# Patient Record
Sex: MALE | Race: WHITE | NOT HISPANIC OR LATINO | Employment: FULL TIME | URBAN - METROPOLITAN AREA
[De-identification: names, ages, dates, MRNs, and addresses within clinical notes are randomized per-mention and may not be internally consistent; named-entity substitution may affect disease eponyms.]

---

## 2021-10-14 ENCOUNTER — OFFICE VISIT (OUTPATIENT)
Dept: FAMILY MEDICINE | Facility: CLINIC | Age: 46
End: 2021-10-14
Payer: COMMERCIAL

## 2021-10-14 VITALS
DIASTOLIC BLOOD PRESSURE: 84 MMHG | RESPIRATION RATE: 20 BRPM | HEART RATE: 73 BPM | BODY MASS INDEX: 31.21 KG/M2 | OXYGEN SATURATION: 97 % | SYSTOLIC BLOOD PRESSURE: 136 MMHG | HEIGHT: 70 IN | WEIGHT: 218 LBS | TEMPERATURE: 98.6 F

## 2021-10-14 DIAGNOSIS — R31.9 HEMATURIA, UNSPECIFIED TYPE: Primary | ICD-10-CM

## 2021-10-14 DIAGNOSIS — R11.0 NAUSEA: ICD-10-CM

## 2021-10-14 DIAGNOSIS — R10.9 RIGHT FLANK PAIN: ICD-10-CM

## 2021-10-14 LAB
ALBUMIN UR-MCNC: ABNORMAL MG/DL
APPEARANCE UR: CLEAR
BACTERIA #/AREA URNS HPF: ABNORMAL /HPF
BILIRUB UR QL STRIP: NEGATIVE
COLOR UR AUTO: YELLOW
GLUCOSE UR STRIP-MCNC: NEGATIVE MG/DL
HGB UR QL STRIP: ABNORMAL
KETONES UR STRIP-MCNC: NEGATIVE MG/DL
LEUKOCYTE ESTERASE UR QL STRIP: NEGATIVE
NITRATE UR QL: NEGATIVE
PH UR STRIP: 6.5 [PH] (ref 5–7)
RBC #/AREA URNS AUTO: ABNORMAL /HPF
SP GR UR STRIP: 1.02 (ref 1–1.03)
UROBILINOGEN UR STRIP-ACNC: 0.2 E.U./DL
WBC #/AREA URNS AUTO: ABNORMAL /HPF

## 2021-10-14 PROCEDURE — 99204 OFFICE O/P NEW MOD 45 MIN: CPT | Performed by: PHYSICIAN ASSISTANT

## 2021-10-14 PROCEDURE — 81001 URINALYSIS AUTO W/SCOPE: CPT | Performed by: PHYSICIAN ASSISTANT

## 2021-10-14 RX ORDER — ONDANSETRON 4 MG/1
4 TABLET, ORALLY DISINTEGRATING ORAL EVERY 8 HOURS PRN
Qty: 5 TABLET | Refills: 0 | Status: SHIPPED | OUTPATIENT
Start: 2021-10-14 | End: 2021-11-10

## 2021-10-14 RX ORDER — TAMSULOSIN HYDROCHLORIDE 0.4 MG/1
0.4 CAPSULE ORAL DAILY
Qty: 7 CAPSULE | Refills: 0 | Status: SHIPPED | OUTPATIENT
Start: 2021-10-14 | End: 2021-11-10

## 2021-10-14 ASSESSMENT — ENCOUNTER SYMPTOMS
FEVER: 0
HEMATURIA: 1
LIGHT-HEADEDNESS: 0
DIARRHEA: 0
VOMITING: 0
NAUSEA: 1
NERVOUS/ANXIOUS: 0
FREQUENCY: 0
CONSTIPATION: 0
DIFFICULTY URINATING: 0
SHORTNESS OF BREATH: 0
ABDOMINAL PAIN: 1
DIZZINESS: 0
DYSURIA: 0
FLANK PAIN: 1

## 2021-10-14 ASSESSMENT — MIFFLIN-ST. JEOR: SCORE: 1875.09

## 2021-10-14 ASSESSMENT — PAIN SCALES - GENERAL: PAINLEVEL: SEVERE PAIN (7)

## 2021-10-14 NOTE — PATIENT INSTRUCTIONS
Your symptoms are concerning for a kidney stone.  Please see the handout at the end of the packet regarding kidney stones.    -We are completing a urine study and blood work today and will send results to [employer] or will call you if this is not set up.  -For further evaluation of the cause of your symptoms, I have placed an order for a CT scan.  Please call [phone] to schedule this.  I would like you to complete this before the end of the week if possible.  -To help any stones pass, I have prescribed Flomax.  -For nausea, I have prescribed Zofran.  -For pain management, I would like you to take ibuprofen as recommended on the bottle.    -Please schedule a follow-up with one of our primary care providers within the next month for a yearly physical.    -If you develop any severe symptoms such as fevers, vomiting, severe pain, or any other severe symptoms, please go to the emergency department.    Reach out with questions or concerns.    Take Care,   Fern Hussein PA-C        Patient Education     Blood in Urine (Hematuria)   Blood in your urine is called hematuria. Most of the time, the cause is not serious. But you should never ignore blood in the urine. Your healthcare provider can evaluate you to find the cause of the bleeding and treat it, if needed.   Types of hematuria    Gross hematuria. This means that the blood can be seen by the naked eye. The urine may look pinkish, brownish, or bright red.    Microscopic hematuria. This means that the urine is clear, but blood cells can be seen when urine is looked at under a microscope or tested in a lab.  Both types of hematuria can have the same causes. Neither one is more serious than the other. With either type, you may not have any other symptoms at all. Or you may have other symptoms such as:     Pain, pressure, or burning when you urinate    Belly pain    Back pain  No matter how much blood is found, the cause of the bleeding needs to be identified.   What  causes hematuria?  Causes of hematuria vary. They include things such as:     Injury    Strenuous exercise    Infection of the bladder, kidney, or prostate    Menstruation  Other reasons people may have blood in their urine are more serious. They include:       Blood-clotting disorders    Bladder or kidney cancer    Sickle cell disease    Inflammation of the kidney, urethra, bladder, or prostate  Many treatments are available for blood in the urine, depending on the cause.   Diagnosing hematuria  Your healthcare provider will first confirm that blood is in your urine. He or she will also take your health history and give you a physical exam. Then other tests are done to find exactly where the blood is coming from and why. Your provider will decide which tests will best figure out the cause of your hematuria. These are some common tests that may be done:     Lab tests (may include urinalysis, a urine culture, a urine cytology, and blood tests)    Cystoscopy    CT or CT urography    MRI or MR urography    Ultrasound of the kidney    Kidney biopsy  [employer] last reviewed this educational content on 6/1/2019 2000-2021 [employer]. All rights reserved. This information is not intended as a substitute for professional medical care. Always follow your healthcare professional's instructions.           Patient Education     Understanding Kidney Stones  Your kidneys are bean-shaped organs. They help filter extra salts, waste, and water from your body. You need to drink enough water every day to help flush the extra salts into your urine. Aim for 6 to 8 8-ounce cups every day.   What are kidney stones?  Kidney stones are made up of chemical crystals that separate out from urine. These crystals clump together to make stones. They may stay in the kidney or move into the urinary tract.    Why kidney stones form  Kidneys form stones for many reasons. If you don t drink enough water, for instance, you won t have  enough urine to dilute chemicals. Then the chemicals may form crystals, which can develop into stones. Here are some reasons why kidney stones form:     Fluid loss (dehydration). This can concentrate urine, causing stones to form.    Certain foods. Some foods contain large amounts of the chemicals that sometimes crystallize into stones. Eating foods that contain a lot of meat or salt can lead to more kidney stones.    Kidney infections. These infections foster stones by slowing urine flow or changing the acid balance of your urine.    Family history. If family members have had kidney stones, you re more likely to have them, too.    A lack of certain substances in your urine. Some substances can help protect you from forming stones. If you don t have enough of these in your urine, stone formation can increase.  Where stones form  Stones begin in the cup-shaped part of the kidney (calyx). Some stay in the calyx and grow. Others move into the kidney, pelvis, or into the ureter. There they can lodge, block the flow of urine, and cause pain.     Symptoms  Many stones cause sudden and severe pain and bloody urine. Others cause nausea or frequent, burning urination. Symptoms often depend on your stone s size and location. Fever may indicate a serious infection. Call your healthcare provider right away if you develop a fever.   [employer] last reviewed this educational content on 2/1/2020 2000-2021 [employer]. All rights reserved. This information is not intended as a substitute for professional medical care. Always follow your healthcare professional's instructions.

## 2021-10-14 NOTE — PROGRESS NOTES
Assessment & Plan     Hematuria, unspecified type  Right flank pain  Nausea  Patient is a 46-year-old male who presents to clinic due to 1 day of change in urine color that developed into hematuria as well as right flank pain starting 2 days ago.  Pain is now at right groin and rated 5/10.  Vital signs normal.  Physical exam significant for RLQ tenderness to palpation without rebound or guarding.  No CVA tenderness. No history of kidney stones.     Differential diagnosis includes kidney stone, mass, acute intrarenal abnormalities.  Symptoms are most consistent with kidney stone.  Patient unable to provide urine sample initially.  Will complete urinalysis as well as basic labs at end of visit.  Patient prescribed Flomax to help stone pass.  Recommended ibuprofen for pain management.  Discussed the importance of hydration.  Zofran prescribed for nausea.  Patient to follow-up with CT within the next 1-2 days for further evaluation.  Discussed importance of emergent follow-up for any new or worsening symptoms.  Patient agreeable.    - UA with Microscopic reflex to Culture - Clinic Collect  - tamsulosin (FLOMAX) 0.4 MG capsule; Take 1 capsule (0.4 mg) by mouth daily  - CT Abdomen Pelvis w/o Contrast; Future  - CBC with platelets; Future  - Basic metabolic panel  (Ca, Cl, CO2, Creat, Gluc, K, Na, BUN); Future  - Urine Microscopic  - ondansetron (ZOFRAN-ODT) 4 MG ODT tab; Take 1 tablet (4 mg) by mouth every 8 hours as needed for nausea    See Patient Instructions    Return in about 1 week (around 10/21/2021), or if symptoms worsen or fail to improve.    [facility] SAMUEL Jay is a 46 year old who presents for the following health issues     HPI     Genitourinary - Male  Onset/Duration: 1 day. Yesterday at work patient noticed a change in urine color which developed into hematuria today. Patient had right upper flank pain a few days ago. Pain is now at RLQ/groin rated  "5/10.  No history of kidney stones.  Description:   Dysuria (painful urination): no}  Hematuria (blood in urine): YES  Frequency: no  Waking at night to urinate: no  Hesitancy (delay in urine): no  Retention (unable to empty): no  Decrease in urinary flow: no  Incontinence: no  Progression of Symptoms:  worsening  Accompanying Signs & Symptoms:  Fever: no  Back/Flank pain: YES- right low back pain that radiates into the groin causing a pulling feeling  Urethral discharge: no  Testicle lumps/masses/pain: no  Nausea and/or vomiting: no  Abdominal pain: no  History:   History of frequent UTI s: no  History of kidney stones: no  History of hernias: no  Personal or Family history of Prostate problems: no  Sexually active: no  Precipitating or alleviating factors: None  Therapies tried and outcome: none        Review of Systems   Constitutional: Negative for fever.   Respiratory: Negative for shortness of breath.    Cardiovascular: Negative for chest pain.   Gastrointestinal: Positive for abdominal pain (RLQ) and nausea (mild). Negative for constipation, diarrhea and vomiting.   Genitourinary: Positive for flank pain (resolved ) and hematuria. Negative for difficulty urinating, dysuria, frequency and urgency.   Skin: Negative for rash.   Neurological: Negative for dizziness and light-headedness.   Psychiatric/Behavioral: The patient is not nervous/anxious.             Objective    /84   Pulse 73   Temp 98.6  F (37  C) (Tympanic)   Resp 20   Ht 1.778 m (5' 10\")   Wt 98.9 kg (218 lb)   SpO2 97%   BMI 31.28 kg/m    Body mass index is 31.28 kg/m .  Physical Exam  Vitals and nursing note reviewed.   Constitutional:       General: He is not in acute distress.     Appearance: Normal appearance.   HENT:      Head: Normocephalic and atraumatic.   Eyes:      Extraocular Movements: Extraocular movements intact.      Pupils: Pupils are equal, round, and reactive to light.   Cardiovascular:      Rate and Rhythm: Normal " rate and regular rhythm.      Heart sounds: Normal heart sounds.   Pulmonary:      Effort: Pulmonary effort is normal.      Breath sounds: Normal breath sounds.   Abdominal:      General: Bowel sounds are normal.      Palpations: Abdomen is soft.      Tenderness: There is abdominal tenderness (RLQ). There is no right CVA tenderness, left CVA tenderness, guarding or rebound.   Musculoskeletal:         General: Normal range of motion.      Cervical back: Normal range of motion.   Skin:     General: Skin is warm and dry.   Neurological:      General: No focal deficit present.      Mental Status: He is alert.   Psychiatric:         Mood and Affect: Mood normal.         Behavior: Behavior normal.

## 2021-10-15 ENCOUNTER — ANCILLARY PROCEDURE (OUTPATIENT)
Dept: CT IMAGING | Facility: CLINIC | Age: 46
End: 2021-10-15
Attending: PHYSICIAN ASSISTANT
Payer: COMMERCIAL

## 2021-10-15 ENCOUNTER — TELEPHONE (OUTPATIENT)
Dept: FAMILY MEDICINE | Facility: CLINIC | Age: 46
End: 2021-10-15

## 2021-10-15 DIAGNOSIS — N28.89 LEFT KIDNEY MASS: Primary | ICD-10-CM

## 2021-10-15 DIAGNOSIS — R31.9 HEMATURIA, UNSPECIFIED TYPE: ICD-10-CM

## 2021-10-15 DIAGNOSIS — R10.9 RIGHT FLANK PAIN: ICD-10-CM

## 2021-10-15 LAB — RADIOLOGIST FLAGS: NORMAL

## 2021-10-15 PROCEDURE — 74176 CT ABD & PELVIS W/O CONTRAST: CPT | Mod: TC | Performed by: RADIOLOGY

## 2021-10-15 NOTE — TELEPHONE ENCOUNTER
I left 2 messages for patient to call back to clinic regarding lab work. First message left yesterday after appointment and second left today. I forgot to bring patient to lab for blood draw after clinic visit 10/14. I left clin ic # to call back to set this up.  Yasmin Singh CMA

## 2021-10-18 ENCOUNTER — PRE VISIT (OUTPATIENT)
Dept: UROLOGY | Facility: CLINIC | Age: 46
End: 2021-10-18

## 2021-10-18 DIAGNOSIS — R31.0 GROSS HEMATURIA: Primary | ICD-10-CM

## 2021-10-19 ENCOUNTER — TELEPHONE (OUTPATIENT)
Dept: UROLOGY | Facility: CLINIC | Age: 46
End: 2021-10-19

## 2021-10-19 NOTE — TELEPHONE ENCOUNTER
----- Message from Lacey Cortes sent at 10/18/2021  7:17 PM CDT -----  Regarding: FW: Cystoscopy and also imaging prior to visit  Hello,  Could you schedule this pt for a CT urogram before his visit with Dr. Bledsoe on 10/20/2021.    Thank you  ----- Message -----  From: Vicki Bledsoe MD  Sent: 10/18/2021   6:24 PM CDT  To: Lacey Cortes  Subject: Cystoscopy and also imaging prior to visit       Donnie Rahman,     Please be ready to scope him. Also it'd be great if he can get a CT urogram prior to his visit with me.     Thanks

## 2021-10-19 NOTE — TELEPHONE ENCOUNTER
Reason for visit: cystoscopy     Relevant information: kidney mass, hematuria    Problem list There is no problem list on file for this patient.      Records/imaging/labs/orders: labs and imaging available. Ct urogram ordered.    Pt called: n/a    At Rooming: collect urine with Cx bladder kit. have a extra luer-agustina syringe available to collect urine during cysto. No urojet, give cipro.

## 2021-10-20 ENCOUNTER — OFFICE VISIT (OUTPATIENT)
Dept: UROLOGY | Facility: CLINIC | Age: 46
End: 2021-10-20
Attending: PHYSICIAN ASSISTANT
Payer: COMMERCIAL

## 2021-10-20 ENCOUNTER — LAB (OUTPATIENT)
Dept: LAB | Facility: CLINIC | Age: 46
End: 2021-10-20
Payer: COMMERCIAL

## 2021-10-20 VITALS
WEIGHT: 218 LBS | BODY MASS INDEX: 31.21 KG/M2 | DIASTOLIC BLOOD PRESSURE: 95 MMHG | HEIGHT: 70 IN | SYSTOLIC BLOOD PRESSURE: 150 MMHG | HEART RATE: 74 BPM

## 2021-10-20 DIAGNOSIS — R10.9 RIGHT FLANK PAIN: ICD-10-CM

## 2021-10-20 DIAGNOSIS — R31.9 HEMATURIA, UNSPECIFIED TYPE: ICD-10-CM

## 2021-10-20 DIAGNOSIS — R31.0 GROSS HEMATURIA: Primary | ICD-10-CM

## 2021-10-20 DIAGNOSIS — N28.89 LEFT KIDNEY MASS: ICD-10-CM

## 2021-10-20 LAB
ANION GAP SERPL CALCULATED.3IONS-SCNC: 3 MMOL/L (ref 3–14)
BUN SERPL-MCNC: 12 MG/DL (ref 7–30)
CALCIUM SERPL-MCNC: 8.9 MG/DL (ref 8.5–10.1)
CHLORIDE BLD-SCNC: 111 MMOL/L (ref 94–109)
CO2 SERPL-SCNC: 29 MMOL/L (ref 20–32)
CREAT SERPL-MCNC: 0.96 MG/DL (ref 0.66–1.25)
ERYTHROCYTE [DISTWIDTH] IN BLOOD BY AUTOMATED COUNT: 12.5 % (ref 10–15)
GFR SERPL CREATININE-BSD FRML MDRD: >90 ML/MIN/1.73M2
GLUCOSE BLD-MCNC: 129 MG/DL (ref 70–99)
HCT VFR BLD AUTO: 42 % (ref 40–53)
HGB BLD-MCNC: 14.4 G/DL (ref 13.3–17.7)
MCH RBC QN AUTO: 29.3 PG (ref 26.5–33)
MCHC RBC AUTO-ENTMCNC: 34.3 G/DL (ref 31.5–36.5)
MCV RBC AUTO: 86 FL (ref 78–100)
PLATELET # BLD AUTO: 229 10E3/UL (ref 150–450)
POTASSIUM BLD-SCNC: 4.2 MMOL/L (ref 3.4–5.3)
RBC # BLD AUTO: 4.91 10E6/UL (ref 4.4–5.9)
SODIUM SERPL-SCNC: 143 MMOL/L (ref 133–144)
WBC # BLD AUTO: 8 10E3/UL (ref 4–11)

## 2021-10-20 PROCEDURE — 80048 BASIC METABOLIC PNL TOTAL CA: CPT

## 2021-10-20 PROCEDURE — 88112 CYTOPATH CELL ENHANCE TECH: CPT | Mod: 26 | Performed by: PATHOLOGY

## 2021-10-20 PROCEDURE — 52000 CYSTOURETHROSCOPY: CPT | Performed by: UROLOGY

## 2021-10-20 PROCEDURE — 99204 OFFICE O/P NEW MOD 45 MIN: CPT | Mod: 25 | Performed by: UROLOGY

## 2021-10-20 PROCEDURE — 36415 COLL VENOUS BLD VENIPUNCTURE: CPT

## 2021-10-20 PROCEDURE — 85027 COMPLETE CBC AUTOMATED: CPT

## 2021-10-20 PROCEDURE — 88112 CYTOPATH CELL ENHANCE TECH: CPT | Mod: TC | Performed by: UROLOGY

## 2021-10-20 RX ORDER — CIPROFLOXACIN 500 MG/1
500 TABLET, FILM COATED ORAL ONCE
Status: DISCONTINUED | OUTPATIENT
Start: 2021-10-20 | End: 2021-11-16

## 2021-10-20 ASSESSMENT — PAIN SCALES - GENERAL: PAINLEVEL: NO PAIN (0)

## 2021-10-20 ASSESSMENT — MIFFLIN-ST. JEOR: SCORE: 1875.09

## 2021-10-20 NOTE — PROGRESS NOTES
Urology clinic   Renal mass           Chief Complaint:   Hematuria and renal mass            Consult or Referral:     Pierre Avila is a 46 year old male seen in consultation from Dr. Hussein.         History of Present Illness:    Pierre Avila is a very pleasant 46 year old male who presents with a history of gross  hematuria. He has not had similar symptoms in the past.There are not associated urinary symptoms.  he denies any flank or abdominal pain.      CT abdomen pelvis non con was obtained which showed a large left upper pole renal mass. He is then referred here for further evaluation.     Concerning typical risk factors for urinary tract malignancies, the patient does not have a family history of  malignancies.    he has not received pelvic radiation, exposure to known carcinogenic agents such as benzenes, aromatic amines, alkylating agents or chronic indwelling foreign bodies in the urinary tract in the past.  Furthermore he does not have a history of recurrent urinary tract infections in the past.    The patient is not currently smoking cigarettes.  he is a former smoker.Finally, he does not have a history of analgesic abuse.           Past Medical History:   None          Past Surgical History:   None          Medications     Current Outpatient Medications   Medication     NO ACTIVE MEDICATIONS     ondansetron (ZOFRAN-ODT) 4 MG ODT tab     tamsulosin (FLOMAX) 0.4 MG capsule     Current Facility-Administered Medications   Medication     ciprofloxacin (CIPRO) tablet 500 mg            Family History:     Family History   Problem Relation Age of Onset     Asthma Father      C.A.D. No family hx of      Diabetes No family hx of      Hypertension No family hx of      Cerebrovascular Disease No family hx of      Cancer Father         melanoma     Cancer Sister         melanoma            Social History:     Social History     Socioeconomic History     Marital status: Single     Spouse name: Not on file  "    Number of children: Not on file     Years of education: Not on file     Highest education level: Not on file   Occupational History     Not on file   Tobacco Use     Smoking status: Former Smoker     Smokeless tobacco: Former User   Substance and Sexual Activity     Alcohol use: Not on file     Drug use: Not on file     Sexual activity: Not on file   Other Topics Concern     Parent/sibling w/ CABG, MI or angioplasty before 65F 55M? No   Social History Narrative     Not on file     Social Determinants of Health     Financial Resource Strain:      Difficulty of Paying Living Expenses:    Food Insecurity:      Worried About Running Out of Food in the Last Year:      Ran Out of Food in the Last Year:    Transportation Needs:      Lack of Transportation (Medical):      Lack of Transportation (Non-Medical):    Physical Activity:      Days of Exercise per Week:      Minutes of Exercise per Session:    Stress:      Feeling of Stress :    Social Connections:      Frequency of Communication with Friends and Family:      Frequency of Social Gatherings with Friends and Family:      Attends Denominational Services:      Active Member of Clubs or Organizations:      Attends Club or Organization Meetings:      Marital Status:    Intimate Partner Violence:      Fear of Current or Ex-Partner:      Emotionally Abused:      Physically Abused:      Sexually Abused:             Allergies:   Seasonal allergies         Review of Systems:  From intake questionnaire     Negative 14 system review except as noted on HPI, nurse's note.         Physical Exam:     Patient is a 46 year old  male    Vitals: Blood pressure (!) 150/95, pulse 74, height 1.778 m (5' 10\"), weight 98.9 kg (218 lb). Body mass index is 31.28 kg/m .  General Appearance Adult: Alert, no acute distress, oriented  HENT: throat/mouth:normal, good dentition  Lungs: no respiratory distress, or pursed lip breathing  Heart: No obvious jugular venous distension present  Abdomen: " soft, nontender, no organomegaly or masses, scars noted  Lymphatics: No cervical or supraclavicular adenopathy  Musculoskeltal: extremities normal, no peripheral edema  Skin: no visible suspicious lesions or rashes  Neuro: Alert, oriented, speech and mentation normal  Psych: affect and mood normal  Gait: Normal  : circumcised        Labs and Pathology:    I reviewed all applicable laboratory and pathology data and went over findings with patient  Significant for     Lab Results   Component Value Date    WBC 8.0 10/20/2021     Lab Results   Component Value Date    RBC 4.91 10/20/2021     Lab Results   Component Value Date    HGB 14.4 10/20/2021     Lab Results   Component Value Date    HCT 42.0 10/20/2021     No components found for: MCT  Lab Results   Component Value Date    MCV 86 10/20/2021     Lab Results   Component Value Date    MCH 29.3 10/20/2021     Lab Results   Component Value Date    MCHC 34.3 10/20/2021     Lab Results   Component Value Date    RDW 12.5 10/20/2021     Lab Results   Component Value Date     10/20/2021       Last Comprehensive Metabolic Panel:  Sodium   Date Value Ref Range Status   10/20/2021 143 133 - 144 mmol/L Final     Potassium   Date Value Ref Range Status   10/20/2021 4.2 3.4 - 5.3 mmol/L Final     Chloride   Date Value Ref Range Status   10/20/2021 111 (H) 94 - 109 mmol/L Final     Carbon Dioxide (CO2)   Date Value Ref Range Status   10/20/2021 29 20 - 32 mmol/L Final     Anion Gap   Date Value Ref Range Status   10/20/2021 3 3 - 14 mmol/L Final     Glucose   Date Value Ref Range Status   10/20/2021 129 (H) 70 - 99 mg/dL Final     Urea Nitrogen   Date Value Ref Range Status   10/20/2021 12 7 - 30 mg/dL Final     Creatinine   Date Value Ref Range Status   10/20/2021 0.96 0.66 - 1.25 mg/dL Final     GFR Estimate   Date Value Ref Range Status   10/20/2021 >90 >60 mL/min/1.73m2 Final     Comment:     As of July 11, 2021, eGFR is calculated by the CKD-EPI creatinine equation,  without race adjustment. eGFR can be influenced by muscle mass, exercise, and diet. The reported eGFR is an estimation only and is only applicable if the renal function is stable.     Calcium   Date Value Ref Range Status   10/20/2021 8.9 8.5 - 10.1 mg/dL Final       INR/Prothrombin Time    Creatinine   Date Value Ref Range Status   10/20/2021 0.96 0.66 - 1.25 mg/dL Final        No results found for: PSA        Imaging:    I reviewed all applicable imaging and went over findings with patient.  Significant for large left upper pole mass no clear retroperitoneal lymphadenopathy limited interpretation due to lack of contrast     CT Abdomen Pelvis w/o Contrast    Result Date: 10/15/2021  CT ABDOMEN AND PELVIS WITHOUT CONTRAST   10/15/2021 10:56 AM HISTORY: Hematuria, unknown cause. Suspected kidney stone on right. Hematuria, unspecified type. Right flank pain. TECHNIQUE: Noncontrast CT abdomen and pelvis was performed. Radiation dose for this scan was reduced using automated exposure control, adjustment of the mA and/or kV according to patient size, or iterative reconstruction technique. COMPARISON: None available FINDINGS: Lower chest: Lung bases are clear. Right kidney: No radiodense kidney/ureteral stone or hydronephrosis. Left kidney: No radiodense kidney/ureteral stones or hydronephrosis. There is an approximately 4.5 cm ill-defined masslike area in the upper pole of the left kidney (series 3 image 78 and series 4 image 60), indeterminate could represent hemorrhagic/proteinaceous cyst versus renal neoplasm. Urinary bladder: Distended and unremarkable. Remainder of the abdomen and pelvis: Limited evaluation of the abdominal organs due to lack of IV contrast. Hepatobiliary: Diffuse hypoattenuation of the liver, likely due to underlying hepatic cirrhosis. Otherwise, the unenhanced liver and gallbladder are grossly unremarkable. Pancreas: The unenhanced pancreas is grossly unremarkable. Spleen: No splenomegaly. Adrenal  glands: No adrenal nodules. Bowels: No abnormally dilated bowel loops. The appendix is visualized and appears normal. Peritoneum: No significant free fluid in the abdomen or pelvis. No free peritoneal or portal venous gas. 2.3 cm calcified focus along the posterior aspect of the urinary bladder, of indeterminate etiology. Pelvic organs: Unremarkable. Vascular: Scattered atherosclerotic vascular calcification of the abdominal aorta and iliac vessels. Lymph nodes: No significant abdominopelvic lymphadenopathy. Bones and soft tissue: No suspicious osseous lesion.      IMPRESSION: 1. No radiodense kidney/ureteral stones or hydronephrosis in either kidney. 2. There is approximately 4.5 cm ill-defined masslike area in the upper pole of the left kidney, indeterminate, could represent hemorrhagic/proteinaceous cyst versus renal neoplasm. Recommend further evaluation with CT or MRI renal mass protocol. 3. Significant hepatic steatosis. [Consider Follow Up: Possible left renal mass.] This report will be copied to the [facility] to ensure a provider acknowledges the finding. [facility] is available Monday through Friday 8am-3:30 pm.  BIANCA HACKETT MD   SYSTEM ID:  [id number]          Outside and Past Medical records    I spent 10 minutes reviewing outside and past medical records.    Cystoscopy procedure     After sterile preparation and draping of the patient,  a 16-Swedish flexible cystoscope was introduced via the urethra.  It was passed without difficulty into the bladder.  The urethra was open without evidence of stricture.  The ureteral orifices were orthotopic.  The bladder mucosa was evaluated using both antegrade and retroflex views and this showed no evidence of any lesions or trabeculation. There was no bloody efflux from left ureteral orifice. 60 cc of urine was then collected and sent off for cytology.  Scope was then removed and patient tolerated the procedure relatively well.     10 total  minutes was spent on cystoscopy procedure alone.                 Assessment and Plan:     Assessment     Left renal mass       I explained to him that lack of contrast significantly limits my ability to define any specific features of the mass including it's origin (from kidney parenchyma or from collecting system) so we need to obtain better imaging as the first step. This is most likely a primary renal cell cancer. Cystoscopy today showed no abnormalities. Hematuria could be due to presence of upper tract urothelial tumor or invasion of collecting system by renal cell carcinoma.       Plan  Obtain CT urogram and chest x ray   Will discuss results once available         45 total minutes spent on the date of the encounter including direct interaction with the patient, performing chart review, documentation and further activities as noted above, exclusive of the time spent on performing cystoscopy.         Vicki Bledsoe MD   Department of Urology   [facility]

## 2021-10-20 NOTE — LETTER
10/20/2021       RE: Pierre Avila  [address] [facility] [address]     Dear Colleague,    Thank you for referring your patient, Pierre Avila, to the [facility] UROLOGY [facility] [place] [facility]. Please see a copy of my visit note below.    Urology clinic   Renal mass           Chief Complaint:   Hematuria and renal mass            Consult or Referral:     Pierre Avila is a 46 year old male seen in consultation from Dr. Hussein.         History of Present Illness:    Pierre Avila is a very pleasant 46 year old male who presents with a history of gross  hematuria. He has not had similar symptoms in the past.There are not associated urinary symptoms.  he denies any flank or abdominal pain.      CT abdomen pelvis non con was obtained which showed a large left upper pole renal mass. He is then referred here for further evaluation.     Concerning typical risk factors for urinary tract malignancies, the patient does not have a family history of  malignancies.    he has not received pelvic radiation, exposure to known carcinogenic agents such as benzenes, aromatic amines, alkylating agents or chronic indwelling foreign bodies in the urinary tract in the past.  Furthermore he does not have a history of recurrent urinary tract infections in the past.    The patient is not currently smoking cigarettes.  he is a former smoker.Finally, he does not have a history of analgesic abuse.           Past Medical History:   None          Past Surgical History:   None          Medications     Current Outpatient Medications   Medication     NO ACTIVE MEDICATIONS     ondansetron (ZOFRAN-ODT) 4 MG ODT tab     tamsulosin (FLOMAX) 0.4 MG capsule     Current Facility-Administered Medications   Medication     ciprofloxacin (CIPRO) tablet 500 mg            Family History:     Family History   Problem Relation Age of Onset     Asthma Father      C.A.D. No family hx  of      Diabetes No family hx of      Hypertension No family hx of      Cerebrovascular Disease No family hx of      Cancer Father         melanoma     Cancer Sister         melanoma            Social History:     Social History     Socioeconomic History     Marital status: Single     Spouse name: Not on file     Number of children: Not on file     Years of education: Not on file     Highest education level: Not on file   Occupational History     Not on file   Tobacco Use     Smoking status: Former Smoker     Smokeless tobacco: Former User   Substance and Sexual Activity     Alcohol use: Not on file     Drug use: Not on file     Sexual activity: Not on file   Other Topics Concern     Parent/sibling w/ CABG, MI or angioplasty before 65F 55M? No   Social History Narrative     Not on file     Social Determinants of Health     Financial Resource Strain:      Difficulty of Paying Living Expenses:    Food Insecurity:      Worried About Running Out of Food in the Last Year:      Ran Out of Food in the Last Year:    Transportation Needs:      Lack of Transportation (Medical):      Lack of Transportation (Non-Medical):    Physical Activity:      Days of Exercise per Week:      Minutes of Exercise per Session:    Stress:      Feeling of Stress :    Social Connections:      Frequency of Communication with Friends and Family:      Frequency of Social Gatherings with Friends and Family:      Attends Jew Services:      Active Member of Clubs or Organizations:      Attends Club or Organization Meetings:      Marital Status:    Intimate Partner Violence:      Fear of Current or Ex-Partner:      Emotionally Abused:      Physically Abused:      Sexually Abused:             Allergies:   Seasonal allergies         Review of Systems:  From intake questionnaire     Negative 14 system review except as noted on HPI, nurse's note.         Physical Exam:     Patient is a 46 year old  male    Vitals: Blood pressure (!) 150/95, pulse 74,  "height 1.778 m (5' 10\"), weight 98.9 kg (218 lb). Body mass index is 31.28 kg/m .  General Appearance Adult: Alert, no acute distress, oriented  HENT: throat/mouth:normal, good dentition  Lungs: no respiratory distress, or pursed lip breathing  Heart: No obvious jugular venous distension present  Abdomen: soft, nontender, no organomegaly or masses, scars noted  Lymphatics: No cervical or supraclavicular adenopathy  Musculoskeltal: extremities normal, no peripheral edema  Skin: no visible suspicious lesions or rashes  Neuro: Alert, oriented, speech and mentation normal  Psych: affect and mood normal  Gait: Normal  : circumcised        Labs and Pathology:    I reviewed all applicable laboratory and pathology data and went over findings with patient  Significant for     Lab Results   Component Value Date    WBC 8.0 10/20/2021     Lab Results   Component Value Date    RBC 4.91 10/20/2021     Lab Results   Component Value Date    HGB 14.4 10/20/2021     Lab Results   Component Value Date    HCT 42.0 10/20/2021     No components found for: MCT  Lab Results   Component Value Date    MCV 86 10/20/2021     Lab Results   Component Value Date    MCH 29.3 10/20/2021     Lab Results   Component Value Date    MCHC 34.3 10/20/2021     Lab Results   Component Value Date    RDW 12.5 10/20/2021     Lab Results   Component Value Date     10/20/2021       Last Comprehensive Metabolic Panel:  Sodium   Date Value Ref Range Status   10/20/2021 143 133 - 144 mmol/L Final     Potassium   Date Value Ref Range Status   10/20/2021 4.2 3.4 - 5.3 mmol/L Final     Chloride   Date Value Ref Range Status   10/20/2021 111 (H) 94 - 109 mmol/L Final     Carbon Dioxide (CO2)   Date Value Ref Range Status   10/20/2021 29 20 - 32 mmol/L Final     Anion Gap   Date Value Ref Range Status   10/20/2021 3 3 - 14 mmol/L Final     Glucose   Date Value Ref Range Status   10/20/2021 129 (H) 70 - 99 mg/dL Final     Urea Nitrogen   Date Value Ref Range " Status   10/20/2021 12 7 - 30 mg/dL Final     Creatinine   Date Value Ref Range Status   10/20/2021 0.96 0.66 - 1.25 mg/dL Final     GFR Estimate   Date Value Ref Range Status   10/20/2021 >90 >60 mL/min/1.73m2 Final     Comment:     As of July 11, 2021, eGFR is calculated by the CKD-EPI creatinine equation, without race adjustment. eGFR can be influenced by muscle mass, exercise, and diet. The reported eGFR is an estimation only and is only applicable if the renal function is stable.     Calcium   Date Value Ref Range Status   10/20/2021 8.9 8.5 - 10.1 mg/dL Final       INR/Prothrombin Time    Creatinine   Date Value Ref Range Status   10/20/2021 0.96 0.66 - 1.25 mg/dL Final        No results found for: PSA        Imaging:    I reviewed all applicable imaging and went over findings with patient.  Significant for large left upper pole mass no clear retroperitoneal lymphadenopathy limited interpretation due to lack of contrast     CT Abdomen Pelvis w/o Contrast    Result Date: 10/15/2021  CT ABDOMEN AND PELVIS WITHOUT CONTRAST   10/15/2021 10:56 AM HISTORY: Hematuria, unknown cause. Suspected kidney stone on right. Hematuria, unspecified type. Right flank pain. TECHNIQUE: Noncontrast CT abdomen and pelvis was performed. Radiation dose for this scan was reduced using automated exposure control, adjustment of the mA and/or kV according to patient size, or iterative reconstruction technique. COMPARISON: None available FINDINGS: Lower chest: Lung bases are clear. Right kidney: No radiodense kidney/ureteral stone or hydronephrosis. Left kidney: No radiodense kidney/ureteral stones or hydronephrosis. There is an approximately 4.5 cm ill-defined masslike area in the upper pole of the left kidney (series 3 image 78 and series 4 image 60), indeterminate could represent hemorrhagic/proteinaceous cyst versus renal neoplasm. Urinary bladder: Distended and unremarkable. Remainder of the abdomen and pelvis: Limited evaluation of  the abdominal organs due to lack of IV contrast. Hepatobiliary: Diffuse hypoattenuation of the liver, likely due to underlying hepatic cirrhosis. Otherwise, the unenhanced liver and gallbladder are grossly unremarkable. Pancreas: The unenhanced pancreas is grossly unremarkable. Spleen: No splenomegaly. Adrenal glands: No adrenal nodules. Bowels: No abnormally dilated bowel loops. The appendix is visualized and appears normal. Peritoneum: No significant free fluid in the abdomen or pelvis. No free peritoneal or portal venous gas. 2.3 cm calcified focus along the posterior aspect of the urinary bladder, of indeterminate etiology. Pelvic organs: Unremarkable. Vascular: Scattered atherosclerotic vascular calcification of the abdominal aorta and iliac vessels. Lymph nodes: No significant abdominopelvic lymphadenopathy. Bones and soft tissue: No suspicious osseous lesion.      IMPRESSION: 1. No radiodense kidney/ureteral stones or hydronephrosis in either kidney. 2. There is approximately 4.5 cm ill-defined masslike area in the upper pole of the left kidney, indeterminate, could represent hemorrhagic/proteinaceous cyst versus renal neoplasm. Recommend further evaluation with CT or MRI renal mass protocol. 3. Significant hepatic steatosis. [Consider Follow Up: Possible left renal mass.] This report will be copied to the [facility] to ensure a provider acknowledges the finding. [facility] Center is available Monday through Friday 8am-3:30 pm.  BIANCA HACKETT MD   SYSTEM ID:  [id number]          Outside and Past Medical records    I spent 10 minutes reviewing outside and past medical records.    Cystoscopy procedure     After sterile preparation and draping of the patient,  a 16-Maltese flexible cystoscope was introduced via the urethra.  It was passed without difficulty into the bladder.  The urethra was open without evidence of stricture.  The ureteral orifices were orthotopic.  The bladder mucosa was evaluated  using both antegrade and retroflex views and this showed no evidence of any lesions or trabeculation. There was no bloody efflux from left ureteral orifice. 60 cc of urine was then collected and sent off for cytology.  Scope was then removed and patient tolerated the procedure relatively well.     10 total minutes was spent on cystoscopy procedure alone.                 Assessment and Plan:     Assessment     Left renal mass       I explained to him that lack of contrast significantly limits my ability to define any specific features of the mass including it's origin (from kidney parenchyma or from collecting system) so we need to obtain better imaging as the first step. This is most likely a primary renal cell cancer. Cystoscopy today showed no abnormalities. Hematuria could be due to presence of upper tract urothelial tumor or invasion of collecting system by renal cell carcinoma.       Plan  Obtain CT urogram and chest x ray   Will discuss results once available         45 total minutes spent on the date of the encounter including direct interaction with the patient, performing chart review, documentation and further activities as noted above, exclusive of the time spent on performing cystoscopy.         Vicki Bledsoe MD   Department of Urology   [facility]

## 2021-10-20 NOTE — NURSING NOTE
"Chief Complaint   Patient presents with    Cystoscopy     kidney mass, hematuria       Blood pressure (!) 150/95, pulse 74, height 1.778 m (5' 10\"), weight 98.9 kg (218 lb). Body mass index is 31.28 kg/m .    There is no problem list on file for this patient.      Allergies   Allergen Reactions    Seasonal Allergies        Current Outpatient Medications   Medication Sig Dispense Refill    NO ACTIVE MEDICATIONS       ondansetron (ZOFRAN-ODT) 4 MG ODT tab Take 1 tablet (4 mg) by mouth every 8 hours as needed for nausea (Patient not taking: Reported on 10/20/2021) 5 tablet 0    tamsulosin (FLOMAX) 0.4 MG capsule Take 1 capsule (0.4 mg) by mouth daily (Patient not taking: Reported on 10/20/2021) 7 capsule 0       Social History     Tobacco Use    Smoking status: Former Smoker    Smokeless tobacco: Former User   Substance Use Topics    Alcohol use: None    Drug use: None       Invasive Procedure Safety Checklist:    Procedure: Cystoscopy    Action: Complete sections and checkboxes as appropriate.    Pre-procedure:  1. Patient ID Verified with 2 identifiers (Jennifer and  or MRN) : YES    2. Procedure and site verified with patient/designee (when able) : YES    3. Accurate consent documentation in medical record : YES    4. H&P (or appropriate assessment) documented in medical record : N/A  H&P must be up to 30 days prior to procedure an updated within 24 hours of                 Procedure as applicable.     5. Relevant diagnostic and radiology test results appropriately labeled and displayed as applicable : YES    6. Blood products, implants, devices, and/or special equipment available for the procedure as applicable : YES    7. Procedure site(s) marked with provider initials [Exclusions: none] : NO    8. Marking not required. Reason : Yes  Procedure does not require site marking    Time Out:     Time-Out performed immediately prior to starting procedure, including verbal and active participation of all team members " addressing: YES    1. Correct patient identity.  2. Confirmed that the correct side and site are marked.  3. An accurate procedure to be done.  4. Agreement on the procedure to be done.  5. Correct patient position.  6. Relevant images and results are properly labeled and appropriately displayed.  7. The need to administer antibiotics or fluids for irrigation purposes during the procedure as applicable.  8. Safety precautions based on patient history or medication use.    During Procedure: Verification of correct person, site, and procedure occurs any time the responsibility for care of the patient is transferred to another member of the care team.  The following medication was given:     MEDICATION:  Lidocaine without epinephrine 2% jelly  ROUTE: urethral   SITE: urethral   DOSE: 10 mL  LOT #: [id number]  : [employer]  EXPIRATION DATE: 4-23  NDC#: [address]-4   Was there drug waste? No    Prior to med admin, verified patient identity using patient's name and date of birth.  Due to med administration, patient instructed to remain in clinic for 15 minutes  afterwards, and to report any adverse reaction to me immediately.    Drug Amount Wasted:  None.  Vial/Syringe: Syringe      The following medication was given:     MEDICATION:  Ciprofloxacin  ROUTE: PO  SITE: Medication was given orally   DOSE: 500mg  LOT #: [id number]  : [employer]  EXPIRATION DATE: 11/2022  NDC#: 4359-1774-02   Was there drug waste? No    Prior to med admin, verified patient identity using patient's name and date of birth.  Due to med administration, patient instructed to remain in clinic for 15 minutes  afterwards, and to report any adverse reaction to me immediately.    Drug Amount Wasted:  None.  Vial/Syringe: Syringe      Alpesh Acevedo  10/20/2021  12:55 PM

## 2021-10-21 ENCOUNTER — ANCILLARY PROCEDURE (OUTPATIENT)
Dept: CT IMAGING | Facility: CLINIC | Age: 46
End: 2021-10-21
Attending: UROLOGY
Payer: COMMERCIAL

## 2021-10-21 DIAGNOSIS — R31.0 GROSS HEMATURIA: ICD-10-CM

## 2021-10-21 LAB
PATH REPORT.COMMENTS IMP SPEC: NORMAL
PATH REPORT.FINAL DX SPEC: NORMAL
PATH REPORT.GROSS SPEC: NORMAL
PATH REPORT.MICROSCOPIC SPEC OTHER STN: NORMAL
PATH REPORT.RELEVANT HX SPEC: NORMAL

## 2021-10-21 PROCEDURE — 74178 CT ABD&PLV WO CNTR FLWD CNTR: CPT | Mod: TC | Performed by: RADIOLOGY

## 2021-10-21 RX ORDER — IOPAMIDOL 755 MG/ML
100 INJECTION, SOLUTION INTRAVASCULAR ONCE
Status: COMPLETED | OUTPATIENT
Start: 2021-10-21 | End: 2021-10-21

## 2021-10-21 RX ADMIN — IOPAMIDOL 100 ML: 755 INJECTION, SOLUTION INTRAVASCULAR at 09:21

## 2021-10-22 DIAGNOSIS — N28.89 RENAL MASS: Primary | ICD-10-CM

## 2021-10-22 DIAGNOSIS — N28.89 LEFT KIDNEY MASS: Primary | ICD-10-CM

## 2021-10-22 RX ORDER — CIPROFLOXACIN 2 MG/ML
400 INJECTION, SOLUTION INTRAVENOUS
Status: CANCELLED | OUTPATIENT
Start: 2021-10-22

## 2021-10-23 ENCOUNTER — HEALTH MAINTENANCE LETTER (OUTPATIENT)
Age: 46
End: 2021-10-23

## 2021-10-25 ENCOUNTER — TELEPHONE (OUTPATIENT)
Dept: UROLOGY | Facility: CLINIC | Age: 46
End: 2021-10-25

## 2021-10-25 NOTE — TELEPHONE ENCOUNTER
[facility] Call Center    Phone Message    May a detailed message be left on voicemail: yes     Reason for Call: Other: Pt calling to schedule outpt biopsy and would like to provide family history.  Please call Pt to discuss.      Action Taken: Message routed to:  [facility] Urology    Travel Screening: Not Applicable

## 2021-10-26 ENCOUNTER — NURSE TRIAGE (OUTPATIENT)
Dept: NURSING | Facility: CLINIC | Age: 46
End: 2021-10-26

## 2021-10-27 DIAGNOSIS — Z11.59 ENCOUNTER FOR SCREENING FOR OTHER VIRAL DISEASES: ICD-10-CM

## 2021-10-27 PROBLEM — N28.89 RENAL MASS: Status: ACTIVE | Noted: 2021-10-27

## 2021-10-28 ENCOUNTER — TELEPHONE (OUTPATIENT)
Dept: UROLOGY | Facility: CLINIC | Age: 46
End: 2021-10-28

## 2021-10-28 NOTE — CONFIDENTIAL NOTE
Patient is scheduled for surgery with Dr. Bledsoe    Spoke with: Pierre Pardo voice mail with: n/a    Date of Surgery: 11/12/21    Location: [facility] OR    H&P: Scheduled with [facility]    Pre-procedure COVID-19 Test: 11/8/21    Additional imaging/appointments: n/a    Surgery packet: to be mailed by 10/28/21     Additional comments: n/a

## 2021-11-03 ENCOUNTER — PATIENT OUTREACH (OUTPATIENT)
Dept: UROLOGY | Facility: CLINIC | Age: 46
End: 2021-11-03

## 2021-11-03 ENCOUNTER — MYC MEDICAL ADVICE (OUTPATIENT)
Dept: UROLOGY | Facility: CLINIC | Age: 46
End: 2021-11-03
Payer: COMMERCIAL

## 2021-11-04 ENCOUNTER — TELEPHONE (OUTPATIENT)
Dept: UROLOGY | Facility: CLINIC | Age: 46
End: 2021-11-04

## 2021-11-04 NOTE — CONFIDENTIAL NOTE
Called to reschedule surgery with Dr Bledsoe from 11/12/21 to 11/16/21 at [facility] OR due to Dr Bledsoe's schedule.  Patient is aware of the update.

## 2021-11-05 ENCOUNTER — TELEPHONE (OUTPATIENT)
Dept: UROLOGY | Facility: CLINIC | Age: 46
End: 2021-11-05

## 2021-11-05 NOTE — TELEPHONE ENCOUNTER
Spoke with pt and confirmed appointment time change for 12/01/2021.    Lacey Cortes MA  11/05/2021

## 2021-11-06 ENCOUNTER — MEDICAL CORRESPONDENCE (OUTPATIENT)
Dept: HEALTH INFORMATION MANAGEMENT | Facility: CLINIC | Age: 46
End: 2021-11-06

## 2021-11-08 DIAGNOSIS — Z11.59 ENCOUNTER FOR SCREENING FOR OTHER VIRAL DISEASES: ICD-10-CM

## 2021-11-10 ENCOUNTER — ANCILLARY PROCEDURE (OUTPATIENT)
Dept: GENERAL RADIOLOGY | Facility: CLINIC | Age: 46
End: 2021-11-10
Attending: UROLOGY
Payer: COMMERCIAL

## 2021-11-10 ENCOUNTER — OFFICE VISIT (OUTPATIENT)
Dept: FAMILY MEDICINE | Facility: CLINIC | Age: 46
End: 2021-11-10
Payer: COMMERCIAL

## 2021-11-10 VITALS
DIASTOLIC BLOOD PRESSURE: 80 MMHG | WEIGHT: 211 LBS | HEIGHT: 70 IN | HEART RATE: 73 BPM | BODY MASS INDEX: 30.21 KG/M2 | RESPIRATION RATE: 16 BRPM | SYSTOLIC BLOOD PRESSURE: 129 MMHG | OXYGEN SATURATION: 99 % | TEMPERATURE: 97.4 F

## 2021-11-10 DIAGNOSIS — N28.89 LEFT KIDNEY MASS: ICD-10-CM

## 2021-11-10 DIAGNOSIS — R31.9 HEMATURIA, UNSPECIFIED TYPE: ICD-10-CM

## 2021-11-10 DIAGNOSIS — Z01.818 PREOP GENERAL PHYSICAL EXAM: Primary | ICD-10-CM

## 2021-11-10 LAB
ALBUMIN UR-MCNC: NEGATIVE MG/DL
APPEARANCE UR: CLEAR
BILIRUB UR QL STRIP: NEGATIVE
COLOR UR AUTO: YELLOW
GLUCOSE UR STRIP-MCNC: NEGATIVE MG/DL
HGB UR QL STRIP: ABNORMAL
KETONES UR STRIP-MCNC: NEGATIVE MG/DL
LEUKOCYTE ESTERASE UR QL STRIP: NEGATIVE
NITRATE UR QL: NEGATIVE
PH UR STRIP: 6 [PH] (ref 5–7)
RBC #/AREA URNS AUTO: ABNORMAL /HPF
SP GR UR STRIP: <=1.005 (ref 1–1.03)
SQUAMOUS #/AREA URNS AUTO: ABNORMAL /LPF
UROBILINOGEN UR STRIP-ACNC: 0.2 E.U./DL
WBC #/AREA URNS AUTO: ABNORMAL /HPF

## 2021-11-10 PROCEDURE — 71046 X-RAY EXAM CHEST 2 VIEWS: CPT | Performed by: RADIOLOGY

## 2021-11-10 PROCEDURE — 81001 URINALYSIS AUTO W/SCOPE: CPT | Performed by: PHYSICIAN ASSISTANT

## 2021-11-10 PROCEDURE — 99214 OFFICE O/P EST MOD 30 MIN: CPT | Performed by: PHYSICIAN ASSISTANT

## 2021-11-10 ASSESSMENT — PAIN SCALES - GENERAL: PAINLEVEL: NO PAIN (0)

## 2021-11-10 ASSESSMENT — MIFFLIN-ST. JEOR: SCORE: 1843.34

## 2021-11-10 NOTE — PATIENT INSTRUCTIONS

## 2021-11-10 NOTE — PROGRESS NOTES
[facility]  [address] [facility] [address]  Phone: [phone]  Primary Provider: Ren Owen  Pre-op Performing Provider: YAMEL MELENDEZ      PREOPERATIVE EVALUATION:  Today's date: 11/10/2021    Pierre Avila is a 46 year old male who presents for a preoperative evaluation.    Surgical Information:  Surgery/Procedure: Cystouroscopy  Surgery Location: [place]   Surgeon: Ladi  Surgery Date: 11/16/21  Time of Surgery: 9:20am  Where patient plans to recover: At home with family  Fax number for surgical facility: Note does not need to be faxed, will be available electronically in Epic.    Type of Anesthesia Anticipated: to be determined    Assessment & Plan     The proposed surgical procedure is considered LOW risk.      ICD-10-CM    1. Preop general physical exam  Z01.818 UA Macro with Reflex to Micro and Culture - lab collect     UA Macro with Reflex to Micro and Culture - lab collect   2. Left kidney mass  N28.89    3. Hematuria, unspecified type  R31.9        Risks and Recommendations:  The patient has the following additional risks and recommendations for perioperative complications:   - No identified additional risk factors other than previously addressed    Medication Instructions:  Patient is on no chronic medications    RECOMMENDATION:  APPROVAL GIVEN to proceed with proposed procedure, without further diagnostic evaluation.        Subjective     HPI related to upcoming procedure: history of hematuria and found to have a renal and bladder mass.    Preop Questions 11/10/2021   1. Have you ever had a heart attack or stroke? No   2. Have you ever had surgery on your heart or blood vessels, such as a stent placement, a coronary artery bypass, or surgery on an artery in your head, neck, heart, or legs? No   3. Do you have chest pain with activity? No   4. Do you have a history of  heart failure? No   5. Do you currently have a cold, bronchitis or  symptoms of other infection? No   6. Do you have a cough, shortness of breath, or wheezing? No   7. Do you or anyone in your family have previous history of blood clots? No   8. Do you or does anyone in your family have a serious bleeding problem such as prolonged bleeding following surgeries or cuts? No   9. Have you ever had problems with anemia or been told to take iron pills? No   10. Have you had any abnormal blood loss such as black, tarry or bloody stools? No   11. Have you ever had a blood transfusion? No   12. Are you willing to have a blood transfusion if it is medically needed before, during, or after your surgery? NO -    13. Have you or any of your relatives ever had problems with anesthesia? No   14. Do you have sleep apnea, excessive snoring or daytime drowsiness? No   15. Do you have any artifical heart valves or other implanted medical devices like a pacemaker, defibrillator, or continuous glucose monitor? No   16. Do you have artificial joints? No   17. Are you allergic to latex? No     Status of Chronic Conditions:  See problem list for active medical problems.  Problems all longstanding and stable, except as noted/documented.  See ROS for pertinent symptoms related to these conditions.      Review of Systems  CONSTITUTIONAL: NEGATIVE for fever, chills, change in weight  INTEGUMENTARY/SKIN: NEGATIVE for worrisome rashes, moles or lesions  EYES: NEGATIVE for vision changes or irritation  ENT/MOUTH: NEGATIVE for ear, mouth and throat problems  RESP: NEGATIVE for significant cough or SOB  CV: NEGATIVE for chest pain, palpitations or peripheral edema  GI: NEGATIVE for nausea, abdominal pain, heartburn, or change in bowel habits  : NEGATIVE for frequency, dysuria, or hematuria  MUSCULOSKELETAL: NEGATIVE for significant arthralgias or myalgia  NEURO: NEGATIVE for weakness, dizziness or paresthesias  ENDOCRINE: NEGATIVE for temperature intolerance, skin/hair changes  HEME: NEGATIVE for bleeding  "problems  PSYCHIATRIC: NEGATIVE for changes in mood or affect    Patient Active Problem List    Diagnosis Date Noted     Renal mass 10/27/2021     Priority: Medium     Added automatically from request for surgery [id number]        History reviewed. No pertinent past medical history.  History reviewed. No pertinent surgical history.  No current outpatient medications on file.       Allergies   Allergen Reactions     Seasonal Allergies         Social History     Tobacco Use     Smoking status: Former Smoker     Smokeless tobacco: Former User   Substance Use Topics     Alcohol use: Not on file     Family History   Problem Relation Age of Onset     Asthma Father      Cancer Father         melanoma     Cancer Sister         melanoma     C.A.D. No family hx of      Diabetes No family hx of      Hypertension No family hx of      Cerebrovascular Disease No family hx of      History   Drug Use Unknown         Objective     /80   Pulse 73   Temp 97.4  F (36.3  C) (Tympanic)   Resp 16   Ht 1.778 m (5' 10\")   Wt 95.7 kg (211 lb)   SpO2 99%   BMI 30.28 kg/m      Physical Exam    GENERAL APPEARANCE: healthy, alert and no distress     EYES: EOMI,  PERRL     HENT: ear canals and TM's normal and nose and mouth without ulcers or lesions     NECK: no adenopathy, no asymmetry, masses, or scars and thyroid normal to palpation     RESP: lungs clear to auscultation - no rales, rhonchi or wheezes     CV: regular rates and rhythm, normal S1 S2, no S3 or S4 and no murmur, click or rub     ABDOMEN:  soft, nontender, no HSM or masses and bowel sounds normal     MS: extremities normal- no gross deformities noted, no evidence of inflammation in joints, FROM in all extremities.     SKIN: no suspicious lesions or rashes     NEURO: Normal strength and tone, sensory exam grossly normal, mentation intact and speech normal     PSYCH: mentation appears normal. and affect normal/bright     LYMPHATICS: No cervical adenopathy    Recent Labs "   Lab Test 10/20/21  0755 10/20/21  0754   HGB 14.4  --      --    NA  --  143   POTASSIUM  --  4.2   CR  --  0.96        Diagnostics:  Results for orders placed or performed in visit on 11/10/21   X-Ray Chest 2 Views     Status: None    Narrative    XR CHEST 2 VW 11/10/2021 9:00 AM    HISTORY: Left kidney mass    COMPARISON: CT 10/21/2021      Impression    IMPRESSION:Negative exam. No acute or suspicious chest findings. The  lungs are clear and there are no pleural effusions. Normal heart size.    MODESTA JOE MD         SYSTEM ID:  [id number]   Results for orders placed or performed in visit on 11/10/21   UA Macro with Reflex to Micro and Culture - lab collect     Status: Abnormal    Specimen: Urine, Clean Catch   Result Value Ref Range    Color Urine Yellow Colorless, Straw, Light Yellow, Yellow    Appearance Urine Clear Clear    Glucose Urine Negative Negative mg/dL    Bilirubin Urine Negative Negative    Ketones Urine Negative Negative mg/dL    Specific Gravity Urine <=1.005 1.003 - 1.035    Blood Urine Small (A) Negative    pH Urine 6.0 5.0 - 7.0    Protein Albumin Urine Negative Negative mg/dL    Urobilinogen Urine 0.2 0.2, 1.0 E.U./dL    Nitrite Urine Negative Negative    Leukocyte Esterase Urine Negative Negative   Urine Microscopic     Status: Abnormal   Result Value Ref Range    RBC Urine 0-2 0-2 /HPF /HPF    WBC Urine 0-5 0-5 /HPF /HPF    Squamous Epithelials Urine Few (A) None Seen /LPF    Narrative    Urine Culture not indicated       No labs were ordered during this visit.   No EKG required for low risk surgery (cataract, skin procedure, breast biopsy, etc).    Revised Cardiac Risk Index (RCRI):  The patient has the following serious cardiovascular risks for perioperative complications:   - No serious cardiac risks = 0 points     RCRI Interpretation: 0 points: Class I (very low risk - 0.4% complication rate)           Signed Electronically by: Ismael Castro PA-C  Copy of this  evaluation report is provided to requesting physician.  Discussed this patient with  Ismael Castro and agree with above assessment and plan. The patient is an acceptable candidate for the proposed anasthesia.  Saqib العلي MD

## 2021-11-10 NOTE — H&P (VIEW-ONLY)
[facility]  [address] [facility] [address]  Phone: [phone]  Primary Provider: Ren Owen  Pre-op Performing Provider: YAMEL MELENDEZ      PREOPERATIVE EVALUATION:  Today's date: 11/10/2021    Pierre Avila is a 46 year old male who presents for a preoperative evaluation.    Surgical Information:  Surgery/Procedure: Cystouroscopy  Surgery Location: [facility]   Surgeon: Ladi  Surgery Date: 11/16/21  Time of Surgery: 9:20am  Where patient plans to recover: At home with family  Fax number for surgical facility: Note does not need to be faxed, will be available electronically in Epic.    Type of Anesthesia Anticipated: to be determined    Assessment & Plan     The proposed surgical procedure is considered LOW risk.      ICD-10-CM    1. Preop general physical exam  Z01.818 UA Macro with Reflex to Micro and Culture - lab collect     UA Macro with Reflex to Micro and Culture - lab collect   2. Left kidney mass  N28.89    3. Hematuria, unspecified type  R31.9        Risks and Recommendations:  The patient has the following additional risks and recommendations for perioperative complications:   - No identified additional risk factors other than previously addressed    Medication Instructions:  Patient is on no chronic medications    RECOMMENDATION:  APPROVAL GIVEN to proceed with proposed procedure, without further diagnostic evaluation.        Subjective     HPI related to upcoming procedure: history of hematuria and found to have a renal and bladder mass.    Preop Questions 11/10/2021   1. Have you ever had a heart attack or stroke? No   2. Have you ever had surgery on your heart or blood vessels, such as a stent placement, a coronary artery bypass, or surgery on an artery in your head, neck, heart, or legs? No   3. Do you have chest pain with activity? No   4. Do you have a history of  heart failure? No   5. Do you currently have a cold, bronchitis or  symptoms of other infection? No   6. Do you have a cough, shortness of breath, or wheezing? No   7. Do you or anyone in your family have previous history of blood clots? No   8. Do you or does anyone in your family have a serious bleeding problem such as prolonged bleeding following surgeries or cuts? No   9. Have you ever had problems with anemia or been told to take iron pills? No   10. Have you had any abnormal blood loss such as black, tarry or bloody stools? No   11. Have you ever had a blood transfusion? No   12. Are you willing to have a blood transfusion if it is medically needed before, during, or after your surgery? NO -    13. Have you or any of your relatives ever had problems with anesthesia? No   14. Do you have sleep apnea, excessive snoring or daytime drowsiness? No   15. Do you have any artifical heart valves or other implanted medical devices like a pacemaker, defibrillator, or continuous glucose monitor? No   16. Do you have artificial joints? No   17. Are you allergic to latex? No     Status of Chronic Conditions:  See problem list for active medical problems.  Problems all longstanding and stable, except as noted/documented.  See ROS for pertinent symptoms related to these conditions.      Review of Systems  CONSTITUTIONAL: NEGATIVE for fever, chills, change in weight  INTEGUMENTARY/SKIN: NEGATIVE for worrisome rashes, moles or lesions  EYES: NEGATIVE for vision changes or irritation  ENT/MOUTH: NEGATIVE for ear, mouth and throat problems  RESP: NEGATIVE for significant cough or SOB  CV: NEGATIVE for chest pain, palpitations or peripheral edema  GI: NEGATIVE for nausea, abdominal pain, heartburn, or change in bowel habits  : NEGATIVE for frequency, dysuria, or hematuria  MUSCULOSKELETAL: NEGATIVE for significant arthralgias or myalgia  NEURO: NEGATIVE for weakness, dizziness or paresthesias  ENDOCRINE: NEGATIVE for temperature intolerance, skin/hair changes  HEME: NEGATIVE for bleeding  "problems  PSYCHIATRIC: NEGATIVE for changes in mood or affect    Patient Active Problem List    Diagnosis Date Noted     Renal mass 10/27/2021     Priority: Medium     Added automatically from request for surgery [id number]        History reviewed. No pertinent past medical history.  History reviewed. No pertinent surgical history.  No current outpatient medications on file.       Allergies   Allergen Reactions     Seasonal Allergies         Social History     Tobacco Use     Smoking status: Former Smoker     Smokeless tobacco: Former User   Substance Use Topics     Alcohol use: Not on file     Family History   Problem Relation Age of Onset     Asthma Father      Cancer Father         melanoma     Cancer Sister         melanoma     C.A.D. No family hx of      Diabetes No family hx of      Hypertension No family hx of      Cerebrovascular Disease No family hx of      History   Drug Use Unknown         Objective     /80   Pulse 73   Temp 97.4  F (36.3  C) (Tympanic)   Resp 16   Ht 1.778 m (5' 10\")   Wt 95.7 kg (211 lb)   SpO2 99%   BMI 30.28 kg/m      Physical Exam    GENERAL APPEARANCE: healthy, alert and no distress     EYES: EOMI,  PERRL     HENT: ear canals and TM's normal and nose and mouth without ulcers or lesions     NECK: no adenopathy, no asymmetry, masses, or scars and thyroid normal to palpation     RESP: lungs clear to auscultation - no rales, rhonchi or wheezes     CV: regular rates and rhythm, normal S1 S2, no S3 or S4 and no murmur, click or rub     ABDOMEN:  soft, nontender, no HSM or masses and bowel sounds normal     MS: extremities normal- no gross deformities noted, no evidence of inflammation in joints, FROM in all extremities.     SKIN: no suspicious lesions or rashes     NEURO: Normal strength and tone, sensory exam grossly normal, mentation intact and speech normal     PSYCH: mentation appears normal. and affect normal/bright     LYMPHATICS: No cervical adenopathy    Recent Labs "   Lab Test 10/20/21  0755 10/20/21  0754   HGB 14.4  --      --    NA  --  143   POTASSIUM  --  4.2   CR  --  0.96        Diagnostics:  Results for orders placed or performed in visit on 11/10/21   X-Ray Chest 2 Views     Status: None    Narrative    XR CHEST 2 VW 11/10/2021 9:00 AM    HISTORY: Left kidney mass    COMPARISON: CT 10/21/2021      Impression    IMPRESSION:Negative exam. No acute or suspicious chest findings. The  lungs are clear and there are no pleural effusions. Normal heart size.    MODESTA JOE MD         SYSTEM ID:  [id number]   Results for orders placed or performed in visit on 11/10/21   UA Macro with Reflex to Micro and Culture - lab collect     Status: Abnormal    Specimen: Urine, Clean Catch   Result Value Ref Range    Color Urine Yellow Colorless, Straw, Light Yellow, Yellow    Appearance Urine Clear Clear    Glucose Urine Negative Negative mg/dL    Bilirubin Urine Negative Negative    Ketones Urine Negative Negative mg/dL    Specific Gravity Urine <=1.005 1.003 - 1.035    Blood Urine Small (A) Negative    pH Urine 6.0 5.0 - 7.0    Protein Albumin Urine Negative Negative mg/dL    Urobilinogen Urine 0.2 0.2, 1.0 E.U./dL    Nitrite Urine Negative Negative    Leukocyte Esterase Urine Negative Negative   Urine Microscopic     Status: Abnormal   Result Value Ref Range    RBC Urine 0-2 0-2 /HPF /HPF    WBC Urine 0-5 0-5 /HPF /HPF    Squamous Epithelials Urine Few (A) None Seen /LPF    Narrative    Urine Culture not indicated       No labs were ordered during this visit.   No EKG required for low risk surgery (cataract, skin procedure, breast biopsy, etc).    Revised Cardiac Risk Index (RCRI):  The patient has the following serious cardiovascular risks for perioperative complications:   - No serious cardiac risks = 0 points     RCRI Interpretation: 0 points: Class I (very low risk - 0.4% complication rate)           Signed Electronically by: Ismael Castro PA-C  Copy of this  evaluation report is provided to requesting physician.  Discussed this patient with  Ismael Castro and agree with above assessment and plan. The patient is an acceptable candidate for the proposed anasthesia.  Saqib العلي MD

## 2021-11-12 ENCOUNTER — LAB (OUTPATIENT)
Dept: LAB | Facility: CLINIC | Age: 46
End: 2021-11-12
Payer: COMMERCIAL

## 2021-11-12 DIAGNOSIS — Z11.59 ENCOUNTER FOR SCREENING FOR OTHER VIRAL DISEASES: ICD-10-CM

## 2021-11-12 PROCEDURE — U0003 INFECTIOUS AGENT DETECTION BY NUCLEIC ACID (DNA OR RNA); SEVERE ACUTE RESPIRATORY SYNDROME CORONAVIRUS 2 (SARS-COV-2) (CORONAVIRUS DISEASE [COVID-19]), AMPLIFIED PROBE TECHNIQUE, MAKING USE OF HIGH THROUGHPUT TECHNOLOGIES AS DESCRIBED BY CMS-2020-01-R: HCPCS

## 2021-11-12 PROCEDURE — U0005 INFEC AGEN DETEC AMPLI PROBE: HCPCS

## 2021-11-14 LAB — SARS-COV-2 RNA RESP QL NAA+PROBE: NEGATIVE

## 2021-11-15 ENCOUNTER — ANESTHESIA EVENT (OUTPATIENT)
Dept: SURGERY | Facility: AMBULATORY SURGERY CENTER | Age: 46
End: 2021-11-15
Payer: COMMERCIAL

## 2021-11-15 NOTE — ANESTHESIA PREPROCEDURE EVALUATION
Anesthesia Pre-Procedure Evaluation    Patient: Pierre Avila   MRN: [id number] : 1975        Preoperative Diagnosis: Renal mass [N28.89]    Procedure : Procedure(s):  CYSTOURETEROSCOPY, WITH RETROGRADE PYELOGRAM Biopsy and STENT INSERTION RIGHT          No past medical history on file.   No past surgical history on file.   Allergies   Allergen Reactions     Seasonal Allergies       Social History     Tobacco Use     Smoking status: Former Smoker     Smokeless tobacco: Former User   Substance Use Topics     Alcohol use: Not on file      Wt Readings from Last 1 Encounters:   11/10/21 95.7 kg (211 lb)        Anesthesia Evaluation            ROS/MED HX  ENT/Pulmonary:       Neurologic:       Cardiovascular:       METS/Exercise Tolerance:     Hematologic:       Musculoskeletal:       GI/Hepatic:       Renal/Genitourinary:       Endo:     (+) Obesity,     Psychiatric/Substance Use:       Infectious Disease:       Malignancy:       Other:            Physical Exam    Airway        Mallampati: II       Respiratory Devices and Support         Dental           Cardiovascular          Rhythm and rate: regular     Pulmonary           breath sounds clear to auscultation           OUTSIDE LABS:  CBC:   Lab Results   Component Value Date    WBC 8.0 10/20/2021    HGB 14.4 10/20/2021    HCT 42.0 10/20/2021     10/20/2021     BMP:   Lab Results   Component Value Date     10/20/2021    POTASSIUM 4.2 10/20/2021    CHLORIDE 111 (H) 10/20/2021    CO2 29 10/20/2021    BUN 12 10/20/2021    CR 0.96 10/20/2021     (H) 10/20/2021     COAGS: No results found for: PTT, INR, FIBR  POC: No results found for: BGM, HCG, HCGS  HEPATIC: No results found for: ALBUMIN, PROTTOTAL, ALT, AST, GGT, ALKPHOS, BILITOTAL, BILIDIRECT, LAINE  OTHER:   Lab Results   Component Value Date    CHRISTIANE 8.9 10/20/2021       Anesthesia Plan    ASA Status:  2   NPO Status:  NPO Appropriate    Anesthesia Type: General.     - Airway: LMA    Induction: Intravenous.   Maintenance: TIVA.        Consents    Anesthesia Plan(s) and associated risks, benefits, and realistic alternatives discussed. Questions answered and patient/representative(s) expressed understanding.     - Discussed with:  Patient         Postoperative Care    Pain management: Oral pain medications.   PONV prophylaxis: Ondansetron (or other 5HT-3), Dexamethasone or Solumedrol     Comments:                Bladimir Smith MD

## 2021-11-16 ENCOUNTER — ANCILLARY PROCEDURE (OUTPATIENT)
Dept: RADIOLOGY | Facility: AMBULATORY SURGERY CENTER | Age: 46
End: 2021-11-16
Attending: UROLOGY
Payer: COMMERCIAL

## 2021-11-16 ENCOUNTER — ANESTHESIA (OUTPATIENT)
Dept: SURGERY | Facility: AMBULATORY SURGERY CENTER | Age: 46
End: 2021-11-16
Payer: COMMERCIAL

## 2021-11-16 ENCOUNTER — HOSPITAL ENCOUNTER (OUTPATIENT)
Facility: AMBULATORY SURGERY CENTER | Age: 46
End: 2021-11-16
Attending: UROLOGY
Payer: COMMERCIAL

## 2021-11-16 VITALS
SYSTOLIC BLOOD PRESSURE: 143 MMHG | HEART RATE: 80 BPM | OXYGEN SATURATION: 98 % | WEIGHT: 218 LBS | RESPIRATION RATE: 16 BRPM | DIASTOLIC BLOOD PRESSURE: 90 MMHG | TEMPERATURE: 97.7 F | HEIGHT: 70 IN | BODY MASS INDEX: 31.21 KG/M2

## 2021-11-16 DIAGNOSIS — N28.89 RENAL MASS: ICD-10-CM

## 2021-11-16 DIAGNOSIS — R52 PAIN: ICD-10-CM

## 2021-11-16 PROCEDURE — 52332 CYSTOSCOPY AND TREATMENT: CPT | Mod: LT

## 2021-11-16 PROCEDURE — 88112 CYTOPATH CELL ENHANCE TECH: CPT | Mod: 26 | Performed by: PATHOLOGY

## 2021-11-16 PROCEDURE — 52354 CYSTOURETERO W/BIOPSY: CPT | Mod: LT

## 2021-11-16 PROCEDURE — 88307 TISSUE EXAM BY PATHOLOGIST: CPT | Mod: 26 | Performed by: PATHOLOGY

## 2021-11-16 PROCEDURE — 88112 CYTOPATH CELL ENHANCE TECH: CPT | Mod: TC | Performed by: UROLOGY

## 2021-11-16 PROCEDURE — 52332 CYSTOSCOPY AND TREATMENT: CPT | Mod: LT | Performed by: UROLOGY

## 2021-11-16 PROCEDURE — 74420 UROGRAPHY RTRGR +-KUB: CPT | Mod: 26 | Performed by: UROLOGY

## 2021-11-16 PROCEDURE — 88307 TISSUE EXAM BY PATHOLOGIST: CPT | Mod: TC | Performed by: UROLOGY

## 2021-11-16 PROCEDURE — 52354 CYSTOURETERO W/BIOPSY: CPT | Mod: LT | Performed by: UROLOGY

## 2021-11-16 PROCEDURE — 74420 UROGRAPHY RTRGR +-KUB: CPT | Mod: TC

## 2021-11-16 DEVICE — STENT URETERAL PERCUFLEX PLUS 6FRX26CM M0061752630: Type: IMPLANTABLE DEVICE | Site: URETHRA | Status: FUNCTIONAL

## 2021-11-16 RX ORDER — PROPOFOL 10 MG/ML
INJECTION, EMULSION INTRAVENOUS CONTINUOUS PRN
Status: DISCONTINUED | OUTPATIENT
Start: 2021-11-16 | End: 2021-11-16

## 2021-11-16 RX ORDER — ACETAMINOPHEN 325 MG/1
975 TABLET ORAL ONCE
Status: COMPLETED | OUTPATIENT
Start: 2021-11-16 | End: 2021-11-16

## 2021-11-16 RX ORDER — MEPERIDINE HYDROCHLORIDE 25 MG/ML
12.5 INJECTION INTRAMUSCULAR; INTRAVENOUS; SUBCUTANEOUS
Status: DISCONTINUED | OUTPATIENT
Start: 2021-11-16 | End: 2021-11-17 | Stop reason: HOSPADM

## 2021-11-16 RX ORDER — LIDOCAINE 40 MG/G
CREAM TOPICAL
Status: DISCONTINUED | OUTPATIENT
Start: 2021-11-16 | End: 2021-11-16 | Stop reason: HOSPADM

## 2021-11-16 RX ORDER — CIPROFLOXACIN 2 MG/ML
400 INJECTION, SOLUTION INTRAVENOUS
Status: COMPLETED | OUTPATIENT
Start: 2021-11-16 | End: 2021-11-16

## 2021-11-16 RX ORDER — FENTANYL CITRATE 50 UG/ML
25 INJECTION, SOLUTION INTRAMUSCULAR; INTRAVENOUS
Status: DISCONTINUED | OUTPATIENT
Start: 2021-11-16 | End: 2021-11-17 | Stop reason: HOSPADM

## 2021-11-16 RX ORDER — FENTANYL CITRATE 50 UG/ML
25 INJECTION, SOLUTION INTRAMUSCULAR; INTRAVENOUS EVERY 5 MIN PRN
Status: DISCONTINUED | OUTPATIENT
Start: 2021-11-16 | End: 2021-11-16 | Stop reason: HOSPADM

## 2021-11-16 RX ORDER — FENTANYL CITRATE 50 UG/ML
INJECTION, SOLUTION INTRAMUSCULAR; INTRAVENOUS PRN
Status: DISCONTINUED | OUTPATIENT
Start: 2021-11-16 | End: 2021-11-16

## 2021-11-16 RX ORDER — GLYCOPYRROLATE 0.2 MG/ML
INJECTION, SOLUTION INTRAMUSCULAR; INTRAVENOUS PRN
Status: DISCONTINUED | OUTPATIENT
Start: 2021-11-16 | End: 2021-11-16

## 2021-11-16 RX ORDER — ATROPA BELLADONNA AND OPIUM 16.2; 3 MG/1; MG/1
1 SUPPOSITORY RECTAL
Status: DISCONTINUED | OUTPATIENT
Start: 2021-11-16 | End: 2021-11-17 | Stop reason: HOSPADM

## 2021-11-16 RX ORDER — SODIUM CHLORIDE, SODIUM LACTATE, POTASSIUM CHLORIDE, CALCIUM CHLORIDE 600; 310; 30; 20 MG/100ML; MG/100ML; MG/100ML; MG/100ML
INJECTION, SOLUTION INTRAVENOUS CONTINUOUS PRN
Status: DISCONTINUED | OUTPATIENT
Start: 2021-11-16 | End: 2021-11-16

## 2021-11-16 RX ORDER — LIDOCAINE HYDROCHLORIDE 20 MG/ML
INJECTION, SOLUTION INFILTRATION; PERINEURAL PRN
Status: DISCONTINUED | OUTPATIENT
Start: 2021-11-16 | End: 2021-11-16

## 2021-11-16 RX ORDER — ONDANSETRON 4 MG/1
4 TABLET, ORALLY DISINTEGRATING ORAL EVERY 30 MIN PRN
Status: DISCONTINUED | OUTPATIENT
Start: 2021-11-16 | End: 2021-11-17 | Stop reason: HOSPADM

## 2021-11-16 RX ORDER — SODIUM CHLORIDE, SODIUM LACTATE, POTASSIUM CHLORIDE, CALCIUM CHLORIDE 600; 310; 30; 20 MG/100ML; MG/100ML; MG/100ML; MG/100ML
INJECTION, SOLUTION INTRAVENOUS CONTINUOUS
Status: DISCONTINUED | OUTPATIENT
Start: 2021-11-16 | End: 2021-11-16 | Stop reason: HOSPADM

## 2021-11-16 RX ORDER — ONDANSETRON 2 MG/ML
4 INJECTION INTRAMUSCULAR; INTRAVENOUS EVERY 30 MIN PRN
Status: DISCONTINUED | OUTPATIENT
Start: 2021-11-16 | End: 2021-11-17 | Stop reason: HOSPADM

## 2021-11-16 RX ORDER — TROSPIUM CHLORIDE 20 MG/1
20 TABLET, FILM COATED ORAL
Qty: 5 TABLET | Refills: 0 | Status: SHIPPED | OUTPATIENT
Start: 2021-11-16 | End: 2021-12-16

## 2021-11-16 RX ORDER — HYDROMORPHONE HYDROCHLORIDE 1 MG/ML
0.2 INJECTION, SOLUTION INTRAMUSCULAR; INTRAVENOUS; SUBCUTANEOUS EVERY 5 MIN PRN
Status: DISCONTINUED | OUTPATIENT
Start: 2021-11-16 | End: 2021-11-16 | Stop reason: HOSPADM

## 2021-11-16 RX ORDER — GABAPENTIN 300 MG/1
300 CAPSULE ORAL
Status: COMPLETED | OUTPATIENT
Start: 2021-11-16 | End: 2021-11-16

## 2021-11-16 RX ORDER — PROPOFOL 10 MG/ML
INJECTION, EMULSION INTRAVENOUS PRN
Status: DISCONTINUED | OUTPATIENT
Start: 2021-11-16 | End: 2021-11-16

## 2021-11-16 RX ORDER — SODIUM CHLORIDE, SODIUM LACTATE, POTASSIUM CHLORIDE, CALCIUM CHLORIDE 600; 310; 30; 20 MG/100ML; MG/100ML; MG/100ML; MG/100ML
INJECTION, SOLUTION INTRAVENOUS CONTINUOUS
Status: DISCONTINUED | OUTPATIENT
Start: 2021-11-16 | End: 2021-11-17 | Stop reason: HOSPADM

## 2021-11-16 RX ORDER — DEXAMETHASONE SODIUM PHOSPHATE 4 MG/ML
INJECTION, SOLUTION INTRA-ARTICULAR; INTRALESIONAL; INTRAMUSCULAR; INTRAVENOUS; SOFT TISSUE PRN
Status: DISCONTINUED | OUTPATIENT
Start: 2021-11-16 | End: 2021-11-16

## 2021-11-16 RX ORDER — HYDROCODONE BITARTRATE AND ACETAMINOPHEN 5; 325 MG/1; MG/1
1 TABLET ORAL
Status: DISCONTINUED | OUTPATIENT
Start: 2021-11-16 | End: 2021-11-17 | Stop reason: HOSPADM

## 2021-11-16 RX ORDER — ONDANSETRON 2 MG/ML
INJECTION INTRAMUSCULAR; INTRAVENOUS PRN
Status: DISCONTINUED | OUTPATIENT
Start: 2021-11-16 | End: 2021-11-16

## 2021-11-16 RX ORDER — TAMSULOSIN HYDROCHLORIDE 0.4 MG/1
0.4 CAPSULE ORAL DAILY
Qty: 5 CAPSULE | Refills: 0 | Status: SHIPPED | OUTPATIENT
Start: 2021-11-16 | End: 2021-12-16

## 2021-11-16 RX ORDER — OXYCODONE HYDROCHLORIDE 5 MG/1
5 TABLET ORAL EVERY 4 HOURS PRN
Status: DISCONTINUED | OUTPATIENT
Start: 2021-11-16 | End: 2021-11-17 | Stop reason: HOSPADM

## 2021-11-16 RX ADMIN — PROPOFOL 200 MCG/KG/MIN: 10 INJECTION, EMULSION INTRAVENOUS at 09:27

## 2021-11-16 RX ADMIN — GLYCOPYRROLATE 0.2 MG: 0.2 INJECTION, SOLUTION INTRAMUSCULAR; INTRAVENOUS at 09:40

## 2021-11-16 RX ADMIN — FENTANYL CITRATE 25 MCG: 50 INJECTION, SOLUTION INTRAMUSCULAR; INTRAVENOUS at 09:35

## 2021-11-16 RX ADMIN — ONDANSETRON 4 MG: 2 INJECTION INTRAMUSCULAR; INTRAVENOUS at 09:37

## 2021-11-16 RX ADMIN — PROPOFOL 150 MCG/KG/MIN: 10 INJECTION, EMULSION INTRAVENOUS at 09:54

## 2021-11-16 RX ADMIN — PROPOFOL 200 MG: 10 INJECTION, EMULSION INTRAVENOUS at 09:27

## 2021-11-16 RX ADMIN — OXYCODONE HYDROCHLORIDE 5 MG: 5 TABLET ORAL at 11:19

## 2021-11-16 RX ADMIN — ACETAMINOPHEN 975 MG: 325 TABLET ORAL at 06:48

## 2021-11-16 RX ADMIN — SODIUM CHLORIDE, SODIUM LACTATE, POTASSIUM CHLORIDE, CALCIUM CHLORIDE: 600; 310; 30; 20 INJECTION, SOLUTION INTRAVENOUS at 06:48

## 2021-11-16 RX ADMIN — CIPROFLOXACIN 400 MG: 2 INJECTION, SOLUTION INTRAVENOUS at 07:51

## 2021-11-16 RX ADMIN — LIDOCAINE HYDROCHLORIDE 60 MG: 20 INJECTION, SOLUTION INFILTRATION; PERINEURAL at 09:27

## 2021-11-16 RX ADMIN — FENTANYL CITRATE 25 MCG: 50 INJECTION, SOLUTION INTRAMUSCULAR; INTRAVENOUS at 09:54

## 2021-11-16 RX ADMIN — DEXAMETHASONE SODIUM PHOSPHATE 4 MG: 4 INJECTION, SOLUTION INTRA-ARTICULAR; INTRALESIONAL; INTRAMUSCULAR; INTRAVENOUS; SOFT TISSUE at 09:37

## 2021-11-16 RX ADMIN — FENTANYL CITRATE 25 MCG: 50 INJECTION, SOLUTION INTRAMUSCULAR; INTRAVENOUS at 10:15

## 2021-11-16 RX ADMIN — GABAPENTIN 300 MG: 300 CAPSULE ORAL at 06:49

## 2021-11-16 RX ADMIN — SODIUM CHLORIDE, SODIUM LACTATE, POTASSIUM CHLORIDE, CALCIUM CHLORIDE: 600; 310; 30; 20 INJECTION, SOLUTION INTRAVENOUS at 09:19

## 2021-11-16 RX ADMIN — PROPOFOL 150 MCG/KG/MIN: 10 INJECTION, EMULSION INTRAVENOUS at 10:16

## 2021-11-16 RX ADMIN — FENTANYL CITRATE 25 MCG: 50 INJECTION, SOLUTION INTRAMUSCULAR; INTRAVENOUS at 09:40

## 2021-11-16 ASSESSMENT — MIFFLIN-ST. JEOR: SCORE: 1875.09

## 2021-11-16 NOTE — DISCHARGE INSTRUCTIONS
[facility]  Home Care Following Anesthesia  For 24 hours after surgery:  1. Get plenty of rest.  A responsible adult must stay with you for at least 24 hours after you leave the surgery center.  2. Do not drive or use heavy equipment.  If you have weakness or tingling, don't drive or use heavy equipment until this feeling goes away.   3. Do not drink alcohol.   4. Avoid strenuous or risky activities.  Ask for help when climbing stairs.  5. You may feel lightheaded.  IF so, sit for a few minutes before standing.  Have someone help you get up.   6. If you have nausea (feel sick to your stomach): Drink only clear liquids such as apple juice, ginger ale, broth or 7-Up.  Rest may also help.  Be sure to drink enough fluids.  Move to a regular diet as you feel able.   7. You may have a slight fever.  Call the doctor if your fever is over 100 F (37.7 C) (taken under the tongue) or lasts longer than 24 hours.  8. You may have a dry mouth, a sore throat, muscle aches or trouble sleeping. These should go away after 24 hours.  9. Do not make important or legal decisions.   10. It is recommended to avoid smoking.               Tips for taking pain medications  To get the best pain relief possible, remember these points:    Take pain medications as directed, before pain becomes severe.    Pain medication can upset your stomach: taking it with food may help.    Constipation is a common side effect of pain medication. Drink plenty of  fluids.    Eat foods high in fiber. Take a stool softener if recommended by your doctor or pharmacist.    Do not drink alcohol, drive or operate machinery while taking pain medications.    Ask about other ways to control pain, such as with heat, ice or relaxation.    Tylenol/Acetaminophen Consumption  To help encourage the safe use of acetaminophen, the makers of TYLENOL  have lowered the maximum daily dose for single-ingredient Extra Strength TYLENOL   (acetaminophen) products sold in the [place] from 8 pills per day (4,000 mg) to 6 pills per day (3,000 mg). The dosing interval has also changed from 2 pills every 4-6 hours to 2 pills every 6 hours.    If you feel your pain relief is insufficient, you may take Tylenol/Acetaminophen in addition to your narcotic pain medication.     Be careful not to exceed 3,000 mg of Tylenol/Acetaminophen in a 24 hour period from all sources.    If you are taking extra strength Tylenol/acetaminophen (500 mg), the maximum dose is 6 tablets in 24 hours.    If you are taking regular strength acetaminophen (325 mg), the maximum dose is 9 tablets in 24 hours.    Tylenol 975mg given at 6:48am. Next dose available after 12:50pm. Then follow package instructions.     Call a doctor for any of the followin. Signs of infection (fever, growing tenderness at the surgery site, a large amount of drainage or bleeding, severe pain, foul-smelling drainage, redness, swelling).  2. It has been over 8 to 10 hours since surgery and you are still not able to urinate (pass water).  3. Headache for over 24 hours.  4. Numbness, tingling or weakness the day after surgery (if you had spinal anesthesia).  5. Signs of Covid-19 infection (temperature over 100 degrees, shortness of breath, cough, loss of taste/smell, generalized body aches, persistent headache, chills, sore throat, nausea/vomiting/diarrhea)  Your doctor is:  Dr. Vicki Bledsoe, Prostate and Urology: [phone]                  Or dial [phone] and ask for the resident on call for:  Prostate Urology  For emergency care, call the:  [facility]:  [phone] (TTY for hearing impaired: [phone])

## 2021-11-16 NOTE — ANESTHESIA POSTPROCEDURE EVALUATION
Patient: Pierre Avila    Procedure: Procedure(s):  CYSTOURETEROSCOPY, WITH RETROGRADE PYELOGRAM Biopsy and STENT INSERTION left       Diagnosis:Renal mass [N28.89]  Diagnosis Additional Information: No value filed.    Anesthesia Type:  General    Note:  Disposition: Outpatient   Postop Pain Control: Uneventful            Sign Out: Well controlled pain   PONV: No   Neuro/Psych: Uneventful            Sign Out: Acceptable/Baseline neuro status   Airway/Respiratory: Uneventful            Sign Out: Acceptable/Baseline resp. status   CV/Hemodynamics: Uneventful            Sign Out: Acceptable CV status; No obvious hypovolemia; No obvious fluid overload   Other NRE: NONE   DID A NON-ROUTINE EVENT OCCUR? No           Last vitals:  Vitals Value Taken Time   /95 11/16/21 1100   Temp 36.5  C (97.7  F) 11/16/21 1100   Pulse 70 11/16/21 1110   Resp 0 11/16/21 1110   SpO2 97 % 11/16/21 1110   Vitals shown include unvalidated device data.    Electronically Signed By: Bladimir Smith MD  November 16, 2021  1:25 PM

## 2021-11-16 NOTE — OP NOTE
PATIENT NAME:Pierre Avila   MEDICAL RECORD NUMBER: [id number]   SURGEON: Vicki Bledsoe MD  ASSISTANT: Dm Macias MD-PGY-2    PREOPERATIVE DIAGNOSIS: Left upper calyceal tumor  POSTOPERATIVE DIAGNOSIS: Same    PROCEDURE PERFORMED: Cystoscopy, left ureteroscopy and biopsy, left ureteral stent placement  Left retrograde pyelogram with interpretation      ANESTHESIA: LMA  COMPLICATIONS: None.   OPERATIVE FINDINGS: Solid mass appeared to originating from the kidney parenchyma and invading the bladder. Otherwise unremarkable      SPECIMEN: Left renal pelvis urine for cytology   Left upper calyceal mass     EBL (mL): Minimal     INDICATIONS FOR PROCEDURE:   Pierre Avila is a very pleasant 46 year old male who presents with a history of gross  hematuria. He has not had similar symptoms in the past.There are not associated urinary symptoms.  he denies any flank or abdominal pain.       CT abdomen pelvis non con was obtained which showed a large left upper pole renal mass. He is then referred here for further evaluation.      Concerning typical risk factors for urinary tract malignancies, the patient does not have a family history of  malignancies.    he has not received pelvic radiation, exposure to known carcinogenic agents such as benzenes, aromatic amines, alkylating agents or chronic indwelling foreign bodies in the urinary tract in the past.  Furthermore he does not have a history of recurrent urinary tract infections in the past.     The patient is not currently smoking cigarettes.  he is a former smoker.Finally, he does not have a history of analgesic abuse.    CT urogram obtained as part of the staging showed a left upper pole mass with some upper calyceal filling defect. Office cystoscopy was unremarkable and cytology came back negative.     After discussing the options, decision was made to proceed with above mentioned procedure to determine the origin of the mass (renal vs upper tract urothelial)       DETAILS OF PROCEDURE: The risks and benefits of the procedure were explained in detail to patient and informed consent was obtained. The patient was brought to the operating room and placed supine on the operating table where he underwent general anesthesia. he was then positioned in dorsal lithotomy position.  The patient was prepped and draped in standard sterile fashion.   After an appropriate timeout, We placed a 22 Icelandic rigid cystoscope through the urethra and into the bladder and performed a complete cystoscopy. We then find the left ureteral orifice and intubated it with Sensor wire and advanced the wire all the way up to the kidney. Cystoscope was then removed and a 11/13 access sheath was advanced easily up to the proximal ureter. We then removed the introducer and advanced the flexible ureteroscope all the way up to the kidney. Kidney was thoroughly surveyed and findings were listed above. We used a halo basket to obtain tissue and it was sent off for pathology. There was no significant bleeding that required fulguration. We then obtained urine sample for cytology and sent it off.  At this point wire was replaced and ureteroscope and access sheath were removed and ureter was closely surveyed on the way down with no evidence of injury. 6 fr x 26 cm stent was then placed over the wire under cystoscopic guidance with good curl proximally and distally. String remained attached and was secured to dorsal penis with Tegaderm.     Cystoscope was then removed and a 14 Fr  catheter and inflated the balloon with 10 ml water. There was return of pink urine with a small blood clot. Patient was then awakened and transported to PACU in stable condition. There was no complications.       Vicki Bledsoe MD   Department of Urology   [facility]

## 2021-11-16 NOTE — ANESTHESIA CARE TRANSFER NOTE
Patient: Pierre Avila    Procedure: Procedure(s):  CYSTOURETEROSCOPY, WITH RETROGRADE PYELOGRAM Biopsy and STENT INSERTION left       Diagnosis: Renal mass [N28.89]  Diagnosis Additional Information: No value filed.    Anesthesia Type:   General     Note:    Oropharynx: spontaneously breathing  Level of Consciousness: drowsy  Oxygen Supplementation: face mask  Level of Supplemental Oxygen (L/min / FiO2): 6  Independent Airway: airway patency satisfactory and stable  Dentition: dentition unchanged  Vital Signs Stable: post-procedure vital signs reviewed and stable  Report to RN Given: handoff report given  Patient transferred to: PACU    Handoff Report: Identifed the Patient, Identified the Reponsible Provider, Reviewed the pertinent medical history, Discussed the surgical course, Reviewed Intra-OP anesthesia mangement and issues during anesthesia, Set expectations for post-procedure period and Allowed opportunity for questions and acknowledgement of understanding      Vitals:  Vitals Value Taken Time   /92 11/16/21 1049   Temp     Pulse 74 11/16/21 1050   Resp 19 11/16/21 1050   SpO2 99 % 11/16/21 1050   Vitals shown include unvalidated device data.    Electronically Signed By: EFE French CRNA  November 16, 2021  10:52 AM

## 2021-11-17 ENCOUNTER — MYC MEDICAL ADVICE (OUTPATIENT)
Dept: UROLOGY | Facility: CLINIC | Age: 46
End: 2021-11-17
Payer: COMMERCIAL

## 2021-11-17 ENCOUNTER — PATIENT OUTREACH (OUTPATIENT)
Dept: UROLOGY | Facility: CLINIC | Age: 46
End: 2021-11-17
Payer: COMMERCIAL

## 2021-11-18 LAB
PATH REPORT.COMMENTS IMP SPEC: NORMAL
PATH REPORT.FINAL DX SPEC: NORMAL
PATH REPORT.GROSS SPEC: NORMAL
PATH REPORT.RELEVANT HX SPEC: NORMAL

## 2021-11-22 LAB
PATH REPORT.COMMENTS IMP SPEC: ABNORMAL
PATH REPORT.COMMENTS IMP SPEC: ABNORMAL
PATH REPORT.COMMENTS IMP SPEC: YES
PATH REPORT.FINAL DX SPEC: ABNORMAL
PATH REPORT.GROSS SPEC: ABNORMAL
PATH REPORT.MICROSCOPIC SPEC OTHER STN: ABNORMAL
PHOTO IMAGE: ABNORMAL

## 2021-11-23 DIAGNOSIS — N28.89 RENAL MASS: Primary | ICD-10-CM

## 2021-11-23 RX ORDER — ACETAMINOPHEN 325 MG/1
975 TABLET ORAL ONCE
Status: CANCELLED | OUTPATIENT
Start: 2021-11-23 | End: 2021-11-23

## 2021-11-23 RX ORDER — CEFAZOLIN SODIUM 2 G/50ML
2 SOLUTION INTRAVENOUS
Status: CANCELLED | OUTPATIENT
Start: 2021-11-23

## 2021-11-23 RX ORDER — GABAPENTIN 300 MG/1
300 CAPSULE ORAL 3 TIMES DAILY
Status: CANCELLED | OUTPATIENT
Start: 2021-11-23

## 2021-11-23 RX ORDER — CEFAZOLIN SODIUM 2 G/50ML
2 SOLUTION INTRAVENOUS SEE ADMIN INSTRUCTIONS
Status: CANCELLED | OUTPATIENT
Start: 2021-11-23

## 2021-11-23 RX ORDER — HEPARIN SODIUM 5000 [USP'U]/.5ML
5000 INJECTION, SOLUTION INTRAVENOUS; SUBCUTANEOUS
Status: CANCELLED | OUTPATIENT
Start: 2021-11-23

## 2021-11-25 ENCOUNTER — PRE VISIT (OUTPATIENT)
Dept: UROLOGY | Facility: CLINIC | Age: 46
End: 2021-11-25
Payer: COMMERCIAL

## 2021-11-26 NOTE — CONFIDENTIAL NOTE
Reason for visit: post-op cystoureteroscopy and  pre-op nephrectomy discussion     Relevant information: left stent insertion with string, renal mass    Records/imaging/labs/orders: in epic    Pt called: n/a    At Rooming: virtual

## 2021-12-01 ENCOUNTER — VIRTUAL VISIT (OUTPATIENT)
Dept: UROLOGY | Facility: CLINIC | Age: 46
End: 2021-12-01
Payer: COMMERCIAL

## 2021-12-01 DIAGNOSIS — C80.1 CLEAR CELL CARCINOMA (H): Primary | ICD-10-CM

## 2021-12-01 PROCEDURE — 99214 OFFICE O/P EST MOD 30 MIN: CPT | Mod: 95 | Performed by: UROLOGY

## 2021-12-01 NOTE — PROGRESS NOTES
pierre is a 46 year old who is being evaluated via a billable telephone visit.      What phone number would you like to be contacted at? [phone]  How would you like to obtain your AVS? Mail a copy     Urology Oncology clinic   [facility]           Chief Complaint:   Renal cell carcinoma cT3             History of Present Illness:    Pierre Avila is a very pleasant 46 year old male who presents with a history of gross  hematuria. He has not had similar symptoms in the past.There are not associated urinary symptoms.  he denies any flank or abdominal pain.     CT abdomen pelvis non con was obtained which showed a large left upper pole renal mass. He is then referred here for further evaluation.     I first saw him on 10/20/2021. Concerning typical risk factors for urinary tract malignancies, the patient does not have a family history of  malignancies.  he has not received pelvic radiation, exposure to known carcinogenic agents such as benzenes, aromatic amines, alkylating agents or chronic indwelling foreign bodies in the urinary tract in the past.  Furthermore he does not have a history of recurrent urinary tract infections in the past.   The patient is not currently smoking cigarettes.  he is a former smoker.Finally, he does not have a history of analgesic abuse.    Office cystoscopy at the time of first visit was unremarkable and cytology was negative.     After discussing the concern that this might be wither a renal cell carcinoma invading the collecting system or an advanced upper tract urothelial carcinoma based on imaging findings.       11/19/2021     He underwent cystoscopy, left ureteroscopy and biopsy, left ureteral stent placement and left retrograde pyelogram with interpretation.     Intraop findings: Solid mass appeared to originating from the kidney parenchyma and invading the collecting system.        Pathology suggested renal cell carcinoma. He is here to discuss pathology and next step in  management     He has done well after the procedure. Stent was removed at home with no issues. He denies any hematuria or urinary tract infection. He still has not smoked since the diagnosis was made. His back pain is still present and has not changed much since surgery.            Past Medical History:   No past medical history on file.         Past Surgical History:     Past Surgical History:   Procedure Laterality Date     COMBINED CYSTOSCOPY, RETROGRADES, URETEROSCOPY, INSERT STENT Left 11/16/2021    Procedure: CYSTOURETEROSCOPY, WITH RETROGRADE PYELOGRAM Biopsy and STENT INSERTION left;  Surgeon: Vicki Bledsoe MD;  Location: [facility] OR            Medications     Current Outpatient Medications   Medication     tamsulosin (FLOMAX) 0.4 MG capsule     trospium (SANCTURA) 20 MG tablet     No current facility-administered medications for this visit.            Family History:     Family History   Problem Relation Age of Onset     Asthma Father      Cancer Father         melanoma     Cancer Sister         melanoma     C.A.D. No family hx of      Diabetes No family hx of      Hypertension No family hx of      Cerebrovascular Disease No family hx of             Social History:     Social History     Socioeconomic History     Marital status: Single     Spouse name: Not on file     Number of children: Not on file     Years of education: Not on file     Highest education level: Not on file   Occupational History     Not on file   Tobacco Use     Smoking status: Former Smoker     Smokeless tobacco: Former User   Vaping Use     Vaping Use: Never used   Substance and Sexual Activity     Alcohol use: Not on file     Drug use: Not Currently     Sexual activity: Yes     Partners: Female   Other Topics Concern     Parent/sibling w/ CABG, MI or angioplasty before 65F 55M? No   Social History Narrative     Not on file     Social Determinants of Health     Financial Resource Strain: Not on file   Food Insecurity: Not on file    Transportation Needs: Not on file   Physical Activity: Not on file   Stress: Not on file   Social Connections: Not on file   Intimate Partner Violence: Not on file   Housing Stability: Not on file            Allergies:   Seasonal allergies         Review of Systems:  From intake questionnaire     Negative 14 system review except as noted on HPI, nurse's note.         Physical Exam:     Vitals:  No vitals were obtained today due to virtual visit.            Labs and Pathology:    I reviewed all applicable laboratory and pathology data and went over findings with patient    Lab Results   Component Value Date    WBC 8.0 10/20/2021     Lab Results   Component Value Date    RBC 4.91 10/20/2021     Lab Results   Component Value Date    HGB 14.4 10/20/2021     Lab Results   Component Value Date    HCT 42.0 10/20/2021     No components found for: MCT  Lab Results   Component Value Date    MCV 86 10/20/2021     Lab Results   Component Value Date    MCH 29.3 10/20/2021     Lab Results   Component Value Date    MCHC 34.3 10/20/2021     Lab Results   Component Value Date    RDW 12.5 10/20/2021     Lab Results   Component Value Date     10/20/2021       Last Comprehensive Metabolic Panel:  Sodium   Date Value Ref Range Status   10/20/2021 143 133 - 144 mmol/L Final     Potassium   Date Value Ref Range Status   10/20/2021 4.2 3.4 - 5.3 mmol/L Final     Chloride   Date Value Ref Range Status   10/20/2021 111 (H) 94 - 109 mmol/L Final     Carbon Dioxide (CO2)   Date Value Ref Range Status   10/20/2021 29 20 - 32 mmol/L Final     Anion Gap   Date Value Ref Range Status   10/20/2021 3 3 - 14 mmol/L Final     Glucose   Date Value Ref Range Status   10/20/2021 129 (H) 70 - 99 mg/dL Final     Urea Nitrogen   Date Value Ref Range Status   10/20/2021 12 7 - 30 mg/dL Final     Creatinine   Date Value Ref Range Status   10/20/2021 0.96 0.66 - 1.25 mg/dL Final     GFR Estimate   Date Value Ref Range Status   10/20/2021 >90 >60  mL/min/1.73m2 Final     Comment:     As of July 11, 2021, eGFR is calculated by the CKD-EPI creatinine equation, without race adjustment. eGFR can be influenced by muscle mass, exercise, and diet. The reported eGFR is an estimation only and is only applicable if the renal function is stable.     Calcium   Date Value Ref Range Status   10/20/2021 8.9 8.5 - 10.1 mg/dL Final       Surgical pathology     Case: [id number]                                   Authorizing Provider:  Vicki Bledsoe MD          Collected:           11/16/2021 10:10 AM           Ordering Location:     [facility] OR  Received:            11/16/2021 10:50 AM                                  [place]                                                                   Pathologist:           Rand Cortez MD                                                            Specimen:    Other, Left Upper Calyceal Tumor                                                           Final Diagnosis   Kidney, left upper calyceal tumor, biopsy:  - Clear-cell renal cell carcinoma   Electronically signed by Rand Cortez MD on 11/22/2021 at  3:24 PM           Imaging:    I reviewed all applicable imaging and went over findings with patient.  Significant for large left upper pole mass no clear retroperitoneal lymphadenopathy limited interpretation due to lack of contrast      CT Abdomen Pelvis w/o Contrast     Result Date: 10/15/2021  CT ABDOMEN AND PELVIS WITHOUT CONTRAST   10/15/2021 10:56 AM HISTORY: Hematuria, unknown cause. Suspected kidney stone on right. Hematuria, unspecified type. Right flank pain. TECHNIQUE: Noncontrast CT abdomen and pelvis was performed. Radiation dose for this scan was reduced using automated exposure control, adjustment of the mA and/or kV according to patient size, or iterative reconstruction technique. COMPARISON: None available FINDINGS: Lower chest: Lung bases are clear. Right kidney: No radiodense kidney/ureteral  stone or hydronephrosis. Left kidney: No radiodense kidney/ureteral stones or hydronephrosis. There is an approximately 4.5 cm ill-defined masslike area in the upper pole of the left kidney (series 3 image 78 and series 4 image 60), indeterminate could represent hemorrhagic/proteinaceous cyst versus renal neoplasm. Urinary bladder: Distended and unremarkable. Remainder of the abdomen and pelvis: Limited evaluation of the abdominal organs due to lack of IV contrast. Hepatobiliary: Diffuse hypoattenuation of the liver, likely due to underlying hepatic cirrhosis. Otherwise, the unenhanced liver and gallbladder are grossly unremarkable. Pancreas: The unenhanced pancreas is grossly unremarkable. Spleen: No splenomegaly. Adrenal glands: No adrenal nodules. Bowels: No abnormally dilated bowel loops. The appendix is visualized and appears normal. Peritoneum: No significant free fluid in the abdomen or pelvis. No free peritoneal or portal venous gas. 2.3 cm calcified focus along the posterior aspect of the urinary bladder, of indeterminate etiology. Pelvic organs: Unremarkable. Vascular: Scattered atherosclerotic vascular calcification of the abdominal aorta and iliac vessels. Lymph nodes: No significant abdominopelvic lymphadenopathy. Bones and soft tissue: No suspicious osseous lesion.       IMPRESSION: 1. No radiodense kidney/ureteral stones or hydronephrosis in either kidney. 2. There is approximately 4.5 cm ill-defined masslike area in the upper pole of the left kidney, indeterminate, could represent hemorrhagic/proteinaceous cyst versus renal neoplasm. Recommend further evaluation with CT or MRI renal mass protocol. 3. Significant hepatic steatosis. [Consider Follow Up: Possible left renal mass.] This report will be copied to the [facility] to ensure a provider acknowledges the finding. [facility] is available Monday through Friday 8am-3:30 pm.    BIANCA HACKETT MD   SYSTEM ID:  [id number]              Assessment and Plan:     Assessment     46 year old male with Clear cell RCC invading the collecting system cT3N0       We discussed different treatment options available to him. Gold standard treatment recommended by AUA guidelines is radical nephrectomy. He does not have any suspicious lymphadenopathy and therefore lymph node dissection is not recommended. We discussed different approaches including open, laparoscopic and robotic radical nephrectomy. Pros and cons of each approach were discussed. He would like a robotic approach.     We discussed perioperative course and discussed the details of the surgery. He understands the complications associated with the procedure which are included but not limited to infection, bleeding, damage to surrounding structures and conversion to open. All his questions and concerns were addressed.     We also discussed the results of Keynote 564 which showed the efficacy of pemrolizumab in improving disease free survival in adjuvant setting in patients with high risk RCC including the ones with pT3 disease regardless of grade. I think he would be an ideal candidate for this and I would like him to meet with one of our medical oncologist to discuss this treatment in further details        Plan  Schedule for robotic left radical nephrectomy   Referral to medical oncology for consideration of adjuvant treatment         30 total minutes spent on the date of the encounter including direct interaction with the patient, performing chart review, documentation and further activities as noted above.          Vicki Bledsoe MD   Department of Urology   [facility]       Phone call duration: 15 minutes

## 2021-12-01 NOTE — LETTER
12/1/2021       RE: Pierre Avila  [address] [facility] [address]     Dear Colleague,    Thank you for referring your patient, Pierre Avila, to the [facility] UROLOGY [facility] [place] at [facility]. Please see a copy of my visit note below.    pierre is a 46 year old who is being evaluated via a billable telephone visit.      What phone number would you like to be contacted at? [phone]  How would you like to obtain your AVS? Mail a copy     Urology Oncology clinic   RCC           Chief Complaint:   Renal cell carcinoma cT3             History of Present Illness:    Pierre Avila is a very pleasant 46 year old male who presents with a history of gross  hematuria. He has not had similar symptoms in the past.There are not associated urinary symptoms.  he denies any flank or abdominal pain.     CT abdomen pelvis non con was obtained which showed a large left upper pole renal mass. He is then referred here for further evaluation.     I first saw him on 10/20/2021. Concerning typical risk factors for urinary tract malignancies, the patient does not have a family history of  malignancies.  he has not received pelvic radiation, exposure to known carcinogenic agents such as benzenes, aromatic amines, alkylating agents or chronic indwelling foreign bodies in the urinary tract in the past.  Furthermore he does not have a history of recurrent urinary tract infections in the past.   The patient is not currently smoking cigarettes.  he is a former smoker.Finally, he does not have a history of analgesic abuse.    Office cystoscopy at the time of first visit was unremarkable and cytology was negative.     After discussing the concern that this might be wither a renal cell carcinoma invading the collecting system or an advanced upper tract urothelial carcinoma based on imaging findings.       11/19/2021     He underwent cystoscopy, left ureteroscopy and  biopsy, left ureteral stent placement and left retrograde pyelogram with interpretation.     Intraop findings: Solid mass appeared to originating from the kidney parenchyma and invading the collecting system.        Pathology suggested renal cell carcinoma. He is here to discuss pathology and next step in management     He has done well after the procedure. Stent was removed at home with no issues. He denies any hematuria or urinary tract infection. He still has not smoked since the diagnosis was made. His back pain is still present and has not changed much since surgery.            Past Medical History:   No past medical history on file.         Past Surgical History:     Past Surgical History:   Procedure Laterality Date     COMBINED CYSTOSCOPY, RETROGRADES, URETEROSCOPY, INSERT STENT Left 11/16/2021    Procedure: CYSTOURETEROSCOPY, WITH RETROGRADE PYELOGRAM Biopsy and STENT INSERTION left;  Surgeon: Vicki Bledsoe MD;  [place] [facility] [place]            Medications     Current Outpatient Medications   Medication     tamsulosin (FLOMAX) 0.4 MG capsule     trospium (SANCTURA) 20 MG tablet     No current facility-administered medications for this visit.            Family History:     Family History   Problem Relation Age of Onset     Asthma Father      Cancer Father         melanoma     Cancer Sister         melanoma     C.A.D. No family hx of      Diabetes No family hx of      Hypertension No family hx of      Cerebrovascular Disease No family hx of             Social History:     Social History     Socioeconomic History     Marital status: Single     Spouse name: Not on file     Number of children: Not on file     Years of education: Not on file     Highest education level: Not on file   Occupational History     Not on file   Tobacco Use     Smoking status: Former Smoker     Smokeless tobacco: Former User   Vaping Use     Vaping Use: Never used   Substance and Sexual Activity     Alcohol use: Not on file     Drug use:  Not Currently     Sexual activity: Yes     Partners: Female   Other Topics Concern     Parent/sibling w/ CABG, MI or angioplasty before 65F 55M? No   Social History Narrative     Not on file     Social Determinants of Health     Financial Resource Strain: Not on file   Food Insecurity: Not on file   Transportation Needs: Not on file   Physical Activity: Not on file   Stress: Not on file   Social Connections: Not on file   Intimate Partner Violence: Not on file   Housing Stability: Not on file            Allergies:   Seasonal allergies         Review of Systems:  From intake questionnaire     Negative 14 system review except as noted on HPI, nurse's note.         Physical Exam:     Vitals:  No vitals were obtained today due to virtual visit.            Labs and Pathology:    I reviewed all applicable laboratory and pathology data and went over findings with patient    Lab Results   Component Value Date    WBC 8.0 10/20/2021     Lab Results   Component Value Date    RBC 4.91 10/20/2021     Lab Results   Component Value Date    HGB 14.4 10/20/2021     Lab Results   Component Value Date    HCT 42.0 10/20/2021     No components found for: MCT  Lab Results   Component Value Date    MCV 86 10/20/2021     Lab Results   Component Value Date    MCH 29.3 10/20/2021     Lab Results   Component Value Date    MCHC 34.3 10/20/2021     Lab Results   Component Value Date    RDW 12.5 10/20/2021     Lab Results   Component Value Date     10/20/2021       Last Comprehensive Metabolic Panel:  Sodium   Date Value Ref Range Status   10/20/2021 143 133 - 144 mmol/L Final     Potassium   Date Value Ref Range Status   10/20/2021 4.2 3.4 - 5.3 mmol/L Final     Chloride   Date Value Ref Range Status   10/20/2021 111 (H) 94 - 109 mmol/L Final     Carbon Dioxide (CO2)   Date Value Ref Range Status   10/20/2021 29 20 - 32 mmol/L Final     Anion Gap   Date Value Ref Range Status   10/20/2021 3 3 - 14 mmol/L Final     Glucose   Date Value  Ref Range Status   10/20/2021 129 (H) 70 - 99 mg/dL Final     Urea Nitrogen   Date Value Ref Range Status   10/20/2021 12 7 - 30 mg/dL Final     Creatinine   Date Value Ref Range Status   10/20/2021 0.96 0.66 - 1.25 mg/dL Final     GFR Estimate   Date Value Ref Range Status   10/20/2021 >90 >60 mL/min/1.73m2 Final     Comment:     As of July 11, 2021, eGFR is calculated by the CKD-EPI creatinine equation, without race adjustment. eGFR can be influenced by muscle mass, exercise, and diet. The reported eGFR is an estimation only and is only applicable if the renal function is stable.     Calcium   Date Value Ref Range Status   10/20/2021 8.9 8.5 - 10.1 mg/dL Final       Surgical pathology     Case: [id number]                                   Authorizing Provider:  Vicki Bledsoe MD          Collected:           11/16/2021 10:10 AM           Ordering Location:     [facility] OR  Received:            11/16/2021 10:50 AM                                  [place]                                                                   Pathologist:           Rand Cortez MD                                                            Specimen:    Other, Left Upper Calyceal Tumor                                                           Final Diagnosis   Kidney, left upper calyceal tumor, biopsy:  - Clear-cell renal cell carcinoma   Electronically signed by Rand Cortez MD on 11/22/2021 at  3:24 PM           Imaging:    I reviewed all applicable imaging and went over findings with patient.  Significant for large left upper pole mass no clear retroperitoneal lymphadenopathy limited interpretation due to lack of contrast      CT Abdomen Pelvis w/o Contrast     Result Date: 10/15/2021  CT ABDOMEN AND PELVIS WITHOUT CONTRAST   10/15/2021 10:56 AM HISTORY: Hematuria, unknown cause. Suspected kidney stone on right. Hematuria, unspecified type. Right flank pain. TECHNIQUE: Noncontrast CT abdomen and pelvis was  performed. Radiation dose for this scan was reduced using automated exposure control, adjustment of the mA and/or kV according to patient size, or iterative reconstruction technique. COMPARISON: None available FINDINGS: Lower chest: Lung bases are clear. Right kidney: No radiodense kidney/ureteral stone or hydronephrosis. Left kidney: No radiodense kidney/ureteral stones or hydronephrosis. There is an approximately 4.5 cm ill-defined masslike area in the upper pole of the left kidney (series 3 image 78 and series 4 image 60), indeterminate could represent hemorrhagic/proteinaceous cyst versus renal neoplasm. Urinary bladder: Distended and unremarkable. Remainder of the abdomen and pelvis: Limited evaluation of the abdominal organs due to lack of IV contrast. Hepatobiliary: Diffuse hypoattenuation of the liver, likely due to underlying hepatic cirrhosis. Otherwise, the unenhanced liver and gallbladder are grossly unremarkable. Pancreas: The unenhanced pancreas is grossly unremarkable. Spleen: No splenomegaly. Adrenal glands: No adrenal nodules. Bowels: No abnormally dilated bowel loops. The appendix is visualized and appears normal. Peritoneum: No significant free fluid in the abdomen or pelvis. No free peritoneal or portal venous gas. 2.3 cm calcified focus along the posterior aspect of the urinary bladder, of indeterminate etiology. Pelvic organs: Unremarkable. Vascular: Scattered atherosclerotic vascular calcification of the abdominal aorta and iliac vessels. Lymph nodes: No significant abdominopelvic lymphadenopathy. Bones and soft tissue: No suspicious osseous lesion.       IMPRESSION: 1. No radiodense kidney/ureteral stones or hydronephrosis in either kidney. 2. There is approximately 4.5 cm ill-defined masslike area in the upper pole of the left kidney, indeterminate, could represent hemorrhagic/proteinaceous cyst versus renal neoplasm. Recommend further evaluation with CT or MRI renal mass protocol. 3.  Significant hepatic steatosis. [Consider Follow Up: Possible left renal mass.] This report will be copied to the [facility] to ensure a provider acknowledges the finding. [facility] is available Monday through Friday 8am-3:30 pm.    BIANCA HACKETT MD   SYSTEM ID:  [id number]             Assessment and Plan:     Assessment     46 year old male with Clear cell RCC invading the collecting system cT3N0       We discussed different treatment options available to him. Gold standard treatment recommended by AUA guidelines is radical nephrectomy. He does not have any suspicious lymphadenopathy and therefore lymph node dissection is not recommended. We discussed different approaches including open, laparoscopic and robotic radical nephrectomy. Pros and cons of each approach were discussed. He would like a robotic approach.     We discussed perioperative course and discussed the details of the surgery. He understands the complications associated with the procedure which are included but not limited to infection, bleeding, damage to surrounding structures and conversion to open. All his questions and concerns were addressed.     We also discussed the results of Keynote 564 which showed the efficacy of pemrolizumab in improving disease free survival in adjuvant setting in patients with high risk RCC including the ones with pT3 disease regardless of grade. I think he would be an ideal candidate for this and I would like him to meet with one of our medical oncologist to discuss this treatment in further details        Plan  Schedule for robotic left radical nephrectomy   Referral to medical oncology for consideration of adjuvant treatment         30 total minutes spent on the date of the encounter including direct interaction with the patient, performing chart review, documentation and further activities as noted above.          Vicki Bledsoe MD   Department of Urology   [facility]       Phone call  duration: 15 minutes

## 2021-12-02 ENCOUNTER — TELEPHONE (OUTPATIENT)
Dept: UROLOGY | Facility: CLINIC | Age: 46
End: 2021-12-02
Payer: COMMERCIAL

## 2021-12-02 NOTE — CONFIDENTIAL NOTE
Patient is scheduled for surgery with Dr. Bledsoe    Spoke with: Pierre Pardo voice mail with: n/a    Date of Surgery: 12/21/21    [facility]    [facility]P: Scheduled with [facility]    Pre-procedure COVID-19 Test: 12/17/21    Additional imaging/appointments: n/a    Surgery packet: to be mailed by 12/3/21     Additional comments: n/a

## 2021-12-03 DIAGNOSIS — Z11.59 ENCOUNTER FOR SCREENING FOR OTHER VIRAL DISEASES: Primary | ICD-10-CM

## 2021-12-07 DIAGNOSIS — C80.1 CLEAR CELL CARCINOMA (H): Primary | ICD-10-CM

## 2021-12-08 ENCOUNTER — PATIENT OUTREACH (OUTPATIENT)
Dept: ONCOLOGY | Facility: CLINIC | Age: 46
End: 2021-12-08
Payer: COMMERCIAL

## 2021-12-08 NOTE — PROGRESS NOTES
New Patient Oncology Nurse Navigator Note     Referring provider: Vicki Bledsoe MD in [facility] [place] [facility]    Referred to (specialty): Medical Oncology    Date Referral Received: 12/7/21     Evaluation for : Renal cell carcinoma cT3       Clinical History (per Nurse review of records provided):       See Dr. Bledsoe's notes.  Plan for robotic left radical nephrectomy on 12/21/21.  Plan to see Dr. Barahona on 1/5/22 to discuss potential adjuvant treatment.    Records Location (Care Everywhere, Media, etc.): Epic     12/21/21- robotic left radical nephrectomy     I called Pierre today to discuss referral to oncology.  He would like to see Dr. Barahona after his surgical follow up on 1/5/22.  I will forward referral to NPS for finalizing this appt. 12:45-1:45.  I gave Pierre the main [facility] number.  No other questions at this time.

## 2021-12-13 ENCOUNTER — PATIENT OUTREACH (OUTPATIENT)
Dept: UROLOGY | Facility: CLINIC | Age: 46
End: 2021-12-13
Payer: COMMERCIAL

## 2021-12-13 ENCOUNTER — MYC MEDICAL ADVICE (OUTPATIENT)
Dept: UROLOGY | Facility: CLINIC | Age: 46
End: 2021-12-13
Payer: COMMERCIAL

## 2021-12-13 DIAGNOSIS — N28.89 RENAL MASS: Primary | ICD-10-CM

## 2021-12-16 ENCOUNTER — OFFICE VISIT (OUTPATIENT)
Dept: FAMILY MEDICINE | Facility: CLINIC | Age: 46
End: 2021-12-16
Payer: COMMERCIAL

## 2021-12-16 VITALS
BODY MASS INDEX: 30.13 KG/M2 | RESPIRATION RATE: 12 BRPM | TEMPERATURE: 97.3 F | DIASTOLIC BLOOD PRESSURE: 77 MMHG | HEART RATE: 80 BPM | WEIGHT: 210 LBS | SYSTOLIC BLOOD PRESSURE: 138 MMHG | OXYGEN SATURATION: 96 %

## 2021-12-16 DIAGNOSIS — R03.0 ELEVATED BLOOD PRESSURE READING WITHOUT DIAGNOSIS OF HYPERTENSION: ICD-10-CM

## 2021-12-16 DIAGNOSIS — C64.2 MALIGNANT NEOPLASM OF KIDNEY EXCLUDING RENAL PELVIS, LEFT (H): ICD-10-CM

## 2021-12-16 DIAGNOSIS — Z01.818 PREOP GENERAL PHYSICAL EXAM: Primary | ICD-10-CM

## 2021-12-16 DIAGNOSIS — F43.9 STRESS: ICD-10-CM

## 2021-12-16 PROCEDURE — 99214 OFFICE O/P EST MOD 30 MIN: CPT | Performed by: FAMILY MEDICINE

## 2021-12-16 NOTE — H&P (VIEW-ONLY)
[facility]  [address] [facility] [address]  Phone: [phone]  Primary Provider: Ren Owen  Pre-op Performing Provider: DAISHA HASKINS      PREOPERATIVE EVALUATION:  Today's date: 12/16/2021    Pierre Avila is a 46 year old male who presents for a preoperative evaluation.    Clear cell renal cancer - needs nephrectomy.   Paternal side -melanoma. No colon cancer.   No personal history of skin cancers- seen dermatology.   No family history mi/cva/dm.   X-smoker - quit 5 years ago.   Ibuprofen on hold. Tylenol. No asa. ALCOHOL - no regularly.   No dysuria. No fevers or chills. No nausea, vomiting or diarrhea or black/bloody stools. No constipation.   No gely known. Some snoring.   No chest pain or shortness of breath. Good exercise tolerance.   Emotionally overall ok. Good support system.     Surgical Information:  Surgery/Procedure: Nephrectomy robot assisted   Surgery Location: [facility]   Surgeon: Ladi  Surgery Date: 12/21/21  Time of Surgery: 8am   Where patient plans to recover: At home with family  Fax number for surgical facility: Note does not need to be faxed, will be available electronically in Epic.    Type of Anesthesia Anticipated: General    ASSESSMENT / PLAN:  (Z01.818) Preop general physical exam  (primary encounter diagnosis)  Comment: denies chest pain or shortness of breath.  Generally healthy  Plan: ok for surgery. Avoid asa/nsaids and ALCOHOL one week before.     (C64.2) Malignant neoplasm of kidney excluding renal pelvis, left (H)  Plan: per urology    (R03.0) Elevated blood pressure reading without diagnosis of hypertension  Comment: possible early htn vs stressp  Plan: patient will limit sodium/increase exercise and self-monitor. Return to clinic 2months for physical/fasting labs. Sooner if worse. Consider beta-blocker. Chest pain or shortness of breath to er. Call/email with questions/concerns     (F43.9) Stress  Comment: anxiety with  diagnosis   Plan: possible therapist or meds. Over the counter sleep aides and limit caffeine. Good support. System. Patient not interestted in meds at this point but consider beta-blocker or selective serotonin reuptake inhibitor. If SUICIAL IDEATION OR HOMOCIDAL IDEATION OR TWILA TO ER. Follow-up 2 months physical. Sooner if worse. Consider therapist too.         Subjective     HPI related to upcoming procedure: renal cancer.    Preop Questions 12/15/2021   1. Have you ever had a heart attack or stroke? No   2. Have you ever had surgery on your heart or blood vessels, such as a stent placement, a coronary artery bypass, or surgery on an artery in your head, neck, heart, or legs? No   3. Do you have chest pain with activity? No   4. Do you have a history of  heart failure? No   5. Do you currently have a cold, bronchitis or symptoms of other infection? No   6. Do you have a cough, shortness of breath, or wheezing? No   7. Do you or anyone in your family have previous history of blood clots? No   8. Do you or does anyone in your family have a serious bleeding problem such as prolonged bleeding following surgeries or cuts? No   9. Have you ever had problems with anemia or been told to take iron pills? No   10. Have you had any abnormal blood loss such as black, tarry or bloody stools? No   11. Have you ever had a blood transfusion? No   12. Are you willing to have a blood transfusion if it is medically needed before, during, or after your surgery? Yes   13. Have you or any of your relatives ever had problems with anesthesia? No   14. Do you have sleep apnea, excessive snoring or daytime drowsiness? YES - doesnt sleep well    14a. Do you have a CPAP machine? No   15. Do you have any artifical heart valves or other implanted medical devices like a pacemaker, defibrillator, or continuous glucose monitor? No   16. Do you have artificial joints? No   17. Are you allergic to latex? No           Review of Systems  All other  ROS negative.     Patient Active Problem List    Diagnosis Date Noted     Renal mass 10/27/2021     Priority: Medium     Added automatically from request for surgery [id number]        No past medical history on file.  Past Surgical History:   Procedure Laterality Date     COMBINED CYSTOSCOPY, RETROGRADES, URETEROSCOPY, INSERT STENT Left 11/16/2021    Procedure: CYSTOURETEROSCOPY, WITH RETROGRADE PYELOGRAM Biopsy and STENT INSERTION left;  Surgeon: Vicki Bledsoe MD;  Location: [facility] OR     Current Outpatient Medications   Medication Sig Dispense Refill     tamsulosin (FLOMAX) 0.4 MG capsule Take 1 capsule (0.4 mg) by mouth daily 5 capsule 0     trospium (SANCTURA) 20 MG tablet Take 1 tablet (20 mg) by mouth 2 times daily (before meals) 5 tablet 0       Allergies   Allergen Reactions     Seasonal Allergies         Social History     Tobacco Use     Smoking status: Former Smoker     Smokeless tobacco: Former User   Substance Use Topics     Alcohol use: Not on file     History   Drug Use Unknown         Objective   /77   Pulse 80   Temp 97.3  F (36.3  C) (Tympanic)   Resp 12   Wt 95.3 kg (210 lb)   SpO2 96%   BMI 30.13 kg/m     Physical Exam  GENERAL APPEARANCE: healthy, alert and no distress  EYES: Eyes grossly normal to inspection, PERRL and conjunctivae and sclerae normal  HENT: ear canals and TM's normal and nose and mouth without ulcers or lesions  NECK: no adenopathy, no asymmetry, masses, or scars and thyroid normal to palpation  RESP: lungs clear to auscultation - no rales, rhonchi or wheezes  CV: regular rate and rhythm, normal S1 S2, no S3 or S4 and no murmur, click or rub   ABDOMEN: soft, nontender, no HSM or masses and bowel sounds normal  MS: extremities normal- no gross deformities noted  NEURO: Normal strength and tone, sensory exam grossly normal, mentation intact and speech normal  PSYCH: mentation appears normal and affect normal/bright, mildly anxious    Recent Labs   Lab Test 10/20/21  [id number]  10/20/21  0754   HGB 14.4  --      --    NA  --  143   POTASSIUM  --  4.2   CR  --  0.96        Diagnostics: normal cbc/creatine 2 months ago.   No labs were ordered during this visit.   No EKG required, no history of coronary heart disease, significant arrhythmia, peripheral arterial disease or other structural heart disease.    Revised Cardiac Risk Index (RCRI):  The patient has the following serious cardiovascular risks for perioperative complications:   - No serious cardiac risks = 0 points     RCRI Interpretation: 0 points: Class I (very low risk - 0.4% complication rate)           Signed Electronically by: David Woodard MD  Copy of this evaluation report is provided to requesting physician.

## 2021-12-16 NOTE — PROGRESS NOTES
[facility]  [address] [facility] [address]  Phone: [phone]  Primary Provider: Ren Owen  Pre-op Performing Provider: DAISHA HASKINS      PREOPERATIVE EVALUATION:  Today's date: 12/16/2021    Pierre Avila is a 46 year old male who presents for a preoperative evaluation.    Clear cell renal cancer - needs nephrectomy.   Paternal side -melanoma. No colon cancer.   No personal history of skin cancers- seen dermatology.   No family history mi/cva/dm.   X-smoker - quit 5 years ago.   Ibuprofen on hold. Tylenol. No asa. ALCOHOL - no regularly.   No dysuria. No fevers or chills. No nausea, vomiting or diarrhea or black/bloody stools. No constipation.   No gely known. Some snoring.   No chest pain or shortness of breath. Good exercise tolerance.   Emotionally overall ok. Good support system.     Surgical Information:  Surgery/Procedure: Nephrectomy robot assisted   Surgery Location: [facility]   Surgeon: Ladi  Surgery Date: 12/21/21  Time of Surgery: 8am   Where patient plans to recover: At home with family  Fax number for surgical facility: Note does not need to be faxed, will be available electronically in Epic.    Type of Anesthesia Anticipated: General    ASSESSMENT / PLAN:  (Z01.818) Preop general physical exam  (primary encounter diagnosis)  Comment: denies chest pain or shortness of breath.  Generally healthy  Plan: ok for surgery. Avoid asa/nsaids and ALCOHOL one week before.     (C64.2) Malignant neoplasm of kidney excluding renal pelvis, left (H)  Plan: per urology    (R03.0) Elevated blood pressure reading without diagnosis of hypertension  Comment: possible early htn vs stressp  Plan: patient will limit sodium/increase exercise and self-monitor. Return to clinic 2months for physical/fasting labs. Sooner if worse. Consider beta-blocker. Chest pain or shortness of breath to er. Call/email with questions/concerns     (F43.9) Stress  Comment: anxiety with  diagnosis   Plan: possible therapist or meds. Over the counter sleep aides and limit caffeine. Good support. System. Patient not interestted in meds at this point but consider beta-blocker or selective serotonin reuptake inhibitor. If SUICIAL IDEATION OR HOMOCIDAL IDEATION OR TWILA TO ER. Follow-up 2 months physical. Sooner if worse. Consider therapist too.         Subjective     HPI related to upcoming procedure: renal cancer.    Preop Questions 12/15/2021   1. Have you ever had a heart attack or stroke? No   2. Have you ever had surgery on your heart or blood vessels, such as a stent placement, a coronary artery bypass, or surgery on an artery in your head, neck, heart, or legs? No   3. Do you have chest pain with activity? No   4. Do you have a history of  heart failure? No   5. Do you currently have a cold, bronchitis or symptoms of other infection? No   6. Do you have a cough, shortness of breath, or wheezing? No   7. Do you or anyone in your family have previous history of blood clots? No   8. Do you or does anyone in your family have a serious bleeding problem such as prolonged bleeding following surgeries or cuts? No   9. Have you ever had problems with anemia or been told to take iron pills? No   10. Have you had any abnormal blood loss such as black, tarry or bloody stools? No   11. Have you ever had a blood transfusion? No   12. Are you willing to have a blood transfusion if it is medically needed before, during, or after your surgery? Yes   13. Have you or any of your relatives ever had problems with anesthesia? No   14. Do you have sleep apnea, excessive snoring or daytime drowsiness? YES - doesnt sleep well    14a. Do you have a CPAP machine? No   15. Do you have any artifical heart valves or other implanted medical devices like a pacemaker, defibrillator, or continuous glucose monitor? No   16. Do you have artificial joints? No   17. Are you allergic to latex? No           Review of Systems  All other  ROS negative.     Patient Active Problem List    Diagnosis Date Noted     Renal mass 10/27/2021     Priority: Medium     Added automatically from request for surgery [id number]        No past medical history on file.  Past Surgical History:   Procedure Laterality Date     COMBINED CYSTOSCOPY, RETROGRADES, URETEROSCOPY, INSERT STENT Left 11/16/2021    Procedure: CYSTOURETEROSCOPY, WITH RETROGRADE PYELOGRAM Biopsy and STENT INSERTION left;  Surgeon: Vicki Bledsoe MD;  Location: [facility] OR     Current Outpatient Medications   Medication Sig Dispense Refill     tamsulosin (FLOMAX) 0.4 MG capsule Take 1 capsule (0.4 mg) by mouth daily 5 capsule 0     trospium (SANCTURA) 20 MG tablet Take 1 tablet (20 mg) by mouth 2 times daily (before meals) 5 tablet 0       Allergies   Allergen Reactions     Seasonal Allergies         Social History     Tobacco Use     Smoking status: Former Smoker     Smokeless tobacco: Former User   Substance Use Topics     Alcohol use: Not on file     History   Drug Use Unknown         Objective   /77   Pulse 80   Temp 97.3  F (36.3  C) (Tympanic)   Resp 12   Wt 95.3 kg (210 lb)   SpO2 96%   BMI 30.13 kg/m     Physical Exam  GENERAL APPEARANCE: healthy, alert and no distress  EYES: Eyes grossly normal to inspection, PERRL and conjunctivae and sclerae normal  HENT: ear canals and TM's normal and nose and mouth without ulcers or lesions  NECK: no adenopathy, no asymmetry, masses, or scars and thyroid normal to palpation  RESP: lungs clear to auscultation - no rales, rhonchi or wheezes  CV: regular rate and rhythm, normal S1 S2, no S3 or S4 and no murmur, click or rub   ABDOMEN: soft, nontender, no HSM or masses and bowel sounds normal  MS: extremities normal- no gross deformities noted  NEURO: Normal strength and tone, sensory exam grossly normal, mentation intact and speech normal  PSYCH: mentation appears normal and affect normal/bright, mildly anxious    Recent Labs   Lab Test 10/20/21  [id number]  10/20/21  0754   HGB 14.4  --      --    NA  --  143   POTASSIUM  --  4.2   CR  --  0.96        Diagnostics: normal cbc/creatine 2 months ago.   No labs were ordered during this visit.   No EKG required, no history of coronary heart disease, significant arrhythmia, peripheral arterial disease or other structural heart disease.    Revised Cardiac Risk Index (RCRI):  The patient has the following serious cardiovascular risks for perioperative complications:   - No serious cardiac risks = 0 points     RCRI Interpretation: 0 points: Class I (very low risk - 0.4% complication rate)           Signed Electronically by: David Woodard MD  Copy of this evaluation report is provided to requesting physician.

## 2021-12-17 ENCOUNTER — LAB (OUTPATIENT)
Dept: LAB | Facility: CLINIC | Age: 46
End: 2021-12-17
Payer: COMMERCIAL

## 2021-12-17 DIAGNOSIS — Z11.59 ENCOUNTER FOR SCREENING FOR OTHER VIRAL DISEASES: ICD-10-CM

## 2021-12-17 PROCEDURE — U0005 INFEC AGEN DETEC AMPLI PROBE: HCPCS

## 2021-12-17 PROCEDURE — U0003 INFECTIOUS AGENT DETECTION BY NUCLEIC ACID (DNA OR RNA); SEVERE ACUTE RESPIRATORY SYNDROME CORONAVIRUS 2 (SARS-COV-2) (CORONAVIRUS DISEASE [COVID-19]), AMPLIFIED PROBE TECHNIQUE, MAKING USE OF HIGH THROUGHPUT TECHNOLOGIES AS DESCRIBED BY CMS-2020-01-R: HCPCS

## 2021-12-18 LAB — SARS-COV-2 RNA RESP QL NAA+PROBE: NEGATIVE

## 2021-12-20 ENCOUNTER — TELEPHONE (OUTPATIENT)
Dept: UROLOGY | Facility: CLINIC | Age: 46
End: 2021-12-20
Payer: COMMERCIAL

## 2021-12-20 ENCOUNTER — ANESTHESIA EVENT (OUTPATIENT)
Dept: SURGERY | Facility: CLINIC | Age: 46
End: 2021-12-20
Payer: COMMERCIAL

## 2021-12-20 NOTE — TELEPHONE ENCOUNTER
Health Call Center    Phone Message    May a detailed message be left on voicemail: yes     Reason for Call: Other: Pierre is wondering what time he should be arriving for his surgery on 12/21. He states he hasn't gotten a call yet. Writer told him he should be getting a call at some time today with what it should be. Please call Pierre, Thank you!     Action Taken: Message routed to:  [facility]  [facility]    Travel Screening: Not Applicable

## 2021-12-21 ENCOUNTER — HOSPITAL ENCOUNTER (INPATIENT)
Facility: CLINIC | Age: 46
LOS: 1 days | Discharge: HOME | End: 2021-12-22
Attending: UROLOGY | Admitting: UROLOGY
Payer: COMMERCIAL

## 2021-12-21 ENCOUNTER — ANESTHESIA (OUTPATIENT)
Dept: SURGERY | Facility: CLINIC | Age: 46
End: 2021-12-21
Payer: COMMERCIAL

## 2021-12-21 DIAGNOSIS — N28.89 RENAL MASS: ICD-10-CM

## 2021-12-21 DIAGNOSIS — G89.18 POSTOPERATIVE PAIN: Primary | ICD-10-CM

## 2021-12-21 LAB
ABO/RH(D): NORMAL
ANION GAP SERPL CALCULATED.3IONS-SCNC: 8 MMOL/L (ref 3–14)
ANTIBODY SCREEN: NEGATIVE
BUN SERPL-MCNC: 12 MG/DL (ref 7–30)
CALCIUM SERPL-MCNC: 9.1 MG/DL (ref 8.5–10.1)
CHLORIDE BLD-SCNC: 108 MMOL/L (ref 94–109)
CO2 SERPL-SCNC: 26 MMOL/L (ref 20–32)
CREAT SERPL-MCNC: 0.99 MG/DL (ref 0.66–1.25)
ERYTHROCYTE [DISTWIDTH] IN BLOOD BY AUTOMATED COUNT: 12.7 % (ref 10–15)
GFR SERPL CREATININE-BSD FRML MDRD: >90 ML/MIN/1.73M2
GLUCOSE BLD-MCNC: 104 MG/DL (ref 70–99)
GLUCOSE BLDC GLUCOMTR-MCNC: 96 MG/DL (ref 70–99)
HCT VFR BLD AUTO: 44.2 % (ref 40–53)
HGB BLD-MCNC: 13.5 G/DL (ref 13.3–17.7)
HGB BLD-MCNC: 13.9 G/DL (ref 13.3–17.7)
HGB BLD-MCNC: 15 G/DL (ref 13.3–17.7)
MCH RBC QN AUTO: 29.6 PG (ref 26.5–33)
MCHC RBC AUTO-ENTMCNC: 33.9 G/DL (ref 31.5–36.5)
MCV RBC AUTO: 87 FL (ref 78–100)
PLATELET # BLD AUTO: 259 10E3/UL (ref 150–450)
POTASSIUM BLD-SCNC: 4.1 MMOL/L (ref 3.4–5.3)
RBC # BLD AUTO: 5.07 10E6/UL (ref 4.4–5.9)
SODIUM SERPL-SCNC: 142 MMOL/L (ref 133–144)
SPECIMEN EXPIRATION DATE: NORMAL
WBC # BLD AUTO: 7.8 10E3/UL (ref 4–11)

## 2021-12-21 PROCEDURE — 250N000011 HC RX IP 250 OP 636

## 2021-12-21 PROCEDURE — 272N000001 HC OR GENERAL SUPPLY STERILE: Performed by: UROLOGY

## 2021-12-21 PROCEDURE — 250N000011 HC RX IP 250 OP 636: Performed by: STUDENT IN AN ORGANIZED HEALTH CARE EDUCATION/TRAINING PROGRAM

## 2021-12-21 PROCEDURE — 80048 BASIC METABOLIC PNL TOTAL CA: CPT | Performed by: STUDENT IN AN ORGANIZED HEALTH CARE EDUCATION/TRAINING PROGRAM

## 2021-12-21 PROCEDURE — 999N000141 HC STATISTIC PRE-PROCEDURE NURSING ASSESSMENT: Performed by: UROLOGY

## 2021-12-21 PROCEDURE — 8E0W4CZ ROBOTIC ASSISTED PROCEDURE OF TRUNK REGION, PERCUTANEOUS ENDOSCOPIC APPROACH: ICD-10-PCS | Performed by: UROLOGY

## 2021-12-21 PROCEDURE — 370N000017 HC ANESTHESIA TECHNICAL FEE, PER MIN: Performed by: UROLOGY

## 2021-12-21 PROCEDURE — 50545 LAPARO RADICAL NEPHRECTOMY: CPT | Mod: LT | Performed by: UROLOGY

## 2021-12-21 PROCEDURE — 88307 TISSUE EXAM BY PATHOLOGIST: CPT | Mod: TC | Performed by: UROLOGY

## 2021-12-21 PROCEDURE — 0TT14ZZ RESECTION OF LEFT KIDNEY, PERCUTANEOUS ENDOSCOPIC APPROACH: ICD-10-PCS | Performed by: UROLOGY

## 2021-12-21 PROCEDURE — 85018 HEMOGLOBIN: CPT | Performed by: UROLOGY

## 2021-12-21 PROCEDURE — 250N000009 HC RX 250: Performed by: UROLOGY

## 2021-12-21 PROCEDURE — 88307 TISSUE EXAM BY PATHOLOGIST: CPT | Mod: 26 | Performed by: PATHOLOGY

## 2021-12-21 PROCEDURE — 250N000009 HC RX 250

## 2021-12-21 PROCEDURE — 85018 HEMOGLOBIN: CPT

## 2021-12-21 PROCEDURE — 120N000002 HC R&B MED SURG/OB UMMC

## 2021-12-21 PROCEDURE — 710N000010 HC RECOVERY PHASE 1, LEVEL 2, PER MIN: Performed by: UROLOGY

## 2021-12-21 PROCEDURE — 258N000003 HC RX IP 258 OP 636

## 2021-12-21 PROCEDURE — 258N000003 HC RX IP 258 OP 636: Performed by: STUDENT IN AN ORGANIZED HEALTH CARE EDUCATION/TRAINING PROGRAM

## 2021-12-21 PROCEDURE — 36415 COLL VENOUS BLD VENIPUNCTURE: CPT | Performed by: STUDENT IN AN ORGANIZED HEALTH CARE EDUCATION/TRAINING PROGRAM

## 2021-12-21 PROCEDURE — 85027 COMPLETE CBC AUTOMATED: CPT | Performed by: STUDENT IN AN ORGANIZED HEALTH CARE EDUCATION/TRAINING PROGRAM

## 2021-12-21 PROCEDURE — P9041 ALBUMIN (HUMAN),5%, 50ML: HCPCS

## 2021-12-21 PROCEDURE — 250N000013 HC RX MED GY IP 250 OP 250 PS 637

## 2021-12-21 PROCEDURE — 36415 COLL VENOUS BLD VENIPUNCTURE: CPT | Performed by: UROLOGY

## 2021-12-21 PROCEDURE — 250N000011 HC RX IP 250 OP 636: Performed by: UROLOGY

## 2021-12-21 PROCEDURE — 250N000025 HC SEVOFLURANE, PER MIN: Performed by: UROLOGY

## 2021-12-21 PROCEDURE — 272N000004 HC RX 272: Performed by: UROLOGY

## 2021-12-21 PROCEDURE — 360N000080 HC SURGERY LEVEL 7, PER MIN: Performed by: UROLOGY

## 2021-12-21 PROCEDURE — 86901 BLOOD TYPING SEROLOGIC RH(D): CPT | Performed by: UROLOGY

## 2021-12-21 PROCEDURE — 250N000013 HC RX MED GY IP 250 OP 250 PS 637: Performed by: UROLOGY

## 2021-12-21 PROCEDURE — 86850 RBC ANTIBODY SCREEN: CPT | Performed by: UROLOGY

## 2021-12-21 RX ORDER — ONDANSETRON 4 MG/1
4 TABLET, ORALLY DISINTEGRATING ORAL EVERY 6 HOURS PRN
Status: DISCONTINUED | OUTPATIENT
Start: 2021-12-21 | End: 2021-12-22 | Stop reason: HOSPADM

## 2021-12-21 RX ORDER — ONDANSETRON 4 MG/1
4 TABLET, ORALLY DISINTEGRATING ORAL EVERY 30 MIN PRN
Status: DISCONTINUED | OUTPATIENT
Start: 2021-12-21 | End: 2021-12-21 | Stop reason: HOSPADM

## 2021-12-21 RX ORDER — DIMENHYDRINATE 50 MG/ML
25 INJECTION, SOLUTION INTRAMUSCULAR; INTRAVENOUS
Status: DISCONTINUED | OUTPATIENT
Start: 2021-12-21 | End: 2021-12-21 | Stop reason: HOSPADM

## 2021-12-21 RX ORDER — ACETAMINOPHEN 325 MG/1
650 TABLET ORAL EVERY 4 HOURS PRN
Status: DISCONTINUED | OUTPATIENT
Start: 2021-12-24 | End: 2021-12-22 | Stop reason: HOSPADM

## 2021-12-21 RX ORDER — FUROSEMIDE 10 MG/ML
10 INJECTION INTRAMUSCULAR; INTRAVENOUS ONCE
Status: COMPLETED | OUTPATIENT
Start: 2021-12-21 | End: 2021-12-21

## 2021-12-21 RX ORDER — SODIUM CHLORIDE, SODIUM LACTATE, POTASSIUM CHLORIDE, CALCIUM CHLORIDE 600; 310; 30; 20 MG/100ML; MG/100ML; MG/100ML; MG/100ML
INJECTION, SOLUTION INTRAVENOUS CONTINUOUS
Status: DISCONTINUED | OUTPATIENT
Start: 2021-12-21 | End: 2021-12-21 | Stop reason: HOSPADM

## 2021-12-21 RX ORDER — HYDROMORPHONE HCL IN WATER/PF 6 MG/30 ML
0.2 PATIENT CONTROLLED ANALGESIA SYRINGE INTRAVENOUS
Status: DISCONTINUED | OUTPATIENT
Start: 2021-12-21 | End: 2021-12-22 | Stop reason: HOSPADM

## 2021-12-21 RX ORDER — ACETAMINOPHEN 325 MG/1
975 TABLET ORAL ONCE
Status: COMPLETED | OUTPATIENT
Start: 2021-12-21 | End: 2021-12-21

## 2021-12-21 RX ORDER — FENTANYL CITRATE 50 UG/ML
INJECTION, SOLUTION INTRAMUSCULAR; INTRAVENOUS PRN
Status: DISCONTINUED | OUTPATIENT
Start: 2021-12-21 | End: 2021-12-21

## 2021-12-21 RX ORDER — POLYETHYLENE GLYCOL 3350 17 G/17G
17 POWDER, FOR SOLUTION ORAL DAILY
Status: DISCONTINUED | OUTPATIENT
Start: 2021-12-22 | End: 2021-12-22 | Stop reason: HOSPADM

## 2021-12-21 RX ORDER — ACETAMINOPHEN 325 MG/1
975 TABLET ORAL ONCE
Status: DISCONTINUED | OUTPATIENT
Start: 2021-12-21 | End: 2021-12-21 | Stop reason: HOSPADM

## 2021-12-21 RX ORDER — HEPARIN SODIUM 5000 [USP'U]/.5ML
5000 INJECTION, SOLUTION INTRAVENOUS; SUBCUTANEOUS
Status: COMPLETED | OUTPATIENT
Start: 2021-12-21 | End: 2021-12-21

## 2021-12-21 RX ORDER — NALOXONE HYDROCHLORIDE 0.4 MG/ML
0.2 INJECTION, SOLUTION INTRAMUSCULAR; INTRAVENOUS; SUBCUTANEOUS
Status: DISCONTINUED | OUTPATIENT
Start: 2021-12-21 | End: 2021-12-22 | Stop reason: HOSPADM

## 2021-12-21 RX ORDER — PHENYLEPHRINE HCL IN 0.9% NACL 50MG/250ML
.5-1.25 PLASTIC BAG, INJECTION (ML) INTRAVENOUS CONTINUOUS
Status: DISCONTINUED | OUTPATIENT
Start: 2021-12-21 | End: 2021-12-21

## 2021-12-21 RX ORDER — SODIUM CHLORIDE 9 MG/ML
INJECTION, SOLUTION INTRAVENOUS CONTINUOUS
Status: DISCONTINUED | OUTPATIENT
Start: 2021-12-21 | End: 2021-12-22 | Stop reason: HOSPADM

## 2021-12-21 RX ORDER — KETAMINE HYDROCHLORIDE 10 MG/ML
INJECTION INTRAMUSCULAR; INTRAVENOUS PRN
Status: DISCONTINUED | OUTPATIENT
Start: 2021-12-21 | End: 2021-12-21

## 2021-12-21 RX ORDER — HYDROMORPHONE HYDROCHLORIDE 1 MG/ML
0.2 INJECTION, SOLUTION INTRAMUSCULAR; INTRAVENOUS; SUBCUTANEOUS EVERY 5 MIN PRN
Status: DISCONTINUED | OUTPATIENT
Start: 2021-12-21 | End: 2021-12-21 | Stop reason: HOSPADM

## 2021-12-21 RX ORDER — OXYCODONE HYDROCHLORIDE 10 MG/1
10 TABLET ORAL EVERY 4 HOURS PRN
Status: DISCONTINUED | OUTPATIENT
Start: 2021-12-21 | End: 2021-12-22 | Stop reason: HOSPADM

## 2021-12-21 RX ORDER — CEFAZOLIN SODIUM 2 G/100ML
2 INJECTION, SOLUTION INTRAVENOUS
Status: COMPLETED | OUTPATIENT
Start: 2021-12-21 | End: 2021-12-21

## 2021-12-21 RX ORDER — FENTANYL CITRATE 50 UG/ML
25 INJECTION, SOLUTION INTRAMUSCULAR; INTRAVENOUS EVERY 5 MIN PRN
Status: DISCONTINUED | OUTPATIENT
Start: 2021-12-21 | End: 2021-12-21 | Stop reason: HOSPADM

## 2021-12-21 RX ORDER — GLYCOPYRROLATE 0.2 MG/ML
INJECTION, SOLUTION INTRAMUSCULAR; INTRAVENOUS PRN
Status: DISCONTINUED | OUTPATIENT
Start: 2021-12-21 | End: 2021-12-21

## 2021-12-21 RX ORDER — BUPIVACAINE HCL/PF 5 MG/ML
AMPUL (ML) INJECTION PRN
Status: DISCONTINUED | OUTPATIENT
Start: 2021-12-21 | End: 2021-12-21 | Stop reason: HOSPADM

## 2021-12-21 RX ORDER — HEPARIN SODIUM 5000 [USP'U]/.5ML
5000 INJECTION, SOLUTION INTRAVENOUS; SUBCUTANEOUS EVERY 8 HOURS
Status: DISCONTINUED | OUTPATIENT
Start: 2021-12-22 | End: 2021-12-22 | Stop reason: HOSPADM

## 2021-12-21 RX ORDER — LIDOCAINE 40 MG/G
CREAM TOPICAL
Status: DISCONTINUED | OUTPATIENT
Start: 2021-12-21 | End: 2021-12-22 | Stop reason: HOSPADM

## 2021-12-21 RX ORDER — LIDOCAINE HYDROCHLORIDE 20 MG/ML
INJECTION, SOLUTION INFILTRATION; PERINEURAL PRN
Status: DISCONTINUED | OUTPATIENT
Start: 2021-12-21 | End: 2021-12-21

## 2021-12-21 RX ORDER — CEFAZOLIN SODIUM 2 G/100ML
2 INJECTION, SOLUTION INTRAVENOUS SEE ADMIN INSTRUCTIONS
Status: DISCONTINUED | OUTPATIENT
Start: 2021-12-21 | End: 2021-12-21 | Stop reason: HOSPADM

## 2021-12-21 RX ORDER — ONDANSETRON 2 MG/ML
4 INJECTION INTRAMUSCULAR; INTRAVENOUS EVERY 30 MIN PRN
Status: DISCONTINUED | OUTPATIENT
Start: 2021-12-21 | End: 2021-12-21 | Stop reason: HOSPADM

## 2021-12-21 RX ORDER — GABAPENTIN 300 MG/1
300 CAPSULE ORAL 3 TIMES DAILY
Status: DISCONTINUED | OUTPATIENT
Start: 2021-12-21 | End: 2021-12-21 | Stop reason: HOSPADM

## 2021-12-21 RX ORDER — GABAPENTIN 300 MG/1
300 CAPSULE ORAL
Status: DISCONTINUED | OUTPATIENT
Start: 2021-12-21 | End: 2021-12-21 | Stop reason: HOSPADM

## 2021-12-21 RX ORDER — ALBUMIN, HUMAN INJ 5% 5 %
12.5 SOLUTION INTRAVENOUS ONCE
Status: COMPLETED | OUTPATIENT
Start: 2021-12-21 | End: 2021-12-21

## 2021-12-21 RX ORDER — MEPERIDINE HYDROCHLORIDE 25 MG/ML
12.5 INJECTION INTRAMUSCULAR; INTRAVENOUS; SUBCUTANEOUS EVERY 5 MIN PRN
Status: DISCONTINUED | OUTPATIENT
Start: 2021-12-21 | End: 2021-12-21 | Stop reason: HOSPADM

## 2021-12-21 RX ORDER — NALOXONE HYDROCHLORIDE 0.4 MG/ML
0.4 INJECTION, SOLUTION INTRAMUSCULAR; INTRAVENOUS; SUBCUTANEOUS
Status: DISCONTINUED | OUTPATIENT
Start: 2021-12-21 | End: 2021-12-22 | Stop reason: HOSPADM

## 2021-12-21 RX ORDER — HALOPERIDOL 5 MG/ML
1 INJECTION INTRAMUSCULAR
Status: DISCONTINUED | OUTPATIENT
Start: 2021-12-21 | End: 2021-12-21 | Stop reason: HOSPADM

## 2021-12-21 RX ORDER — LIDOCAINE 40 MG/G
CREAM TOPICAL
Status: DISCONTINUED | OUTPATIENT
Start: 2021-12-21 | End: 2021-12-21 | Stop reason: HOSPADM

## 2021-12-21 RX ORDER — LABETALOL HYDROCHLORIDE 5 MG/ML
10 INJECTION, SOLUTION INTRAVENOUS
Status: DISCONTINUED | OUTPATIENT
Start: 2021-12-21 | End: 2021-12-21 | Stop reason: HOSPADM

## 2021-12-21 RX ORDER — PROCHLORPERAZINE MALEATE 5 MG
10 TABLET ORAL EVERY 6 HOURS PRN
Status: DISCONTINUED | OUTPATIENT
Start: 2021-12-21 | End: 2021-12-22 | Stop reason: HOSPADM

## 2021-12-21 RX ORDER — PROPOFOL 10 MG/ML
INJECTION, EMULSION INTRAVENOUS PRN
Status: DISCONTINUED | OUTPATIENT
Start: 2021-12-21 | End: 2021-12-21

## 2021-12-21 RX ORDER — HYDRALAZINE HYDROCHLORIDE 20 MG/ML
2.5-5 INJECTION INTRAMUSCULAR; INTRAVENOUS EVERY 10 MIN PRN
Status: DISCONTINUED | OUTPATIENT
Start: 2021-12-21 | End: 2021-12-21 | Stop reason: HOSPADM

## 2021-12-21 RX ORDER — AMOXICILLIN 250 MG
1 CAPSULE ORAL 2 TIMES DAILY
Status: DISCONTINUED | OUTPATIENT
Start: 2021-12-21 | End: 2021-12-22 | Stop reason: HOSPADM

## 2021-12-21 RX ORDER — ACETAMINOPHEN 500 MG
500-1000 TABLET ORAL EVERY 6 HOURS PRN
Status: ON HOLD | COMMUNITY
End: 2021-12-22

## 2021-12-21 RX ORDER — OXYCODONE HYDROCHLORIDE 5 MG/1
5 TABLET ORAL EVERY 4 HOURS PRN
Status: DISCONTINUED | OUTPATIENT
Start: 2021-12-21 | End: 2021-12-22 | Stop reason: HOSPADM

## 2021-12-21 RX ORDER — OXYCODONE HYDROCHLORIDE 5 MG/1
5 TABLET ORAL EVERY 4 HOURS PRN
Status: DISCONTINUED | OUTPATIENT
Start: 2021-12-21 | End: 2021-12-21 | Stop reason: HOSPADM

## 2021-12-21 RX ORDER — ONDANSETRON 2 MG/ML
INJECTION INTRAMUSCULAR; INTRAVENOUS PRN
Status: DISCONTINUED | OUTPATIENT
Start: 2021-12-21 | End: 2021-12-21

## 2021-12-21 RX ORDER — ONDANSETRON 2 MG/ML
4 INJECTION INTRAMUSCULAR; INTRAVENOUS EVERY 6 HOURS PRN
Status: DISCONTINUED | OUTPATIENT
Start: 2021-12-21 | End: 2021-12-22 | Stop reason: HOSPADM

## 2021-12-21 RX ORDER — LANOLIN ALCOHOL/MO/W.PET/CERES
3 CREAM (GRAM) TOPICAL
Status: DISCONTINUED | OUTPATIENT
Start: 2021-12-21 | End: 2021-12-22 | Stop reason: HOSPADM

## 2021-12-21 RX ORDER — HYDROMORPHONE HCL IN WATER/PF 6 MG/30 ML
0.4 PATIENT CONTROLLED ANALGESIA SYRINGE INTRAVENOUS
Status: DISCONTINUED | OUTPATIENT
Start: 2021-12-21 | End: 2021-12-22 | Stop reason: HOSPADM

## 2021-12-21 RX ORDER — ACETAMINOPHEN 325 MG/1
975 TABLET ORAL EVERY 6 HOURS
Status: DISCONTINUED | OUTPATIENT
Start: 2021-12-21 | End: 2021-12-22 | Stop reason: HOSPADM

## 2021-12-21 RX ORDER — BISACODYL 10 MG
10 SUPPOSITORY, RECTAL RECTAL DAILY PRN
Status: DISCONTINUED | OUTPATIENT
Start: 2021-12-21 | End: 2021-12-22 | Stop reason: HOSPADM

## 2021-12-21 RX ORDER — EPHEDRINE SULFATE 50 MG/ML
INJECTION, SOLUTION INTRAMUSCULAR; INTRAVENOUS; SUBCUTANEOUS PRN
Status: DISCONTINUED | OUTPATIENT
Start: 2021-12-21 | End: 2021-12-21

## 2021-12-21 RX ADMIN — GLYCOPYRROLATE 0.2 MG: 0.2 INJECTION, SOLUTION INTRAMUSCULAR; INTRAVENOUS at 08:52

## 2021-12-21 RX ADMIN — ROCURONIUM BROMIDE 50 MG: 50 INJECTION, SOLUTION INTRAVENOUS at 08:10

## 2021-12-21 RX ADMIN — MIDAZOLAM 2 MG: 1 INJECTION INTRAMUSCULAR; INTRAVENOUS at 07:59

## 2021-12-21 RX ADMIN — PHENYLEPHRINE HYDROCHLORIDE 100 MCG: 10 INJECTION INTRAVENOUS at 08:41

## 2021-12-21 RX ADMIN — Medication 2 G: at 08:14

## 2021-12-21 RX ADMIN — GABAPENTIN 300 MG: 300 CAPSULE ORAL at 06:57

## 2021-12-21 RX ADMIN — SODIUM CHLORIDE, POTASSIUM CHLORIDE, SODIUM LACTATE AND CALCIUM CHLORIDE: 600; 310; 30; 20 INJECTION, SOLUTION INTRAVENOUS at 07:39

## 2021-12-21 RX ADMIN — HYDROMORPHONE HYDROCHLORIDE 0.2 MG: 1 INJECTION, SOLUTION INTRAMUSCULAR; INTRAVENOUS; SUBCUTANEOUS at 12:25

## 2021-12-21 RX ADMIN — Medication 5 MG: at 08:33

## 2021-12-21 RX ADMIN — ACETAMINOPHEN 975 MG: 325 TABLET, FILM COATED ORAL at 15:16

## 2021-12-21 RX ADMIN — SUGAMMADEX 200 MG: 100 INJECTION, SOLUTION INTRAVENOUS at 11:16

## 2021-12-21 RX ADMIN — FUROSEMIDE 10 MG: 10 INJECTION, SOLUTION INTRAVENOUS at 12:26

## 2021-12-21 RX ADMIN — HEPARIN SODIUM 5000 UNITS: 10000 INJECTION, SOLUTION INTRAVENOUS; SUBCUTANEOUS at 23:47

## 2021-12-21 RX ADMIN — LIDOCAINE HYDROCHLORIDE 100 MG: 20 INJECTION, SOLUTION INFILTRATION; PERINEURAL at 08:06

## 2021-12-21 RX ADMIN — FENTANYL CITRATE 50 MCG: 50 INJECTION, SOLUTION INTRAMUSCULAR; INTRAVENOUS at 08:12

## 2021-12-21 RX ADMIN — FENTANYL CITRATE 100 MCG: 50 INJECTION, SOLUTION INTRAMUSCULAR; INTRAVENOUS at 08:46

## 2021-12-21 RX ADMIN — PHENYLEPHRINE HYDROCHLORIDE 100 MCG: 10 INJECTION INTRAVENOUS at 08:35

## 2021-12-21 RX ADMIN — Medication 5 MG: at 08:27

## 2021-12-21 RX ADMIN — ALBUMIN (HUMAN) 12.5 G: 12.5 INJECTION, SOLUTION INTRAVENOUS at 12:27

## 2021-12-21 RX ADMIN — PHENYLEPHRINE HYDROCHLORIDE 100 MCG: 10 INJECTION INTRAVENOUS at 09:29

## 2021-12-21 RX ADMIN — ROCURONIUM BROMIDE 20 MG: 50 INJECTION, SOLUTION INTRAVENOUS at 09:57

## 2021-12-21 RX ADMIN — SODIUM CHLORIDE: 9 INJECTION, SOLUTION INTRAVENOUS at 23:09

## 2021-12-21 RX ADMIN — DOCUSATE SODIUM 50 MG AND SENNOSIDES 8.6 MG 1 TABLET: 8.6; 5 TABLET, FILM COATED ORAL at 21:03

## 2021-12-21 RX ADMIN — FENTANYL CITRATE 200 MCG: 50 INJECTION, SOLUTION INTRAMUSCULAR; INTRAVENOUS at 08:04

## 2021-12-21 RX ADMIN — PROPOFOL 200 MG: 10 INJECTION, EMULSION INTRAVENOUS at 08:09

## 2021-12-21 RX ADMIN — Medication 10 MG: at 10:23

## 2021-12-21 RX ADMIN — HYDROMORPHONE HYDROCHLORIDE 0.5 MG: 1 INJECTION, SOLUTION INTRAMUSCULAR; INTRAVENOUS; SUBCUTANEOUS at 09:16

## 2021-12-21 RX ADMIN — HYDROMORPHONE HYDROCHLORIDE 0.5 MG: 1 INJECTION, SOLUTION INTRAMUSCULAR; INTRAVENOUS; SUBCUTANEOUS at 10:13

## 2021-12-21 RX ADMIN — Medication 30 MG: at 10:08

## 2021-12-21 RX ADMIN — HYDROMORPHONE HYDROCHLORIDE 0.2 MG: 1 INJECTION, SOLUTION INTRAMUSCULAR; INTRAVENOUS; SUBCUTANEOUS at 13:19

## 2021-12-21 RX ADMIN — ACETAMINOPHEN 975 MG: 325 TABLET, FILM COATED ORAL at 06:56

## 2021-12-21 RX ADMIN — ROCURONIUM BROMIDE 20 MG: 50 INJECTION, SOLUTION INTRAVENOUS at 09:17

## 2021-12-21 RX ADMIN — OXYCODONE HYDROCHLORIDE 5 MG: 5 TABLET ORAL at 17:11

## 2021-12-21 RX ADMIN — HEPARIN SODIUM 5000 UNITS: 10000 INJECTION, SOLUTION INTRAVENOUS; SUBCUTANEOUS at 06:58

## 2021-12-21 RX ADMIN — ACETAMINOPHEN 975 MG: 325 TABLET, FILM COATED ORAL at 21:03

## 2021-12-21 RX ADMIN — Medication 5 MG: at 08:54

## 2021-12-21 RX ADMIN — PHENYLEPHRINE HYDROCHLORIDE 100 MCG: 10 INJECTION INTRAVENOUS at 08:24

## 2021-12-21 RX ADMIN — ROCURONIUM BROMIDE 30 MG: 50 INJECTION, SOLUTION INTRAVENOUS at 08:45

## 2021-12-21 RX ADMIN — ONDANSETRON 4 MG: 2 INJECTION INTRAMUSCULAR; INTRAVENOUS at 10:45

## 2021-12-21 RX ADMIN — SODIUM CHLORIDE: 9 INJECTION, SOLUTION INTRAVENOUS at 13:54

## 2021-12-21 ASSESSMENT — ACTIVITIES OF DAILY LIVING (ADL)
ADLS_ACUITY_SCORE: 12
ADLS_ACUITY_SCORE: 10

## 2021-12-21 ASSESSMENT — LIFESTYLE VARIABLES: TOBACCO_USE: 1

## 2021-12-21 NOTE — OR NURSING
Dr Dawson (urology) paged w postop hgb result, no call back or new orders, continue postop plan of care

## 2021-12-21 NOTE — BRIEF OP NOTE
[facility]    Brief Operative Note    Pre-operative diagnosis: Renal mass [N28.89]  Post-operative diagnosis Same as pre-operative diagnosis    Procedure: Procedure(s):  NEPHRECTOMY, ROBOT-ASSISTED  Surgeon: Surgeon(s) and Role:     * Vicki Bledsoe MD - Primary     * Jabier Luz MD - Resident - Assisting  Anesthesia: General   Estimated Blood Loss: 50cc    Drains: 16-Fr patrick  Specimens:   ID Type Source Tests Collected by Time Destination   1 : Left kidney Tissue Kidney, Left SURGICAL PATHOLOGY EXAM Vicki Bledsoe MD 12/21/2021 10:51 AM      Findings:   One artery and one vein. Adrenal gland spared. Visually negative margins.  Complications: None.  Implants: * No implants in log *    Panda Dawson MD  Urology Resident

## 2021-12-21 NOTE — PLAN OF CARE
Vitals:    12/21/21 1300 12/21/21 1330 12/21/21 1353 12/21/21 1445   BP: 136/81 137/82  (!) 148/81   BP Location:    Left arm   Pulse: 78 86  97   Resp: 16 15  26   Temp: 97.5  F (36.4  C)   99.4  F (37.4  C)   TempSrc: Oral   Oral   SpO2: 93% 96% 96% 95%   Weight:        46 years old male with renal mass, came to 7B sy 1500 from PACU after having left nephrectomy,   Pt alert and oriented, 4 lap site prima pore intact as well as midline incision dressing intact,  Sánchez with adequate amount out put, denies nausea, better pain control, up and transferred to bed with stand by assist, significant other at bed side, continue with plan of care.

## 2021-12-21 NOTE — ANESTHESIA PREPROCEDURE EVALUATION
Anesthesia Pre-Procedure Evaluation    Patient: Pierre Avila   MRN: [id number] : 1975        Preoperative Diagnosis: Renal mass [N28.89]    Procedure : Procedure(s):  NEPHRECTOMY, ROBOT-ASSISTED          History reviewed. No pertinent past medical history.   Past Surgical History:   Procedure Laterality Date     COMBINED CYSTOSCOPY, RETROGRADES, URETEROSCOPY, INSERT STENT Left 2021    Procedure: CYSTOURETEROSCOPY, WITH RETROGRADE PYELOGRAM Biopsy and STENT INSERTION left;  Surgeon: Vicki Bledsoe MD;  [place] [facility] [place]      Allergies   Allergen Reactions     Seasonal Allergies       Social History     Tobacco Use     Smoking status: Former Smoker     Smokeless tobacco: Former User   Substance Use Topics     Alcohol use: Not Currently      Wt Readings from Last 1 Encounters:   21 95.3 kg (210 lb)        Anesthesia Evaluation   Pt has had prior anesthetic. Type: General.    No history of anesthetic complications       ROS/MED HX  ENT/Pulmonary: Comment:   Former smoker, quit 5 years ago.   Seasonal allergies.     (+) tobacco use, Past use,     Neurologic:  - neg neurologic ROS     Cardiovascular:  - neg cardiovascular ROS   (+) hypertension-range: 138/77/ ----No previous cardiac testing     METS/Exercise Tolerance: 4 - Raking leaves, gardening    Hematologic:  - neg hematologic  ROS     Musculoskeletal:  - neg musculoskeletal ROS     GI/Hepatic:  - neg GI/hepatic ROS     Renal/Genitourinary: Comment:   New diagnostic of renal cell carcinoma  S/p cystoscopy, ureteroscopy and stents 2021  Estimated GFR >90, with creat 0.96      Endo:  - neg endo ROS     Psychiatric/Substance Use:  - neg psychiatric ROS     Infectious Disease:  - neg infectious disease ROS     Malignancy: Comment:   Renal cell carcinoma, left      Other:            Physical Exam    Airway  airway exam normal      Mallampati: II   TM distance: > 3 FB   Neck ROM: full   Mouth opening: > 3 cm    Respiratory Devices and  Support         Dental  no notable dental history         Cardiovascular   cardiovascular exam normal          Pulmonary   pulmonary exam normal                OUTSIDE LABS:  CBC:   Lab Results   Component Value Date    WBC 7.8 12/21/2021    WBC 8.0 10/20/2021    HGB 15.0 12/21/2021    HGB 14.4 10/20/2021    HCT 44.2 12/21/2021    HCT 42.0 10/20/2021     12/21/2021     10/20/2021     BMP:   Lab Results   Component Value Date     10/20/2021    POTASSIUM 4.2 10/20/2021    CHLORIDE 111 (H) 10/20/2021    CO2 29 10/20/2021    BUN 12 10/20/2021    CR 0.96 10/20/2021    GLC 96 12/21/2021     (H) 10/20/2021     COAGS: No results found for: PTT, INR, FIBR  POC: No results found for: BGM, HCG, HCGS  HEPATIC: No results found for: ALBUMIN, PROTTOTAL, ALT, AST, GGT, ALKPHOS, BILITOTAL, BILIDIRECT, LAINE  OTHER:   Lab Results   Component Value Date    CHRISTIANE 8.9 10/20/2021       Anesthesia Plan    ASA Status:  2   NPO Status:  NPO Appropriate    Anesthesia Type: General.     - Airway: ETT   Induction: Intravenous.   Maintenance: Inhalation.   Techniques and Equipment:     - Lines/Monitors: 2nd IV     Consents    Anesthesia Plan(s) and associated risks, benefits, and realistic alternatives discussed. Questions answered and patient/representative(s) expressed understanding.     - Discussed: Risks, Benefits and Alternatives for BOTH SEDATION and the PROCEDURE were discussed     - Discussed with:  Patient      - Extended Intubation/Ventilatory Support Discussed: No.      - Patient is DNR/DNI Status: No    Use of blood products discussed: Yes.     - Discussed with: Patient.     Postoperative Care    Pain management: IV analgesics, Oral pain medications.   PONV prophylaxis: Ondansetron (or other 5HT-3), Dexamethasone or Solumedrol     Comments:                Geovanna Lucas MD

## 2021-12-21 NOTE — ANESTHESIA CARE TRANSFER NOTE
Patient: Pierre Avila    Procedure: Procedure(s):  NEPHRECTOMY, ROBOT-ASSISTED       Diagnosis: Renal mass [N28.89]  Diagnosis Additional Information: No value filed.    Anesthesia Type:   General     Note:    Oropharynx: oropharynx clear of all foreign objects  Level of Consciousness: awake  Oxygen Supplementation: nasal cannula  Level of Supplemental Oxygen (L/min / FiO2): 4  Independent Airway: airway patency satisfactory and stable  Dentition: dentition unchanged  Vital Signs Stable: post-procedure vital signs reviewed and stable  Report to RN Given: handoff report given  Patient transferred to: PACU    Handoff Report: Identifed the Patient, Identified the Reponsible Provider, Reviewed the pertinent medical history, Discussed the surgical course, Reviewed Intra-OP anesthesia mangement and issues during anesthesia, Set expectations for post-procedure period and Allowed opportunity for questions and acknowledgement of understanding      Vitals:  Vitals Value Taken Time   /77 12/21/21 1133   Temp     Pulse 78 12/21/21 1136   Resp     SpO2 100 % 12/21/21 1136   Vitals shown include unvalidated device data.    Electronically Signed By: EFE Miller CRNA  December 21, 2021  11:37 AM

## 2021-12-21 NOTE — ANESTHESIA POSTPROCEDURE EVALUATION
Patient: Pierre Avila    Procedure: Procedure(s):  NEPHRECTOMY, ROBOT-ASSISTED       Diagnosis:Renal mass [N28.89]  Diagnosis Additional Information: No value filed.    Anesthesia Type:  General    Note:  Disposition: Inpatient   Postop Pain Control: Uneventful            Sign Out: Well controlled pain   PONV: No   Neuro/Psych: Uneventful            Sign Out: Acceptable/Baseline neuro status   Airway/Respiratory: Uneventful            Sign Out: Acceptable/Baseline resp. status   CV/Hemodynamics: Uneventful            Sign Out: Acceptable CV status; No obvious hypovolemia; No obvious fluid overload   Other NRE: NONE   DID A NON-ROUTINE EVENT OCCUR? No           Last vitals:  Vitals Value Taken Time   /81 12/21/21 1300   Temp 36.2  C (97.1  F) 12/21/21 1133   Pulse 82 12/21/21 1308   Resp 16 12/21/21 1300   SpO2 96 % 12/21/21 1308   Vitals shown include unvalidated device data.    Electronically Signed By: Oskar Reid DO  December 21, 2021  1:10 PM

## 2021-12-21 NOTE — ANESTHESIA PROCEDURE NOTES
Airway         Procedure Start/Stop Times: 12/21/2021 8:12 AM and 12/21/2021 8:15 AM  Staff -        Anesthesiologist:  Geovanna Handley MD       CRNA: Mc Means APRN CRNA       Performed By: CRNAIndications and Patient Condition       Induction type:intravenous       Mask difficulty assessment: 2 - vent by mask + OA or adjuvant +/- NMBA    Final Airway Details       Final airway type: endotracheal airway       Successful airway: Diagnostic laryngoscopy only  Endotracheal Airway Details        Cuffed: yes       Successful intubation technique: direct laryngoscopy       DL Blade Type: Hendrickson 2       Grade View of Cords: 1       Adjucts: stylet       Position: Left       Measured from: gums/teeth       Secured at (cm): 22       Bite block used: None    Post intubation assessment        Placement verified by: capnometry, equal breath sounds and chest rise        Number of attempts at approach: 1       Number of other approaches attempted: 0       Secured with: plastic tape and pink tape       Ease of procedure: easy       Dentition: Intact

## 2021-12-22 VITALS
OXYGEN SATURATION: 98 % | BODY MASS INDEX: 30.05 KG/M2 | SYSTOLIC BLOOD PRESSURE: 147 MMHG | HEART RATE: 69 BPM | TEMPERATURE: 97.6 F | RESPIRATION RATE: 18 BRPM | WEIGHT: 209.44 LBS | DIASTOLIC BLOOD PRESSURE: 91 MMHG

## 2021-12-22 LAB
ANION GAP SERPL CALCULATED.3IONS-SCNC: 5 MMOL/L (ref 3–14)
BUN SERPL-MCNC: 12 MG/DL (ref 7–30)
CALCIUM SERPL-MCNC: 8.8 MG/DL (ref 8.5–10.1)
CHLORIDE BLD-SCNC: 110 MMOL/L (ref 94–109)
CO2 SERPL-SCNC: 26 MMOL/L (ref 20–32)
CREAT SERPL-MCNC: 1.45 MG/DL (ref 0.66–1.25)
ERYTHROCYTE [DISTWIDTH] IN BLOOD BY AUTOMATED COUNT: 13 % (ref 10–15)
GFR SERPL CREATININE-BSD FRML MDRD: 60 ML/MIN/1.73M2
GLUCOSE BLD-MCNC: 106 MG/DL (ref 70–99)
HCT VFR BLD AUTO: 39.9 % (ref 40–53)
HGB BLD-MCNC: 13.1 G/DL (ref 13.3–17.7)
MCH RBC QN AUTO: 29.1 PG (ref 26.5–33)
MCHC RBC AUTO-ENTMCNC: 32.8 G/DL (ref 31.5–36.5)
MCV RBC AUTO: 89 FL (ref 78–100)
PLATELET # BLD AUTO: 240 10E3/UL (ref 150–450)
POTASSIUM BLD-SCNC: 4.2 MMOL/L (ref 3.4–5.3)
RBC # BLD AUTO: 4.5 10E6/UL (ref 4.4–5.9)
SODIUM SERPL-SCNC: 141 MMOL/L (ref 133–144)
WBC # BLD AUTO: 10.6 10E3/UL (ref 4–11)

## 2021-12-22 PROCEDURE — 85027 COMPLETE CBC AUTOMATED: CPT

## 2021-12-22 PROCEDURE — 250N000011 HC RX IP 250 OP 636

## 2021-12-22 PROCEDURE — 36415 COLL VENOUS BLD VENIPUNCTURE: CPT

## 2021-12-22 PROCEDURE — 250N000013 HC RX MED GY IP 250 OP 250 PS 637

## 2021-12-22 PROCEDURE — 80048 BASIC METABOLIC PNL TOTAL CA: CPT

## 2021-12-22 RX ORDER — AMOXICILLIN 250 MG
1-2 CAPSULE ORAL 2 TIMES DAILY
Qty: 45 TABLET | Refills: 0 | Status: SHIPPED | OUTPATIENT
Start: 2021-12-22 | End: 2022-01-05

## 2021-12-22 RX ORDER — OXYCODONE HYDROCHLORIDE 5 MG/1
5 TABLET ORAL EVERY 4 HOURS PRN
Qty: 10 TABLET | Refills: 0 | Status: SHIPPED | OUTPATIENT
Start: 2021-12-22 | End: 2022-01-05

## 2021-12-22 RX ORDER — ACETAMINOPHEN 325 MG/1
650 TABLET ORAL EVERY 6 HOURS PRN
Qty: 120 TABLET | Refills: 0 | Status: SHIPPED | OUTPATIENT
Start: 2021-12-22 | End: 2022-01-05

## 2021-12-22 RX ADMIN — ACETAMINOPHEN 975 MG: 325 TABLET, FILM COATED ORAL at 08:29

## 2021-12-22 RX ADMIN — HEPARIN SODIUM 5000 UNITS: 10000 INJECTION, SOLUTION INTRAVENOUS; SUBCUTANEOUS at 08:30

## 2021-12-22 RX ADMIN — ACETAMINOPHEN 975 MG: 325 TABLET, FILM COATED ORAL at 03:07

## 2021-12-22 RX ADMIN — DOCUSATE SODIUM 50 MG AND SENNOSIDES 8.6 MG 1 TABLET: 8.6; 5 TABLET, FILM COATED ORAL at 08:29

## 2021-12-22 RX ADMIN — Medication 3 MG: at 00:05

## 2021-12-22 ASSESSMENT — ACTIVITIES OF DAILY LIVING (ADL)
ADLS_ACUITY_SCORE: 12
ADLS_ACUITY_SCORE: 3
ADLS_ACUITY_SCORE: 12
ADLS_ACUITY_SCORE: 3
ADLS_ACUITY_SCORE: 12
ADLS_ACUITY_SCORE: 12
ADLS_ACUITY_SCORE: 3
ADLS_ACUITY_SCORE: 12
ADLS_ACUITY_SCORE: 3

## 2021-12-22 NOTE — PLAN OF CARE
BP (!) 142/76 (BP Location: Left arm)   Pulse 85   Temp 99.4  F (37.4  C) (Oral)   Resp 19   Wt 95 kg (209 lb 7 oz)   SpO2 94%   BMI 30.05 kg/m       NEURO: A&ox4, calls appropriately   RESP: denies of SOB, sating on RA >95%  CARDIAC: WDL, denies of chest pain  GI/: Patrick catheter with auop, no bm this shift, +bs  DIET: clear liquid diet (rn to advance as tolerated)  PAIN: c/o pain 7/10. Scheduled tylenol & prn oxy 5mg (1x)  SKIN/INCISION: 4 lap site prima pore(CDI), midline incision (CDI)  IV ACCESS/DRAIN: L PIV hand (NS infusing 100ml/hr), R PIV Hand (SL), patrick catheter  LAB: reviewed  ACTIVITY: SBA  PLAN: Continue POC

## 2021-12-22 NOTE — DISCHARGE SUMMARY
[facility] UroDischarge Summary    Patient: Pierre Avila    MRN: [id number]   : 1975         Date of Admission:  2021   Date of Discharge::  2021  Admitting Physician:  Vicki Bledsoe MD  Discharge Physician:  Dm Macias              Admission Diagnoses:   LEFT renal mass [N28.89]    History reviewed. No pertinent past medical history.          Discharge Diagnosis:     LEFT renal mass [N28.89]    History reviewed. No pertinent past medical history.         Procedures:     Procedure(s): 21 - Left robot radical nephrectomy   Dr. Vicki Bledsoe (Urology)            Medications Prior to Admission:     Medications Prior to Admission   Medication Sig Dispense Refill Last Dose     [DISCONTINUED] acetaminophen (TYLENOL) 500 MG tablet Take 500-1,000 mg by mouth every 6 hours as needed for mild pain                Discharge Medications:     Current Discharge Medication List      START taking these medications    Details   oxyCODONE (ROXICODONE) 5 MG tablet Take 1 tablet (5 mg) by mouth every 4 hours as needed for moderate to severe pain  Qty: 10 tablet, Refills: 0    Associated Diagnoses: Postoperative pain; Renal mass      senna-docusate (SENOKOT-S/PERICOLACE) 8.6-50 MG tablet Take 1-2 tablets by mouth 2 times daily To prevent constipation  Qty: 45 tablet, Refills: 0    Associated Diagnoses: Postoperative pain; Renal mass         CONTINUE these medications which have CHANGED    Details   acetaminophen (TYLENOL) 325 MG tablet Take 2 tablets (650 mg) by mouth every 6 hours as needed for pain  Qty: 120 tablet, Refills: 0    Associated Diagnoses: Postoperative pain; Renal mass                   Consultations:   Consultation during this admission received:   None          Brief History of Illness:   Reason for admission requiring a surgical or invasive procedure:   Renal mass [N28.89]   The patient underwent the following procedure(s):   See above   There were no immediate complications  during this procedure.    Please refer to the full operative summary for details.           Hospital Course:   The patient's hospital course was unremarkable.  Pierre Avila recovered as anticipated and experienced no post-operative complications.      On POD #1 he was ambulating without assitance, tolerating the discharge diet, had pain controlled with PO medications to go home with, and was requiring no IV medications or fluids. He underwent a voiding trial that was successful.  He was discharged to home with appropriate contact information, follow-up and instructions as seen below in the discharge paperwork.           Discharge Labs:     No results found for: PSA  Recent Labs   Lab 12/22/21  0712 12/21/21 2001 12/21/21  1233 12/21/21  0630   WBC 10.6  --   --  7.8   HGB 13.1* 13.9   < > 15.0     --   --  259    < > = values in this interval not displayed.     Recent Labs   Lab 12/22/21  0712 12/21/21  0630    142   POTASSIUM 4.2 4.1   CHLORIDE 110* 108   CO2 26 26   BUN 12 12   CR 1.45* 0.99   * 104*   CHRISTIANE 8.8 9.1     No lab results found in last 7 days.    Invalid input(s): URINEBLOOD  No results found for this or any previous visit.     Surgical Pathology  Case: [id number]                                   Authorizing Provider:  Vicki Bledsoe MD          Collected:           12/21/2021 10:51 AM           Ordering Location:     [facility] MAIN OR                 Received:            12/21/2021 03:34 PM           Pathologist:           Rand Cortez MD                                                            Specimen:    Kidney, Left, Left kidney                                                                  Final Diagnosis   Left kidney, nephrectomy:  - Clear-cell renal cell carcinoma  - ISUP grade 3  - Carcinoma involves calyceal urothelium  - Margins are free of carcinoma  - See synoptic report   Amendment electronically signed by Rand Cortez MD on 12/29/2021 at 11:26 AM               Discharge Instructions and Follow-Up:    Diet:  -  Regular    Activity:   - No strenuous exercise for 6 weeks.   - No lifting, pushing, pulling more than 15 pounds for 6 weeks.   - Do not strain with bowel movements.   - Do not drive until you can press the brake pedal quickly and fully without pain.   - Do not operate a motor vehicle while taking narcotic pain medications.     Medications:     1) PAIN: oxycodone is a narcotic medication that is prescribed for pain.  Narcotics will cause sleepiness and constipation and can become addictive, therefore it is best to stop or reduce them as soon as you can.  Any left over narcotics should be disposed of with an Authorized  for unneeded medications.  Contact your [facility] s or [facility] s household trash and recycling service to learn about medication disposal options and guidelines for your area.  If you decide to store this medication at home it should be kept in a locked cabinet to prevent access to children or visitors.   - To reduce your narcotic use, take Tylenol (acetaminophen 625mg) every 4-6 hours as directed.  Do not take more than 4,000mg of Tylenol (acetaminophen/ APAP) from all sources in any 24 hour period since this can cause liver damage.  Do not take more than 2400mg of ibuprofen in any 24 hour period since this can cause kidney damage.   - Never drive, operate machinery or drink alcoholic beverages while you are taking narcotic pain medications.      2) CONSTIPATION: Surgery, pain medications and bladder spasm medications can make you constipated.  Take pericolace, or other over the counter solutions such as prune juice, miralax, fiber products, senna, and dulcolax to prevent constipation. If you are taking these but still have not had a bowel movement in 3 days, start over-the-counter Milk of Magnesia taken twice daily until you have a good result.  Call the office with any concerns.     Incisions:   - You may shower and get incisions wet  "starting 48 hrs after surgery.  - Do not scrub incisions or submerge wounds in a bath, pool, hot tub, etc. for 2 weeks.   - Remove wound dressing 48 hours after surgery.   - If purple dermabond glue was used, avoid applying any lotions or ointments.   - If steri-strips were used, they will fall off on their own.   - Leave incision open to air.  Cover with gauze only if needed for comfort or to protect clothing from drainage.     Tubes / drains:  None    Follow-Up:   - Schedule an appointment to be seen by your primary care provider within 7-10 days of discharge for a postoperative checkup.   - Follow up with Dr. Bledsoe as scheduled on 1/05/2022  - Call or return sooner than your regularly scheduled visit if you develop any of the following:  Fever (greater than 101.3F), uncontrolled pain, uncontrolled nausea or vomiting, concerns about bowel function, as well as increased redness, swelling, drainage from your wound or any concerns about urinating or urinary catheter drainage.      Here are some phone numbers where you can reach us:  - Monday through Friday, 8am to 4:30pm - as long as it is not a holiday, IT IS BEST to call the Urology Clinic Triage Line at: [phone] with any non-emergent urology concerns.    - If it is a night, weekend, or holiday call the [facility] number at [phone] and ask the  to page the \"urology resident on call\".  Typically, the on-call provider should return your call within 30 minutes.  Please page the on-call provider again if you haven't been contacted as expected.  Rarely, the on-call provider will be unable to promptly return a call due to a hospital emergency.  If you have paged twice and are still not contacted, ask the hospital  to page the \"urology CHIEF-RESIDENT on call\".  - FOR EMERGENCIES, always call 911 or go to the Emergency Department         Discharge Disposition:     Discharged to home      Attestation: I have reviewed today's vital signs, " notes, medications, labs and imaging.    Natalia Arita PA-C  Urology Physician Assistant  Personal Pager: [phone]    Please call Job Code:   [phone] to reach the Urology resident or PA on call - Weekdays  [phone] to reach the Urology resident or PA on call - Weeknights and weekends      December 22, 2021

## 2021-12-22 NOTE — PLAN OF CARE
/74 (BP Location: Left arm)   Pulse 74   Temp 99  F (37.2  C) (Oral)   Resp 16   Wt 95 kg (209 lb 7 oz)   SpO2 95%   BMI 30.05 kg/m       Neuros:  A&Ox4. Able to make needs known.   Activity: SBA  Cardiac:  WNL. Denies cardiac chest pain   Respiratory:  WNL. Sating well on RA. Denies SOB  Diet: Clears- tolerating   GI/Gu:  Sánchez with adequate output.No BM this shift. Denies N/V  Skin/Incisions: 4 abdominal lap sites covered with  Primapore. No other deficits noted   LDA: PIV infusing NS at 100mL/hr  Pain: abdominal pain- denies need for pain meds, scheduled tylenol given  PRN: Melatonin   Changes:  No acute changes this shift   Plan: Continue POC     MARII GARCIA RN on 12/22/2021 at 2:55 AM

## 2021-12-22 NOTE — PROGRESS NOTES
Blood pressure (!) 147/91, pulse 69, temperature 97.6  F (36.4  C), temperature source Oral, resp. rate 18, weight 95 kg (209 lb 7 oz), SpO2 98 %.        Pt is alert and oriented by 4. Able to make needs known. Sánchez removed -- pt voided on own 600 ml. Both PIVs removed. Discharge instructions gone over with pt-- verbalized understanding and had no questions. Adequate for discharge.

## 2021-12-22 NOTE — PROGRESS NOTES
Urology  Progress Note    - no acute events overnight  - tolerated clears  - pain well controlled   - very hundry    Exam  BP (!) 144/88 (BP Location: Left arm)   Pulse 65   Temp 98.3  F (36.8  C) (Oral)   Resp 17   Wt 95 kg (209 lb 7 oz)   SpO2 96%   BMI 30.05 kg/m    No acute distress  Unlabored breathing on RA  Abdomen soft, appropriately tender, nondistended. Incisions c/d/i  Sánchez with clear yellow urine in tubing    UOP 3600/1850    Labs    AM labs pending    Assessment/Plan  46 year old y/o male POD#1 s/p robotic assisted left radical nephrectomy      Neuro: tylenol, oxy, dilaudid for pain control  CV: TORIN  Pulm: incentive spirometry while awake  FEN/GI: Regular diet, MIVF @ 100/hr  Endo: TORIN  : Sánchez out today  Heme/ID: No infectious concerns at this time  Activity: Up ad mary, goal to ambulate QID   PPx: SCDs in bed  Dispo: Home today     Seen and examined with the chief resident. Will discuss with Dr. Bledsoe.    Dm Macias MD  Urology Resident     Contacting the Urology Team     Please use the following job codes to reach the Urology Team. Note that you must use an in house phone and that job codes cannot receive text pages.     On weekdays, dial [phone] (or star-star-star [phone] on the new [employer] telephones) then [phone] to reach the Adult Urology resident or PA on call    On weekdays, dial [phone] (or star-star-star [phone] on the new [employer] telephones) then [phone] to reach the Pediatric Urology resident    On weeknights and weekends, dial [phone] (or star-star-star [phone] on the new [employer] telephones) then [phone] to reach the Urology resident on call (for both Adult and Pediatrics)

## 2021-12-22 NOTE — PHARMACY-ADMISSION MEDICATION HISTORY
Admission Medication History Completed by Pharmacy    See [facility] Admission Navigator for allergy information, preferred outpatient pharmacy, prior to admission medications and immunization status.     Medication History Sources:     Patient Interview    SureScripts    Changes made to PTA medication list (reason):    Added: None    Deleted: None    Changed: None    Additional Information:    Patient reports that he isn't taking any prescription medications and the only OTC is acetaminophen    Prior to Admission medications    Medication Sig Last Dose Taking? Auth Provider   acetaminophen (TYLENOL) 500 MG tablet Take 500-1,000 mg by mouth every 6 hours as needed for mild pain  Yes Unknown, Entered By History       Date completed: 12/21/21    Medication history completed by: Reagan Maurer RPH

## 2021-12-23 NOTE — OP NOTE
DATE OF SURGERY: 12/21/2021     PREOPERATIVE DIAGNOSIS:  Left renal mass      POSTOPERATIVE DIAGNOSIS: Same      PROCEDURE PERFORMED:   1) Left robotic-assisted laparoscopic radical nephrectomy     STAFF SURGEON: Vicki Bledsoe MD      RESIDENT SURGEON: Panda Dawson MD-PGY 5; Jabier Luz MD-PGY-4     ANESTHESIA: General.      ESTIMATED BLOOD LOSS: 50 mL.      DRAINS AND TUBES: 16 Fr Sánchez catheter     COMPLICATIONS: None.      DISPOSITION: PACU.      SPECIMENS OBTAINED: :Left renal mass      SIGNIFICANT FINDINGS: Left kidney was resected with visually negative margins, adrenal gland was spared      HISTORY OF PRESENT ILLNESS:   Pierre Avila is a 46 year old male who presented to clinic to discuss management of a renal mass discovered during and ED visit for gross hematuria. He has not had similar symptoms in the past.There are not associated urinary symptoms.  he denies any flank or abdominal pain.      CT abdomen pelvis non con was obtained which showed a large left upper pole renal mass. He is then referred here for further evaluation.      CT urogram obtained as part of the staging showed a left upper pole mass with some upper calyceal filling defect. Office cystoscopy was unremarkable and cytology came back negative.     He was taken to OR by me on 11/16/2021 for left ureteroscopy and biopsy. Pathology showed clear cell RCC.      After discussing different treatment options, He decided to undergo above mentioned procedure      OPERATION PERFORMED:   Informed consent was obtained and the patient was brought to the operating room where general anesthesia was induced. He was given appropriate preoperative antibiotics and positioned in the lithotomy position. We then performed a timeout, verifying the correct patient's site and procedure to be performed.      He was placed in a modified flank position where all pressure points were carefully padded and secured. He was then prepped and draped in the usual  sterile fashion.       Veress needle was used to gain acess alf between ASIS and umbilicus which was not successful. We then made a small incision where the camera port was planned to be placed and made a small incision down the the fascia and then placed the Veress needle. After confirmatory drop test, the Veress needle was used for insufflation. We placed a camera port and three dilating ports (three 8 mm robotic ports and a 12 mm assistant port). The robot was docked. The colon was reflected medially to expose the anterior surface of Gerota's fascia. The medial aspect of the kidney was exposed and the ureter and gonadal vein were identified.The kidney was lifted off the psoas muscle and dissection was carried posteriorly until the renal artery and vein were identified. The renal artery was carefully skeletonized. There was a single renal artery and one main vein. The renal artery and vein were sequentially ligated and divided with the stapler. The lateral and upper pole attachments were then taken. The gonadal vein and ureter were ligated with clips and divided. The adrenal gland was spared. Excellent hemostasis was noted. No lymphadenopathy was appreciated.     Specimen was placed in a 15 mm EndoCatch bag. Ports were removed. 12 mm assistant port was extended slightly caudally and cephalad to allow tumor extraction. The specimen was removed and sent to pathology. We closed the fascia from the extraction site with 1-PDS in a running fashion and tied in the middle. Skin was re approximated using 3- vicryl and 4-0 Monocryl. The port sites were dressed with Primapore. Midline incision was covered with steri strips, telfa and Tegaderm.      Counts were correct x 2 and there was no apparent complications. The patient was then awakened and taken to the PACU in stable condition.     Vicki Bledsoe MD   Department of Urology   [facility]

## 2021-12-28 ENCOUNTER — PATIENT OUTREACH (OUTPATIENT)
Dept: UROLOGY | Facility: CLINIC | Age: 46
End: 2021-12-28
Payer: COMMERCIAL

## 2021-12-28 ENCOUNTER — MYC MEDICAL ADVICE (OUTPATIENT)
Dept: UROLOGY | Facility: CLINIC | Age: 46
End: 2021-12-28
Payer: COMMERCIAL

## 2021-12-29 LAB
PATH REPORT.COMMENTS IMP SPEC: NORMAL
PATH REPORT.FINAL DX SPEC: NORMAL
PATH REPORT.GROSS SPEC: NORMAL
PATH REPORT.MICROSCOPIC SPEC OTHER STN: NORMAL
PATHOLOGY SYNOPTIC REPORT: NORMAL
PHOTO IMAGE: NORMAL

## 2021-12-30 ENCOUNTER — PRE VISIT (OUTPATIENT)
Dept: UROLOGY | Facility: CLINIC | Age: 46
End: 2021-12-30
Payer: COMMERCIAL

## 2021-12-30 NOTE — CONFIDENTIAL NOTE
Reason for visit: post-op check-up     Relevant information: nephrectomy    Records/imaging/labs/orders: in epic    Pt called: n/a    At Rooming: standard

## 2022-01-05 ENCOUNTER — OFFICE VISIT (OUTPATIENT)
Dept: UROLOGY | Facility: CLINIC | Age: 47
End: 2022-01-05
Payer: COMMERCIAL

## 2022-01-05 ENCOUNTER — ONCOLOGY VISIT (OUTPATIENT)
Dept: ONCOLOGY | Facility: CLINIC | Age: 47
End: 2022-01-05
Attending: UROLOGY
Payer: COMMERCIAL

## 2022-01-05 VITALS
HEIGHT: 70 IN | HEART RATE: 66 BPM | DIASTOLIC BLOOD PRESSURE: 97 MMHG | WEIGHT: 210 LBS | BODY MASS INDEX: 30.06 KG/M2 | SYSTOLIC BLOOD PRESSURE: 150 MMHG

## 2022-01-05 VITALS
SYSTOLIC BLOOD PRESSURE: 150 MMHG | WEIGHT: 209.2 LBS | OXYGEN SATURATION: 98 % | HEART RATE: 66 BPM | DIASTOLIC BLOOD PRESSURE: 97 MMHG | BODY MASS INDEX: 29.29 KG/M2 | HEIGHT: 71 IN

## 2022-01-05 DIAGNOSIS — C64.2 RENAL CELL CARCINOMA, LEFT (H): Primary | ICD-10-CM

## 2022-01-05 DIAGNOSIS — C80.1 CLEAR CELL CARCINOMA (H): ICD-10-CM

## 2022-01-05 DIAGNOSIS — C64.2 MALIGNANT NEOPLASM OF KIDNEY EXCLUDING RENAL PELVIS, LEFT (H): Primary | ICD-10-CM

## 2022-01-05 PROCEDURE — 99215 OFFICE O/P EST HI 40 MIN: CPT | Mod: 24 | Performed by: UROLOGY

## 2022-01-05 PROCEDURE — 99205 OFFICE O/P NEW HI 60 MIN: CPT | Performed by: INTERNAL MEDICINE

## 2022-01-05 PROCEDURE — G0463 HOSPITAL OUTPT CLINIC VISIT: HCPCS

## 2022-01-05 ASSESSMENT — PAIN SCALES - GENERAL
PAINLEVEL: MODERATE PAIN (5)
PAINLEVEL: NO PAIN (1)

## 2022-01-05 ASSESSMENT — MIFFLIN-ST. JEOR
SCORE: 1838.8
SCORE: 1858.92

## 2022-01-05 NOTE — NURSING NOTE
"Oncology Rooming Note    January 5, 2022 12:36 PM   Pierre Avila is a 46 year old male who presents for:    Chief Complaint   Patient presents with     Oncology Clinic Visit     clear cell carcinoma     Initial Vitals: BP (!) 150/97   Pulse 66   Ht 1.816 m (5' 11.5\")   Wt 94.9 kg (209 lb 3.2 oz)   SpO2 98%   BMI 28.77 kg/m   Estimated body mass index is 28.77 kg/m  as calculated from the following:    Height as of this encounter: 1.816 m (5' 11.5\").    Weight as of this encounter: 94.9 kg (209 lb 3.2 oz). Body surface area is 2.19 meters squared.  No Pain (1) Comment: Data Unavailable   No LMP for male patient.  Allergies reviewed: Yes  Medications reviewed: Yes    Medications: Medication refills not needed today.  Pharmacy name entered into [employer]:    [facility] - [place], [place] - [address] [place] [address] [facility] - [place], [place] - [address] [facility] [place], [place] - [address] [facility] [phone]    Clinical concerns: none       Lisset Russell CMA            "

## 2022-01-05 NOTE — LETTER
1/5/2022         RE: Pierre Avila  [address] [facility] [address]        Dear Colleague,    Thank you for referring your patient, Pierre Avila, to the [facility]. Please see a copy of my visit note below.      NEW PATIENT SECOND OPINION CONSULTATION    Reason for Visit:  Renal cell carcinoma (pT3a G3 R0) s/p nephrectomy; discussion of adjuvant options.    History of Present Illness:  Dear Dr Vicik Bledsoe,    Thank you for referring your patient for a Second Opinion consultation at the [facility].  A brief overview of his oncological history as well as my recommendations follow below.    Mr. Avila is a 46-year-old man who presented with gross hematuria in 10/2021.  A CT scan performed on 10/21/2021 revealed a 5 cm mass at the upper pole of the left kidney invading the collecting system, without evidence of local or distant metastatic disease.  He underwent a cystoscopy on 11/19/2021, and a biopsy of the left kidney was obtained via left ureteroscopy, with pathology suggesting clear-cell kidney cancer.    On 12/21/2021, he underwent left (robotic) radical nephrectomy.  This revealed a 5.4 cm clear-cell renal cell carcinoma (no sarcomatoid or rhabdoid features), nucleolar grade 3, with tumor extending into the calyceal system, and negative surgical margins.  His final pathological stage was pT3a G3 R0.    The patient presents to the Medical Oncology clinic for a consultation regarding the usefulness of adjuvant therapy.  Thank you for the kind referral.  The patient came to the clinic by himself today.    Review of systems:  The patient reports feeling generally well, and he is recovering quickly from his radical nephrectomy surgery.  He does not report significant new signs or symptoms at this time.  His weight remains stable.  He does not have any constitutional symptoms.  He has minimal pain at this point.  A comprehensive  14-system review of symptoms is otherwise generally unremarkable.  His ECOG score is 0.  His pain score is 1/10.    Past Medical History:  Renal cell carcinoma s/p left radical nephrectomy (12/21/2021)  Seasonal allergies  Hepatic steatosis    Medications:  No prescription medications at this time  Tamsulosin 0.4 mg (off)  Trospium 20 mg (off)  Acetaminophen 325 mg PRN (off)  Oxycodone 5 mg PRN (off)  Senna/docusate PRN (off)    Allergies:  No known drug allergies    Social History:  The patient lives in [place], [place] (which is [place] minutes north [facility]).  He is unmarried, but has a girlfriend.  He is a , which is a very physical occupation.  He was a former smoker, but has quit.  He reports social alcohol use.    Family History:  There is no family history of genitourinary malignancies.  His father and his sister had malignant melanoma.  A maternal aunt may possibly have had potential kidney cancer.  He also thinks that there might have been additional cancers on his father's side of the family.    Physical Examination:  Mr. Avila is a 46-year-old man who appears comfortable at rest.  His vital signs are unremarkable.  His HEENT examination is within normal limits.  The cardiac, pulmonary, and abdominal examinations are unremarkable.  There is no palpable lymphadenopathy.  His surgical site from the recent robotic nephrectomy appears to be healing well, without evidence of erythema or exudate.  The neuromuscular examination is grossly intact.  The extremities do not demonstrate pitting edema.  The skin evaluation is unremarkable      ASSESSMENT AND PLAN:  Mr. Avila is a 46-year-old man with a recent diagnosis of clear-cell renal cell carcinoma (pT3a G3 R0) who underwent nephrectomy on 12/21/2021.  He presents today to discuss adjuvant treatment options.  His ECOG score is 0.  His pain score is 1/10.    We spent the majority of our consultation today discussing the potential role of  adjuvant pembrolizumab for treatment of resected clear-cell kidney cancer at high risk of recurrence.  We have reviewed the results of the KeyNote-564 trial as well as the recent FDA approval of adjuvant pembrolizumab for kidney cancer at high risk of relapse.  The eligibility for the KeyNote-564 clinical trial involved either: pT2 disease with grade 4 differentiation or sarcomatoid features, all pT3/T4 disease, N1 disease, or resected M1 disease with current TONYA status.  The pembrolizumab was then delivered at a dose of 200 mg IV every 3 weeks, for a total of 17 cycles (1 year).  This patient therefore meets criteria for considering adjuvant pembrolizumab.    We then went into a little more detail about the potential benefits of adjuvant pembrolizumab.  Based on the KeyNote-564 results, patients receiving adjuvant pembrolizumab (versus placebo) had a 32% relative improvement in relapse-free survival.  To represent this in a different manner, the 2-year relapse-free rate in the control group was 68% versus 77% in the pembrolizumab arm (an absolute relapse-free survival difference of about 10% at 2 years).  We also spent some time discussing the potential adverse events related to adjuvant pembrolizumab.  In particular, we discussed a 30% chance of fatigue, a 25% chance of diarrhea, a 23% chance of pruritus, a 21% chance of hypothyroidism, a 20% chance of rash, a 16% chance of nausea, as well as other less frequent side effects.  In particular, we discussed some of the rare but serious immune-related adverse events including colitis, hepatitis, pneumonitis and polyendocrinopathies.  At the end of our conversation, the patient had a good understanding of the relative risks and benefits of adjuvant pembrolizumab treatment in this setting.  He is still undecided about his choice, but he will get back to me in the next few weeks.    We spent the final part of our discussion reviewing the surveillance schedule for stage  pT3 resected kidney cancers.  This would involve a CT scan of the chest, abdomen and pelvis every 6 months for the first 3 years following nephrectomy.  He would then need a CT scan every 12 months during years 3-5 of follow-up.  If he developed overt metastatic kidney cancer in the future, then the most likely scenario is that he would develop oligometastatic disease at that time.  If this was the case, then he could certainly be managed with metastasectomy followed by pseudo-adjuvant pembrolizumab at that time.  If there was a polymetastatic recurrence, my preferred regimen would be pembrolizumab combined with axitinib.    A total of 80 minutes were spent on this patient on the day of the consultation, of which more than 50% of this time was used for counseling and coordination of care.  The patient was given the opportunity to ask multiple questions today, all of which were answered to his satisfaction.  I provided him with my business card, and he will get back to me soon about his decision.          Again, thank you for allowing me to participate in the care of your patient.      Sincerely,    Bart Barahona MD

## 2022-01-05 NOTE — PROCEDURES
Urology Oncology clinic   RCC            Chief Complaint:   Renal cell carcinoma cT3               History of Present Illness:    Pierre Avila is a very pleasant 46 year old male who presents with a history of gross  hematuria. He has not had similar symptoms in the past.There are not associated urinary symptoms.  he denies any flank or abdominal pain.     CT abdomen pelvis non con was obtained which showed a large left upper pole renal mass. He is then referred here for further evaluation.      I first saw him on 10/20/2021. Concerning typical risk factors for urinary tract malignancies, the patient does not have a family history of  malignancies.  he has not received pelvic radiation, exposure to known carcinogenic agents such as benzenes, aromatic amines, alkylating agents or chronic indwelling foreign bodies in the urinary tract in the past.  Furthermore he does not have a history of recurrent urinary tract infections in the past.   The patient is not currently smoking cigarettes.  he is a former smoker.Finally, he does not have a history of analgesic abuse.     Office cystoscopy at the time of first visit was unremarkable and cytology was negative.      After discussing the concern that this might be wither a renal cell carcinoma invading the collecting system or an advanced upper tract urothelial carcinoma based on imaging findings.         11/19/2021      He underwent cystoscopy, left ureteroscopy and biopsy, left ureteral stent placement and left retrograde pyelogram with interpretation.      Intraop findings: Solid mass appeared to originating from the kidney parenchyma and invading the collecting system.         Pathology suggested renal cell carcinoma. He is here to discuss pathology and next step in management      He has done well after the procedure. Stent was removed at home with no issues. He denies any hematuria or urinary tract infection. He still has not smoked since the diagnosis was made.  His back pain is still present and has not changed much since surgery.     12/21/2021    He underwent robotic left radical nephrectomy. He did well postoperatively and left on POD # 1.     01/05/22     He is here today for follow up. He has been recovering well from the surgery.  reports some occasional back pain which he thinks is mostly positional.           Past Medical History:   Hypertension            Past Surgical History:     Past Surgical History:   Procedure Laterality Date     COMBINED CYSTOSCOPY, RETROGRADES, URETEROSCOPY, INSERT STENT Left 11/16/2021    Procedure: CYSTOURETEROSCOPY, WITH RETROGRADE PYELOGRAM Biopsy and STENT INSERTION left;  Surgeon: Vicki Bledsoe MD;  Location: [facility] OR     DAVINCI NEPHRECTOMY Left 12/21/2021    Procedure: NEPHRECTOMY, ROBOT-ASSISTED;  Surgeon: Vicki Bledsoe MD;  Location: [facility] OR              Medications     Current Outpatient Medications:      acetaminophen (TYLENOL) 325 MG tablet, Take 2 tablets (650 mg) by mouth every 6 hours as needed for pain, Disp: 120 tablet, Rfl: 0     oxyCODONE (ROXICODONE) 5 MG tablet, Take 1 tablet (5 mg) by mouth every 4 hours as needed for moderate to severe pain (Patient not taking: Reported on 1/5/2022), Disp: 10 tablet, Rfl: 0     senna-docusate (SENOKOT-S/PERICOLACE) 8.6-50 MG tablet, Take 1-2 tablets by mouth 2 times daily To prevent constipation (Patient not taking: Reported on 1/5/2022), Disp: 45 tablet, Rfl: 0            Family History:      Family History         Family History   Problem Relation Age of Onset     Asthma Father       Cancer Father           melanoma     Cancer Sister           melanoma     C.A.D. No family hx of       Diabetes No family hx of       Hypertension No family hx of       Cerebrovascular Disease No family hx of                   Social History:      Social History   Social History            Socioeconomic History     Marital status: Single       Spouse name: Not on file     Number of children: Not on  "file     Years of education: Not on file     Highest education level: Not on file   Occupational History     Not on file   Tobacco Use     Smoking status: Former Smoker     Smokeless tobacco: Former User   Vaping Use     Vaping Use: Never used   Substance and Sexual Activity     Alcohol use: Not on file     Drug use: Not Currently     Sexual activity: Yes       Partners: Female   Other Topics Concern     Parent/sibling w/ CABG, MI or angioplasty before 65F 55M? No   Social History Narrative     Not on file      Social Determinants of Health      Financial Resource Strain: Not on file   Food Insecurity: Not on file   Transportation Needs: Not on file   Physical Activity: Not on file   Stress: Not on file   Social Connections: Not on file   Intimate Partner Violence: Not on file   Housing Stability: Not on file                 Allergies:   Seasonal allergies          Review of Systems:  From intake questionnaire      Negative 14 system review except as noted on HPI, nurse's note.          Physical Exam:       BP (!) 150/97   Pulse 66   Ht 1.778 m (5' 10\")   Wt 95.3 kg (210 lb)   BMI 30.13 kg/m    General Appearance Adult: Alert, no acute distress, oriented  HENT: throat/mouth:normal, good dentition  Lungs: no respiratory distress, or pursed lip breathing  Heart: No obvious jugular venous distension present  Abdomen: soft, nontender, no organomegaly or masses, scars noted  Lymphatics: No cervical or supraclavicular adenopathy  Musculoskeltal: extremities normal, no peripheral edema  Skin: no visible suspicious lesions or rashes  Neuro: Alert, oriented, speech and mentation normal  Psych: affect and mood normal  Gait: Normal  : circumcised          Labs and Pathology:                                                              I reviewed all applicable laboratory and pathology data and went over findings with patient     Lab Results   Component Value Date    WBC 10.6 12/22/2021     Lab Results   Component Value " Date    RBC 4.50 12/22/2021     Lab Results   Component Value Date    HGB 13.1 12/22/2021     Lab Results   Component Value Date    HCT 39.9 12/22/2021     No components found for: MCT  Lab Results   Component Value Date    MCV 89 12/22/2021     Lab Results   Component Value Date    MCH 29.1 12/22/2021     Lab Results   Component Value Date    MCHC 32.8 12/22/2021     Lab Results   Component Value Date    RDW 13.0 12/22/2021     Lab Results   Component Value Date     12/22/2021       Last Comprehensive Metabolic Panel:  Sodium   Date Value Ref Range Status   12/22/2021 141 133 - 144 mmol/L Final     Potassium   Date Value Ref Range Status   12/22/2021 4.2 3.4 - 5.3 mmol/L Final     Chloride   Date Value Ref Range Status   12/22/2021 110 (H) 94 - 109 mmol/L Final     Carbon Dioxide (CO2)   Date Value Ref Range Status   12/22/2021 26 20 - 32 mmol/L Final     Anion Gap   Date Value Ref Range Status   12/22/2021 5 3 - 14 mmol/L Final     Glucose   Date Value Ref Range Status   12/22/2021 106 (H) 70 - 99 mg/dL Final     Urea Nitrogen   Date Value Ref Range Status   12/22/2021 12 7 - 30 mg/dL Final     Creatinine   Date Value Ref Range Status   12/22/2021 1.45 (H) 0.66 - 1.25 mg/dL Final     GFR Estimate   Date Value Ref Range Status   12/22/2021 60 (L) >60 mL/min/1.73m2 Final     Comment:     Effective December 21, 2021 eGFRcr in adults is calculated using the 2021 CKD-EPI creatinine equation which includes age and gender (Nba et al., NEJM, DOI: 10.1056/QFYUfa5043621)     Calcium   Date Value Ref Range Status   12/22/2021 8.8 8.5 - 10.1 mg/dL Final          Surgical pathology        Case: [id number]                                   Authorizing Provider:  Vicki Bledsoe MD          Collected:           11/16/2021 10:10 AM           Ordering Location:     [facility] Main OR  Received:            11/16/2021 10:50 AM                                  J[place]                                                                    Pathologist:           Rand Cortez MD                                                            Specimen:    Other, Left Upper Calyceal Tumor                                                            Final Diagnosis   Kidney, left upper calyceal tumor, biopsy:  - Clear-cell renal cell carcinoma   Electronically signed by Rand Cortez MD on 11/22/2021 at  3:24 PM         Case: [id number]                                   Authorizing Provider:  Vicki Bledsoe MD          Collected:           12/21/2021 10:51 AM           Ordering Location:     [facility] MAIN OR                 Received:            12/21/2021 03:34 PM           Pathologist:           Rand Cortez MD                                                            Specimen:    Kidney, Left, Left kidney                                                                  Final Diagnosis   Left kidney, nephrectomy:  - Clear-cell renal cell carcinoma  - ISUP grade 3  - Carcinoma involves calyceal urothelium  - Margins are free of carcinoma  - See synoptic report   Amendment electronically signed by Rand Cortez MD on 12/29/2021 at 11:26 AM          Imaging:                                                              I reviewed all applicable imaging and went over findings with patient.  Significant for large left upper pole mass no clear retroperitoneal lymphadenopathy limited interpretation due to lack of contrast      CT Abdomen Pelvis w/o Contrast     Result Date: 10/15/2021  CT ABDOMEN AND PELVIS WITHOUT CONTRAST   10/15/2021 10:56 AM HISTORY: Hematuria, unknown cause. Suspected kidney stone on right. Hematuria, unspecified type. Right flank pain. TECHNIQUE: Noncontrast CT abdomen and pelvis was performed. Radiation dose for this scan was reduced using automated exposure control, adjustment of the mA and/or kV according to patient size, or iterative reconstruction technique. COMPARISON: None available FINDINGS: Lower chest: Lung bases  are clear. Right kidney: No radiodense kidney/ureteral stone or hydronephrosis. Left kidney: No radiodense kidney/ureteral stones or hydronephrosis. There is an approximately 4.5 cm ill-defined masslike area in the upper pole of the left kidney (series 3 image 78 and series 4 image 60), indeterminate could represent hemorrhagic/proteinaceous cyst versus renal neoplasm. Urinary bladder: Distended and unremarkable. Remainder of the abdomen and pelvis: Limited evaluation of the abdominal organs due to lack of IV contrast. Hepatobiliary: Diffuse hypoattenuation of the liver, likely due to underlying hepatic cirrhosis. Otherwise, the unenhanced liver and gallbladder are grossly unremarkable. Pancreas: The unenhanced pancreas is grossly unremarkable. Spleen: No splenomegaly. Adrenal glands: No adrenal nodules. Bowels: No abnormally dilated bowel loops. The appendix is visualized and appears normal. Peritoneum: No significant free fluid in the abdomen or pelvis. No free peritoneal or portal venous gas. 2.3 cm calcified focus along the posterior aspect of the urinary bladder, of indeterminate etiology. Pelvic organs: Unremarkable. Vascular: Scattered atherosclerotic vascular calcification of the abdominal aorta and iliac vessels. Lymph nodes: No significant abdominopelvic lymphadenopathy. Bones and soft tissue: No suspicious osseous lesion.       IMPRESSION: 1. No radiodense kidney/ureteral stones or hydronephrosis in either kidney. 2. There is approximately 4.5 cm ill-defined masslike area in the upper pole of the left kidney, indeterminate, could represent hemorrhagic/proteinaceous cyst versus renal neoplasm. Recommend further evaluation with CT or MRI renal mass protocol. 3. Significant hepatic steatosis. [Consider Follow Up: Possible left renal mass.] This report will be copied to the [facility] to ensure a provider acknowledges the finding. Access Center is available Monday through Friday 8am-3:30 pm.     BIANCA HACKETT MD   SYSTEM ID:  [id number]                Assessment and Plan:      Assessment      46 year old male with Clear cell RCC invading the collecting system cT3N0 status post left robotic nephrectomy on 12/21/2021 pT3Nx         We again discussed the results of Keynote 564 which showed the efficacy of pemrolizumab in improving disease free survival in adjuvant setting in patients with high risk RCC including the ones with pT3 disease regardless of grade. He is scheduled to see Dr. Barahona today to discuss adjuvant treatment         Plan  Baseline CT abdomen pelvis, CXR, CBC, CMP from 3 - 12 months, frequency of imaging will depend on his adjuvant treatment planning  Will discuss with Dr. Barahona after his visit with him.         45 total minutes spent on the date of the encounter including direct interaction with the patient, performing chart review, documentation and further activities as noted above.             Vicki Bledsoe MD   Department of Urology   [facility]

## 2022-01-05 NOTE — LETTER
1/5/2022       RE: Pierre Avila  [address] [facility] [address]     Dear Colleague,    Thank you for referring your patient, Pierre Avila, to the [facility]. Please see a copy of my visit note below.    See procedure note        Again, thank you for allowing me to participate in the care of your patient.      Sincerely,    Vicki Bledsoe MD

## 2022-01-05 NOTE — NURSING NOTE
"Chief Complaint   Patient presents with     Follow Up     post left nephrectomy       Blood pressure (!) 150/97, pulse 66, height 1.778 m (5' 10\"), weight 95.3 kg (210 lb). Body mass index is 30.13 kg/m .    Patient Active Problem List   Diagnosis     Renal mass       Allergies   Allergen Reactions     Seasonal Allergies        Current Outpatient Medications   Medication Sig Dispense Refill     acetaminophen (TYLENOL) 325 MG tablet Take 2 tablets (650 mg) by mouth every 6 hours as needed for pain 120 tablet 0     oxyCODONE (ROXICODONE) 5 MG tablet Take 1 tablet (5 mg) by mouth every 4 hours as needed for moderate to severe pain (Patient not taking: Reported on 1/5/2022) 10 tablet 0     senna-docusate (SENOKOT-S/PERICOLACE) 8.6-50 MG tablet Take 1-2 tablets by mouth 2 times daily To prevent constipation (Patient not taking: Reported on 1/5/2022) 45 tablet 0       Social History     Tobacco Use     Smoking status: Former Smoker     Smokeless tobacco: Former User   Vaping Use     Vaping Use: Never used   Substance Use Topics     Alcohol use: Not Currently     Drug use: Not Currently       Laceykay Cortes  1/5/2022  8:27 AM  "

## 2022-01-05 NOTE — PROGRESS NOTES
NEW PATIENT SECOND OPINION CONSULTATION    Reason for Visit:  Renal cell carcinoma (pT3a G3 R0) s/p nephrectomy; discussion of adjuvant options.    History of Present Illness:  Dear Dr Vicki Bledsoe,    Thank you for referring your patient for a Second Opinion consultation at the [facility].  A brief overview of his oncological history as well as my recommendations follow below.    Mr. Avila is a 46-year-old man who presented with gross hematuria in 10/2021.  A CT scan performed on 10/21/2021 revealed a 5 cm mass at the upper pole of the left kidney invading the collecting system, without evidence of local or distant metastatic disease.  He underwent a cystoscopy on 11/19/2021, and a biopsy of the left kidney was obtained via left ureteroscopy, with pathology suggesting clear-cell kidney cancer.    On 12/21/2021, he underwent left (robotic) radical nephrectomy.  This revealed a 5.4 cm clear-cell renal cell carcinoma (no sarcomatoid or rhabdoid features), nucleolar grade 3, with tumor extending into the calyceal system, and negative surgical margins.  His final pathological stage was pT3a G3 R0.    The patient presents to the Medical Oncology clinic for a consultation regarding the usefulness of adjuvant therapy.  Thank you for the kind referral.  The patient came to the clinic by himself today.    Review of systems:  The patient reports feeling generally well, and he is recovering quickly from his radical nephrectomy surgery.  He does not report significant new signs or symptoms at this time.  His weight remains stable.  He does not have any constitutional symptoms.  He has minimal pain at this point.  A comprehensive 14-system review of symptoms is otherwise generally unremarkable.  His ECOG score is 0.  His pain score is 1/10.    Past Medical History:  Renal cell carcinoma s/p left radical nephrectomy (12/21/2021)  Seasonal allergies  Hepatic steatosis    Medications:  No  prescription medications at this time  Tamsulosin 0.4 mg (off)  Trospium 20 mg (off)  Acetaminophen 325 mg PRN (off)  Oxycodone 5 mg PRN (off)  Senna/docusate PRN (off)    Allergies:  No known drug allergies    Social History:  The patient lives in [place], [place] (which is [place] minutes north of [place]).  He is unmarried, but has a girlfriend.  He is a , which is a very physical occupation.  He was a former smoker, but has quit.  He reports social alcohol use.    Family History:  There is no family history of genitourinary malignancies.  His father and his sister had malignant melanoma.  A maternal aunt may possibly have had potential kidney cancer.  He also thinks that there might have been additional cancers on his father's side of the family.    Physical Examination:  Mr. Avila is a 46-year-old man who appears comfortable at rest.  His vital signs are unremarkable.  His HEENT examination is within normal limits.  The cardiac, pulmonary, and abdominal examinations are unremarkable.  There is no palpable lymphadenopathy.  His surgical site from the recent robotic nephrectomy appears to be healing well, without evidence of erythema or exudate.  The neuromuscular examination is grossly intact.  The extremities do not demonstrate pitting edema.  The skin evaluation is unremarkable      ASSESSMENT AND PLAN:  Mr. Avila is a 46-year-old man with a recent diagnosis of clear-cell renal cell carcinoma (pT3a G3 R0) who underwent nephrectomy on 12/21/2021.  He presents today to discuss adjuvant treatment options.  His ECOG score is 0.  His pain score is 1/10.    We spent the majority of our consultation today discussing the potential role of adjuvant pembrolizumab for treatment of resected clear-cell kidney cancer at high risk of recurrence.  We have reviewed the results of the KeyNote-564 trial as well as the recent FDA approval of adjuvant pembrolizumab for kidney cancer at high risk of relapse.  The  eligibility for the KeyNote-564 clinical trial involved either: pT2 disease with grade 4 differentiation or sarcomatoid features, all pT3/T4 disease, N1 disease, or resected M1 disease with current TONYA status.  The pembrolizumab was then delivered at a dose of 200 mg IV every 3 weeks, for a total of 17 cycles (1 year).  This patient therefore meets criteria for considering adjuvant pembrolizumab.    We then went into a little more detail about the potential benefits of adjuvant pembrolizumab.  Based on the KeyNote-564 results, patients receiving adjuvant pembrolizumab (versus placebo) had a 32% relative improvement in relapse-free survival.  To represent this in a different manner, the 2-year relapse-free rate in the control group was 68% versus 77% in the pembrolizumab arm (an absolute relapse-free survival difference of about 10% at 2 years).  We also spent some time discussing the potential adverse events related to adjuvant pembrolizumab.  In particular, we discussed a 30% chance of fatigue, a 25% chance of diarrhea, a 23% chance of pruritus, a 21% chance of hypothyroidism, a 20% chance of rash, a 16% chance of nausea, as well as other less frequent side effects.  In particular, we discussed some of the rare but serious immune-related adverse events including colitis, hepatitis, pneumonitis and polyendocrinopathies.  At the end of our conversation, the patient had a good understanding of the relative risks and benefits of adjuvant pembrolizumab treatment in this setting.  He is still undecided about his choice, but he will get back to me in the next few weeks.    We spent the final part of our discussion reviewing the surveillance schedule for stage pT3 resected kidney cancers.  This would involve a CT scan of the chest, abdomen and pelvis every 6 months for the first 3 years following nephrectomy.  He would then need a CT scan every 12 months during years 3-5 of follow-up.  If he developed overt metastatic  kidney cancer in the future, then the most likely scenario is that he would develop oligometastatic disease at that time.  If this was the case, then he could certainly be managed with metastasectomy followed by pseudo-adjuvant pembrolizumab at that time.  If there was a polymetastatic recurrence, my preferred regimen would be pembrolizumab combined with axitinib.    A total of 80 minutes were spent on this patient on the day of the consultation, of which more than 50% of this time was used for counseling and coordination of care.  The patient was given the opportunity to ask multiple questions today, all of which were answered to his satisfaction.  I provided him with my business card, and he will get back to me soon about his decision.    Bart Barahona M.D.

## 2022-01-17 ENCOUNTER — TELEPHONE (OUTPATIENT)
Dept: UROLOGY | Facility: CLINIC | Age: 47
End: 2022-01-17
Payer: COMMERCIAL

## 2022-01-17 NOTE — TELEPHONE ENCOUNTER
[facility] Call Center    Phone Message    May a detailed message be left on voicemail: yes     Reason for Call: Other: Aaron called stating that he would need an extension for his short term disability as he was supposed to be back on 1/11 and he just returned today 1/17. Please call Pierre back to discuss, thank you.     Action Taken: Message routed to:  [facility] ([facility]    Travel Screening: Not Applicable

## 2022-01-18 NOTE — TELEPHONE ENCOUNTER
Action 01/18/22 MMT   Action Taken  CSS called patient to confirm what is needed for his employer. Patient will fax over STD form today.     9:02AM - CSS received STD form. Form completed and put in provider folder to review and sign.

## 2022-01-20 NOTE — TELEPHONE ENCOUNTER
Action 01/20/22 MMT   Action Taken  CSS faxed signed forms to [id number] per pt request.

## 2022-01-25 ENCOUNTER — PATIENT OUTREACH (OUTPATIENT)
Dept: ONCOLOGY | Facility: CLINIC | Age: 47
End: 2022-01-25
Payer: COMMERCIAL

## 2022-01-25 NOTE — PROGRESS NOTES
"TC to pt returning his VM stating he has some non-urgent questions. Pt stated he had a couple of questions but is presently at work and requested writer call him back later.     TC to pt. Discussed his questions and concerns related to starting tx. Provided pt with [employer] handouts on pembrolizumab, as well as the [facility] handout \"When to Call For Help, Side Effects of Chemotherapy via My Chart message. Reviewed administration, side effects and ongoing symptom support management by providers/APPs. Highlighted steps to expect while on treatment (check in, labs, pre meds, infusion). Discussed that chemo may be delayed due to side effects, infusion schedule or patient s need to modify. Reviewed most concerning symptoms that warrant an immediate call to the clinic nurse line. Provided phone numbers for triage and after hours care. Refer to patient education tab for information delivered regarding pembro side effects and symptom management.    "

## 2022-01-28 DIAGNOSIS — C64.9 RENAL CELL CARCINOMA, UNSPECIFIED LATERALITY (H): Primary | ICD-10-CM

## 2022-01-28 RX ORDER — NALOXONE HYDROCHLORIDE 0.4 MG/ML
0.2 INJECTION, SOLUTION INTRAMUSCULAR; INTRAVENOUS; SUBCUTANEOUS
Status: CANCELLED | OUTPATIENT
Start: 2022-02-09

## 2022-01-28 RX ORDER — METHYLPREDNISOLONE SODIUM SUCCINATE 125 MG/2ML
125 INJECTION, POWDER, LYOPHILIZED, FOR SOLUTION INTRAMUSCULAR; INTRAVENOUS
Status: CANCELLED
Start: 2022-02-09

## 2022-01-28 RX ORDER — ALBUTEROL SULFATE 0.83 MG/ML
2.5 SOLUTION RESPIRATORY (INHALATION)
Status: CANCELLED | OUTPATIENT
Start: 2022-02-09

## 2022-01-28 RX ORDER — MEPERIDINE HYDROCHLORIDE 25 MG/ML
25 INJECTION INTRAMUSCULAR; INTRAVENOUS; SUBCUTANEOUS EVERY 30 MIN PRN
Status: CANCELLED | OUTPATIENT
Start: 2022-02-09

## 2022-01-28 RX ORDER — EPINEPHRINE 1 MG/ML
0.3 INJECTION, SOLUTION INTRAMUSCULAR; SUBCUTANEOUS EVERY 5 MIN PRN
Status: CANCELLED | OUTPATIENT
Start: 2022-02-09

## 2022-01-28 RX ORDER — DIPHENHYDRAMINE HYDROCHLORIDE 50 MG/ML
50 INJECTION INTRAMUSCULAR; INTRAVENOUS
Status: CANCELLED
Start: 2022-02-09

## 2022-01-28 RX ORDER — ALBUTEROL SULFATE 90 UG/1
1-2 AEROSOL, METERED RESPIRATORY (INHALATION)
Status: CANCELLED
Start: 2022-02-09

## 2022-01-28 RX ORDER — LORAZEPAM 2 MG/ML
0.5 INJECTION INTRAMUSCULAR EVERY 4 HOURS PRN
Status: CANCELLED | OUTPATIENT
Start: 2022-02-09

## 2022-01-31 ENCOUNTER — NURSE TRIAGE (OUTPATIENT)
Dept: UROLOGY | Facility: CLINIC | Age: 47
End: 2022-01-31
Payer: COMMERCIAL

## 2022-01-31 DIAGNOSIS — C64.9 RENAL CELL CARCINOMA, UNSPECIFIED LATERALITY (H): Primary | ICD-10-CM

## 2022-01-31 RX ORDER — ALBUTEROL SULFATE 0.83 MG/ML
2.5 SOLUTION RESPIRATORY (INHALATION)
Status: CANCELLED | OUTPATIENT
Start: 2022-03-02

## 2022-01-31 RX ORDER — LORAZEPAM 2 MG/ML
0.5 INJECTION INTRAMUSCULAR EVERY 4 HOURS PRN
Status: CANCELLED | OUTPATIENT
Start: 2022-03-02

## 2022-01-31 RX ORDER — METHYLPREDNISOLONE SODIUM SUCCINATE 125 MG/2ML
125 INJECTION, POWDER, LYOPHILIZED, FOR SOLUTION INTRAMUSCULAR; INTRAVENOUS
Status: CANCELLED
Start: 2022-03-02

## 2022-01-31 RX ORDER — EPINEPHRINE 1 MG/ML
0.3 INJECTION, SOLUTION INTRAMUSCULAR; SUBCUTANEOUS EVERY 5 MIN PRN
Status: CANCELLED | OUTPATIENT
Start: 2022-03-02

## 2022-01-31 RX ORDER — ALBUTEROL SULFATE 90 UG/1
1-2 AEROSOL, METERED RESPIRATORY (INHALATION)
Status: CANCELLED
Start: 2022-03-02

## 2022-01-31 RX ORDER — MEPERIDINE HYDROCHLORIDE 25 MG/ML
25 INJECTION INTRAMUSCULAR; INTRAVENOUS; SUBCUTANEOUS EVERY 30 MIN PRN
Status: CANCELLED | OUTPATIENT
Start: 2022-03-02

## 2022-01-31 RX ORDER — DIPHENHYDRAMINE HYDROCHLORIDE 50 MG/ML
50 INJECTION INTRAMUSCULAR; INTRAVENOUS
Status: CANCELLED
Start: 2022-03-02

## 2022-01-31 RX ORDER — NALOXONE HYDROCHLORIDE 0.4 MG/ML
0.2 INJECTION, SOLUTION INTRAMUSCULAR; INTRAVENOUS; SUBCUTANEOUS
Status: CANCELLED | OUTPATIENT
Start: 2022-03-02

## 2022-01-31 NOTE — TELEPHONE ENCOUNTER
[facility] Call Center    Phone Message    May a detailed message be left on voicemail: yes     Reason for Call: Symptoms or Concerns     If patient has red-flag symptoms, warm transfer to triage line    Current symptom or concern: pt is calling, stating he has slight pain near his incision, he cant determine if its abdominal pain, he thinks it might be caused from exercising, please call Pierre, thank you    Symptoms have been present for:  1 week(s)    Has patient previously been seen for this? No    By : n/a    Date: n/a    Are there any new or worsening symptoms? Yes: new      Action Taken: Message routed to:  [facility]    Travel Screening: Not Applicable

## 2022-01-31 NOTE — TELEPHONE ENCOUNTER
Spoke to pt. Pt reports having abdominal pain that started last Sunday. Pain noted above belly button in center. Intermittent pain, 7/10. Pt clarified that it's not necessarily in pain, but feels uncomfortable. Pt reports similar discomfort when he eats too much fruit and potassium. Pt has been exercising again. He feels it may be due to that. Tylenol helps relieve. Urinating well and having BM per usual. No swelling or redness. No fever. No nausea or vomiting. Pt reports strain noted since post op to groin that seems to be improving. Chart and problems list reviewed.    Reason for Disposition    Mild abdominal pain    Protocols used: ABDOMINAL PAIN - MALE-A-OH

## 2022-02-08 RX ORDER — PROCHLORPERAZINE MALEATE 10 MG
10 TABLET ORAL EVERY 6 HOURS PRN
Qty: 30 TABLET | Refills: 2 | Status: SHIPPED | OUTPATIENT
Start: 2022-02-08 | End: 2022-03-23

## 2022-02-09 ENCOUNTER — INFUSION THERAPY VISIT (OUTPATIENT)
Dept: ONCOLOGY | Facility: CLINIC | Age: 47
End: 2022-02-09
Attending: INTERNAL MEDICINE
Payer: COMMERCIAL

## 2022-02-09 ENCOUNTER — APPOINTMENT (OUTPATIENT)
Dept: LAB | Facility: CLINIC | Age: 47
End: 2022-02-09
Attending: INTERNAL MEDICINE
Payer: COMMERCIAL

## 2022-02-09 VITALS
SYSTOLIC BLOOD PRESSURE: 145 MMHG | TEMPERATURE: 97.8 F | HEART RATE: 68 BPM | BODY MASS INDEX: 28.98 KG/M2 | WEIGHT: 210.7 LBS | RESPIRATION RATE: 16 BRPM | OXYGEN SATURATION: 98 % | DIASTOLIC BLOOD PRESSURE: 87 MMHG

## 2022-02-09 DIAGNOSIS — C64.2 RENAL CELL CARCINOMA, LEFT (H): Primary | ICD-10-CM

## 2022-02-09 DIAGNOSIS — C64.2 MALIGNANT NEOPLASM OF KIDNEY EXCLUDING RENAL PELVIS, LEFT (H): ICD-10-CM

## 2022-02-09 DIAGNOSIS — C64.9 RENAL CELL CARCINOMA, UNSPECIFIED LATERALITY (H): Primary | ICD-10-CM

## 2022-02-09 LAB
ALBUMIN SERPL-MCNC: 4 G/DL (ref 3.4–5)
ALP SERPL-CCNC: 73 U/L (ref 40–150)
ALT SERPL W P-5'-P-CCNC: 75 U/L (ref 0–70)
ANION GAP SERPL CALCULATED.3IONS-SCNC: 10 MMOL/L (ref 3–14)
AST SERPL W P-5'-P-CCNC: 33 U/L (ref 0–45)
BILIRUB SERPL-MCNC: 0.4 MG/DL (ref 0.2–1.3)
BUN SERPL-MCNC: 22 MG/DL (ref 7–30)
CALCIUM SERPL-MCNC: 9.2 MG/DL (ref 8.5–10.1)
CHLORIDE BLD-SCNC: 107 MMOL/L (ref 94–109)
CO2 SERPL-SCNC: 26 MMOL/L (ref 20–32)
CREAT SERPL-MCNC: 1.42 MG/DL (ref 0.66–1.25)
ERYTHROCYTE [DISTWIDTH] IN BLOOD BY AUTOMATED COUNT: 12.7 % (ref 10–15)
GFR SERPL CREATININE-BSD FRML MDRD: 62 ML/MIN/1.73M2
GLUCOSE BLD-MCNC: 89 MG/DL (ref 70–99)
HCT VFR BLD AUTO: 41.1 % (ref 40–53)
HGB BLD-MCNC: 14.1 G/DL (ref 13.3–17.7)
MCH RBC QN AUTO: 29.4 PG (ref 26.5–33)
MCHC RBC AUTO-ENTMCNC: 34.3 G/DL (ref 31.5–36.5)
MCV RBC AUTO: 86 FL (ref 78–100)
PLATELET # BLD AUTO: 210 10E3/UL (ref 150–450)
POTASSIUM BLD-SCNC: 4.2 MMOL/L (ref 3.4–5.3)
PROT SERPL-MCNC: 7.3 G/DL (ref 6.8–8.8)
RBC # BLD AUTO: 4.79 10E6/UL (ref 4.4–5.9)
SODIUM SERPL-SCNC: 143 MMOL/L (ref 133–144)
TSH SERPL DL<=0.005 MIU/L-ACNC: 2.06 MU/L (ref 0.4–4)
WBC # BLD AUTO: 9.1 10E3/UL (ref 4–11)

## 2022-02-09 PROCEDURE — G0463 HOSPITAL OUTPT CLINIC VISIT: HCPCS

## 2022-02-09 PROCEDURE — 84443 ASSAY THYROID STIM HORMONE: CPT | Performed by: INTERNAL MEDICINE

## 2022-02-09 PROCEDURE — 80053 COMPREHEN METABOLIC PANEL: CPT | Performed by: INTERNAL MEDICINE

## 2022-02-09 PROCEDURE — 250N000011 HC RX IP 250 OP 636: Performed by: INTERNAL MEDICINE

## 2022-02-09 PROCEDURE — 258N000003 HC RX IP 258 OP 636: Performed by: INTERNAL MEDICINE

## 2022-02-09 PROCEDURE — 85027 COMPLETE CBC AUTOMATED: CPT | Performed by: INTERNAL MEDICINE

## 2022-02-09 PROCEDURE — 36415 COLL VENOUS BLD VENIPUNCTURE: CPT | Performed by: INTERNAL MEDICINE

## 2022-02-09 PROCEDURE — 96413 CHEMO IV INFUSION 1 HR: CPT

## 2022-02-09 PROCEDURE — 99215 OFFICE O/P EST HI 40 MIN: CPT | Performed by: INTERNAL MEDICINE

## 2022-02-09 RX ADMIN — SODIUM CHLORIDE 250 ML: 9 INJECTION, SOLUTION INTRAVENOUS at 13:37

## 2022-02-09 RX ADMIN — SODIUM CHLORIDE 200 MG: 9 INJECTION, SOLUTION INTRAVENOUS at 13:37

## 2022-02-09 ASSESSMENT — PAIN SCALES - GENERAL: PAINLEVEL: NO PAIN (0)

## 2022-02-09 NOTE — LETTER
2/9/2022         RE: Pierre Avila  [address] [facility] [address]        Dear Colleague,    Thank you for referring your patient, Pierre Avila, to the [facility]. Please see a copy of my visit note below.      FOLLOW UP VISIT     Reason for Visit:  Renal cell carcinoma (pT3a G3 R0) s/p nephrectomy; starting adjuvant pembrolizumab.     History of Present Illness:  Mr. Avila is a 46-year-old man who presented with gross hematuria in 10/2021.  A CT scan performed on 10/21/2021 revealed a 5 cm mass at the upper pole of the left kidney invading the collecting system, without evidence of local or distant metastatic disease.  On 12/21/2021, he underwent a left radical nephrectomy.  This revealed a 5.4 cm clear-cell renal cell carcinoma (no sarcomatoid or rhabdoid features), nucleolar grade 3, with tumor extending into the calyceal system, and negative surgical margins.  His final pathological stage was pT3a G3 R0.     The patient presents to the Medical Oncology clinic today to begin adjuvant pembrolizumab treatment.  Today is cycle #1 of adjuvant pembrolizumab.     Review of systems:  The patient reports feeling generally well, and he is recovering quickly from his radical nephrectomy surgery.  He does not report significant new signs or symptoms at this time.  His weight remains stable.  He does not have any constitutional symptoms.  He has minimal pain at this point.  A comprehensive 14-system review of symptoms is otherwise generally unremarkable.  His ECOG score is 0.  His pain score is 1/10.      Medications:  No prescription medications     Allergies:  No known drug allergies      Physical Examination:  Mr. Avila is a 46-year-old man who appears comfortable at rest.  His vital signs are unremarkable.  His HEENT examination is within normal limits.  The cardiac, pulmonary, and abdominal examinations are unremarkable.  There is no palpable lymphadenopathy.  His  surgical site from the recent robotic nephrectomy appears to be healing well, without evidence of erythema or exudate.  The neuromuscular examination is grossly intact.  The extremities do not demonstrate pitting edema.  The skin evaluation is unremarkable    Laboratories:  His CBC reveals a WBC count of 9.1, hemoglobin 14.1, hematocrit 41%, platelets 210K.  His comprehensive metabolic panel shows a creatinine level of 1.42 mg/dL (GFR 62), and an ALT level of 75 units/L, but is otherwise unremarkable.  His TSH level is normal at 2.06 mU/L.        ASSESSMENT AND PLAN:  Mr. Avila is a 46-year-old man with a recent diagnosis of clear-cell renal cell carcinoma (pT3a G3 R0) who underwent nephrectomy on 12/21/2021.  He presents today to begin adjuvant pembrolizumab treatment.  His ECOG score is 0.  His pain score is 1/10.     We spent the majority of our visit today reviewing the role of adjuvant pembrolizumab for treatment of resected clear-cell kidney cancer at high risk of recurrence.  We have reviewed the results of the KeyNote-564 trial as well as the recent FDA approval of adjuvant pembrolizumab for kidney cancer at high risk of relapse.  The eligibility for the KeyNote-564 clinical trial involved either: pT2 disease with grade 4 differentiation or sarcomatoid features, all pT3/T4 disease, N1 disease, or resected M1 disease with current TONYA status.  The pembrolizumab was then delivered at a dose of 200 mg IV every 3 weeks, for a total of 17 cycles (1 year).  This patient meets criteria for considering adjuvant pembrolizumab.     We then went into a little more detail about the potential benefits of adjuvant pembrolizumab.  Based on the KeyNote-564 results, patients receiving adjuvant pembrolizumab (versus placebo) had a 32% relative improvement in relapse-free survival.  To represent this in a different manner, the 2-year relapse-free rate in the control group was 68% versus 77% in the pembrolizumab arm (an  absolute relapse-free survival difference of about 10% at 2 years).  We also spent some time discussing the potential adverse events related to adjuvant pembrolizumab.  In particular, we discussed a 30% chance of fatigue, a 25% chance of diarrhea, a 23% chance of pruritus, a 21% chance of hypothyroidism, a 20% chance of rash, a 16% chance of nausea, as well as other less frequent side effects.  In particular, we discussed some of the rare but serious immune-related adverse events including colitis, hepatitis, pneumonitis and polyendocrinopathies.  At the end of our conversation, the patient was agreeable to begin cycle #1 of adjuvant pembrolizumab today.     We spent the final part of our discussion reviewing the surveillance schedule for stage pT3 resected kidney cancers.  This would involve a CT scan of the chest, abdomen and pelvis every 6 months for the first 3 years following nephrectomy.  He would then need a CT scan every 12 months during years 3-5 of follow-up.  This follow-up scheduule would be the same regardless of whether he received adjuvant therapy or not.  If he developed overt metastatic kidney cancer in the future, then the most likely scenario is that he would develop oligometastatic disease at that time.  If this was the case, then he could certainly be managed with metastasectomy followed by pseudo-adjuvant pembrolizumab at that time.  If there was a polymetastatic recurrence, my preferred regimen would be pembrolizumab combined with axitinib.     A total of 40 minutes were spent on this patient on the day of the consultation, of which more than 50% of this time was used for counseling and coordination of care. The patient was given the opportunity to ask multiple questions today, all of which were answered to his satisfaction.           Again, thank you for allowing me to participate in the care of your patient.      Sincerely,    Bart Barahona MD     [facility]

## 2022-02-09 NOTE — NURSING NOTE
"Oncology Rooming Note    February 9, 2022 11:33 AM   Pierre Avila is a 46 year old male who presents for:    Chief Complaint   Patient presents with     Oncology Clinic Visit     Clear cell carcinoma     Blood Draw     Labs drawn via piv by RN in lab.  VS taken     Initial Vitals: BP (!) 145/87   Pulse 68   Temp 97.8  F (36.6  C) (Oral)   Resp 16   Wt 95.6 kg (210 lb 11.2 oz)   SpO2 98%   BMI 28.98 kg/m   Estimated body mass index is 28.98 kg/m  as calculated from the following:    Height as of 1/5/22: 1.816 m (5' 11.5\").    Weight as of this encounter: 95.6 kg (210 lb 11.2 oz). Body surface area is 2.2 meters squared.  No Pain (0) Comment: Data Unavailable   No LMP for male patient.  Allergies reviewed: Yes  Medications reviewed: Yes    Medications: Medication refills not needed today.  Pharmacy name entered into [employer]:    [facility] - [place] - [address] [place] [address] [facility] [address] [place], [place] - [address] [facility] [place], [place] - [id number]    Clinical concerns: None    Alpesh Cerna            "

## 2022-02-09 NOTE — PROGRESS NOTES
FOLLOW UP VISIT     Reason for Visit:  Renal cell carcinoma (pT3a G3 R0) s/p nephrectomy; starting adjuvant pembrolizumab.     History of Present Illness:  Mr. Avila is a 46-year-old man who presented with gross hematuria in 10/2021.  A CT scan performed on 10/21/2021 revealed a 5 cm mass at the upper pole of the left kidney invading the collecting system, without evidence of local or distant metastatic disease.  On 12/21/2021, he underwent a left radical nephrectomy.  This revealed a 5.4 cm clear-cell renal cell carcinoma (no sarcomatoid or rhabdoid features), nucleolar grade 3, with tumor extending into the calyceal system, and negative surgical margins.  His final pathological stage was pT3a G3 R0.     The patient presents to the Medical Oncology clinic today to begin adjuvant pembrolizumab treatment.  Today is cycle #1 of adjuvant pembrolizumab.     Review of systems:  The patient reports feeling generally well, and he is recovering quickly from his radical nephrectomy surgery.  He does not report significant new signs or symptoms at this time.  His weight remains stable.  He does not have any constitutional symptoms.  He has minimal pain at this point.  A comprehensive 14-system review of symptoms is otherwise generally unremarkable.  His ECOG score is 0.  His pain score is 1/10.      Medications:  No prescription medications     Allergies:  No known drug allergies      Physical Examination:  Mr. Avila is a 46-year-old man who appears comfortable at rest.  His vital signs are unremarkable.  His HEENT examination is within normal limits.  The cardiac, pulmonary, and abdominal examinations are unremarkable.  There is no palpable lymphadenopathy.  His surgical site from the recent robotic nephrectomy appears to be healing well, without evidence of erythema or exudate.  The neuromuscular examination is grossly intact.  The extremities do not demonstrate pitting edema.  The skin evaluation is  unremarkable    Laboratories:  His CBC reveals a WBC count of 9.1, hemoglobin 14.1, hematocrit 41%, platelets 210K.  His comprehensive metabolic panel shows a creatinine level of 1.42 mg/dL (GFR 62), and an ALT level of 75 units/L, but is otherwise unremarkable.  His TSH level is normal at 2.06 mU/L.        ASSESSMENT AND PLAN:  Mr. Avila is a 46-year-old man with a recent diagnosis of clear-cell renal cell carcinoma (pT3a G3 R0) who underwent nephrectomy on 12/21/2021.  He presents today to begin adjuvant pembrolizumab treatment.  His ECOG score is 0.  His pain score is 1/10.     We spent the majority of our visit today reviewing the role of adjuvant pembrolizumab for treatment of resected clear-cell kidney cancer at high risk of recurrence.  We have reviewed the results of the KeyNote-564 trial as well as the recent FDA approval of adjuvant pembrolizumab for kidney cancer at high risk of relapse.  The eligibility for the KeyNote-564 clinical trial involved either: pT2 disease with grade 4 differentiation or sarcomatoid features, all pT3/T4 disease, N1 disease, or resected M1 disease with current TONYA status.  The pembrolizumab was then delivered at a dose of 200 mg IV every 3 weeks, for a total of 17 cycles (1 year).  This patient meets criteria for considering adjuvant pembrolizumab.     We then went into a little more detail about the potential benefits of adjuvant pembrolizumab.  Based on the KeyNote-564 results, patients receiving adjuvant pembrolizumab (versus placebo) had a 32% relative improvement in relapse-free survival.  To represent this in a different manner, the 2-year relapse-free rate in the control group was 68% versus 77% in the pembrolizumab arm (an absolute relapse-free survival difference of about 10% at 2 years).  We also spent some time discussing the potential adverse events related to adjuvant pembrolizumab.  In particular, we discussed a 30% chance of fatigue, a 25% chance of diarrhea,  a 23% chance of pruritus, a 21% chance of hypothyroidism, a 20% chance of rash, a 16% chance of nausea, as well as other less frequent side effects.  In particular, we discussed some of the rare but serious immune-related adverse events including colitis, hepatitis, pneumonitis and polyendocrinopathies.  At the end of our conversation, the patient was agreeable to begin cycle #1 of adjuvant pembrolizumab today.     We spent the final part of our discussion reviewing the surveillance schedule for stage pT3 resected kidney cancers.  This would involve a CT scan of the chest, abdomen and pelvis every 6 months for the first 3 years following nephrectomy.  He would then need a CT scan every 12 months during years 3-5 of follow-up.  This follow-up scheduule would be the same regardless of whether he received adjuvant therapy or not.  If he developed overt metastatic kidney cancer in the future, then the most likely scenario is that he would develop oligometastatic disease at that time.  If this was the case, then he could certainly be managed with metastasectomy followed by pseudo-adjuvant pembrolizumab at that time.  If there was a polymetastatic recurrence, my preferred regimen would be pembrolizumab combined with axitinib.     A total of 40 minutes were spent on this patient on the day of the consultation, of which more than 50% of this time was used for counseling and coordination of care. The patient was given the opportunity to ask multiple questions today, all of which were answered to his satisfaction.     Bart Barahona M.D.

## 2022-02-09 NOTE — PATIENT INSTRUCTIONS
Contact Numbers    [facility] Main Line (for Scheduling/Triage/After Hours Nurse Line): [phone]    Please call the [facility] nurse triage or the after hours nurse line if you experience a temperature greater than or equal to 100.5, shaking chills, have uncontrolled nausea, vomiting and/or diarrhea, dizziness, lightheadedness, shortness of breath, chest pain, bleeding, unexplained bruising, or if you have any other new/concerning symptoms, questions or concerns.     If you are having any concerning symptoms or wish to speak to a provider before your next infusion visit, please call your care coordinator or triage to notify them so we can adequately serve you.     If you need any refills on medications (narcotics or other medications), please call before your infusion appointment.       Lab Results:  Recent Results (from the past 12 hour(s))   Comprehensive metabolic panel    Collection Time: 02/09/22 11:30 AM   Result Value Ref Range    Sodium 143 133 - 144 mmol/L    Potassium 4.2 3.4 - 5.3 mmol/L    Chloride 107 94 - 109 mmol/L    Carbon Dioxide (CO2) 26 20 - 32 mmol/L    Anion Gap 10 3 - 14 mmol/L    Urea Nitrogen 22 7 - 30 mg/dL    Creatinine 1.42 (H) 0.66 - 1.25 mg/dL    Calcium 9.2 8.5 - 10.1 mg/dL    Glucose 89 70 - 99 mg/dL    Alkaline Phosphatase 73 40 - 150 U/L    AST 33 0 - 45 U/L    ALT 75 (H) 0 - 70 U/L    Protein Total 7.3 6.8 - 8.8 g/dL    Albumin 4.0 3.4 - 5.0 g/dL    Bilirubin Total 0.4 0.2 - 1.3 mg/dL    GFR Estimate 62 >60 mL/min/1.73m2   TSH with free T4 reflex    Collection Time: 02/09/22 11:30 AM   Result Value Ref Range    TSH 2.06 0.40 - 4.00 mU/L   CBC with platelets    Collection Time: 02/09/22 11:30 AM   Result Value Ref Range    WBC Count 9.1 4.0 - 11.0 10e3/uL    RBC Count 4.79 4.40 - 5.90 10e6/uL    Hemoglobin 14.1 13.3 - 17.7 g/dL    Hematocrit 41.1 40.0 - 53.0 %    MCV 86 78 - 100 fL    MCH 29.4 26.5 - 33.0 pg    MCHC 34.3 31.5 - 36.5 g/dL    RDW 12.7 10.0 - 15.0 %    Platelet Count 210 150  - 450 10e3/uL

## 2022-02-09 NOTE — PROGRESS NOTES
Infusion Nursing Note:  Pierre Avila presents today for Cycle 1 Day 1 Keytruda.    Patient seen by provider today: Yes: Dr. Barahona   present during visit today: Not Applicable.    Note: Patient new to oncology infusion room and is receiving Keytruda for the first time. Pt oriented to infusion room and call light. Immunotherapy teaching done previously by Radha Yoon, RNCC.  Reinforced immunotherapy teaching/side effects and schedule during infusion visit.     Copy of AVS reviewed with patient. Pt instructed to call care coordinator, triage (or MD on call if after hours/weekends) with chills/temp >=100.4, questions/concerns. Pt stated understanding of plan.     Pt arrives feeling well but states he is nervous to start treatment. Offered reassurance and open conversation. BP elevated but this is normal for his clinic visits. Otherwise, denies any further concerns after MD visit.    Intravenous Access:  Peripheral IV placed.    Treatment Conditions:  Lab Results   Component Value Date    HGB 14.1 02/09/2022    WBC 9.1 02/09/2022     02/09/2022      Lab Results   Component Value Date     02/09/2022    POTASSIUM 4.2 02/09/2022    CR 1.42 (H) 02/09/2022    CHRISTIANE 9.2 02/09/2022    BILITOTAL 0.4 02/09/2022    ALBUMIN 4.0 02/09/2022    ALT 75 (H) 02/09/2022    AST 33 02/09/2022     Results reviewed, labs MET treatment parameters, ok to proceed with treatment.      Post Infusion Assessment:  Patient tolerated infusion without incident.  Blood return noted pre and post infusion.  Site patent and intact, free from redness, edema or discomfort.  Access discontinued per protocol.       Discharge Plan:   Patient declined prescription refills.  Discharge instructions reviewed with: Patient.  Patient and/or family verbalized understanding of discharge instructions and all questions answered.  Copy of AVS reviewed with patient and/or family.  Patient will return 3/2 for next appointment.  Patient  discharged in stable condition accompanied by: self.  Departure Mode: Ambulatory.      Faby Garrett RN

## 2022-02-09 NOTE — NURSING NOTE
Chief Complaint   Patient presents with     Oncology Clinic Visit     Clear cell carcinoma     Blood Draw     Labs drawn via piv by RN in lab.  VS taken       Labs drawn from PIV placed by RN. Line flushed with saline. Vitals taken. Pt checked in for appointment(s).    Carmel Huber RN

## 2022-03-02 ENCOUNTER — APPOINTMENT (OUTPATIENT)
Dept: LAB | Facility: CLINIC | Age: 47
End: 2022-03-02
Attending: INTERNAL MEDICINE
Payer: COMMERCIAL

## 2022-03-02 ENCOUNTER — INFUSION THERAPY VISIT (OUTPATIENT)
Dept: ONCOLOGY | Facility: CLINIC | Age: 47
End: 2022-03-02
Attending: INTERNAL MEDICINE
Payer: COMMERCIAL

## 2022-03-02 VITALS
WEIGHT: 211.2 LBS | OXYGEN SATURATION: 100 % | HEART RATE: 76 BPM | TEMPERATURE: 97.9 F | HEIGHT: 71 IN | BODY MASS INDEX: 29.57 KG/M2 | RESPIRATION RATE: 16 BRPM | SYSTOLIC BLOOD PRESSURE: 137 MMHG | DIASTOLIC BLOOD PRESSURE: 88 MMHG

## 2022-03-02 DIAGNOSIS — C64.9 RENAL CELL CARCINOMA, UNSPECIFIED LATERALITY (H): Primary | ICD-10-CM

## 2022-03-02 LAB
ALBUMIN SERPL-MCNC: 4 G/DL (ref 3.4–5)
ALP SERPL-CCNC: 73 U/L (ref 40–150)
ALT SERPL W P-5'-P-CCNC: 52 U/L (ref 0–70)
ANION GAP SERPL CALCULATED.3IONS-SCNC: 7 MMOL/L (ref 3–14)
AST SERPL W P-5'-P-CCNC: 26 U/L (ref 0–45)
BILIRUB SERPL-MCNC: 0.5 MG/DL (ref 0.2–1.3)
BUN SERPL-MCNC: 23 MG/DL (ref 7–30)
CALCIUM SERPL-MCNC: 9.2 MG/DL (ref 8.5–10.1)
CHLORIDE BLD-SCNC: 108 MMOL/L (ref 94–109)
CO2 SERPL-SCNC: 24 MMOL/L (ref 20–32)
CREAT SERPL-MCNC: 1.29 MG/DL (ref 0.66–1.25)
GFR SERPL CREATININE-BSD FRML MDRD: 69 ML/MIN/1.73M2
GLUCOSE BLD-MCNC: 88 MG/DL (ref 70–99)
POTASSIUM BLD-SCNC: 4.1 MMOL/L (ref 3.4–5.3)
PROT SERPL-MCNC: 7.6 G/DL (ref 6.8–8.8)
SODIUM SERPL-SCNC: 139 MMOL/L (ref 133–144)
TSH SERPL DL<=0.005 MIU/L-ACNC: 1.76 MU/L (ref 0.4–4)

## 2022-03-02 PROCEDURE — 36415 COLL VENOUS BLD VENIPUNCTURE: CPT | Performed by: INTERNAL MEDICINE

## 2022-03-02 PROCEDURE — G0463 HOSPITAL OUTPT CLINIC VISIT: HCPCS

## 2022-03-02 PROCEDURE — 258N000003 HC RX IP 258 OP 636: Performed by: INTERNAL MEDICINE

## 2022-03-02 PROCEDURE — 84443 ASSAY THYROID STIM HORMONE: CPT | Performed by: INTERNAL MEDICINE

## 2022-03-02 PROCEDURE — 80053 COMPREHEN METABOLIC PANEL: CPT | Performed by: INTERNAL MEDICINE

## 2022-03-02 PROCEDURE — 250N000011 HC RX IP 250 OP 636: Performed by: INTERNAL MEDICINE

## 2022-03-02 PROCEDURE — 99215 OFFICE O/P EST HI 40 MIN: CPT | Performed by: INTERNAL MEDICINE

## 2022-03-02 PROCEDURE — 82040 ASSAY OF SERUM ALBUMIN: CPT | Performed by: INTERNAL MEDICINE

## 2022-03-02 PROCEDURE — 96413 CHEMO IV INFUSION 1 HR: CPT

## 2022-03-02 RX ORDER — EPINEPHRINE 1 MG/ML
0.3 INJECTION, SOLUTION INTRAMUSCULAR; SUBCUTANEOUS EVERY 5 MIN PRN
Status: CANCELLED | OUTPATIENT
Start: 2022-03-23

## 2022-03-02 RX ORDER — ALBUTEROL SULFATE 0.83 MG/ML
2.5 SOLUTION RESPIRATORY (INHALATION)
Status: CANCELLED | OUTPATIENT
Start: 2022-03-23

## 2022-03-02 RX ORDER — LORAZEPAM 2 MG/ML
0.5 INJECTION INTRAMUSCULAR EVERY 4 HOURS PRN
Status: CANCELLED | OUTPATIENT
Start: 2022-03-23

## 2022-03-02 RX ORDER — MEPERIDINE HYDROCHLORIDE 25 MG/ML
25 INJECTION INTRAMUSCULAR; INTRAVENOUS; SUBCUTANEOUS EVERY 30 MIN PRN
Status: CANCELLED | OUTPATIENT
Start: 2022-03-23

## 2022-03-02 RX ORDER — METHYLPREDNISOLONE SODIUM SUCCINATE 125 MG/2ML
125 INJECTION, POWDER, LYOPHILIZED, FOR SOLUTION INTRAMUSCULAR; INTRAVENOUS
Status: CANCELLED
Start: 2022-03-23

## 2022-03-02 RX ORDER — DIPHENHYDRAMINE HYDROCHLORIDE 50 MG/ML
50 INJECTION INTRAMUSCULAR; INTRAVENOUS
Status: CANCELLED
Start: 2022-03-23

## 2022-03-02 RX ORDER — NALOXONE HYDROCHLORIDE 0.4 MG/ML
0.2 INJECTION, SOLUTION INTRAMUSCULAR; INTRAVENOUS; SUBCUTANEOUS
Status: CANCELLED | OUTPATIENT
Start: 2022-03-23

## 2022-03-02 RX ORDER — ALBUTEROL SULFATE 90 UG/1
1-2 AEROSOL, METERED RESPIRATORY (INHALATION)
Status: CANCELLED
Start: 2022-03-23

## 2022-03-02 RX ADMIN — SODIUM CHLORIDE 250 ML: 9 INJECTION, SOLUTION INTRAVENOUS at 10:41

## 2022-03-02 RX ADMIN — SODIUM CHLORIDE 200 MG: 9 INJECTION, SOLUTION INTRAVENOUS at 10:54

## 2022-03-02 ASSESSMENT — PAIN SCALES - GENERAL: PAINLEVEL: NO PAIN (0)

## 2022-03-02 NOTE — PROGRESS NOTES
FOLLOW UP VISIT     Reason for Visit:  Renal cell carcinoma (pT3a R0) s/p nephrectomy; cycle #2 of adjuvant pembrolizumab.     History of Present Illness:  Mr. Avila is a 46-year-old man who presented with hematuria in 10/2021.  CT imaging (10/21/2021) revealed a 5 cm mass at the upper pole of the left kidney invading the collecting system, without evidence of local or distant metastatic disease.  On 12/21/2021, he underwent a left radical nephrectomy which showed a 5.4 cm clear-cell renal cell carcinoma (no sarcomatoid or rhabdoid features), nucleolar grade 3, with tumor extending into the calyceal system, and negative surgical margins.  His final pathological stage was pT3a G3 R0.     The patient elected to receive adjuvant pembrolizumab treatment, which he started three weeks ago.  He returns today to begin cycle #2 of adjuvant pembrolizumab.     Review of systems:  The patient reports feeling generally well, and he has recovered nicely from his radical nephrectomy surgery.  He does not report significant new signs or symptoms at this time.  There are no particular side effects from pembrolizumab yet.  His weight remains stable.  He does not have any constitutional symptoms.  He has minimal pain at this point.  A comprehensive 14-system review of symptoms is otherwise generally unremarkable.  His ECOG score is 0.  His pain score is 1/10.      Medications:  No prescription medications.     Allergies:  No known drug allergies.      Physical Examination:  Mr. Avila is a 46-year-old man who appears comfortable at rest.  His vital signs are unremarkable.  His HEENT examination is within normal limits.  The cardiac, pulmonary, and abdominal examinations are unremarkable.  There is no palpable lymphadenopathy.  His surgical site from the recent robotic nephrectomy appears to be healing well, without evidence of erythema or exudate.  The neuromuscular examination is grossly intact.  The extremities do not  demonstrate pitting edema.  The skin evaluation is unremarkable     Laboratories (3/2/22):  His CBC reveals a WBC count of 9.1, hemoglobin 14.1, [id number]%, platelets 210K.  His comprehensive metabolic panel shows a creatinine level of 1.29 mg/dL (GFR of 69 mL/min), and an ALT level of 52 units/L, but is otherwise unremarkable.  His TSH level is normal at 1.76 mU/L.        ASSESSMENT AND PLAN:  Mr. Avila is a 46-year-old man with a recent diagnosis of clear-cell renal cell carcinoma (pT3a G3 R0) who underwent radical nephrectomy on 12/21/2021.  He returns today to receive cycle #2 of adjuvant pembrolizumab treatment.  His ECOG score is 0.  His pain score is 1/10.     We previously reviewed the results of the KeyNote-564 trial as well as the recent FDA approval of adjuvant pembrolizumab for kidney cancer at high risk of relapse.  The eligibility for the KeyNote-564 clinical trial involved either: pT2 disease with grade 4 differentiation or sarcomatoid features, all pT3/T4 disease, N1 disease, or resected M1 disease with current TONYA status.  The pembrolizumab was then delivered at a dose of 200 mg IV every 3 weeks, for a total of 17 cycles (1 year).  This patient clearly meets the criteria for adjuvant pembrolizumab.  After reviewing the anticipated benefits and potential side effects of pembrolizumab therapy, the patient decided to embark on this adjuvant treatment.  He returns today to receive cycle #2 of pembrolizumab.      We have also reviewed the surveillance schedule for stage pT3 kidney cancers following resection.  This involves a CT scan of the chest, abdomen and pelvis every 6 months for the first 3 years following nephrectomy.  He would then need a CT scan every 12 months during years 3-5 of follow-up.  This follow-up scheduule would be the same regardless of whether he received adjuvant therapy or not.  If he developed overt metastatic kidney cancer in the future, then the most likely scenario is  that he would develop oligometastatic disease at that time.  If this was the case, then he could certainly be managed with metastasectomy followed by pseudo-adjuvant pembrolizumab at that time.  If he had a polymetastatic recurrence, then my preferred regimen would be pembrolizumab-axitinib or perhaps nivolumab-cabozantinib.     A total of 40 minutes were spent on this patient on the day of the consultation, of which more than 50% of this time was used for counseling and coordination of care. The patient was given the opportunity to ask multiple questions today, all of which were answered to his satisfaction.     Bart Barahona M.D.

## 2022-03-02 NOTE — PROGRESS NOTES
Infusion Nursing Note:  Pierre Avila presents today for C2D1 Keytruda.    Patient seen by provider today: Yes: Dr. Regalado   present during visit today: Not Applicable.    Note: No complaints made. Otherwise well.      Intravenous Access:  Peripheral IV placed.    Treatment Conditions:  Lab Results   Component Value Date    HGB 14.1 02/09/2022    WBC 9.1 02/09/2022     02/09/2022      Lab Results   Component Value Date     03/02/2022    POTASSIUM 4.1 03/02/2022    CR 1.29 (H) 03/02/2022    CHRISTIANE 9.2 03/02/2022    BILITOTAL 0.5 03/02/2022    ALBUMIN 4.0 03/02/2022    ALT 52 03/02/2022    AST 26 03/02/2022     Results reviewed, labs MET treatment parameters, ok to proceed with treatment.      Post Infusion Assessment:  Patient tolerated infusion without incident.  Blood return noted pre and post infusion.  Site patent and intact, free from redness, edema or discomfort.  No evidence of extravasations.  Access discontinued per protocol.       Discharge Plan:   Patient declined prescription refills.  Discharge instructions reviewed with: Patient.  Patient and/or family verbalized understanding of discharge instructions and all questions answered.  AVS to patient via [employer].  Patient will return 3/23/22 for next appointment.   Patient discharged in stable condition accompanied by: self.  Departure Mode: Ambulatory.      DELICIA KIRKPATRICK RN

## 2022-03-02 NOTE — LETTER
3/2/2022         RE: Pierre Avila  [address] [facility] [address]        Dear Colleague,    Thank you for referring your patient, Pierre Avila, to the [facility]. Please see a copy of my visit note below.    FOLLOW UP VISIT     Reason for Visit:  Renal cell carcinoma (pT3a R0) s/p nephrectomy; cycle #2 of adjuvant pembrolizumab.     History of Present Illness:  Mr. Avila is a 46-year-old man who presented with hematuria in 10/2021.  CT imaging (10/21/2021) revealed a 5 cm mass at the upper pole of the left kidney invading the collecting system, without evidence of local or distant metastatic disease.  On 12/21/2021, he underwent a left radical nephrectomy which showed a 5.4 cm clear-cell renal cell carcinoma (no sarcomatoid or rhabdoid features), nucleolar grade 3, with tumor extending into the calyceal system, and negative surgical margins.  His final pathological stage was pT3a G3 R0.     The patient elected to receive adjuvant pembrolizumab treatment, which he started three weeks ago.  He returns today to begin cycle #2 of adjuvant pembrolizumab.     Review of systems:  The patient reports feeling generally well, and he has recovered nicely from his radical nephrectomy surgery.  He does not report significant new signs or symptoms at this time.  There are no particular side effects from pembrolizumab yet.  His weight remains stable.  He does not have any constitutional symptoms.  He has minimal pain at this point.  A comprehensive 14-system review of symptoms is otherwise generally unremarkable.  His ECOG score is 0.  His pain score is 1/10.      Medications:  No prescription medications.     Allergies:  No known drug allergies.      Physical Examination:  Mr. Avila is a 46-year-old man who appears comfortable at rest.  His vital signs are unremarkable.  His HEENT examination is within normal limits.  The cardiac, pulmonary, and abdominal examinations are  unremarkable.  There is no palpable lymphadenopathy.  His surgical site from the recent robotic nephrectomy appears to be healing well, without evidence of erythema or exudate.  The neuromuscular examination is grossly intact.  The extremities do not demonstrate pitting edema.  The skin evaluation is unremarkable     Laboratories (3/2/22):  His CBC reveals a WBC count of 9.1, hemoglobin 14.1, gxqaqubamy46%, platelets 210K.  His comprehensive metabolic panel shows a creatinine level of 1.29 mg/dL (GFR of 69 mL/min), and an ALT level of 52 units/L, but is otherwise unremarkable.  His TSH level is normal at 1.76 mU/L.     ASSESSMENT AND PLAN:  Mr. Avila is a 46-year-old man with a recent diagnosis of clear-cell renal cell carcinoma (pT3a G3 R0) who underwent radical nephrectomy on 12/21/2021.  He returns today to receive cycle #2 of adjuvant pembrolizumab treatment.  His ECOG score is 0.  His pain score is 1/10.     We previously reviewed the results of the KeyNote-564 trial as well as the recent FDA approval of adjuvant pembrolizumab for kidney cancer at high risk of relapse.  The eligibility for the KeyNote-564 clinical trial involved either: pT2 disease with grade 4 differentiation or sarcomatoid features, all pT3/T4 disease, N1 disease, or resected M1 disease with current TONYA status.  The pembrolizumab was then delivered at a dose of 200 mg IV every 3 weeks, for a total of 17 cycles (1 year).  This patient clearly meets the criteria for adjuvant pembrolizumab.  After reviewing the anticipated benefits and potential side effects of pembrolizumab therapy, the patient decided to embark on this adjuvant treatment.  He returns today to receive cycle #2 of pembrolizumab.      We have also reviewed the surveillance schedule for stage pT3 kidney cancers following resection.  This involves a CT scan of the chest, abdomen and pelvis every 6 months for the first 3 years following nephrectomy.  He would then need a CT scan  every 12 months during years 3-5 of follow-up.  This follow-up scheduule would be the same regardless of whether he received adjuvant therapy or not.  If he developed overt metastatic kidney cancer in the future, then the most likely scenario is that he would develop oligometastatic disease at that time.  If this was the case, then he could certainly be managed with metastasectomy followed by pseudo-adjuvant pembrolizumab at that time.  If he had a polymetastatic recurrence, then my preferred regimen would be pembrolizumab-axitinib or perhaps nivolumab-cabozantinib.     A total of 40 minutes were spent on this patient on the day of the consultation, of which more than 50% of this time was used for counseling and coordination of care. The patient was given the opportunity to ask multiple questions today, all of which were answered to his satisfaction.       Again, thank you for allowing me to participate in the care of your patient.      Sincerely,    Bart Barahona MD

## 2022-03-02 NOTE — NURSING NOTE
"Oncology Rooming Note    March 2, 2022 9:29 AM   Pierre Avila is a 46 year old male who presents for:    Chief Complaint   Patient presents with     Blood Draw     labs drawn with piv start by rn.  vs taken     Oncology Clinic Visit     [facility] RETURN - CLEAR CELL CARCINOMA     Initial Vitals: /88 (BP Location: Right arm, Patient Position: Sitting, Cuff Size: Adult Large)   Pulse 76   Temp 97.9  F (36.6  C) (Oral)   Resp 16   Ht 1.816 m (5' 11.5\")   Wt 95.8 kg (211 lb 3.2 oz)   SpO2 100%   BMI 29.05 kg/m   Estimated body mass index is 29.05 kg/m  as calculated from the following:    Height as of this encounter: 1.816 m (5' 11.5\").    Weight as of this encounter: 95.8 kg (211 lb 3.2 oz). Body surface area is 2.2 meters squared.  No Pain (0) Comment: Data Unavailable   No LMP for male patient.  Allergies reviewed: Yes  Medications reviewed: Yes    Medications: Medication refills not needed today.  Pharmacy name entered into [employer]:    [facility] - [place], [place] - [address] [place] [address] [facility] - [place], [place] - [address] [facility] [place], [place] - [id number]    Clinical concerns: No new concerns. [place] was notified.      Flo Marin LPN            "

## 2022-03-02 NOTE — PATIENT INSTRUCTIONS
Contact Numbers  [facility]: [phone]    After Hours:  [phone]  Triage: [phone]    Please call the [facility] Triage line if you experience a temperature greater than or equal to 100.5, shaking chills, have uncontrolled nausea, vomiting and/or diarrhea, dizziness, shortness of breath, chest pain, bleeding, unexplained bruising, or if you have any other new/concerning symptoms, questions or concerns.     If it is after hours, weekends, or holidays, please call the main hospital  at  [phone] and ask to speak to the Oncology doctor on call.     If you are having any concerning symptoms or wish to speak to a provider before your next infusion visit, please call your care coordinator or triage to notify them so we can adequately serve you.     If you need a refill on a narcotic prescription or other medication, please call triage before your infusion appointment.         March 2022 Sunday Monday Tuesday Wednesday Thursday Friday Saturday             1     2    LAB PERIPHERAL   9:15 AM   (15 min.)   [facility] DRAW   [facility]    RETURN   9:30 AM   (30 min.)   Bart Barahona MD   [facility]    ONC INFUSION 1 HR (60 MIN)  10:30 AM   (60 min.)    ONC INFUSION NURSE   [facility] 3     4     5       6     7     8     9     10     11     12       13     14     15     16     17     18     19       20     21     22     23    LAB PERIPHERAL  12:00 PM   (15 min.)   [facility] DRAW   [facility]    RETURN  12:15 PM   (45 min.)   Radha Mendiola PA-C   [facility]    ONC INFUSION 1 HR (60 MIN)   1:30 PM   (60 min.)    ONC INFUSION NURSE   [facility] 24     25     26       27     28     29     30     31                           April 2022 Sunday Monday Tuesday Wednesday Thursday Friday Saturday                             1     2       3     4     5     6     7     8     9       10     11     12     13     14     15     16       17     18     19     20     21     22     23       24     25     26     27     28     29     30                     Lab Results:  Recent Results (from the past 12 hour(s))   Comprehensive metabolic panel    Collection Time: 03/02/22  9:17 AM   Result Value Ref Range    Sodium 139 133 - 144 mmol/L    Potassium 4.1 3.4 - 5.3 mmol/L    Chloride 108 94 - 109 mmol/L    Carbon Dioxide (CO2) 24 20 - 32 mmol/L    Anion Gap 7 3 - 14 mmol/L    Urea Nitrogen 23 7 - 30 mg/dL    Creatinine 1.29 (H) 0.66 - 1.25 mg/dL    Calcium 9.2 8.5 - 10.1 mg/dL    Glucose 88 70 - 99 mg/dL    Alkaline Phosphatase 73 40 - 150 U/L    AST 26 0 - 45 U/L    ALT 52 0 - 70 U/L    Protein Total 7.6 6.8 - 8.8 g/dL    Albumin 4.0 3.4 - 5.0 g/dL    Bilirubin Total 0.5 0.2 - 1.3 mg/dL    GFR Estimate 69 >60 mL/min/1.73m2   TSH with free T4 reflex    Collection Time: 03/02/22  9:17 AM   Result Value Ref Range    TSH 1.76 0.40 - 4.00 mU/L

## 2022-03-02 NOTE — NURSING NOTE
Chief Complaint   Patient presents with     Blood Draw     labs drawn with piv start by rn.  vs taken     Labs drawn with PIV start by rn.  Pt tolerated well.  VS taken and pt checked in for next appt.    Genie Zapata RN

## 2022-03-22 NOTE — PROGRESS NOTES
[facility]  Date of Visit: Mar 23, 2022   Oncologist: Dr. Bart Barahona      Reason for Visit:  Renal cell carcinoma (pT3a R0) s/p nephrectomy; cycle #3 of adjuvant pembrolizumab.     History of Present Illness:  Mr. Avila is a 46 year old man who presented with hematuria in 10/2021.  CT imaging (10/21/2021) revealed a 5 cm mass at the upper pole of the left kidney invading the collecting system, without evidence of local or distant metastatic disease.  On 12/21/2021, he underwent a left radical nephrectomy which showed a 5.4 cm clear-cell renal cell carcinoma (no sarcomatoid or rhabdoid features), nucleolar grade 3, with tumor extending into the calyceal system, and negative surgical margins.  His final pathological stage was pT3a G3 R0.     The patient elected to receive adjuvant pembrolizumab treatment, which he started on 2/9/22.  He returns today to begin cycle #3 of adjuvant pembrolizumab.     Interval History:    He is feeling well. He is tolerating pembrolizumab overall well with the exception of mild fatigue. He is staying active. He works out 4-5 times a week. Does some yoga and walking. He takes naps when needed. Eating/drinking well. Normal bowel function. No diarrhea. His breathing is good. No CP or cough. No skin rashes or aches/pains. ROS is otherwise negative.      Physical Exam:   /80   Pulse 58   Temp 98  F (36.7  C) (Oral)   Resp 16   Wt 96.4 kg (212 lb 9.6 oz)   SpO2 98%   BMI 29.24 kg/m     General: well appearing, no acute distress  HEENT: normocephalic, atraumatic, PERRLA, sclerae nonicteric  Lymph: no palpable cervical, supraclavicular or axillary lymphadenopathy   CV: regular rate and rhythm, no murmurs  Lungs: clear to auscultation bilaterally, no wheezes/rales/rhonchi  Abd: soft, positive bowel sounds, non-distended, non-tender  MSK: full range of motion in all four extremities, no peripheral edema  Neuro: alert and oriented x3, CN  grossly intact   Psych: appropriate mood and affect  Skin: no rashes or lesions    Laboratory Data: Reviewed labs today.     Ref. Range 3/23/2022 12:11   Sodium Latest Ref Range: 133 - 144 mmol/L 140   Potassium Latest Ref Range: 3.4 - 5.3 mmol/L 4.0   Chloride Latest Ref Range: 94 - 109 mmol/L 107   Carbon Dioxide Latest Ref Range: 20 - 32 mmol/L 27   Urea Nitrogen Latest Ref Range: 7 - 30 mg/dL 20   Creatinine Latest Ref Range: 0.66 - 1.25 mg/dL 1.51 (H)   GFR Estimate Latest Ref Range: >60 mL/min/1.73m2 57 (L)   Calcium Latest Ref Range: 8.5 - 10.1 mg/dL 9.3   Anion Gap Latest Ref Range: 3 - 14 mmol/L 6   Albumin Latest Ref Range: 3.4 - 5.0 g/dL 4.0   Protein Total Latest Ref Range: 6.8 - 8.8 g/dL 7.7   Bilirubin Total Latest Ref Range: 0.2 - 1.3 mg/dL 0.4   Alkaline Phosphatase Latest Ref Range: 40 - 150 U/L 65   ALT Latest Ref Range: 0 - 70 U/L 54   AST Latest Ref Range: 0 - 45 U/L 25   TSH Latest Ref Range: 0.40 - 4.00 mU/L 2.32   Glucose Latest Ref Range: 70 - 99 mg/dL 86       ASSESSMENT AND PLAN:  Mr. Avila is a 46 year old man with a recent diagnosis of clear-cell renal cell carcinoma (pT3a G3 R0) who underwent radical nephrectomy on 12/21/2021.  He returns today to receive cycle #3 of adjuvant pembrolizumab treatment.  His ECOG score is 0.       Dr. Barahona previously reviewed the results of the KeyNote-564 trial as well as the recent FDA approval of adjuvant pembrolizumab for kidney cancer at high risk of relapse.  The eligibility for the KeyNote-564 clinical trial involved either: pT2 disease with grade 4 differentiation or sarcomatoid features, all pT3/T4 disease, N1 disease, or resected M1 disease with current TONYA status.  The pembrolizumab was then delivered at a dose of 200 mg IV every 3 weeks, for a total of 17 cycles (1 year).  This patient clearly meets the criteria for adjuvant pembrolizumab.  After reviewing the anticipated benefits and potential side effects of pembrolizumab therapy,  the patient decided to embark on this adjuvant treatment.  He returns today to receive cycle #3 of pembrolizumab.  His creatinine is slightly elevated compared to value checked three weeks ago, however, still remains close to baseline.  Will monitor this. He will RTC in 3 weeks for cycle #4.       Previously also reviewed the surveillance schedule for stage pT3 kidney cancers following resection.  This involves a CT scan of the chest, abdomen and pelvis every 6 months for the first 3 years following nephrectomy.  He would then need a CT scan every 12 months during years 3-5 of follow-up.  This follow-up schedule would be the same regardless of whether he received adjuvant therapy or not.  If he developed overt metastatic kidney cancer in the future, then the most likely scenario is that he would develop oligometastatic disease at that time.  If this was the case, then he could certainly be managed with metastasectomy followed by pseudo-adjuvant pembrolizumab at that time.  If he had a polymetastatic recurrence, then my preferred regimen would be pembrolizumab-axitinib or perhaps nivolumab-cabozantinib.    He currently has CT CAP scheduled 7/2022.      45 minutes spent on the date of the encounter doing chart review, review of test results, interpretation of tests, patient visit and documentation     Radha Mendiola PA-C  [facility]  [address] [place] [id number] [phone]

## 2022-03-23 ENCOUNTER — INFUSION THERAPY VISIT (OUTPATIENT)
Dept: ONCOLOGY | Facility: CLINIC | Age: 47
End: 2022-03-23
Attending: INTERNAL MEDICINE
Payer: COMMERCIAL

## 2022-03-23 ENCOUNTER — APPOINTMENT (OUTPATIENT)
Dept: LAB | Facility: CLINIC | Age: 47
End: 2022-03-23
Attending: INTERNAL MEDICINE
Payer: COMMERCIAL

## 2022-03-23 VITALS
WEIGHT: 212.6 LBS | DIASTOLIC BLOOD PRESSURE: 80 MMHG | SYSTOLIC BLOOD PRESSURE: 129 MMHG | BODY MASS INDEX: 29.24 KG/M2 | RESPIRATION RATE: 16 BRPM | OXYGEN SATURATION: 98 % | HEART RATE: 58 BPM | TEMPERATURE: 98 F

## 2022-03-23 DIAGNOSIS — C64.2 RENAL CELL CARCINOMA, LEFT (H): Primary | ICD-10-CM

## 2022-03-23 DIAGNOSIS — C64.9 RENAL CELL CARCINOMA, UNSPECIFIED LATERALITY (H): Primary | ICD-10-CM

## 2022-03-23 LAB
ALBUMIN SERPL-MCNC: 4 G/DL (ref 3.4–5)
ALP SERPL-CCNC: 65 U/L (ref 40–150)
ALT SERPL W P-5'-P-CCNC: 54 U/L (ref 0–70)
ANION GAP SERPL CALCULATED.3IONS-SCNC: 6 MMOL/L (ref 3–14)
AST SERPL W P-5'-P-CCNC: 25 U/L (ref 0–45)
BILIRUB SERPL-MCNC: 0.4 MG/DL (ref 0.2–1.3)
BUN SERPL-MCNC: 20 MG/DL (ref 7–30)
CALCIUM SERPL-MCNC: 9.3 MG/DL (ref 8.5–10.1)
CHLORIDE BLD-SCNC: 107 MMOL/L (ref 94–109)
CO2 SERPL-SCNC: 27 MMOL/L (ref 20–32)
CREAT SERPL-MCNC: 1.51 MG/DL (ref 0.66–1.25)
GFR SERPL CREATININE-BSD FRML MDRD: 57 ML/MIN/1.73M2
GLUCOSE BLD-MCNC: 86 MG/DL (ref 70–99)
POTASSIUM BLD-SCNC: 4 MMOL/L (ref 3.4–5.3)
PROT SERPL-MCNC: 7.7 G/DL (ref 6.8–8.8)
SODIUM SERPL-SCNC: 140 MMOL/L (ref 133–144)
TSH SERPL DL<=0.005 MIU/L-ACNC: 2.32 MU/L (ref 0.4–4)

## 2022-03-23 PROCEDURE — 258N000003 HC RX IP 258 OP 636: Performed by: INTERNAL MEDICINE

## 2022-03-23 PROCEDURE — G0463 HOSPITAL OUTPT CLINIC VISIT: HCPCS

## 2022-03-23 PROCEDURE — 84443 ASSAY THYROID STIM HORMONE: CPT | Performed by: INTERNAL MEDICINE

## 2022-03-23 PROCEDURE — 36415 COLL VENOUS BLD VENIPUNCTURE: CPT | Performed by: INTERNAL MEDICINE

## 2022-03-23 PROCEDURE — 99215 OFFICE O/P EST HI 40 MIN: CPT | Performed by: PHYSICIAN ASSISTANT

## 2022-03-23 PROCEDURE — 250N000011 HC RX IP 250 OP 636: Performed by: INTERNAL MEDICINE

## 2022-03-23 PROCEDURE — 80053 COMPREHEN METABOLIC PANEL: CPT | Performed by: INTERNAL MEDICINE

## 2022-03-23 PROCEDURE — 96413 CHEMO IV INFUSION 1 HR: CPT

## 2022-03-23 RX ADMIN — SODIUM CHLORIDE 250 ML: 9 INJECTION, SOLUTION INTRAVENOUS at 13:11

## 2022-03-23 RX ADMIN — SODIUM CHLORIDE 200 MG: 9 INJECTION, SOLUTION INTRAVENOUS at 13:22

## 2022-03-23 ASSESSMENT — PAIN SCALES - GENERAL: PAINLEVEL: NO PAIN (0)

## 2022-03-23 NOTE — NURSING NOTE
Chief Complaint   Patient presents with     Blood Draw     Vitals, blood drawn and PIV placed by LPN. Pt checked into appt.      LUIS MANUEL Sousa LPN

## 2022-03-23 NOTE — PROGRESS NOTES
Infusion Nursing Note:  Pierre Avila presents today for C3D1 Keytruda.    Patient seen by provider today: Yes: Radha Mendiola PA-C   present during visit today: Not Applicable.    Note: Pt denied any questions following visit with provider prior to infusion. Understands plan for treatment today.    Creatinine elevated to 1.51 today. Within treatment parameters. Notified Radha Mendiola PA-C.    TORKOMAL Mendiola PA-C/Mary Kay Wilson RN 03/23/22 @ 1256  - creatinine today not too far from his baseline of 1.4  - encourage fluids in case elevated creatinine is due to dehydration    Intravenous Access:  Peripheral IV placed.    Treatment Conditions:    Results for PIERRE AVILA (MRN [id number]) as of 3/23/2022 13:01   Ref. Range 3/23/2022 12:11   Sodium Latest Ref Range: 133 - 144 mmol/L 140   Potassium Latest Ref Range: 3.4 - 5.3 mmol/L 4.0   Chloride Latest Ref Range: 94 - 109 mmol/L 107   Carbon Dioxide Latest Ref Range: 20 - 32 mmol/L 27   Urea Nitrogen Latest Ref Range: 7 - 30 mg/dL 20   Creatinine Latest Ref Range: 0.66 - 1.25 mg/dL 1.51 (H)   GFR Estimate Latest Ref Range: >60 mL/min/1.73m2 57 (L)   Calcium Latest Ref Range: 8.5 - 10.1 mg/dL 9.3   Anion Gap Latest Ref Range: 3 - 14 mmol/L 6   Albumin Latest Ref Range: 3.4 - 5.0 g/dL 4.0   Protein Total Latest Ref Range: 6.8 - 8.8 g/dL 7.7   Bilirubin Total Latest Ref Range: 0.2 - 1.3 mg/dL 0.4   Alkaline Phosphatase Latest Ref Range: 40 - 150 U/L 65   ALT Latest Ref Range: 0 - 70 U/L 54   AST Latest Ref Range: 0 - 45 U/L 25   TSH Latest Ref Range: 0.40 - 4.00 mU/L 2.32   Glucose Latest Ref Range: 70 - 99 mg/dL 86       Results reviewed, labs MET treatment parameters, ok to proceed with treatment.      Post Infusion Assessment:  Patient tolerated infusion without incident.  Blood return noted pre and post infusion.  Site patent and intact, free from redness, edema or discomfort.  No evidence of extravasations.  Access discontinued per  protocol.       Discharge Plan:   Patient declined prescription refills.  Discharge instructions reviewed with: Patient.  Patient and/or family verbalized understanding of discharge instructions and all questions answered.  AVS to patient via [employer].  Patient will return in 3 weeks for provider visit and infusion per check out notes from visit today for next appointment. Pt aware to call if appointments not made in a few days. Verbalized understanding.  Patient discharged in stable condition accompanied by: self.  Departure Mode: Ambulatory.  Face to Face time: 0.      Kimmy Wilson RN

## 2022-03-23 NOTE — NURSING NOTE
"Oncology Rooming Note    March 23, 2022 12:22 PM   Pierre Avila is a 46 year old male who presents for:    Chief Complaint   Patient presents with     Blood Draw     Vitals, blood drawn and PIV placed by LPN. Pt checked into appt.      Oncology Clinic Visit     Clear cell carcinoma      Initial Vitals: /80   Pulse 58   Temp 98  F (36.7  C) (Oral)   Resp 16   Wt 96.4 kg (212 lb 9.6 oz)   SpO2 98%   BMI 29.24 kg/m   Estimated body mass index is 29.24 kg/m  as calculated from the following:    Height as of 3/2/22: 1.816 m (5' 11.5\").    Weight as of this encounter: 96.4 kg (212 lb 9.6 oz). Body surface area is 2.21 meters squared.  No Pain (0) Comment: Data Unavailable   No LMP for male patient.  Allergies reviewed: Yes  Medications reviewed: Yes    Medications: Medication refills not needed today.  Pharmacy name entered into nvite:    [facility] - [place], [place] - [address] [place] [address] [facility] [address] [place] [address] - [phone] [facility] [place], [place] - [address] [facility] [phone]    Clinical concerns: None       Rin Rios LPN            "

## 2022-03-23 NOTE — LETTER
3/23/2022         RE: Pierre Avila  [address] [facility] [address]      [facility]  Date of Visit: Mar 23, 2022   Oncologist: Dr. Bart Barahona      Reason for Visit:  Renal cell carcinoma (pT3a R0) s/p nephrectomy; cycle #3 of adjuvant pembrolizumab.     History of Present Illness:  Mr. Avila is a 46 year old man who presented with hematuria in 10/2021.  CT imaging (10/21/2021) revealed a 5 cm mass at the upper pole of the left kidney invading the collecting system, without evidence of local or distant metastatic disease.  On 12/21/2021, he underwent a left radical nephrectomy which showed a 5.4 cm clear-cell renal cell carcinoma (no sarcomatoid or rhabdoid features), nucleolar grade 3, with tumor extending into the calyceal system, and negative surgical margins.  His final pathological stage was pT3a G3 R0.     The patient elected to receive adjuvant pembrolizumab treatment, which he started on 2/9/22.  He returns today to begin cycle #3 of adjuvant pembrolizumab.     Interval History:    He is feeling well. He is tolerating pembrolizumab overall well with the exception of mild fatigue. He is staying active. He works out 4-5 times a week. Does some yoga and walking. He takes naps when needed. Eating/drinking well. Normal bowel function. No diarrhea. His breathing is good. No CP or cough. No skin rashes or aches/pains. ROS is otherwise negative.      Physical Exam:   /80   Pulse 58   Temp 98  F (36.7  C) (Oral)   Resp 16   Wt 96.4 kg (212 lb 9.6 oz)   SpO2 98%   BMI 29.24 kg/m     General: well appearing, no acute distress  HEENT: normocephalic, atraumatic, PERRLA, sclerae nonicteric  Lymph: no palpable cervical, supraclavicular or axillary lymphadenopathy   CV: regular rate and rhythm, no murmurs  Lungs: clear to auscultation bilaterally, no wheezes/rales/rhonchi  Abd: soft, positive bowel sounds, non-distended, non-tender  MSK: full range of  motion in all four extremities, no peripheral edema  Neuro: alert and oriented x3, CN grossly intact   Psych: appropriate mood and affect  Skin: no rashes or lesions    Laboratory Data: Reviewed labs today.     Ref. Range 3/23/2022 12:11   Sodium Latest Ref Range: 133 - 144 mmol/L 140   Potassium Latest Ref Range: 3.4 - 5.3 mmol/L 4.0   Chloride Latest Ref Range: 94 - 109 mmol/L 107   Carbon Dioxide Latest Ref Range: 20 - 32 mmol/L 27   Urea Nitrogen Latest Ref Range: 7 - 30 mg/dL 20   Creatinine Latest Ref Range: 0.66 - 1.25 mg/dL 1.51 (H)   GFR Estimate Latest Ref Range: >60 mL/min/1.73m2 57 (L)   Calcium Latest Ref Range: 8.5 - 10.1 mg/dL 9.3   Anion Gap Latest Ref Range: 3 - 14 mmol/L 6   Albumin Latest Ref Range: 3.4 - 5.0 g/dL 4.0   Protein Total Latest Ref Range: 6.8 - 8.8 g/dL 7.7   Bilirubin Total Latest Ref Range: 0.2 - 1.3 mg/dL 0.4   Alkaline Phosphatase Latest Ref Range: 40 - 150 U/L 65   ALT Latest Ref Range: 0 - 70 U/L 54   AST Latest Ref Range: 0 - 45 U/L 25   TSH Latest Ref Range: 0.40 - 4.00 mU/L 2.32   Glucose Latest Ref Range: 70 - 99 mg/dL 86     ASSESSMENT AND PLAN:  Mr. Avila is a 46 year old man with a recent diagnosis of clear-cell renal cell carcinoma (pT3a G3 R0) who underwent radical nephrectomy on 12/21/2021.  He returns today to receive cycle #3 of adjuvant pembrolizumab treatment.  His ECOG score is 0.       Dr. Barahona previously reviewed the results of the KeyNote-564 trial as well as the recent FDA approval of adjuvant pembrolizumab for kidney cancer at high risk of relapse.  The eligibility for the KeyNote-564 clinical trial involved either: pT2 disease with grade 4 differentiation or sarcomatoid features, all pT3/T4 disease, N1 disease, or resected M1 disease with current TONYA status.  The pembrolizumab was then delivered at a dose of 200 mg IV every 3 weeks, for a total of 17 cycles (1 year).  This patient clearly meets the criteria for adjuvant pembrolizumab.  After  reviewing the anticipated benefits and potential side effects of pembrolizumab therapy, the patient decided to embark on this adjuvant treatment.  He returns today to receive cycle #3 of pembrolizumab.  His creatinine is slightly elevated compared to value checked three weeks ago, however, still remains close to baseline.  Will monitor this. He will RTC in 3 weeks for cycle #4.       Previously also reviewed the surveillance schedule for stage pT3 kidney cancers following resection.  This involves a CT scan of the chest, abdomen and pelvis every 6 months for the first 3 years following nephrectomy.  He would then need a CT scan every 12 months during years 3-5 of follow-up.  This follow-up schedule would be the same regardless of whether he received adjuvant therapy or not.  If he developed overt metastatic kidney cancer in the future, then the most likely scenario is that he would develop oligometastatic disease at that time.  If this was the case, then he could certainly be managed with metastasectomy followed by pseudo-adjuvant pembrolizumab at that time.  If he had a polymetastatic recurrence, then my preferred regimen would be pembrolizumab-axitinib or perhaps nivolumab-cabozantinib.    He currently has CT CAP scheduled 7/2022.      45 minutes spent on the date of the encounter doing chart review, review of test results, interpretation of tests, patient visit and documentation     Radha Mendiola PA-C

## 2022-04-11 NOTE — PROGRESS NOTES
[facility]  Date of Visit: Apr 13, 2022   Oncologist: Dr. Bart Barahona      Reason for Visit:  Renal cell carcinoma (pT3a R0) s/p nephrectomy; cycle #4 of adjuvant pembrolizumab.     History of Present Illness:  Mr. Avila is a 46 year old man who presented with hematuria in 10/2021.  CT imaging (10/21/2021) revealed a 5 cm mass at the upper pole of the left kidney invading the collecting system, without evidence of local or distant metastatic disease.  On 12/21/2021, he underwent a left radical nephrectomy which showed a 5.4 cm clear-cell renal cell carcinoma (no sarcomatoid or rhabdoid features), nucleolar grade 3, with tumor extending into the calyceal system, and negative surgical margins.  His final pathological stage was pT3a G3 R0.     The patient elected to receive adjuvant pembrolizumab treatment, which he started on 2/9/22.  He returns today to begin cycle #4 of adjuvant pembrolizumab.     Interval History:    Pierre is feeling well and tolerating pembro infusions without significant side effects. He has been having some intermittent low back pain recently that are transient and resolve on its own. He does not have pain today and did not have pain yesterday either. He states it is not bothersome and wanted to make us aware. Denies loss of bowel or bladder control. His fatigue is stable. He reports he has been very active. He recently brought a new exercise bike and is doing a lot of work outside. He continues to do yoga. He even went on a run recently. He is staying well hydrated and his appetite is good. He denies diarrhea. No CP or SOB or cough. No fevers or chills. No skin rashes or new aches/pains. ROS is otherwise negative.     Physical Exam:   /81   Pulse 61   Temp 98.4  F (36.9  C) (Oral)   Resp 16   Wt 97.4 kg (214 lb 11.2 oz)   SpO2 100%   BMI 29.53 kg/m     General: well appearing, pleasant male, no acute distress  HEENT: normocephalic,  atraumatic, PERRLA, sclerae nonicteric  Lymph: no palpable cervical, supraclavicular or axillary lymphadenopathy   CV: regular rate and rhythm, no murmurs  Lungs: clear to auscultation bilaterally, no wheezes/rales/rhonchi  Abd: soft, positive bowel sounds, non-distended, non-tender  MSK: full range of motion in all four extremities, no peripheral edema  Neuro: alert and oriented x3, CN grossly intact   Psych: appropriate mood and affect  Skin: no rashes or lesions    Laboratory Data: Reviewed labs today. TSH pending.    Latest Reference Range & Units 04/13/22 10:22   Sodium 133 - 144 mmol/L 142   Potassium 3.4 - 5.3 mmol/L 4.3   Chloride 94 - 109 mmol/L 110 (H)   Carbon Dioxide 20 - 32 mmol/L 27   Urea Nitrogen 7 - 30 mg/dL 25   Creatinine 0.66 - 1.25 mg/dL 1.31 (H)   GFR Estimate >60 mL/min/1.73m2 68 [1]   Calcium 8.5 - 10.1 mg/dL 9.3   Anion Gap 3 - 14 mmol/L 5   Albumin 3.4 - 5.0 g/dL 4.0   Protein Total 6.8 - 8.8 g/dL 7.3   Bilirubin Total 0.2 - 1.3 mg/dL 0.6   Alkaline Phosphatase 40 - 150 U/L 57   ALT 0 - 70 U/L 45   AST 0 - 45 U/L 23   Glucose 70 - 99 mg/dL 91   WBC 4.0 - 11.0 10e3/uL 6.7   Hemoglobin 13.3 - 17.7 g/dL 13.6   Hematocrit 40.0 - 53.0 % 40.4   Platelet Count 150 - 450 10e3/uL 214   RBC Count 4.40 - 5.90 10e6/uL 4.64   MCV 78 - 100 fL 87   MCH 26.5 - 33.0 pg 29.3   MCHC 31.5 - 36.5 g/dL 33.7   RDW 10.0 - 15.0 % 12.4   % Neutrophils % 61   % Lymphocytes % 23   % Monocytes % 9   % Eosinophils % 6   % Basophils % 1   Absolute Basophils 0.0 - 0.2 10e3/uL 0.0   Absolute Eosinophils 0.0 - 0.7 10e3/uL 0.4   Absolute Immature Granulocytes <=0.4 10e3/uL 0.0   Absolute Lymphocytes 0.8 - 5.3 10e3/uL 1.6   Absolute Monocytes 0.0 - 1.3 10e3/uL 0.6   % Immature Granulocytes % 0   Absolute Neutrophils 1.6 - 8.3 10e3/uL 4.1   Absolute NRBCs 10e3/uL 0.0   NRBCs per 100 WBC <1 /100 0     ASSESSMENT AND PLAN:  Mr. Avila is a 46 year old man with a recent diagnosis of clear-cell renal cell carcinoma (pT3a G3 R0)  who underwent radical nephrectomy on 12/21/2021.  He returns today to receive cycle #4 of adjuvant pembrolizumab treatment.  His ECOG score is 0.       Dr. Barahona previously reviewed the results of the KeyNote-564 trial as well as the recent FDA approval of adjuvant pembrolizumab for kidney cancer at high risk of relapse.  The eligibility for the KeyNote-564 clinical trial involved either: pT2 disease with grade 4 differentiation or sarcomatoid features, all pT3/T4 disease, N1 disease, or resected M1 disease with current TONYA status.  The pembrolizumab was then delivered at a dose of 200 mg IV every 3 weeks, for a total of 17 cycles (1 year).  This patient clearly meets the criteria for adjuvant pembrolizumab.  After reviewing the anticipated benefits and potential side effects of pembrolizumab therapy, the patient decided to embark on this adjuvant treatment; he started cycle 1 on 2/9/22.     He returns today to receive cycle #4 of pembrolizumab.  His creatinine is improved today, he has been hydrating very well at home.  Will continue to monitor this.  We will monitor his low back pain at this time and he will call us if it gets worse.  He did not have any pain at today's visit and reports it is transient and resolves on its own without OTC pain medications.  We discussed monitoring with supportive cares for now with heating pad and stretches.  It is likely musculoskeletal-related as he is physically very active.  Advised him to avoid heavy lifting.  He verbalized understanding.     He will RTC in 3 weeks for labs, cycle #5, and f/up with Dr. Barahona.       Previously also reviewed the surveillance schedule for stage pT3 kidney cancers following resection. This involves a CT scan of the chest, abdomen and pelvis every 6 months for the first 3 years following nephrectomy.  He would then need a CT scan every 12 months during years 3-5 of follow-up.  This follow-up schedule would be the same regardless of  whether he received adjuvant therapy or not.  If he developed overt metastatic kidney cancer in the future, then the most likely scenario is that he would develop oligometastatic disease at that time.  If this was the case, then he could certainly be managed with metastasectomy followed by pseudo-adjuvant pembrolizumab at that time.  If he had a polymetastatic recurrence, then my preferred regimen would be pembrolizumab-axitinib or perhaps nivolumab-cabozantinib.    He currently has CT CAP scheduled 7/2022.      40 minutes spent on the date of the encounter doing chart review, review of test results, interpretation of tests, patient visit and documentation     Radha Mendiola PA-C  [facility]  [address] [place] [address] [phone]

## 2022-04-13 ENCOUNTER — APPOINTMENT (OUTPATIENT)
Dept: LAB | Facility: CLINIC | Age: 47
End: 2022-04-13
Attending: INTERNAL MEDICINE
Payer: COMMERCIAL

## 2022-04-13 ENCOUNTER — ONCOLOGY VISIT (OUTPATIENT)
Dept: ONCOLOGY | Facility: CLINIC | Age: 47
End: 2022-04-13
Attending: INTERNAL MEDICINE
Payer: COMMERCIAL

## 2022-04-13 VITALS
SYSTOLIC BLOOD PRESSURE: 127 MMHG | BODY MASS INDEX: 29.53 KG/M2 | DIASTOLIC BLOOD PRESSURE: 81 MMHG | HEART RATE: 61 BPM | WEIGHT: 214.7 LBS | TEMPERATURE: 98.4 F | OXYGEN SATURATION: 100 % | RESPIRATION RATE: 16 BRPM

## 2022-04-13 DIAGNOSIS — C64.9 RENAL CELL CARCINOMA, UNSPECIFIED LATERALITY (H): ICD-10-CM

## 2022-04-13 DIAGNOSIS — C64.9 RENAL CELL CARCINOMA, UNSPECIFIED LATERALITY (H): Primary | ICD-10-CM

## 2022-04-13 DIAGNOSIS — C64.2 RENAL CELL CARCINOMA, LEFT (H): Primary | ICD-10-CM

## 2022-04-13 LAB
ALBUMIN SERPL-MCNC: 4 G/DL (ref 3.4–5)
ALP SERPL-CCNC: 57 U/L (ref 40–150)
ALT SERPL W P-5'-P-CCNC: 45 U/L (ref 0–70)
ANION GAP SERPL CALCULATED.3IONS-SCNC: 5 MMOL/L (ref 3–14)
AST SERPL W P-5'-P-CCNC: 23 U/L (ref 0–45)
BASOPHILS # BLD AUTO: 0 10E3/UL (ref 0–0.2)
BASOPHILS NFR BLD AUTO: 1 %
BILIRUB SERPL-MCNC: 0.6 MG/DL (ref 0.2–1.3)
BUN SERPL-MCNC: 25 MG/DL (ref 7–30)
CALCIUM SERPL-MCNC: 9.3 MG/DL (ref 8.5–10.1)
CHLORIDE BLD-SCNC: 110 MMOL/L (ref 94–109)
CO2 SERPL-SCNC: 27 MMOL/L (ref 20–32)
CREAT SERPL-MCNC: 1.31 MG/DL (ref 0.66–1.25)
EOSINOPHIL # BLD AUTO: 0.4 10E3/UL (ref 0–0.7)
EOSINOPHIL NFR BLD AUTO: 6 %
ERYTHROCYTE [DISTWIDTH] IN BLOOD BY AUTOMATED COUNT: 12.4 % (ref 10–15)
GFR SERPL CREATININE-BSD FRML MDRD: 68 ML/MIN/1.73M2
GLUCOSE BLD-MCNC: 91 MG/DL (ref 70–99)
HCT VFR BLD AUTO: 40.4 % (ref 40–53)
HGB BLD-MCNC: 13.6 G/DL (ref 13.3–17.7)
IMM GRANULOCYTES # BLD: 0 10E3/UL
IMM GRANULOCYTES NFR BLD: 0 %
LYMPHOCYTES # BLD AUTO: 1.6 10E3/UL (ref 0.8–5.3)
LYMPHOCYTES NFR BLD AUTO: 23 %
MCH RBC QN AUTO: 29.3 PG (ref 26.5–33)
MCHC RBC AUTO-ENTMCNC: 33.7 G/DL (ref 31.5–36.5)
MCV RBC AUTO: 87 FL (ref 78–100)
MONOCYTES # BLD AUTO: 0.6 10E3/UL (ref 0–1.3)
MONOCYTES NFR BLD AUTO: 9 %
NEUTROPHILS # BLD AUTO: 4.1 10E3/UL (ref 1.6–8.3)
NEUTROPHILS NFR BLD AUTO: 61 %
NRBC # BLD AUTO: 0 10E3/UL
NRBC BLD AUTO-RTO: 0 /100
PLATELET # BLD AUTO: 214 10E3/UL (ref 150–450)
POTASSIUM BLD-SCNC: 4.3 MMOL/L (ref 3.4–5.3)
PROT SERPL-MCNC: 7.3 G/DL (ref 6.8–8.8)
RBC # BLD AUTO: 4.64 10E6/UL (ref 4.4–5.9)
SODIUM SERPL-SCNC: 142 MMOL/L (ref 133–144)
TSH SERPL DL<=0.005 MIU/L-ACNC: 1.81 MU/L (ref 0.4–4)
WBC # BLD AUTO: 6.7 10E3/UL (ref 4–11)

## 2022-04-13 PROCEDURE — 96413 CHEMO IV INFUSION 1 HR: CPT

## 2022-04-13 PROCEDURE — 250N000011 HC RX IP 250 OP 636: Performed by: PHYSICIAN ASSISTANT

## 2022-04-13 PROCEDURE — 80053 COMPREHEN METABOLIC PANEL: CPT | Performed by: PHYSICIAN ASSISTANT

## 2022-04-13 PROCEDURE — 84443 ASSAY THYROID STIM HORMONE: CPT | Performed by: PHYSICIAN ASSISTANT

## 2022-04-13 PROCEDURE — 85025 COMPLETE CBC W/AUTO DIFF WBC: CPT

## 2022-04-13 PROCEDURE — G0463 HOSPITAL OUTPT CLINIC VISIT: HCPCS

## 2022-04-13 PROCEDURE — 258N000003 HC RX IP 258 OP 636: Performed by: PHYSICIAN ASSISTANT

## 2022-04-13 PROCEDURE — 36415 COLL VENOUS BLD VENIPUNCTURE: CPT

## 2022-04-13 PROCEDURE — 99215 OFFICE O/P EST HI 40 MIN: CPT | Performed by: PHYSICIAN ASSISTANT

## 2022-04-13 RX ORDER — ALBUTEROL SULFATE 0.83 MG/ML
2.5 SOLUTION RESPIRATORY (INHALATION)
Status: CANCELLED | OUTPATIENT
Start: 2022-04-13

## 2022-04-13 RX ORDER — DIPHENHYDRAMINE HYDROCHLORIDE 50 MG/ML
50 INJECTION INTRAMUSCULAR; INTRAVENOUS
Status: CANCELLED
Start: 2022-04-13

## 2022-04-13 RX ORDER — MEPERIDINE HYDROCHLORIDE 25 MG/ML
25 INJECTION INTRAMUSCULAR; INTRAVENOUS; SUBCUTANEOUS EVERY 30 MIN PRN
Status: CANCELLED | OUTPATIENT
Start: 2022-04-13

## 2022-04-13 RX ORDER — LORAZEPAM 2 MG/ML
0.5 INJECTION INTRAMUSCULAR EVERY 4 HOURS PRN
Status: CANCELLED | OUTPATIENT
Start: 2022-04-13

## 2022-04-13 RX ORDER — NALOXONE HYDROCHLORIDE 0.4 MG/ML
0.2 INJECTION, SOLUTION INTRAMUSCULAR; INTRAVENOUS; SUBCUTANEOUS
Status: CANCELLED | OUTPATIENT
Start: 2022-04-13

## 2022-04-13 RX ORDER — ALBUTEROL SULFATE 90 UG/1
1-2 AEROSOL, METERED RESPIRATORY (INHALATION)
Status: CANCELLED
Start: 2022-04-13

## 2022-04-13 RX ORDER — EPINEPHRINE 1 MG/ML
0.3 INJECTION, SOLUTION INTRAMUSCULAR; SUBCUTANEOUS EVERY 5 MIN PRN
Status: CANCELLED | OUTPATIENT
Start: 2022-04-13

## 2022-04-13 RX ORDER — METHYLPREDNISOLONE SODIUM SUCCINATE 125 MG/2ML
125 INJECTION, POWDER, LYOPHILIZED, FOR SOLUTION INTRAMUSCULAR; INTRAVENOUS
Status: CANCELLED
Start: 2022-04-13

## 2022-04-13 RX ADMIN — SODIUM CHLORIDE 200 MG: 9 INJECTION, SOLUTION INTRAVENOUS at 12:03

## 2022-04-13 RX ADMIN — SODIUM CHLORIDE 250 ML: 9 INJECTION, SOLUTION INTRAVENOUS at 12:03

## 2022-04-13 ASSESSMENT — PAIN SCALES - GENERAL: PAINLEVEL: NO PAIN (0)

## 2022-04-13 NOTE — LETTER
4/13/2022         RE: Pierre Avila  [address] [facility] [address]      [facility]  Date of Visit: Apr 13, 2022   Oncologist: Dr. Bart Barahona      Reason for Visit:  Renal cell carcinoma (pT3a R0) s/p nephrectomy; cycle #4 of adjuvant pembrolizumab.     History of Present Illness:  Mr. Avila is a 46 year old man who presented with hematuria in 10/2021.  CT imaging (10/21/2021) revealed a 5 cm mass at the upper pole of the left kidney invading the collecting system, without evidence of local or distant metastatic disease.  On 12/21/2021, he underwent a left radical nephrectomy which showed a 5.4 cm clear-cell renal cell carcinoma (no sarcomatoid or rhabdoid features), nucleolar grade 3, with tumor extending into the calyceal system, and negative surgical margins.  His final pathological stage was pT3a G3 R0.     The patient elected to receive adjuvant pembrolizumab treatment, which he started on 2/9/22.  He returns today to begin cycle #4 of adjuvant pembrolizumab.     Interval History:    Pierre is feeling well and tolerating pembro infusions without significant side effects. He has been having some intermittent low back pain recently that are transient and resolve on its own. He does not have pain today and did not have pain yesterday either. He states it is not bothersome and wanted to make us aware. Denies loss of bowel or bladder control. His fatigue is stable. He reports he has been very active. He recently brought a new exercise bike and is doing a lot of work outside. He continues to do yoga. He even went on a run recently. He is staying well hydrated and his appetite is good. He denies diarrhea. No CP or SOB or cough. No fevers or chills. No skin rashes or new aches/pains. ROS is otherwise negative.     Physical Exam:   /81   Pulse 61   Temp 98.4  F (36.9  C) (Oral)   Resp 16   Wt 97.4 kg (214 lb 11.2 oz)   SpO2 100%   BMI 29.53 kg/m      General: well appearing, pleasant male, no acute distress  HEENT: normocephalic, atraumatic, PERRLA, sclerae nonicteric  Lymph: no palpable cervical, supraclavicular or axillary lymphadenopathy   CV: regular rate and rhythm, no murmurs  Lungs: clear to auscultation bilaterally, no wheezes/rales/rhonchi  Abd: soft, positive bowel sounds, non-distended, non-tender  MSK: full range of motion in all four extremities, no peripheral edema  Neuro: alert and oriented x3, CN grossly intact   Psych: appropriate mood and affect  Skin: no rashes or lesions    Laboratory Data: Reviewed labs today. TSH pending.    Latest Reference Range & Units 04/13/22 10:22   Sodium 133 - 144 mmol/L 142   Potassium 3.4 - 5.3 mmol/L 4.3   Chloride 94 - 109 mmol/L 110 (H)   Carbon Dioxide 20 - 32 mmol/L 27   Urea Nitrogen 7 - 30 mg/dL 25   Creatinine 0.66 - 1.25 mg/dL 1.31 (H)   GFR Estimate >60 mL/min/1.73m2 68 [1]   Calcium 8.5 - 10.1 mg/dL 9.3   Anion Gap 3 - 14 mmol/L 5   Albumin 3.4 - 5.0 g/dL 4.0   Protein Total 6.8 - 8.8 g/dL 7.3   Bilirubin Total 0.2 - 1.3 mg/dL 0.6   Alkaline Phosphatase 40 - 150 U/L 57   ALT 0 - 70 U/L 45   AST 0 - 45 U/L 23   Glucose 70 - 99 mg/dL 91   WBC 4.0 - 11.0 10e3/uL 6.7   Hemoglobin 13.3 - 17.7 g/dL 13.6   Hematocrit 40.0 - 53.0 % 40.4   Platelet Count 150 - 450 10e3/uL 214   RBC Count 4.40 - 5.90 10e6/uL 4.64   MCV 78 - 100 fL 87   MCH 26.5 - 33.0 pg 29.3   MCHC 31.5 - 36.5 g/dL 33.7   RDW 10.0 - 15.0 % 12.4   % Neutrophils % 61   % Lymphocytes % 23   % Monocytes % 9   % Eosinophils % 6   % Basophils % 1   Absolute Basophils 0.0 - 0.2 10e3/uL 0.0   Absolute Eosinophils 0.0 - 0.7 10e3/uL 0.4   Absolute Immature Granulocytes <=0.4 10e3/uL 0.0   Absolute Lymphocytes 0.8 - 5.3 10e3/uL 1.6   Absolute Monocytes 0.0 - 1.3 10e3/uL 0.6   % Immature Granulocytes % 0   Absolute Neutrophils 1.6 - 8.3 10e3/uL 4.1   Absolute NRBCs 10e3/uL 0.0   NRBCs per 100 WBC <1 /100 0     ASSESSMENT AND PLAN:  Mary Avila is a 46  year old man with a recent diagnosis of clear-cell renal cell carcinoma (pT3a G3 R0) who underwent radical nephrectomy on 12/21/2021.  He returns today to receive cycle #4 of adjuvant pembrolizumab treatment.  His ECOG score is 0.       Dr. Barahona previously reviewed the results of the KeyNote-564 trial as well as the recent FDA approval of adjuvant pembrolizumab for kidney cancer at high risk of relapse.  The eligibility for the KeyNote-564 clinical trial involved either: pT2 disease with grade 4 differentiation or sarcomatoid features, all pT3/T4 disease, N1 disease, or resected M1 disease with current TONYA status.  The pembrolizumab was then delivered at a dose of 200 mg IV every 3 weeks, for a total of 17 cycles (1 year).  This patient clearly meets the criteria for adjuvant pembrolizumab.  After reviewing the anticipated benefits and potential side effects of pembrolizumab therapy, the patient decided to embark on this adjuvant treatment; he started cycle 1 on 2/9/22.     He returns today to receive cycle #4 of pembrolizumab.  His creatinine is improved today, he has been hydrating very well at home.  Will continue to monitor this.  We will monitor his low back pain at this time and he will call us if it gets worse.  He did not have any pain at today's visit and reports it is transient and resolves on its own without OTC pain medications.  We discussed monitoring with supportive cares for now with heating pad and stretches.  It is likely musculoskeletal-related as he is physically very active.  Advised him to avoid heavy lifting.  He verbalized understanding.     He will RTC in 3 weeks for labs, cycle #5, and f/up with Dr. Barahona.       Previously also reviewed the surveillance schedule for stage pT3 kidney cancers following resection. This involves a CT scan of the chest, abdomen and pelvis every 6 months for the first 3 years following nephrectomy.  He would then need a CT scan every 12 months during  years 3-5 of follow-up.  This follow-up schedule would be the same regardless of whether he received adjuvant therapy or not.  If he developed overt metastatic kidney cancer in the future, then the most likely scenario is that he would develop oligometastatic disease at that time.  If this was the case, then he could certainly be managed with metastasectomy followed by pseudo-adjuvant pembrolizumab at that time.  If he had a polymetastatic recurrence, then my preferred regimen would be pembrolizumab-axitinib or perhaps nivolumab-cabozantinib.    He currently has CT CAP scheduled 7/2022.      40 minutes spent on the date of the encounter doing chart review, review of test results, interpretation of tests, patient visit and documentation     Radha Mendiola PA-C  [facility]  [address] [place] [id number] [phone]

## 2022-04-13 NOTE — NURSING NOTE
"Oncology Rooming Note    April 13, 2022 10:34 AM   Pierre Avila is a 46 year old male who presents for:    Chief Complaint   Patient presents with     Blood Draw     Labs drawn via piv by RN in lab.  VS taken     Oncology Clinic Visit     Renal cell carcinoma, Clear cell carcinoma      Initial Vitals: /81   Pulse 61   Temp 98.4  F (36.9  C) (Oral)   Resp 16   Wt 97.4 kg (214 lb 11.2 oz)   SpO2 100%   BMI 29.53 kg/m   Estimated body mass index is 29.53 kg/m  as calculated from the following:    Height as of 3/2/22: 1.816 m (5' 11.5\").    Weight as of this encounter: 97.4 kg (214 lb 11.2 oz). Body surface area is 2.22 meters squared.  No Pain (0) Comment: Data Unavailable   No LMP for male patient.  Allergies reviewed: Yes  Medications reviewed: Yes    Medications: Medication refills not needed today.  Pharmacy name entered into [employer]:    [facility] - [place] - [address] [place] [address] [facility] [address] [place], [place] - [address] [facility] [place], [place] - [address] [facility] [phone]    Clinical concerns: None     Alpesh Cerna            "

## 2022-04-13 NOTE — PATIENT INSTRUCTIONS
[facility] Triage and after hours / weekends / holidays:  [phone]    Please call the triage or after hours line if you experience a temperature greater than or equal to 100.4, shaking chills, have uncontrolled nausea, vomiting and/or diarrhea, dizziness, shortness of breath, chest pain, bleeding, unexplained bruising, or if you have any other new/concerning symptoms, questions or concerns.      If you are having any concerning symptoms or wish to speak to a provider before your next infusion visit, please call your care coordinator or triage to notify them so we can adequately serve you.     If you need a refill on a narcotic prescription or other medication, please call before your infusion appointment.

## 2022-04-13 NOTE — PROGRESS NOTES
Infusion Nursing Note:  Pierre Avila presents today for cycle 4, day 1 pembrolizumab.    Patient seen by provider today: Yes: Radha Mendiola   present during visit today: Not Applicable.    Note: Patient arrives feeling well after visit with Radha, offers no complaints.      Intravenous Access:  Peripheral IV placed.    Treatment Conditions:  Lab Results   Component Value Date    HGB 13.6 04/13/2022    WBC 6.7 04/13/2022    ANEUTAUTO 4.1 04/13/2022     04/13/2022      Lab Results   Component Value Date     04/13/2022    POTASSIUM 4.3 04/13/2022    CR 1.31 (H) 04/13/2022    CHRISTIANE 9.3 04/13/2022    BILITOTAL 0.6 04/13/2022    ALBUMIN 4.0 04/13/2022    ALT 45 04/13/2022    AST 23 04/13/2022     Results reviewed, labs MET treatment parameters, ok to proceed with treatment.      Post Infusion Assessment:  Patient tolerated infusion without incident.  Blood return noted pre and post infusion.  No evidence of extravasations.  Access discontinued per protocol.       Discharge Plan:   Patient declined prescription refills.  Patient and/or family verbalized understanding of discharge instructions and all questions answered.  Copy of AVS reviewed with patient and/or family.  Patient will return 5/4 for next appointment.  Patient discharged in stable condition accompanied by: self.  Departure Mode: Ambulatory.      Quin Wood RN

## 2022-04-13 NOTE — NURSING NOTE
Chief Complaint   Patient presents with     Blood Draw     Labs drawn via piv by RN in lab.  VS taken       Labs drawn from PIV placed by RN. Line flushed with saline. Vitals taken. Pt checked in for appointment(s).    Carmel Huber RN

## 2022-05-04 ENCOUNTER — APPOINTMENT (OUTPATIENT)
Dept: LAB | Facility: CLINIC | Age: 47
End: 2022-05-04
Attending: INTERNAL MEDICINE
Payer: COMMERCIAL

## 2022-05-04 ENCOUNTER — ONCOLOGY VISIT (OUTPATIENT)
Dept: ONCOLOGY | Facility: CLINIC | Age: 47
End: 2022-05-04
Attending: INTERNAL MEDICINE
Payer: COMMERCIAL

## 2022-05-04 VITALS
RESPIRATION RATE: 16 BRPM | BODY MASS INDEX: 29.3 KG/M2 | WEIGHT: 213 LBS | OXYGEN SATURATION: 98 % | DIASTOLIC BLOOD PRESSURE: 83 MMHG | HEART RATE: 75 BPM | TEMPERATURE: 97.9 F | SYSTOLIC BLOOD PRESSURE: 126 MMHG

## 2022-05-04 DIAGNOSIS — C64.9 RENAL CELL CARCINOMA, UNSPECIFIED LATERALITY (H): Primary | ICD-10-CM

## 2022-05-04 LAB
ALBUMIN SERPL-MCNC: 4.1 G/DL (ref 3.4–5)
ALP SERPL-CCNC: 61 U/L (ref 40–150)
ALT SERPL W P-5'-P-CCNC: 76 U/L (ref 0–70)
ANION GAP SERPL CALCULATED.3IONS-SCNC: 6 MMOL/L (ref 3–14)
AST SERPL W P-5'-P-CCNC: 34 U/L (ref 0–45)
BILIRUB SERPL-MCNC: 0.6 MG/DL (ref 0.2–1.3)
BUN SERPL-MCNC: 23 MG/DL (ref 7–30)
CALCIUM SERPL-MCNC: 8.8 MG/DL (ref 8.5–10.1)
CHLORIDE BLD-SCNC: 109 MMOL/L (ref 94–109)
CO2 SERPL-SCNC: 26 MMOL/L (ref 20–32)
CREAT SERPL-MCNC: 1.28 MG/DL (ref 0.66–1.25)
GFR SERPL CREATININE-BSD FRML MDRD: 70 ML/MIN/1.73M2
GLUCOSE BLD-MCNC: 94 MG/DL (ref 70–99)
POTASSIUM BLD-SCNC: 4.2 MMOL/L (ref 3.4–5.3)
PROT SERPL-MCNC: 7.2 G/DL (ref 6.8–8.8)
SODIUM SERPL-SCNC: 141 MMOL/L (ref 133–144)
TSH SERPL DL<=0.005 MIU/L-ACNC: 1.69 MU/L (ref 0.4–4)

## 2022-05-04 PROCEDURE — 250N000011 HC RX IP 250 OP 636: Performed by: INTERNAL MEDICINE

## 2022-05-04 PROCEDURE — 99215 OFFICE O/P EST HI 40 MIN: CPT | Performed by: INTERNAL MEDICINE

## 2022-05-04 PROCEDURE — 258N000003 HC RX IP 258 OP 636: Performed by: INTERNAL MEDICINE

## 2022-05-04 PROCEDURE — G0463 HOSPITAL OUTPT CLINIC VISIT: HCPCS

## 2022-05-04 PROCEDURE — 36415 COLL VENOUS BLD VENIPUNCTURE: CPT | Performed by: INTERNAL MEDICINE

## 2022-05-04 PROCEDURE — 96413 CHEMO IV INFUSION 1 HR: CPT

## 2022-05-04 PROCEDURE — 84443 ASSAY THYROID STIM HORMONE: CPT | Performed by: INTERNAL MEDICINE

## 2022-05-04 PROCEDURE — 80053 COMPREHEN METABOLIC PANEL: CPT | Performed by: INTERNAL MEDICINE

## 2022-05-04 RX ORDER — MEPERIDINE HYDROCHLORIDE 25 MG/ML
25 INJECTION INTRAMUSCULAR; INTRAVENOUS; SUBCUTANEOUS EVERY 30 MIN PRN
Status: CANCELLED | OUTPATIENT
Start: 2022-05-25

## 2022-05-04 RX ORDER — LORAZEPAM 2 MG/ML
0.5 INJECTION INTRAMUSCULAR EVERY 4 HOURS PRN
Status: CANCELLED | OUTPATIENT
Start: 2022-05-04

## 2022-05-04 RX ORDER — METHYLPREDNISOLONE SODIUM SUCCINATE 125 MG/2ML
125 INJECTION, POWDER, LYOPHILIZED, FOR SOLUTION INTRAMUSCULAR; INTRAVENOUS
Status: CANCELLED
Start: 2022-05-04

## 2022-05-04 RX ORDER — MEPERIDINE HYDROCHLORIDE 25 MG/ML
25 INJECTION INTRAMUSCULAR; INTRAVENOUS; SUBCUTANEOUS EVERY 30 MIN PRN
Status: CANCELLED | OUTPATIENT
Start: 2022-05-04

## 2022-05-04 RX ORDER — ALBUTEROL SULFATE 90 UG/1
1-2 AEROSOL, METERED RESPIRATORY (INHALATION)
Status: CANCELLED
Start: 2022-05-04

## 2022-05-04 RX ORDER — LORAZEPAM 2 MG/ML
0.5 INJECTION INTRAMUSCULAR EVERY 4 HOURS PRN
Status: CANCELLED | OUTPATIENT
Start: 2022-05-25

## 2022-05-04 RX ORDER — ALBUTEROL SULFATE 0.83 MG/ML
2.5 SOLUTION RESPIRATORY (INHALATION)
Status: CANCELLED | OUTPATIENT
Start: 2022-05-04

## 2022-05-04 RX ORDER — METHYLPREDNISOLONE SODIUM SUCCINATE 125 MG/2ML
125 INJECTION, POWDER, LYOPHILIZED, FOR SOLUTION INTRAMUSCULAR; INTRAVENOUS
Status: CANCELLED
Start: 2022-05-25

## 2022-05-04 RX ORDER — EPINEPHRINE 1 MG/ML
0.3 INJECTION, SOLUTION INTRAMUSCULAR; SUBCUTANEOUS EVERY 5 MIN PRN
Status: CANCELLED | OUTPATIENT
Start: 2022-05-25

## 2022-05-04 RX ORDER — EPINEPHRINE 1 MG/ML
0.3 INJECTION, SOLUTION INTRAMUSCULAR; SUBCUTANEOUS EVERY 5 MIN PRN
Status: CANCELLED | OUTPATIENT
Start: 2022-05-04

## 2022-05-04 RX ORDER — NALOXONE HYDROCHLORIDE 0.4 MG/ML
0.2 INJECTION, SOLUTION INTRAMUSCULAR; INTRAVENOUS; SUBCUTANEOUS
Status: CANCELLED | OUTPATIENT
Start: 2022-05-25

## 2022-05-04 RX ORDER — ALBUTEROL SULFATE 0.83 MG/ML
2.5 SOLUTION RESPIRATORY (INHALATION)
Status: CANCELLED | OUTPATIENT
Start: 2022-05-25

## 2022-05-04 RX ORDER — ALBUTEROL SULFATE 90 UG/1
1-2 AEROSOL, METERED RESPIRATORY (INHALATION)
Status: CANCELLED
Start: 2022-05-25

## 2022-05-04 RX ORDER — DIPHENHYDRAMINE HYDROCHLORIDE 50 MG/ML
50 INJECTION INTRAMUSCULAR; INTRAVENOUS
Status: CANCELLED
Start: 2022-05-04

## 2022-05-04 RX ORDER — NALOXONE HYDROCHLORIDE 0.4 MG/ML
0.2 INJECTION, SOLUTION INTRAMUSCULAR; INTRAVENOUS; SUBCUTANEOUS
Status: CANCELLED | OUTPATIENT
Start: 2022-05-04

## 2022-05-04 RX ORDER — DIPHENHYDRAMINE HYDROCHLORIDE 50 MG/ML
50 INJECTION INTRAMUSCULAR; INTRAVENOUS
Status: CANCELLED
Start: 2022-05-25

## 2022-05-04 RX ADMIN — SODIUM CHLORIDE 200 MG: 9 INJECTION, SOLUTION INTRAVENOUS at 11:30

## 2022-05-04 RX ADMIN — SODIUM CHLORIDE 250 ML: 9 INJECTION, SOLUTION INTRAVENOUS at 11:30

## 2022-05-04 ASSESSMENT — PAIN SCALES - GENERAL: PAINLEVEL: NO PAIN (0)

## 2022-05-04 NOTE — NURSING NOTE
Chief Complaint   Patient presents with     Blood Draw     Labs drawn via PIV start by RN. VS taken.     Labs drawn with PIV start by rn.  Pt tolerated well.  VS taken and pt checked in for next appt.    Chon Gillespie RN

## 2022-05-04 NOTE — LETTER
5/4/2022         RE: Pierre Avila  [address] [facility] [address]        Dear Colleague,    Thank you for referring your patient, Pierre Avila, to the [facility]. Please see a copy of my visit note below.      FOLLOW UP VISIT     Reason for Visit:  Renal cell carcinoma (pT3a R0) s/p nephrectomy; cycle #5 of adjuvant pembrolizumab.     History of Present Illness:  Mr. Avila is a 46-year-old man who presented with hematuria in 10/2021.  CT imaging (10/21/2021) revealed a 5 cm mass at the upper pole of the left kidney invading the collecting system, without evidence of local or distant metastatic disease.  On 12/21/2021, he underwent a left radical nephrectomy which showed a 5.4 cm clear-cell renal cell carcinoma (no sarcomatoid or rhabdoid features), nucleolar grade 3, with tumor extending into the calyceal system, and negative surgical margins.  His final pathological stage was pT3a G3 R0.     The patient elected to receive adjuvant pembrolizumab treatment, which he started twelve weeks ago.  He returns today to begin cycle #5 of adjuvant pembrolizumab.  He is tolerating the treatment well.     Review of systems:  The patient reports feeling generally well, and he has recovered nicely from his radical nephrectomy surgery.  He does not report significant new signs or symptoms at this time.  There are no particular side effects from pembrolizumab yet.  His weight remains stable.  He does not have any constitutional symptoms.  He has minimal pain at this point.  A comprehensive 14-system review of symptoms is otherwise generally unremarkable.  His ECOG score is 0.  His pain score is 1/10.      Medications:   Pembrolizumab 200 mg IV q3wk     Allergies:    No known drug allergies.      Physical Examination:  Mr. Avila is a 46-year-old man who appears comfortable at rest.  His vital signs are unremarkable.  His HEENT examination is within normal limits.  The cardiac,  pulmonary, and abdominal examinations are unremarkable.  There is no palpable lymphadenopathy.  His surgical site from the recent robotic nephrectomy appears to be healing well, without evidence of erythema or exudate.  The neuromuscular examination is grossly intact.  The extremities do not demonstrate pitting edema.  The skin evaluation is unremarkable     Laboratories (5/4/22):  His CBC reveals a WBC count of 6.7, hemoglobin 13.6, hematocrit 40%, platelets 214K.  His comprehensive metabolic panel shows a creatinine level of 1.28 mg/dL (GFR of 70 mL/min), and an ALT level of 76 units/L, but is otherwise unremarkable.  His TSH level is normal at 1.81 mU/L.       ASSESSMENT AND PLAN:  Mr. Avila is a 46-year-old man with a recent diagnosis of clear-cell renal cell carcinoma (pT3a G3 R0) who underwent radical nephrectomy on 12/21/2021.  He returns today to receive cycle #5 of adjuvant pembrolizumab treatment.  His ECOG score is 0.  His pain score is 1/10.     We previously reviewed the results of the KeyNote-564 trial as well as the recent FDA approval of adjuvant pembrolizumab for kidney cancer at high risk of relapse.  The eligibility for the KeyNote-564 clinical trial involved either: pT2 disease with grade 4 differentiation or sarcomatoid features, all pT3/T4 disease, N1 disease, or resected M1 disease with current TONYA status.  The pembrolizumab was then delivered at a dose of 200 mg IV every 3 weeks, for a total of 17 cycles (1 year).  This patient clearly meets criteria for adjuvant pembrolizumab.  After reviewing the anticipated benefits and potential side effects of pembrolizumab therapy, the patient decided to embark on this adjuvant treatment.  He returns today to receive cycle #5 of pembrolizumab.      We have also reviewed the surveillance schedule for stage pT3 kidney cancers following resection.  This involves a CT scan of the chest, abdomen and pelvis every 6 months for the first 3 years following  nephrectomy.  He would then need a CT scan every 12 months during years 3-5 of follow up.  This follow-up scheduule would be the same regardless of whether he received adjuvant therapy or not.  His next CT scan will be done on 7/5/2022.  If he developed overt metastatic kidney cancer in the future, then the most likely scenario is that he would develop oligometastatic disease at that time.  If this was the case, then he could certainly be managed with metastasectomy followed by pseudo-adjuvant pembrolizumab at that time.  If he had a polymetastatic recurrence, then my preferred regimen would be pembrolizumab-axitinib or perhaps nivolumab-cabozantinib.     A total of 40 minutes were spent on this patient on the day of the consultation, of which more than 50% of this time was used for counseling and coordination of care. The patient was given the opportunity to ask multiple questions today, all of which were answered to his satisfaction.             Again, thank you for allowing me to participate in the care of your patient.      Sincerely,    Bart Barahona MD

## 2022-05-04 NOTE — NURSING NOTE
"Oncology Rooming Note    May 4, 2022 10:22 AM   Pierre Avila is a 46 year old male who presents for:    Chief Complaint   Patient presents with     Blood Draw     Labs drawn via PIV start by RN. VS taken.     Oncology Clinic Visit     Rtn for clear cell carcinoma     Initial Vitals: /83   Pulse 75   Temp 97.9  F (36.6  C) (Oral)   Resp 16   Wt 96.6 kg (213 lb)   SpO2 98%   BMI 29.30 kg/m   Estimated body mass index is 29.3 kg/m  as calculated from the following:    Height as of 3/2/22: 1.816 m (5' 11.5\").    Weight as of this encounter: 96.6 kg (213 lb). Body surface area is 2.21 meters squared.  No Pain (0) Comment: Data Unavailable   No LMP for male patient.  Allergies reviewed: Yes  Medications reviewed: Yes    Medications: Medication refills not needed today.  Pharmacy name entered into [id number]:    [facility] - [place] - [address] [place] [address] [facility] [address] - [place], [place] - [address] [facility] [place], [place] - [address] [facility] [phone]    Clinical concerns: none       Marleni Rossi, EMT            "

## 2022-05-04 NOTE — PROGRESS NOTES
Infusion Nursing Note:  Pierre PERLA MckeonAustin presents today for C5D1 Keytruda.    Patient seen by provider today: Yes: Dr. Barahona   present during visit today: Not Applicable.    Note: Pt saw provider prior to infusion, ok for treatment.      Intravenous Access:  Peripheral IV placed in lab.    Treatment Conditions:  Lab Results   Component Value Date     05/04/2022    POTASSIUM 4.2 05/04/2022    CR 1.28 (H) 05/04/2022    CHRISTIANE 8.8 05/04/2022    BILITOTAL 0.6 05/04/2022    ALBUMIN 4.1 05/04/2022    ALT 76 (H) 05/04/2022    AST 34 05/04/2022     Results reviewed, labs MET treatment parameters, ok to proceed with treatment.      Post Infusion Assessment:  Patient tolerated infusion without incident.  Blood return noted pre and post infusion.  Site patent and intact, free from redness, edema or discomfort.  No evidence of extravasations.  Access discontinued per protocol.       Discharge Plan:   Patient declined prescription refills.  Discharge instructions reviewed with: Patient.  Patient and/or family verbalized understanding of discharge instructions and all questions answered.  AVS to patient via [employer].  Patient will return 5/25 for next appointment.   Patient discharged in stable condition accompanied by: self.  Departure Mode: Ambulatory.    Mireille Alvarado RN

## 2022-05-04 NOTE — PROGRESS NOTES
FOLLOW UP VISIT     Reason for Visit:  Renal cell carcinoma (pT3a R0) s/p nephrectomy; cycle #5 of adjuvant pembrolizumab.     History of Present Illness:  Mr. Avila is a 46-year-old man who presented with hematuria in 10/2021.  CT imaging (10/21/2021) revealed a 5 cm mass at the upper pole of the left kidney invading the collecting system, without evidence of local or distant metastatic disease.  On 12/21/2021, he underwent a left radical nephrectomy which showed a 5.4 cm clear-cell renal cell carcinoma (no sarcomatoid or rhabdoid features), nucleolar grade 3, with tumor extending into the calyceal system, and negative surgical margins.  His final pathological stage was pT3a G3 R0.     The patient elected to receive adjuvant pembrolizumab treatment, which he started twelve weeks ago.  He returns today to begin cycle #5 of adjuvant pembrolizumab.  He is tolerating the treatment well.     Review of systems:  The patient reports feeling generally well, and he has recovered nicely from his radical nephrectomy surgery.  He does not report significant new signs or symptoms at this time.  There are no particular side effects from pembrolizumab yet.  His weight remains stable.  He does not have any constitutional symptoms.  He has minimal pain at this point.  A comprehensive 14-system review of symptoms is otherwise generally unremarkable.  His ECOG score is 0.  His pain score is 1/10.      Medications:   Pembrolizumab 200 mg IV q3wk     Allergies:    No known drug allergies.      Physical Examination:  Mr. Avila is a 46-year-old man who appears comfortable at rest.  His vital signs are unremarkable.  His HEENT examination is within normal limits.  The cardiac, pulmonary, and abdominal examinations are unremarkable.  There is no palpable lymphadenopathy.  His surgical site from the recent robotic nephrectomy appears to be healing well, without evidence of erythema or exudate.  The neuromuscular examination is  grossly intact.  The extremities do not demonstrate pitting edema.  The skin evaluation is unremarkable     Laboratories (5/4/22):  His CBC reveals a WBC count of 6.7, hemoglobin 13.6, hematocrit 40%, platelets 214K.  His comprehensive metabolic panel shows a creatinine level of 1.28 mg/dL (GFR of 70 mL/min), and an ALT level of 76 units/L, but is otherwise unremarkable.  His TSH level is normal at 1.81 mU/L.        ASSESSMENT AND PLAN:  Mr. Avila is a 46-year-old man with a recent diagnosis of clear-cell renal cell carcinoma (pT3a G3 R0) who underwent radical nephrectomy on 12/21/2021.  He returns today to receive cycle #5 of adjuvant pembrolizumab treatment.  His ECOG score is 0.  His pain score is 1/10.     We previously reviewed the results of the KeyNote-564 trial as well as the recent FDA approval of adjuvant pembrolizumab for kidney cancer at high risk of relapse.  The eligibility for the KeyNote-564 clinical trial involved either: pT2 disease with grade 4 differentiation or sarcomatoid features, all pT3/T4 disease, N1 disease, or resected M1 disease with current TONYA status.  The pembrolizumab was then delivered at a dose of 200 mg IV every 3 weeks, for a total of 17 cycles (1 year).  This patient clearly meets criteria for adjuvant pembrolizumab.  After reviewing the anticipated benefits and potential side effects of pembrolizumab therapy, the patient decided to embark on this adjuvant treatment.  He returns today to receive cycle #5 of pembrolizumab.      We have also reviewed the surveillance schedule for stage pT3 kidney cancers following resection.  This involves a CT scan of the chest, abdomen and pelvis every 6 months for the first 3 years following nephrectomy.  He would then need a CT scan every 12 months during years 3-5 of follow up.  This follow-up scheduule would be the same regardless of whether he received adjuvant therapy or not.  His next CT scan will be done on 7/5/2022.  If he  developed overt metastatic kidney cancer in the future, then the most likely scenario is that he would develop oligometastatic disease at that time.  If this was the case, then he could certainly be managed with metastasectomy followed by pseudo-adjuvant pembrolizumab at that time.  If he had a polymetastatic recurrence, then my preferred regimen would be pembrolizumab-axitinib or perhaps nivolumab-cabozantinib.     A total of 40 minutes were spent on this patient on the day of the consultation, of which more than 50% of this time was used for counseling and coordination of care. The patient was given the opportunity to ask multiple questions today, all of which were answered to his satisfaction.     Bart Barahona M.D.

## 2022-05-24 NOTE — PROGRESS NOTES
[facility]  Date of Visit: May 25, 2022   Oncologist: Dr. Bart Barahona     Reason for Visit:  Renal cell carcinoma (pT3a R0) s/p nephrectomy; cycle #6 of adjuvant pembrolizumab.     History of Present Illness:   Mr. Avila is a 46 year old man who presented with hematuria in 10/2021.  CT imaging (10/21/2021) revealed a 5 cm mass at the upper pole of the left kidney invading the collecting system, without evidence of local or distant metastatic disease.  On 12/21/2021, he underwent a left radical nephrectomy which showed a 5.4 cm clear-cell renal cell carcinoma (no sarcomatoid or rhabdoid features), nucleolar grade 3, with tumor extending into the calyceal system, and negative surgical margins.  His final pathological stage was pT3a G3 R0.     The patient elected to receive adjuvant pembrolizumab treatment, which he started on 2/9/22.  He returns today to begin cycle #6 of adjuvant pembrolizumab.    Interval History:   He returns today to begin cycle #6 of adjuvant pembrolizumab.  He is tolerating the treatment well. Reports occasional fatigue and headaches. Takes tylenol prn. Has foot issues bilaterally at baseline. Reports occasional neuropathy bilaterally but especially in his right toes. Not interfering with daily activities. He has seen ortho in the past for long standing plantar fascitis.     Reports new burning and itching behind both knees on and off after last infusion of keytruda. Pain lasts 10-20 minutes with 3-4 flares over the last three weeks. Reports redness at the site, without any other rashes. Has dry skin at baseline. Does not moisturize regularly. No vitiligo. No peripheral edema.     Working on eliminating sugars. Eats well otherwise. Reports good oral hydration. Weight stable.     No chest pain, shortness, or cough. No abdominal pain, diarrhea, or constipation. No anorexia. No vomiting or nausea. No urinary changes. No mucositis.     Review of  "systems:  The patient reports feeling generally well, and he has recovered nicely from his radical nephrectomy surgery.  He does not report significant new signs or symptoms at this time.  There are no particular side effects from pembrolizumab yet.  His weight remains stable.  He does not have any constitutional symptoms.  He has minimal pain at this point.  A comprehensive 14-system review of symptoms is otherwise generally unremarkable.  His ECOG score is 0.        Medications:   Pembrolizumab 200 mg IV q3wk     Allergies:    No known drug allergies.      Physical Examination:    /77   Pulse 58   Temp 98.3  F (36.8  C) (Oral)   Resp 16   Ht 1.816 m (5' 11.5\")   Wt 95.8 kg (211 lb 4.8 oz)   SpO2 100%   BMI 29.06 kg/m    General: No acute distress  HEENT: Sclera anicteric. Oral mucosa pink and moist.  No mucositis or thrush  Lymph: No visible lymphadenopathy in neck  Heart: Regular, rate, and rhythm  Lungs: Clear to ascultation bilaterally  Abdomen: Positive bowel sounds. Soft, non-distended, non-tender. No organomegaly or mass.   Extremities: no lower extremity edema  Neuro: Cranial nerves grossly intact  Rash: none     Laboratories (5/4/22):    I personally reviewed the following labs.      Last Comprehensive Metabolic Panel:  Sodium   Date Value Ref Range Status   05/25/2022 141 133 - 144 mmol/L Preliminary     Potassium   Date Value Ref Range Status   05/25/2022 4.1 3.4 - 5.3 mmol/L Preliminary     Chloride   Date Value Ref Range Status   05/25/2022 108 94 - 109 mmol/L Preliminary     Carbon Dioxide (CO2)   Date Value Ref Range Status   05/04/2022 26 20 - 32 mmol/L Final     Anion Gap   Date Value Ref Range Status   05/04/2022 6 3 - 14 mmol/L Final     Glucose   Date Value Ref Range Status   05/04/2022 94 70 - 99 mg/dL Final     Urea Nitrogen   Date Value Ref Range Status   05/04/2022 23 7 - 30 mg/dL Final     Creatinine   Date Value Ref Range Status   05/04/2022 1.28 (H) 0.66 - 1.25 mg/dL Final "     GFR Estimate   Date Value Ref Range Status   05/04/2022 70 >60 mL/min/1.73m2 Final     Comment:     Effective December 21, 2021 eGFRcr in adults is calculated using the 2021 CKD-EPI creatinine equation which includes age and gender (Nba et al., NEJ, DOI: 10.1056/XKBIas1491591)     Calcium   Date Value Ref Range Status   05/04/2022 8.8 8.5 - 10.1 mg/dL Final       TSH   Date Value Ref Range Status   05/25/2022 2.23 0.40 - 4.00 mU/L Final       Imaging: No recent imaging.     ASSESSMENT AND PLAN:  #Clear-cell renal cell carcinoma  Mr. Avila is a 46 year old man with a recent diagnosis of clear-cell renal cell carcinoma (pT3a G3 R0) who underwent radical nephrectomy on 12/21/2021. He returns today to receive cycle #6 of adjuvant pembrolizumab treatment.  Tolerating pembrolizumab with minimal toxicity. His ECOG score is 0. See Dr. Barahona note from 5/04 for details regarding KeyNote-564 trial as well as the recent FDA approval of adjuvant pembrolizumab for kidney cancer at high risk of relapse. Long term plan is 17 cycles (1 year) of pembrolizumab every 3 weeks.   - Labs and clinically appropriate to proceed with C6 pembrolizumab today. TSH WNL. Mildly elevated ALT 5/4/22, improved and WNL today.   - Surveillance plan for CT of the chest, abdomen and pelvis every 6 months for the first 3 years following nephrectomy. Then, 12 months during years 3-5. Next scan scheduled for 7/5/2022.    - RTC on 6/15 and 7/6 with Dr. Barahona as scheduled.     #Pruritis behind bilateral knees  - May be related to Keytruda. Mild in nature. Not bothersome today. Okay to continue to monitor for now. Will reassess with next visit.     Zainab Garcia PA-C    The patient was seen in conjunction with Zainab Garcia PA-C who served as a scribe for today's visit. I have reviewed and edited the note and agree with the above findings and plan.    35 minutes spent on the date of the encounter doing chart review, review of test  results, interpretation of tests, patient visit and documentation     Radha Mendiola PA-C  [facility] [address] [place] [place] [address] [phone]

## 2022-05-25 ENCOUNTER — ONCOLOGY VISIT (OUTPATIENT)
Dept: ONCOLOGY | Facility: CLINIC | Age: 47
End: 2022-05-25
Attending: INTERNAL MEDICINE
Payer: COMMERCIAL

## 2022-05-25 ENCOUNTER — APPOINTMENT (OUTPATIENT)
Dept: LAB | Facility: CLINIC | Age: 47
End: 2022-05-25
Attending: INTERNAL MEDICINE
Payer: COMMERCIAL

## 2022-05-25 VITALS
TEMPERATURE: 98.3 F | OXYGEN SATURATION: 100 % | BODY MASS INDEX: 29.58 KG/M2 | RESPIRATION RATE: 16 BRPM | DIASTOLIC BLOOD PRESSURE: 77 MMHG | HEIGHT: 71 IN | SYSTOLIC BLOOD PRESSURE: 129 MMHG | WEIGHT: 211.3 LBS | HEART RATE: 58 BPM

## 2022-05-25 DIAGNOSIS — C64.2 RENAL CELL CARCINOMA, LEFT (H): Primary | ICD-10-CM

## 2022-05-25 DIAGNOSIS — C64.9 RENAL CELL CARCINOMA, UNSPECIFIED LATERALITY (H): Primary | ICD-10-CM

## 2022-05-25 LAB
ALBUMIN SERPL-MCNC: 4.1 G/DL (ref 3.4–5)
ALP SERPL-CCNC: 60 U/L (ref 40–150)
ALT SERPL W P-5'-P-CCNC: 38 U/L (ref 0–70)
ANION GAP SERPL CALCULATED.3IONS-SCNC: 6 MMOL/L (ref 3–14)
AST SERPL W P-5'-P-CCNC: 27 U/L (ref 0–45)
BILIRUB SERPL-MCNC: 0.6 MG/DL (ref 0.2–1.3)
BUN SERPL-MCNC: 22 MG/DL (ref 7–30)
CALCIUM SERPL-MCNC: 9.3 MG/DL (ref 8.5–10.1)
CHLORIDE BLD-SCNC: 108 MMOL/L (ref 94–109)
CO2 SERPL-SCNC: 27 MMOL/L (ref 20–32)
CREAT SERPL-MCNC: 1.26 MG/DL (ref 0.66–1.25)
GFR SERPL CREATININE-BSD FRML MDRD: 71 ML/MIN/1.73M2
GLUCOSE BLD-MCNC: 88 MG/DL (ref 70–99)
POTASSIUM BLD-SCNC: 4.1 MMOL/L (ref 3.4–5.3)
PROT SERPL-MCNC: 7.4 G/DL (ref 6.8–8.8)
SODIUM SERPL-SCNC: 141 MMOL/L (ref 133–144)
TSH SERPL DL<=0.005 MIU/L-ACNC: 2.23 MU/L (ref 0.4–4)

## 2022-05-25 PROCEDURE — 36415 COLL VENOUS BLD VENIPUNCTURE: CPT | Performed by: INTERNAL MEDICINE

## 2022-05-25 PROCEDURE — 99214 OFFICE O/P EST MOD 30 MIN: CPT | Performed by: PHYSICIAN ASSISTANT

## 2022-05-25 PROCEDURE — 96413 CHEMO IV INFUSION 1 HR: CPT

## 2022-05-25 PROCEDURE — 258N000003 HC RX IP 258 OP 636: Performed by: INTERNAL MEDICINE

## 2022-05-25 PROCEDURE — G0463 HOSPITAL OUTPT CLINIC VISIT: HCPCS

## 2022-05-25 PROCEDURE — 84443 ASSAY THYROID STIM HORMONE: CPT | Performed by: INTERNAL MEDICINE

## 2022-05-25 PROCEDURE — 80053 COMPREHEN METABOLIC PANEL: CPT | Performed by: INTERNAL MEDICINE

## 2022-05-25 PROCEDURE — 250N000011 HC RX IP 250 OP 636: Performed by: INTERNAL MEDICINE

## 2022-05-25 RX ADMIN — SODIUM CHLORIDE 200 MG: 9 INJECTION, SOLUTION INTRAVENOUS at 13:08

## 2022-05-25 RX ADMIN — SODIUM CHLORIDE 250 ML: 9 INJECTION, SOLUTION INTRAVENOUS at 13:08

## 2022-05-25 ASSESSMENT — PAIN SCALES - GENERAL: PAINLEVEL: NO PAIN (0)

## 2022-05-25 NOTE — PATIENT INSTRUCTIONS
[facility] Triage and after hours / weekends / holidays:  [phone]    Please call the triage or after hours line if you experience a temperature greater than or equal to 100.4, shaking chills, have uncontrolled nausea, vomiting and/or diarrhea, dizziness, shortness of breath, chest pain, bleeding, unexplained bruising, or if you have any other new/concerning symptoms, questions or concerns.      If you are having any concerning symptoms or wish to speak to a provider before your next infusion visit, please call your care coordinator or triage to notify them so we can adequately serve you.     If you need a refill on a narcotic prescription or other medication, please call before your infusion appointment.                May 2022      Bharathi Monday Tuesday Wednesday Thursday Friday Saturday   1     2     3     4    LAB PERIPHERAL   9:45 AM   (15 min.)   [facility] DRAW   [facility]    RETURN  10:00 AM   (30 min.)   Bart Barahona MD   [facility]    ONC INFUSION 1 HR (60 MIN)  11:00 AM   (60 min.)    ONC INFUSION NURSE   [facility] 5     6     7       8     9     10     11     12     13     14       15     16     17     18     19     20     21       22     23     24     25    LAB PERIPHERAL  11:00 AM   (15 min.)   [facility] DRAW   [facility]    RETURN  11:15 AM   (45 min.)   Radha Mendiola PA-C   [facility]    ONC INFUSION 1 HR (60 MIN)  12:00 PM   (60 min.)    ONC INFUSION NURSE   [facility] 26     27     28       29     30     31 June 2022 Sunday Monday Tuesday Wednesday Thursday Friday Saturday                  1     2     3     4       5  Happy Birthday!     6     7     8     9     10     11       12     13     14     15    LAB PERIPHERAL   7:15 AM   (15 min.)   [facility]  DRAW   [facility]    RETURN   7:30 AM   (30 min.)   Bart Barahona MD   [facility]    ONC INFUSION 1 HR (60 MIN)   9:00 AM   (60 min.)    ONC INFUSION NURSE   [facility] 16     17     18       19     20     21     22     23     24     25       26     27     28     29     30                                  Recent Results (from the past 24 hour(s))   Comprehensive metabolic panel    Collection Time: 05/25/22 11:20 AM   Result Value Ref Range    Sodium 141 133 - 144 mmol/L    Potassium 4.1 3.4 - 5.3 mmol/L    Chloride 108 94 - 109 mmol/L    Carbon Dioxide (CO2) 27 20 - 32 mmol/L    Anion Gap 6 3 - 14 mmol/L    Urea Nitrogen 22 7 - 30 mg/dL    Creatinine 1.26 (H) 0.66 - 1.25 mg/dL    Calcium 9.3 8.5 - 10.1 mg/dL    Glucose 88 70 - 99 mg/dL    Alkaline Phosphatase 60 40 - 150 U/L    AST 27 0 - 45 U/L    ALT 38 0 - 70 U/L    Protein Total 7.4 6.8 - 8.8 g/dL    Albumin 4.1 3.4 - 5.0 g/dL    Bilirubin Total 0.6 0.2 - 1.3 mg/dL    GFR Estimate 71 >60 mL/min/1.73m2   TSH with free T4 reflex    Collection Time: 05/25/22 11:20 AM   Result Value Ref Range    TSH 2.23 0.40 - 4.00 mU/L

## 2022-05-25 NOTE — LETTER
5/25/2022         RE: Pierre Avila  [address] [facility] [address]      [facility]  Date of Visit: May 25, 2022   Oncologist: Dr. Bart Barahona     Reason for Visit:  Renal cell carcinoma (pT3a R0) s/p nephrectomy; cycle #6 of adjuvant pembrolizumab.     History of Present Illness:   Mr. Avila is a 46 year old man who presented with hematuria in 10/2021.  CT imaging (10/21/2021) revealed a 5 cm mass at the upper pole of the left kidney invading the collecting system, without evidence of local or distant metastatic disease.  On 12/21/2021, he underwent a left radical nephrectomy which showed a 5.4 cm clear-cell renal cell carcinoma (no sarcomatoid or rhabdoid features), nucleolar grade 3, with tumor extending into the calyceal system, and negative surgical margins.  His final pathological stage was pT3a G3 R0.     The patient elected to receive adjuvant pembrolizumab treatment, which he started on 2/9/22.  He returns today to begin cycle #6 of adjuvant pembrolizumab.    Interval History:   He returns today to begin cycle #6 of adjuvant pembrolizumab.  He is tolerating the treatment well. Reports occasional fatigue and headaches. Takes tylenol prn. Has foot issues bilaterally at baseline. Reports occasional neuropathy bilaterally but especially in his right toes. Not interfering with daily activities. He has seen ortho in the past for long standing plantar fascitis.     Reports new burning and itching behind both knees on and off after last infusion of keytruda. Pain lasts 10-20 minutes with 3-4 flares over the last three weeks. Reports redness at the site, without any other rashes. Has dry skin at baseline. Does not moisturize regularly. No vitiligo. No peripheral edema.     Working on eliminating sugars. Eats well otherwise. Reports good oral hydration. Weight stable.     No chest pain, shortness, or cough. No abdominal pain, diarrhea, or constipation. No  "anorexia. No vomiting or nausea. No urinary changes. No mucositis.     Review of systems:  The patient reports feeling generally well, and he has recovered nicely from his radical nephrectomy surgery.  He does not report significant new signs or symptoms at this time.  There are no particular side effects from pembrolizumab yet.  His weight remains stable.  He does not have any constitutional symptoms.  He has minimal pain at this point.  A comprehensive 14-system review of symptoms is otherwise generally unremarkable.  His ECOG score is 0.        Medications:   Pembrolizumab 200 mg IV q3wk     Allergies:    No known drug allergies.      Physical Examination:    /77   Pulse 58   Temp 98.3  F (36.8  C) (Oral)   Resp 16   Ht 1.816 m (5' 11.5\")   Wt 95.8 kg (211 lb 4.8 oz)   SpO2 100%   BMI 29.06 kg/m    General: No acute distress  HEENT: Sclera anicteric. Oral mucosa pink and moist.  No mucositis or thrush  Lymph: No visible lymphadenopathy in neck  Heart: Regular, rate, and rhythm  Lungs: Clear to ascultation bilaterally  Abdomen: Positive bowel sounds. Soft, non-distended, non-tender. No organomegaly or mass.   Extremities: no lower extremity edema  Neuro: Cranial nerves grossly intact  Rash: none     Laboratories (5/4/22):    I personally reviewed the following labs.      Last Comprehensive Metabolic Panel:  Sodium   Date Value Ref Range Status   05/25/2022 141 133 - 144 mmol/L Preliminary     Potassium   Date Value Ref Range Status   05/25/2022 4.1 3.4 - 5.3 mmol/L Preliminary     Chloride   Date Value Ref Range Status   05/25/2022 108 94 - 109 mmol/L Preliminary     Carbon Dioxide (CO2)   Date Value Ref Range Status   05/04/2022 26 20 - 32 mmol/L Final     Anion Gap   Date Value Ref Range Status   05/04/2022 6 3 - 14 mmol/L Final     Glucose   Date Value Ref Range Status   05/04/2022 94 70 - 99 mg/dL Final     Urea Nitrogen   Date Value Ref Range Status   05/04/2022 23 7 - 30 mg/dL Final "     Creatinine   Date Value Ref Range Status   05/04/2022 1.28 (H) 0.66 - 1.25 mg/dL Final     GFR Estimate   Date Value Ref Range Status   05/04/2022 70 >60 mL/min/1.73m2 Final     Comment:     Effective December 21, 2021 eGFRcr in adults is calculated using the 2021 CKD-EPI creatinine equation which includes age and gender (Nba et al., [facility], DOI: 10.1056/EBGNrx2230360)     Calcium   Date Value Ref Range Status   05/04/2022 8.8 8.5 - 10.1 mg/dL Final       TSH   Date Value Ref Range Status   05/25/2022 2.23 0.40 - 4.00 mU/L Final       Imaging: No recent imaging.     ASSESSMENT AND PLAN:  #Clear-cell renal cell carcinoma  Mr. Avila is a 46 year old man with a recent diagnosis of clear-cell renal cell carcinoma (pT3a G3 R0) who underwent radical nephrectomy on 12/21/2021. He returns today to receive cycle #6 of adjuvant pembrolizumab treatment.  Tolerating pembrolizumab with minimal toxicity. His ECOG score is 0. See Dr. Barahona note from 5/04 for details regarding KeyNote-564 trial as well as the recent FDA approval of adjuvant pembrolizumab for kidney cancer at high risk of relapse. Long term plan is 17 cycles (1 year) of pembrolizumab every 3 weeks.   - Labs and clinically appropriate to proceed with C6 pembrolizumab today. TSH WNL. Mildly elevated ALT 5/4/22, improved and WNL today.   - Surveillance plan for CT of the chest, abdomen and pelvis every 6 months for the first 3 years following nephrectomy. Then, 12 months during years 3-5. Next scan scheduled for 7/5/2022.    - RTC on 6/15 and 7/6 with Dr. Barahona as scheduled.     #Pruritis behind bilateral knees  - May be related to Keytruda. Mild in nature. Not bothersome today. Okay to continue to monitor for now. Will reassess with next visit.     Zainab Garcia PA-C    The patient was seen in conjunction with Zainab Garcia PA-C who served as a scribe for today's visit. I have reviewed and edited the note and agree with the above findings and  plan.    35 minutes spent on the date of the encounter doing chart review, review of test results, interpretation of tests, patient visit and documentation       Radha Mendiola PA-C  [facility] [address] [place] [place] [address] [phone]

## 2022-05-25 NOTE — NURSING NOTE
"Oncology Rooming Note    May 25, 2022 11:28 AM   Pierre Avila is a 46 year old male who presents for:    Chief Complaint   Patient presents with     Blood Draw     Vitals, blood drawn and PIV placed by LPN. Pt checked into appt.     Oncology Clinic Visit     [facility] RETURN - CLEAR CELL CARCINOMA      Initial Vitals: /77   Pulse 58   Temp 98.3  F (36.8  C) (Oral)   Resp 16   Ht 1.816 m (5' 11.5\")   Wt 95.8 kg (211 lb 4.8 oz)   SpO2 100%   BMI 29.06 kg/m   Estimated body mass index is 29.06 kg/m  as calculated from the following:    Height as of this encounter: 1.816 m (5' 11.5\").    Weight as of this encounter: 95.8 kg (211 lb 4.8 oz). Body surface area is 2.2 meters squared.  No Pain (0) Comment: Data Unavailable   No LMP for male patient.  Allergies reviewed: Yes  Medications reviewed: Yes    Medications: Medication refills not needed today.  Pharmacy name entered into ADOR:    [facility] [place] - [address] [place] [address] [facility] [address] [place] [address] [phone] [facility] [place], [place] - [address] [facility] [phone]    Clinical concerns: No new concerns. Mendiola was notified.      Flo Marin LPN            "

## 2022-05-25 NOTE — PROGRESS NOTES
Infusion Nursing Note:  Pierre Avila presents today for Cycle 6 Day 1 Keytruda.    Patient seen by provider today: Yes: Radha Mendiola PA-C    Note: Patient presents to the infusion center today after his provider appt.    SREE Garcia/Dipika Mott RN 5/25/22 1225  -ok to proceed with tx today     Intravenous Access:  Peripheral IV placed.    Treatment Conditions:   Latest Reference Range & Units 05/25/22 11:20   Sodium 133 - 144 mmol/L 141   Potassium 3.4 - 5.3 mmol/L 4.1   Chloride 94 - 109 mmol/L 108   Carbon Dioxide 20 - 32 mmol/L 27   Urea Nitrogen 7 - 30 mg/dL 22   Creatinine 0.66 - 1.25 mg/dL 1.26 (H)   GFR Estimate >60 mL/min/1.73m2 71 [1]   Calcium 8.5 - 10.1 mg/dL 9.3   Anion Gap 3 - 14 mmol/L 6   Albumin 3.4 - 5.0 g/dL 4.1   Protein Total 6.8 - 8.8 g/dL 7.4   Bilirubin Total 0.2 - 1.3 mg/dL 0.6   Alkaline Phosphatase 40 - 150 U/L 60   ALT 0 - 70 U/L 38   AST 0 - 45 U/L 27   TSH 0.40 - 4.00 mU/L 2.23   Glucose 70 - 99 mg/dL 88     Results reviewed, labs MET treatment parameters, ok to proceed with treatment.    Post Infusion Assessment:  Patient tolerated infusion without incident.  Blood return noted pre and post infusion.  No evidence of extravasations.  Access discontinued per protocol.     Discharge Plan:   Patient declined prescription refills.  Discharge instructions reviewed with: Patient.  Patient and/or family verbalized understanding of discharge instructions and all questions answered.  AVS to patient via eDabba.  Patient will return 6/15 for next appointment.   Patient discharged in stable condition accompanied by: self.  Departure Mode: Ambulatory.      Dipika Mott RN

## 2022-06-15 ENCOUNTER — INFUSION THERAPY VISIT (OUTPATIENT)
Dept: ONCOLOGY | Facility: CLINIC | Age: 47
End: 2022-06-15
Attending: INTERNAL MEDICINE
Payer: COMMERCIAL

## 2022-06-15 ENCOUNTER — ONCOLOGY VISIT (OUTPATIENT)
Dept: ONCOLOGY | Facility: CLINIC | Age: 47
End: 2022-06-15
Attending: PHYSICIAN ASSISTANT
Payer: COMMERCIAL

## 2022-06-15 ENCOUNTER — APPOINTMENT (OUTPATIENT)
Dept: LAB | Facility: CLINIC | Age: 47
End: 2022-06-15
Attending: INTERNAL MEDICINE
Payer: COMMERCIAL

## 2022-06-15 VITALS
SYSTOLIC BLOOD PRESSURE: 129 MMHG | BODY MASS INDEX: 29.02 KG/M2 | WEIGHT: 211 LBS | DIASTOLIC BLOOD PRESSURE: 85 MMHG | OXYGEN SATURATION: 99 % | TEMPERATURE: 98 F | HEART RATE: 68 BPM | RESPIRATION RATE: 16 BRPM

## 2022-06-15 DIAGNOSIS — C64.2 RENAL CELL CARCINOMA, LEFT (H): Primary | ICD-10-CM

## 2022-06-15 DIAGNOSIS — C64.9 RENAL CELL CARCINOMA, UNSPECIFIED LATERALITY (H): Primary | ICD-10-CM

## 2022-06-15 DIAGNOSIS — C64.9 RENAL CELL CARCINOMA, UNSPECIFIED LATERALITY (H): ICD-10-CM

## 2022-06-15 LAB
ALBUMIN SERPL-MCNC: 4.2 G/DL (ref 3.4–5)
ALP SERPL-CCNC: 60 U/L (ref 40–150)
ALT SERPL W P-5'-P-CCNC: 42 U/L (ref 0–70)
ANION GAP SERPL CALCULATED.3IONS-SCNC: 8 MMOL/L (ref 3–14)
AST SERPL W P-5'-P-CCNC: 21 U/L (ref 0–45)
BILIRUB SERPL-MCNC: 0.4 MG/DL (ref 0.2–1.3)
BUN SERPL-MCNC: 22 MG/DL (ref 7–30)
CALCIUM SERPL-MCNC: 9.3 MG/DL (ref 8.5–10.1)
CHLORIDE BLD-SCNC: 109 MMOL/L (ref 94–109)
CO2 SERPL-SCNC: 24 MMOL/L (ref 20–32)
CREAT SERPL-MCNC: 1.43 MG/DL (ref 0.66–1.25)
GFR SERPL CREATININE-BSD FRML MDRD: 61 ML/MIN/1.73M2
GLUCOSE BLD-MCNC: 92 MG/DL (ref 70–99)
POTASSIUM BLD-SCNC: 4.3 MMOL/L (ref 3.4–5.3)
PROT SERPL-MCNC: 7.9 G/DL (ref 6.8–8.8)
SODIUM SERPL-SCNC: 141 MMOL/L (ref 133–144)
TSH SERPL DL<=0.005 MIU/L-ACNC: 3.25 MU/L (ref 0.4–4)

## 2022-06-15 PROCEDURE — 258N000003 HC RX IP 258 OP 636: Performed by: INTERNAL MEDICINE

## 2022-06-15 PROCEDURE — 80053 COMPREHEN METABOLIC PANEL: CPT | Performed by: INTERNAL MEDICINE

## 2022-06-15 PROCEDURE — 250N000011 HC RX IP 250 OP 636: Performed by: INTERNAL MEDICINE

## 2022-06-15 PROCEDURE — 99215 OFFICE O/P EST HI 40 MIN: CPT | Performed by: INTERNAL MEDICINE

## 2022-06-15 PROCEDURE — 84443 ASSAY THYROID STIM HORMONE: CPT | Performed by: INTERNAL MEDICINE

## 2022-06-15 PROCEDURE — G0463 HOSPITAL OUTPT CLINIC VISIT: HCPCS

## 2022-06-15 PROCEDURE — 36415 COLL VENOUS BLD VENIPUNCTURE: CPT | Performed by: INTERNAL MEDICINE

## 2022-06-15 PROCEDURE — 96413 CHEMO IV INFUSION 1 HR: CPT

## 2022-06-15 RX ORDER — DIPHENHYDRAMINE HYDROCHLORIDE 50 MG/ML
50 INJECTION INTRAMUSCULAR; INTRAVENOUS
Status: CANCELLED
Start: 2022-06-15

## 2022-06-15 RX ORDER — LORAZEPAM 2 MG/ML
0.5 INJECTION INTRAMUSCULAR EVERY 4 HOURS PRN
Status: CANCELLED | OUTPATIENT
Start: 2022-06-15

## 2022-06-15 RX ORDER — ALBUTEROL SULFATE 90 UG/1
1-2 AEROSOL, METERED RESPIRATORY (INHALATION)
Status: CANCELLED
Start: 2022-06-15

## 2022-06-15 RX ORDER — ALBUTEROL SULFATE 90 UG/1
1-2 AEROSOL, METERED RESPIRATORY (INHALATION)
Status: CANCELLED
Start: 2022-07-06

## 2022-06-15 RX ORDER — ALBUTEROL SULFATE 0.83 MG/ML
2.5 SOLUTION RESPIRATORY (INHALATION)
Status: CANCELLED | OUTPATIENT
Start: 2022-07-06

## 2022-06-15 RX ORDER — MEPERIDINE HYDROCHLORIDE 25 MG/ML
25 INJECTION INTRAMUSCULAR; INTRAVENOUS; SUBCUTANEOUS EVERY 30 MIN PRN
Status: CANCELLED | OUTPATIENT
Start: 2022-06-15

## 2022-06-15 RX ORDER — LORAZEPAM 2 MG/ML
0.5 INJECTION INTRAMUSCULAR EVERY 4 HOURS PRN
Status: CANCELLED | OUTPATIENT
Start: 2022-07-06

## 2022-06-15 RX ORDER — NALOXONE HYDROCHLORIDE 0.4 MG/ML
0.2 INJECTION, SOLUTION INTRAMUSCULAR; INTRAVENOUS; SUBCUTANEOUS
Status: CANCELLED | OUTPATIENT
Start: 2022-07-06

## 2022-06-15 RX ORDER — EPINEPHRINE 1 MG/ML
0.3 INJECTION, SOLUTION INTRAMUSCULAR; SUBCUTANEOUS EVERY 5 MIN PRN
Status: CANCELLED | OUTPATIENT
Start: 2022-07-06

## 2022-06-15 RX ORDER — ALBUTEROL SULFATE 0.83 MG/ML
2.5 SOLUTION RESPIRATORY (INHALATION)
Status: CANCELLED | OUTPATIENT
Start: 2022-06-15

## 2022-06-15 RX ORDER — METHYLPREDNISOLONE SODIUM SUCCINATE 125 MG/2ML
125 INJECTION, POWDER, LYOPHILIZED, FOR SOLUTION INTRAMUSCULAR; INTRAVENOUS
Status: CANCELLED
Start: 2022-06-15

## 2022-06-15 RX ORDER — NALOXONE HYDROCHLORIDE 0.4 MG/ML
0.2 INJECTION, SOLUTION INTRAMUSCULAR; INTRAVENOUS; SUBCUTANEOUS
Status: CANCELLED | OUTPATIENT
Start: 2022-06-15

## 2022-06-15 RX ORDER — DIPHENHYDRAMINE HYDROCHLORIDE 50 MG/ML
50 INJECTION INTRAMUSCULAR; INTRAVENOUS
Status: CANCELLED
Start: 2022-07-06

## 2022-06-15 RX ORDER — EPINEPHRINE 1 MG/ML
0.3 INJECTION, SOLUTION INTRAMUSCULAR; SUBCUTANEOUS EVERY 5 MIN PRN
Status: CANCELLED | OUTPATIENT
Start: 2022-06-15

## 2022-06-15 RX ORDER — METHYLPREDNISOLONE SODIUM SUCCINATE 125 MG/2ML
125 INJECTION, POWDER, LYOPHILIZED, FOR SOLUTION INTRAMUSCULAR; INTRAVENOUS
Status: CANCELLED
Start: 2022-07-06

## 2022-06-15 RX ORDER — MEPERIDINE HYDROCHLORIDE 25 MG/ML
25 INJECTION INTRAMUSCULAR; INTRAVENOUS; SUBCUTANEOUS EVERY 30 MIN PRN
Status: CANCELLED | OUTPATIENT
Start: 2022-07-06

## 2022-06-15 RX ADMIN — SODIUM CHLORIDE 200 MG: 9 INJECTION, SOLUTION INTRAVENOUS at 08:43

## 2022-06-15 RX ADMIN — SODIUM CHLORIDE 250 ML: 9 INJECTION, SOLUTION INTRAVENOUS at 08:36

## 2022-06-15 ASSESSMENT — PAIN SCALES - GENERAL: PAINLEVEL: NO PAIN (0)

## 2022-06-15 NOTE — LETTER
6/15/2022         RE: Pierre Avila  [address] [facility] [address]        Dear Colleague,    Thank you for referring your patient, Pierre Avila, to the [facility]. Please see a copy of my visit note below.      FOLLOW UP VISIT     Reason for Visit:  Renal cell carcinoma (pT3a R0) s/p nephrectomy; cycle #7 of adjuvant pembrolizumab.     History of Present Illness:  Mr. Avila is a 47-year-old man who presented with hematuria in 10/2021.  CT scan (10/21/2021) revealed a 5 cm mass at the upper pole of the left kidney invading the collecting system, without evidence of local or distant metastatic disease.  On 12/21/2021, he underwent a left radical nephrectomy which showed a 5.4 cm clear-cell renal cell carcinoma (no sarcomatoid or rhabdoid features), nucleolar grade 3, with tumor extending into the calyceal system, and negative surgical margins.  His final pathological stage was pT3a G3 R0 kidney cancer.     The patient elected to receive adjuvant pembrolizumab treatment, which he started eighteen weeks ago.  He returns today to begin cycle #7 of adjuvant pembrolizumab.  He is tolerating the treatment relatively well.     Review of systems:  The patient reports feeling generally well, and he has recovered nicely from his radical nephrectomy surgery.  He reports occasional fatigue and headaches. Takes tylenol prn.  Has foot issues bilaterally at baseline. Reports occasional neuropathy bilaterally but especially in his right toes. Not interfering with daily activities. He has seen orthopedics in the past for long standing plantar fascitis.  Reports some burning and itching behind both knees on and off after last infusion. Pain lasts 10-20 minutes with 3-4 flares over the last three weeks. Reports redness at the site, without any other rashes. Has dry skin at baseline. Does not moisturize regularly. No vitiligo. No peripheral edema. Working on eliminating sugars. Eats well  otherwise. Reports good oral hydration. Weight stable. No chest pain, shortness, or cough.  No abdominal pain, diarrhea, or constipation.  No anorexia.  No vomiting or nausea.  No urinary changes.  No mucositis.   A comprehensive 14-system review of symptoms is otherwise generally unremarkable.  His ECOG score is 0.  His pain score is 1/10.      Medications:   Pembrolizumab 200 mg IV q3wk     Allergies:    No known drug allergies.      Physical Examination:  Mr. Avila is a 47-year-old man who appears comfortable at rest.  His vital signs are unremarkable.  His HEENT examination is within normal limits.  The cardiac, pulmonary, and abdominal examinations are unremarkable.  There is no palpable lymphadenopathy.  His surgical site from the recent robotic nephrectomy appears to be healing well, without evidence of erythema or exudate.  The neuromuscular examination is grossly intact.  The extremities do not demonstrate pitting edema.  The skin evaluation is unremarkable     Laboratories (6/15/22):  His CBC is within normal limits.  His comprehensive metabolic panel shows a creatinine level of 1.43 mg/dL (GFR of 61 mL/min), and an ALT level of 42 units/L, but is otherwise unremarkable.  His TSH level is normal at 3.25 mU/L.        ASSESSMENT AND PLAN:  Mr. Avila is a 47-year-old man with a recent diagnosis of clear-cell renal cell carcinoma (pT3a G3 R0) who underwent radical nephrectomy on 12/21/2021.  He returns today to receive cycle #7 of adjuvant pembrolizumab treatment.  His ECOG score is 0.  His pain score is 1/10.     We previously reviewed the results of the KeyNote-564 trial as well as the recent FDA approval of adjuvant pembrolizumab for kidney cancer at high risk of relapse.  The eligibility for the KeyNote-564 clinical trial involved either: pT2 disease with grade 4 differentiation or sarcomatoid features, all pT3/T4 disease, N1 disease, or resected M1 disease with current TONYA status.  The  pembrolizumab was then delivered at a dose of 200 mg IV every 3 weeks, for a total of 17 cycles (1 year).  This patient clearly meets criteria for adjuvant pembrolizumab.  After reviewing the anticipated benefits and potential side effects of pembrolizumab therapy, the patient decided to embark on this adjuvant treatment.  He returns today to receive cycle #7 of pembrolizumab.  He is tolerating this well, except for some pruritus behind both knees which is manageable.      We have also reviewed the surveillance schedule for stage pT3 kidney cancers following resection.  This involves a CT scan of the chest, abdomen and pelvis every 6 months for the first 3 years following nephrectomy.  He would then need a CT scan every 12 months during years 3-5 of follow up.  This follow-up scheduule would be the same regardless of whether he received adjuvant therapy or not.  His next CT scan will be done on 7/5/2022.  If he developed overt metastatic kidney cancer in the future, then the most likely scenario is that he would develop oligometastatic disease at that time.  If this was the case, then he could certainly be managed with metastasectomy followed by pseudo-adjuvant pembrolizumab at that time.  If he had a polymetastatic recurrence, then my preferred regimen would be pembrolizumab-axitinib or perhaps nivolumab-cabozantinib.     A total of 40 minutes were spent on this patient on the day of the consultation, of which more than 50% of this time was used for counseling and coordination of care. The patient was given the opportunity to ask multiple questions today, all of which were answered to his satisfaction.           Again, thank you for allowing me to participate in the care of your patient.      Sincerely,    Bart Barahona MD

## 2022-06-15 NOTE — NURSING NOTE
Chief Complaint   Patient presents with     Blood Draw     Labs drawn with PIV start by vascular access. Vitals taken.     Labs drawn with PIV start by vascular access. Pt tolerated well. Vitals taken. /92, provider messaged. Pt checked into next appointment.    Joy Devlin RN

## 2022-06-15 NOTE — NURSING NOTE
"Oncology Rooming Note    Amy 15, 2022 7:35 AM   Pierre Avila is a 47 year old male who presents for:    Chief Complaint   Patient presents with     Blood Draw     Labs drawn with PIV start by vascular access. Vitals taken.     Oncology Clinic Visit     Clear Cell Carcinoma     Initial Vitals: BP (!) 149/92 (BP Location: Right arm, Patient Position: Sitting, Cuff Size: Adult Regular)   Pulse 68   Temp 98  F (36.7  C) (Oral)   Resp 16   Wt 95.7 kg (211 lb)   SpO2 99%   BMI 29.02 kg/m   Estimated body mass index is 29.02 kg/m  as calculated from the following:    Height as of 5/25/22: 1.816 m (5' 11.5\").    Weight as of this encounter: 95.7 kg (211 lb). Body surface area is 2.2 meters squared.  No Pain (0) Comment: Data Unavailable   No LMP for male patient.  Allergies reviewed: Yes  Medications reviewed: Yes    Medications: Medication refills not needed today.  Pharmacy name entered into [employer]:    [facility] - [place] - [address] [place] [address] [facility] [address] [place] [address] - [address] [facility] [place], [place] - [address] [facility] [phone]    Clinical concerns: Patient states there are no new concerns to discuss with provider.  Dr Barahona was not notified.         Deja Elise CMA              "

## 2022-06-15 NOTE — PROGRESS NOTES
FOLLOW UP VISIT     Reason for Visit:  Renal cell carcinoma (pT3a R0) s/p nephrectomy; cycle #7 of adjuvant pembrolizumab.     History of Present Illness:  Mr. Avila is a 47-year-old man who presented with hematuria in 10/2021.  CT scan (10/21/2021) revealed a 5 cm mass at the upper pole of the left kidney invading the collecting system, without evidence of local or distant metastatic disease.  On 12/21/2021, he underwent a left radical nephrectomy which showed a 5.4 cm clear-cell renal cell carcinoma (no sarcomatoid or rhabdoid features), nucleolar grade 3, with tumor extending into the calyceal system, and negative surgical margins.  His final pathological stage was pT3a G3 R0 kidney cancer.     The patient elected to receive adjuvant pembrolizumab treatment, which he started eighteen weeks ago.  He returns today to begin cycle #7 of adjuvant pembrolizumab.  He is tolerating the treatment relatively well.     Review of systems:  The patient reports feeling generally well, and he has recovered nicely from his radical nephrectomy surgery.  He reports occasional fatigue and headaches. Takes tylenol prn.  Has foot issues bilaterally at baseline. Reports occasional neuropathy bilaterally but especially in his right toes. Not interfering with daily activities. He has seen orthopedics in the past for long standing plantar fascitis.  Reports some burning and itching behind both knees on and off after last infusion. Pain lasts 10-20 minutes with 3-4 flares over the last three weeks. Reports redness at the site, without any other rashes. Has dry skin at baseline. Does not moisturize regularly. No vitiligo. No peripheral edema. Working on eliminating sugars. Eats well otherwise. Reports good oral hydration. Weight stable. No chest pain, shortness, or cough.  No abdominal pain, diarrhea, or constipation.  No anorexia.  No vomiting or nausea.  No urinary changes.  No mucositis.   A comprehensive 14-system review of  symptoms is otherwise generally unremarkable.  His ECOG score is 0.  His pain score is 1/10.      Medications:   Pembrolizumab 200 mg IV q3wk     Allergies:    No known drug allergies.      Physical Examination:  Mr. Avila is a 47-year-old man who appears comfortable at rest.  His vital signs are unremarkable.  His HEENT examination is within normal limits.  The cardiac, pulmonary, and abdominal examinations are unremarkable.  There is no palpable lymphadenopathy.  His surgical site from the recent robotic nephrectomy appears to be healing well, without evidence of erythema or exudate.  The neuromuscular examination is grossly intact.  The extremities do not demonstrate pitting edema.  The skin evaluation is unremarkable     Laboratories (6/15/22):  His CBC is within normal limits.  His comprehensive metabolic panel shows a creatinine level of 1.43 mg/dL (GFR of 61 mL/min), and an ALT level of 42 units/L, but is otherwise unremarkable.  His TSH level is normal at 3.25 mU/L.        ASSESSMENT AND PLAN:  Mr. Avila is a 47-year-old man with a recent diagnosis of clear-cell renal cell carcinoma (pT3a G3 R0) who underwent radical nephrectomy on 12/21/2021.  He returns today to receive cycle #7 of adjuvant pembrolizumab treatment.  His ECOG score is 0.  His pain score is 1/10.     We previously reviewed the results of the KeyNote-564 trial as well as the recent FDA approval of adjuvant pembrolizumab for kidney cancer at high risk of relapse.  The eligibility for the KeyNote-564 clinical trial involved either: pT2 disease with grade 4 differentiation or sarcomatoid features, all pT3/T4 disease, N1 disease, or resected M1 disease with current TONYA status.  The pembrolizumab was then delivered at a dose of 200 mg IV every 3 weeks, for a total of 17 cycles (1 year).  This patient clearly meets criteria for adjuvant pembrolizumab.  After reviewing the anticipated benefits and potential side effects of pembrolizumab  therapy, the patient decided to embark on this adjuvant treatment.  He returns today to receive cycle #7 of pembrolizumab.  He is tolerating this well, except for some pruritus behind both knees which is manageable.      We have also reviewed the surveillance schedule for stage pT3 kidney cancers following resection.  This involves a CT scan of the chest, abdomen and pelvis every 6 months for the first 3 years following nephrectomy.  He would then need a CT scan every 12 months during years 3-5 of follow up.  This follow-up scheduule would be the same regardless of whether he received adjuvant therapy or not.  His next CT scan will be done on 7/5/2022.  If he developed overt metastatic kidney cancer in the future, then the most likely scenario is that he would develop oligometastatic disease at that time.  If this was the case, then he could certainly be managed with metastasectomy followed by pseudo-adjuvant pembrolizumab at that time.  If he had a polymetastatic recurrence, then my preferred regimen would be pembrolizumab-axitinib or perhaps nivolumab-cabozantinib.     A total of 40 minutes were spent on this patient on the day of the consultation, of which more than 50% of this time was used for counseling and coordination of care. The patient was given the opportunity to ask multiple questions today, all of which were answered to his satisfaction.     Bart Barahona M.D.

## 2022-06-15 NOTE — PROGRESS NOTES
Infusion Nursing Note:  Pierre Avila presents today for Cycle 7 Day 1 Keytruda.    Patient seen by provider today: Yes: Dr. Evans   present during visit today: Not Applicable.    Note: N/A.    Intravenous Access:  Peripheral IV placed.    Treatment Conditions:  Lab Results   Component Value Date    HGB 13.6 04/13/2022    WBC 6.7 04/13/2022    ANEUTAUTO 4.1 04/13/2022     04/13/2022      Lab Results   Component Value Date     06/15/2022    POTASSIUM 4.3 06/15/2022    CR 1.43 (H) 06/15/2022    CHRISTIANE 9.3 06/15/2022    BILITOTAL 0.4 06/15/2022    ALBUMIN 4.2 06/15/2022    ALT 42 06/15/2022    AST 21 06/15/2022     Results reviewed, labs MET treatment parameters, ok to proceed with treatment.    Post Infusion Assessment:  Patient tolerated infusion without incident.  Blood return noted pre and post infusion.  Site patent and intact, free from redness, edema or discomfort.  No evidence of extravasations.  Access discontinued per protocol.     Discharge Plan:   Patient declined prescription refills.  Discharge instructions reviewed with: Patient.  Patient and/or family verbalized understanding of discharge instructions and all questions answered.  AVS to patient via [facility].  Patient will return 7/6 for next appointment.   Patient discharged in stable condition accompanied by: self.  Departure Mode: Ambulatory.      Faby Garrett RN

## 2022-07-05 ENCOUNTER — ANCILLARY PROCEDURE (OUTPATIENT)
Dept: CT IMAGING | Facility: CLINIC | Age: 47
End: 2022-07-05
Attending: INTERNAL MEDICINE
Payer: COMMERCIAL

## 2022-07-05 DIAGNOSIS — C80.1 CLEAR CELL CARCINOMA (H): ICD-10-CM

## 2022-07-05 PROCEDURE — 74177 CT ABD & PELVIS W/CONTRAST: CPT | Mod: TC | Performed by: RADIOLOGY

## 2022-07-05 PROCEDURE — 71260 CT THORAX DX C+: CPT | Mod: TC | Performed by: RADIOLOGY

## 2022-07-05 RX ORDER — IOPAMIDOL 755 MG/ML
119 INJECTION, SOLUTION INTRAVASCULAR ONCE
Status: COMPLETED | OUTPATIENT
Start: 2022-07-05 | End: 2022-07-05

## 2022-07-05 RX ADMIN — IOPAMIDOL 119 ML: 755 INJECTION, SOLUTION INTRAVASCULAR at 10:51

## 2022-07-06 ENCOUNTER — ONCOLOGY VISIT (OUTPATIENT)
Dept: ONCOLOGY | Facility: CLINIC | Age: 47
End: 2022-07-06
Attending: INTERNAL MEDICINE
Payer: COMMERCIAL

## 2022-07-06 ENCOUNTER — APPOINTMENT (OUTPATIENT)
Dept: LAB | Facility: CLINIC | Age: 47
End: 2022-07-06
Attending: INTERNAL MEDICINE
Payer: COMMERCIAL

## 2022-07-06 VITALS
OXYGEN SATURATION: 100 % | HEART RATE: 55 BPM | TEMPERATURE: 98.2 F | SYSTOLIC BLOOD PRESSURE: 138 MMHG | RESPIRATION RATE: 16 BRPM | WEIGHT: 212.5 LBS | DIASTOLIC BLOOD PRESSURE: 82 MMHG | BODY MASS INDEX: 29.23 KG/M2

## 2022-07-06 DIAGNOSIS — C64.9 RENAL CELL CARCINOMA, UNSPECIFIED LATERALITY (H): Primary | ICD-10-CM

## 2022-07-06 DIAGNOSIS — C64.2 RENAL CELL CARCINOMA, LEFT (H): ICD-10-CM

## 2022-07-06 LAB
ALBUMIN SERPL-MCNC: 4.3 G/DL (ref 3.4–5)
ALP SERPL-CCNC: 50 U/L (ref 40–150)
ALT SERPL W P-5'-P-CCNC: 36 U/L (ref 0–70)
ANION GAP SERPL CALCULATED.3IONS-SCNC: 6 MMOL/L (ref 3–14)
AST SERPL W P-5'-P-CCNC: 23 U/L (ref 0–45)
BASOPHILS # BLD AUTO: 0 10E3/UL (ref 0–0.2)
BASOPHILS NFR BLD AUTO: 1 %
BILIRUB SERPL-MCNC: 0.5 MG/DL (ref 0.2–1.3)
BUN SERPL-MCNC: 17 MG/DL (ref 7–30)
CALCIUM SERPL-MCNC: 9.1 MG/DL (ref 8.5–10.1)
CHLORIDE BLD-SCNC: 108 MMOL/L (ref 94–109)
CO2 SERPL-SCNC: 26 MMOL/L (ref 20–32)
CREAT SERPL-MCNC: 1.35 MG/DL (ref 0.66–1.25)
EOSINOPHIL # BLD AUTO: 0.3 10E3/UL (ref 0–0.7)
EOSINOPHIL NFR BLD AUTO: 5 %
ERYTHROCYTE [DISTWIDTH] IN BLOOD BY AUTOMATED COUNT: 12.6 % (ref 10–15)
GFR SERPL CREATININE-BSD FRML MDRD: 65 ML/MIN/1.73M2
GLUCOSE BLD-MCNC: 89 MG/DL (ref 70–99)
HCT VFR BLD AUTO: 40.8 % (ref 40–53)
HGB BLD-MCNC: 14.1 G/DL (ref 13.3–17.7)
IMM GRANULOCYTES # BLD: 0 10E3/UL
IMM GRANULOCYTES NFR BLD: 1 %
LYMPHOCYTES # BLD AUTO: 1.7 10E3/UL (ref 0.8–5.3)
LYMPHOCYTES NFR BLD AUTO: 26 %
MCH RBC QN AUTO: 29.7 PG (ref 26.5–33)
MCHC RBC AUTO-ENTMCNC: 34.6 G/DL (ref 31.5–36.5)
MCV RBC AUTO: 86 FL (ref 78–100)
MONOCYTES # BLD AUTO: 0.6 10E3/UL (ref 0–1.3)
MONOCYTES NFR BLD AUTO: 8 %
NEUTROPHILS # BLD AUTO: 4 10E3/UL (ref 1.6–8.3)
NEUTROPHILS NFR BLD AUTO: 59 %
NRBC # BLD AUTO: 0 10E3/UL
NRBC BLD AUTO-RTO: 0 /100
PLATELET # BLD AUTO: 217 10E3/UL (ref 150–450)
POTASSIUM BLD-SCNC: 4.1 MMOL/L (ref 3.4–5.3)
PROT SERPL-MCNC: 7.7 G/DL (ref 6.8–8.8)
RBC # BLD AUTO: 4.75 10E6/UL (ref 4.4–5.9)
SODIUM SERPL-SCNC: 140 MMOL/L (ref 133–144)
TSH SERPL DL<=0.005 MIU/L-ACNC: 3.28 MU/L (ref 0.4–4)
WBC # BLD AUTO: 6.6 10E3/UL (ref 4–11)

## 2022-07-06 PROCEDURE — 250N000011 HC RX IP 250 OP 636: Performed by: INTERNAL MEDICINE

## 2022-07-06 PROCEDURE — 36415 COLL VENOUS BLD VENIPUNCTURE: CPT | Performed by: INTERNAL MEDICINE

## 2022-07-06 PROCEDURE — 85025 COMPLETE CBC W/AUTO DIFF WBC: CPT | Performed by: INTERNAL MEDICINE

## 2022-07-06 PROCEDURE — 80053 COMPREHEN METABOLIC PANEL: CPT | Performed by: INTERNAL MEDICINE

## 2022-07-06 PROCEDURE — G0463 HOSPITAL OUTPT CLINIC VISIT: HCPCS

## 2022-07-06 PROCEDURE — 99215 OFFICE O/P EST HI 40 MIN: CPT | Performed by: INTERNAL MEDICINE

## 2022-07-06 PROCEDURE — 258N000003 HC RX IP 258 OP 636: Performed by: INTERNAL MEDICINE

## 2022-07-06 PROCEDURE — 84443 ASSAY THYROID STIM HORMONE: CPT | Performed by: INTERNAL MEDICINE

## 2022-07-06 PROCEDURE — 96413 CHEMO IV INFUSION 1 HR: CPT

## 2022-07-06 RX ORDER — ALBUTEROL SULFATE 90 UG/1
1-2 AEROSOL, METERED RESPIRATORY (INHALATION)
Status: CANCELLED
Start: 2022-07-27

## 2022-07-06 RX ORDER — DIPHENHYDRAMINE HYDROCHLORIDE 50 MG/ML
50 INJECTION INTRAMUSCULAR; INTRAVENOUS
Status: CANCELLED
Start: 2022-07-27

## 2022-07-06 RX ORDER — EPINEPHRINE 1 MG/ML
0.3 INJECTION, SOLUTION INTRAMUSCULAR; SUBCUTANEOUS EVERY 5 MIN PRN
Status: CANCELLED | OUTPATIENT
Start: 2022-07-27

## 2022-07-06 RX ORDER — MEPERIDINE HYDROCHLORIDE 25 MG/ML
25 INJECTION INTRAMUSCULAR; INTRAVENOUS; SUBCUTANEOUS EVERY 30 MIN PRN
Status: CANCELLED | OUTPATIENT
Start: 2022-07-27

## 2022-07-06 RX ORDER — ALBUTEROL SULFATE 0.83 MG/ML
2.5 SOLUTION RESPIRATORY (INHALATION)
Status: CANCELLED | OUTPATIENT
Start: 2022-07-27

## 2022-07-06 RX ORDER — METHYLPREDNISOLONE SODIUM SUCCINATE 125 MG/2ML
125 INJECTION, POWDER, LYOPHILIZED, FOR SOLUTION INTRAMUSCULAR; INTRAVENOUS
Status: CANCELLED
Start: 2022-07-27

## 2022-07-06 RX ORDER — LORAZEPAM 2 MG/ML
0.5 INJECTION INTRAMUSCULAR EVERY 4 HOURS PRN
Status: CANCELLED | OUTPATIENT
Start: 2022-07-27

## 2022-07-06 RX ORDER — NALOXONE HYDROCHLORIDE 0.4 MG/ML
0.2 INJECTION, SOLUTION INTRAMUSCULAR; INTRAVENOUS; SUBCUTANEOUS
Status: CANCELLED | OUTPATIENT
Start: 2022-07-27

## 2022-07-06 RX ADMIN — SODIUM CHLORIDE 200 MG: 9 INJECTION, SOLUTION INTRAVENOUS at 10:36

## 2022-07-06 RX ADMIN — SODIUM CHLORIDE 250 ML: 9 INJECTION, SOLUTION INTRAVENOUS at 10:36

## 2022-07-06 ASSESSMENT — PAIN SCALES - GENERAL: PAINLEVEL: NO PAIN (0)

## 2022-07-06 NOTE — PATIENT INSTRUCTIONS
[facility] Triage and after hours / weekends / holidays:  [phone]    Please call the triage or after hours line if you experience a temperature greater than or equal to 100.4, shaking chills, have uncontrolled nausea, vomiting and/or diarrhea, dizziness, shortness of breath, chest pain, bleeding, unexplained bruising, or if you have any other new/concerning symptoms, questions or concerns.      If you are having any concerning symptoms or wish to speak to a provider before your next infusion visit, please call your care coordinator or triage to notify them so we can adequately serve you.     If you need a refill on a narcotic prescription or other medication, please call before your infusion appointment.               July 2022 Sunday Monday Tuesday Wednesday Thursday Friday Saturday                            1     2       3     4     5    CT CHEST/ABDOMEN/PELVIS W  10:30 AM   (20 min.)   BECT1   [facility] 6    LAB PERIPHERAL   8:45 AM   (15 min.)   [facility] LAB DRAW   [facility]    RETURN   9:00 AM   (30 min.)   Bart Barahona MD   [facility]    ONC INFUSION 1 HR (60 MIN)  10:00 AM   (60 min.)    ONC INFUSION NURSE   [facility] 7     8     9       10     11     12     13     14     15     16       17     18     19     20     21     22     23       24     25     26     27    LAB PERIPHERAL   8:45 AM   (15 min.)   [facility] LAB DRAW   [facility]    RETURN   9:00 AM   (30 min.)   Bart Barahona MD   [facility]    ONC INFUSION 1 HR (60 MIN)  10:00 AM   (60 min.)    ONC INFUSION NURSE   [facility] 28 29     30       31                                                 August 2022 Sunday Monday Tuesday Wednesday Thursday Friday Saturday        1     2     3     4     5     6       7      8     9     10     11     12     13       14     15     16     17    LAB PERIPHERAL  10:45 AM   (15 min.)   [facility] LAB DRAW   [facility]    RETURN  11:00 AM   (45 min.)   Tessy Alexander CNP   [facility]    ONC INFUSION 1 HR (60 MIN)  12:00 PM   (60 min.)    ONC INFUSION NURSE   [facility] 18     19     20       21     22     23     24     25     26     27       28     29     30     31                                      Lab Results:  Recent Results (from the past 12 hour(s))   Comprehensive metabolic panel    Collection Time: 07/06/22  9:01 AM   Result Value Ref Range    Sodium 140 133 - 144 mmol/L    Potassium 4.1 3.4 - 5.3 mmol/L    Chloride 108 94 - 109 mmol/L    Carbon Dioxide (CO2) 26 20 - 32 mmol/L    Anion Gap 6 3 - 14 mmol/L    Urea Nitrogen 17 7 - 30 mg/dL    Creatinine 1.35 (H) 0.66 - 1.25 mg/dL    Calcium 9.1 8.5 - 10.1 mg/dL    Glucose 89 70 - 99 mg/dL    Alkaline Phosphatase 50 40 - 150 U/L    AST 23 0 - 45 U/L    ALT 36 0 - 70 U/L    Protein Total 7.7 6.8 - 8.8 g/dL    Albumin 4.3 3.4 - 5.0 g/dL    Bilirubin Total 0.5 0.2 - 1.3 mg/dL    GFR Estimate 65 >60 mL/min/1.73m2   TSH with free T4 reflex    Collection Time: 07/06/22  9:01 AM   Result Value Ref Range    TSH 3.28 0.40 - 4.00 mU/L   CBC with platelets and differential    Collection Time: 07/06/22  9:01 AM   Result Value Ref Range    WBC Count 6.6 4.0 - 11.0 10e3/uL    RBC Count 4.75 4.40 - 5.90 10e6/uL    Hemoglobin 14.1 13.3 - 17.7 g/dL    Hematocrit 40.8 40.0 - 53.0 %    MCV 86 78 - 100 fL    MCH 29.7 26.5 - 33.0 pg    MCHC 34.6 31.5 - 36.5 g/dL    RDW 12.6 10.0 - 15.0 %    Platelet Count 217 150 - 450 10e3/uL    % Neutrophils 59 %    % Lymphocytes 26 %    % Monocytes 8 %    % Eosinophils 5 %    % Basophils 1 %    % Immature Granulocytes 1 %    NRBCs per 100 WBC 0 <1 /100    Absolute Neutrophils 4.0 1.6 - 8.3 10e3/uL    Absolute Lymphocytes 1.7 0.8 -  5.3 10e3/uL    Absolute Monocytes 0.6 0.0 - 1.3 10e3/uL    Absolute Eosinophils 0.3 0.0 - 0.7 10e3/uL    Absolute Basophils 0.0 0.0 - 0.2 10e3/uL    Absolute Immature Granulocytes 0.0 <=0.4 10e3/uL    Absolute NRBCs 0.0 10e3/uL

## 2022-07-06 NOTE — PROGRESS NOTES
FOLLOW UP VISIT     Reason for Visit:  Renal cell carcinoma (pT3a R0) s/p nephrectomy; cycle #8 of adjuvant pembrolizumab.     History of Present Illness:  Mr. Avila is a 47-year-old man who presented with hematuria in 10/2021.  CT imaging (10/21/2021) revealed a large mass at the upper pole of the left kidney, without evidence of local or distant metastatic disease.  On 12/21/2021, he had a left radical nephrectomy which showed a 5.4 cm clear-cell renal cell carcinoma (no sarcomatoid or rhabdoid features), nucleolar grade 3, with tumor extending into the calyceal system.  His final pathological stage was pT3a G3 R0 kidney cancer.     The patient elected to receive adjuvant pembrolizumab treatment, which he started in 2/2022.  He returns today to begin cycle #8 of adjuvant pembrolizumab.  He is tolerating the treatment relatively well.     Review of systems:  The patient reports feeling generally well, and he has recovered nicely from his radical nephrectomy surgery.  He reports occasional fatigue and headaches. Reports occasional neuropathy bilaterally but especially in his right toes. Not interfering with daily activities. He has seen orthopedics in the past for long standing plantar fascitis.  Reports some burning and itching behind both knees on and off after last infusion. Pain lasts 10-20 minutes with 3-4 flares over the last three weeks. Reports redness at the site, without any other rashes. Has dry skin at baseline. Does not moisturize regularly. No vitiligo. No peripheral edema. Working on eliminating sugars. Eats well otherwise. Reports good oral hydration. Weight stable. No chest pain, shortness, or cough.  No abdominal pain, diarrhea, or constipation.  No anorexia.  No vomiting or nausea.  No urinary changes.  No mucositis.   A comprehensive 14-system review of symptoms is otherwise generally unremarkable.  His ECOG score is 0.  His pain score is 1/10.      Medications:   Pembrolizumab 200 mg IV  q3wk     Allergies:    No known drug allergies.      Physical Examination:  Mr. Avila is a 47-year-old man who appears comfortable at rest.  His vital signs are unremarkable.  His HEENT examination is within normal limits.  The cardiac, pulmonary, and abdominal examinations are unremarkable.  There is no palpable lymphadenopathy.  His surgical site from the recent robotic nephrectomy appears to be healing well, without evidence of erythema or exudate.  The neuromuscular examination is grossly intact.  The extremities do not demonstrate pitting edema.  The skin evaluation is unremarkable     Laboratories (7/6/22):  His CBC is within normal limits.  His comprehensive metabolic panel shows a creatinine level of 1.35 mg/dL (GFR of 65 mL/min), but is otherwise unremarkable.  His TSH level is normal at 3.28 mU/L.    Imaging (7/5/2022):  He underwent a CT scan of the chest, abdomen, and pelvis with IV contrast yesterday.  This shows no evidence of recurrent or metastatic renal cell carcinoma.  There is evidence of a prior left nephrectomy.  There is slight thickening of the left adrenal gland without discrete nodularity.  Overall, this scan is consistent with TONYA.        ASSESSMENT AND PLAN:  Mr. Avila is a 47-year-old man with a diagnosis of clear-cell renal cell carcinoma (pT3a G3 R0) who underwent radical nephrectomy on 12/21/2021.  He began adjuvant therapy in 2/2022, and returns today to receive cycle #8 of adjuvant pembrolizumab treatment.  His ECOG score is 0.  His pain score is 1/10.     We previously reviewed the results of the KeyNote-564 trial as well as the recent FDA approval of adjuvant pembrolizumab for kidney cancer at high risk of relapse.  The eligibility for the KeyNote-564 clinical trial involved either: pT2 disease with grade 4 differentiation or sarcomatoid features, all pT3/T4 disease, N1 disease, or resected M1 disease with current TONYA status.  The pembrolizumab was then delivered at a dose  of 200 mg IV every 3 weeks, for a total of 17 cycles (one year).  This patient clearly meets criteria for adjuvant pembrolizumab.  After reviewing the anticipated benefits and potential side effects of pembrolizumab therapy, the patient decided to embark on this adjuvant treatment.  He returns today to receive cycle #8 of pembrolizumab.  He is tolerating this well, except for some pruritus behind both knees which is manageable.      We have also reviewed the surveillance schedule for stage pT3 kidney cancers following resection.  This involves a CT scan of the chest, abdomen and pelvis every 6 months for the first 3 years following nephrectomy.  He would then need a CT scan every 12 months during years 3-5 of follow up.  His next CT scan will be done on 10/17/2022.  If he developed overt metastatic kidney cancer in the future, then the most likely scenario is that he would have oligometastatic disease at that time.  If this was the case, then he could certainly be managed with metastasectomy followed by pseudo-adjuvant pembrolizumab at that time.  If he had a polymetastatic recurrence, then my preferred regimen would be pembrolizumab-axitinib or nivolumab-cabozantinib.     A total of 40 minutes were spent on this patient on the day of the consultation, of which more than 50% of this time was used for counseling and coordination of care. The patient was given the opportunity to ask multiple questions today, all of which were answered to his satisfaction.     Bart Barahona M.D.

## 2022-07-06 NOTE — NURSING NOTE
"Oncology Rooming Note    July 6, 2022 9:10 AM   Pierre Avila is a 47 year old male who presents for:    Chief Complaint   Patient presents with     Blood Draw     Labs drawn via PIV start by RN. VS taken.     Oncology Clinic Visit     Pt is here for Renal Cell Carcinoma     Initial Vitals: Blood Pressure 138/82   Pulse 55   Temperature 98.2  F (36.8  C) (Oral)   Respiration 16   Weight 96.4 kg (212 lb 8 oz)   Oxygen Saturation 100%   Body Mass Index 29.23 kg/m   Estimated body mass index is 29.23 kg/m  as calculated from the following:    Height as of 5/25/22: 1.816 m (5' 11.5\").    Weight as of this encounter: 96.4 kg (212 lb 8 oz). Body surface area is 2.21 meters squared.  No Pain (0) Comment: Data Unavailable   No LMP for male patient.  Allergies reviewed: Yes  Medications reviewed: Yes    Medications: Medication refills not needed today.  Pharmacy name entered into [employer]:    [facility] - [place] - [address] [place] [place] [address] [facility] - [place], [place] - [address] [facility] [place], [place] - [address] [facility] [phone]    Clinical concerns: none       Sherin Thapa MA            "

## 2022-07-06 NOTE — PROGRESS NOTES
Infusion Nursing Note:  Pierre PERLA MckeonAustin presents today for Cycle 8, Day 1 Keytruda.    Patient seen by provider today: Yes: Dr. Garay   present during visit today: Not Applicable.    Note: N/A.    Intravenous Access:  Peripheral IV placed.    Treatment Conditions:  Lab Results   Component Value Date    HGB 14.1 07/06/2022    WBC 6.6 07/06/2022    ANEUTAUTO 4.0 07/06/2022     07/06/2022      Lab Results   Component Value Date     07/06/2022    POTASSIUM 4.1 07/06/2022    CR 1.35 (H) 07/06/2022    CHRISTIANE 9.1 07/06/2022    BILITOTAL 0.5 07/06/2022    ALBUMIN 4.3 07/06/2022    ALT 36 07/06/2022    AST 23 07/06/2022     Results reviewed, labs MET treatment parameters, ok to proceed with treatment.    Post Infusion Assessment:  Patient tolerated infusion without incident.  Blood return noted pre and post infusion.  Site patent and intact, free from redness, edema or discomfort.  No evidence of extravasations.  Access discontinued per protocol.     Discharge Plan:   Patient declined prescription refills.  Copy of AVS reviewed with patient and/or family.  Patient will return 7/27/22 for next appointment.  Patient discharged in stable condition accompanied by: self.  Departure Mode: Ambulatory.      Sakina Frias RN

## 2022-07-06 NOTE — LETTER
7/6/2022         RE: Pierre Avila  [address] [facility] [address]        Dear Colleague,    Thank you for referring your patient, Pierre Avila, to the [facility]. Please see a copy of my visit note below.      FOLLOW UP VISIT     Reason for Visit:  Renal cell carcinoma (pT3a R0) s/p nephrectomy; cycle #8 of adjuvant pembrolizumab.     History of Present Illness:  Mr. Avila is a 47-year-old man who presented with hematuria in 10/2021.  CT imaging (10/21/2021) revealed a large mass at the upper pole of the left kidney, without evidence of local or distant metastatic disease.  On 12/21/2021, he had a left radical nephrectomy which showed a 5.4 cm clear-cell renal cell carcinoma (no sarcomatoid or rhabdoid features), nucleolar grade 3, with tumor extending into the calyceal system.  His final pathological stage was pT3a G3 R0 kidney cancer.     The patient elected to receive adjuvant pembrolizumab treatment, which he started in 2/2022.  He returns today to begin cycle #8 of adjuvant pembrolizumab.  He is tolerating the treatment relatively well.     Review of systems:  The patient reports feeling generally well, and he has recovered nicely from his radical nephrectomy surgery.  He reports occasional fatigue and headaches. Reports occasional neuropathy bilaterally but especially in his right toes. Not interfering with daily activities. He has seen orthopedics in the past for long standing plantar fascitis.  Reports some burning and itching behind both knees on and off after last infusion. Pain lasts 10-20 minutes with 3-4 flares over the last three weeks. Reports redness at the site, without any other rashes. Has dry skin at baseline. Does not moisturize regularly. No vitiligo. No peripheral edema. Working on eliminating sugars. Eats well otherwise. Reports good oral hydration. Weight stable. No chest pain, shortness, or cough.  No abdominal pain, diarrhea, or  constipation.  No anorexia.  No vomiting or nausea.  No urinary changes.  No mucositis.   A comprehensive 14-system review of symptoms is otherwise generally unremarkable.  His ECOG score is 0.  His pain score is 1/10.      Medications:   Pembrolizumab 200 mg IV q3wk     Allergies:    No known drug allergies.      Physical Examination:  Mr. Avila is a 47-year-old man who appears comfortable at rest.  His vital signs are unremarkable.  His HEENT examination is within normal limits.  The cardiac, pulmonary, and abdominal examinations are unremarkable.  There is no palpable lymphadenopathy.  His surgical site from the recent robotic nephrectomy appears to be healing well, without evidence of erythema or exudate.  The neuromuscular examination is grossly intact.  The extremities do not demonstrate pitting edema.  The skin evaluation is unremarkable     Laboratories (7/6/22):  His CBC is within normal limits.  His comprehensive metabolic panel shows a creatinine level of 1.35 mg/dL (GFR of 65 mL/min), but is otherwise unremarkable.  His TSH level is normal at 3.28 mU/L.    Imaging (7/5/2022):  He underwent a CT scan of the chest, abdomen, and pelvis with IV contrast yesterday.  This shows no evidence of recurrent or metastatic renal cell carcinoma.  There is evidence of a prior left nephrectomy.  There is slight thickening of the left adrenal gland without discrete nodularity.  Overall, this scan is consistent with TONYA.        ASSESSMENT AND PLAN:  Mr. Avila is a 47-year-old man with a diagnosis of clear-cell renal cell carcinoma (pT3a G3 R0) who underwent radical nephrectomy on 12/21/2021.  He began adjuvant therapy in 2/2022, and returns today to receive cycle #8 of adjuvant pembrolizumab treatment.  His ECOG score is 0.  His pain score is 1/10.     We previously reviewed the results of the KeyNote-564 trial as well as the recent FDA approval of adjuvant pembrolizumab for kidney cancer at high risk of relapse.   The eligibility for the KeyNote-564 clinical trial involved either: pT2 disease with grade 4 differentiation or sarcomatoid features, all pT3/T4 disease, N1 disease, or resected M1 disease with current TONYA status.  The pembrolizumab was then delivered at a dose of 200 mg IV every 3 weeks, for a total of 17 cycles (one year).  This patient clearly meets criteria for adjuvant pembrolizumab.  After reviewing the anticipated benefits and potential side effects of pembrolizumab therapy, the patient decided to embark on this adjuvant treatment.  He returns today to receive cycle #8 of pembrolizumab.  He is tolerating this well, except for some pruritus behind both knees which is manageable.      We have also reviewed the surveillance schedule for stage pT3 kidney cancers following resection.  This involves a CT scan of the chest, abdomen and pelvis every 6 months for the first 3 years following nephrectomy.  He would then need a CT scan every 12 months during years 3-5 of follow up.  His next CT scan will be done on 10/17/2022.  If he developed overt metastatic kidney cancer in the future, then the most likely scenario is that he would have oligometastatic disease at that time.  If this was the case, then he could certainly be managed with metastasectomy followed by pseudo-adjuvant pembrolizumab at that time.  If he had a polymetastatic recurrence, then my preferred regimen would be pembrolizumab-axitinib or nivolumab-cabozantinib.     A total of 40 minutes were spent on this patient on the day of the consultation, of which more than 50% of this time was used for counseling and coordination of care. The patient was given the opportunity to ask multiple questions today, all of which were answered to his satisfaction.           Again, thank you for allowing me to participate in the care of your patient.      Sincerely,    Bart Barahona MD

## 2022-07-26 RX ORDER — ALBUTEROL SULFATE 0.83 MG/ML
2.5 SOLUTION RESPIRATORY (INHALATION)
Status: CANCELLED | OUTPATIENT
Start: 2022-08-17

## 2022-07-26 RX ORDER — DIPHENHYDRAMINE HYDROCHLORIDE 50 MG/ML
50 INJECTION INTRAMUSCULAR; INTRAVENOUS
Status: CANCELLED
Start: 2022-08-17

## 2022-07-26 RX ORDER — LORAZEPAM 2 MG/ML
0.5 INJECTION INTRAMUSCULAR EVERY 4 HOURS PRN
Status: CANCELLED | OUTPATIENT
Start: 2022-08-17

## 2022-07-26 RX ORDER — EPINEPHRINE 1 MG/ML
0.3 INJECTION, SOLUTION INTRAMUSCULAR; SUBCUTANEOUS EVERY 5 MIN PRN
Status: CANCELLED | OUTPATIENT
Start: 2022-08-17

## 2022-07-26 RX ORDER — NALOXONE HYDROCHLORIDE 0.4 MG/ML
0.2 INJECTION, SOLUTION INTRAMUSCULAR; INTRAVENOUS; SUBCUTANEOUS
Status: CANCELLED | OUTPATIENT
Start: 2022-08-17

## 2022-07-26 RX ORDER — ALBUTEROL SULFATE 90 UG/1
1-2 AEROSOL, METERED RESPIRATORY (INHALATION)
Status: CANCELLED
Start: 2022-08-17

## 2022-07-26 RX ORDER — MEPERIDINE HYDROCHLORIDE 25 MG/ML
25 INJECTION INTRAMUSCULAR; INTRAVENOUS; SUBCUTANEOUS EVERY 30 MIN PRN
Status: CANCELLED | OUTPATIENT
Start: 2022-08-17

## 2022-07-26 RX ORDER — METHYLPREDNISOLONE SODIUM SUCCINATE 125 MG/2ML
125 INJECTION, POWDER, LYOPHILIZED, FOR SOLUTION INTRAMUSCULAR; INTRAVENOUS
Status: CANCELLED
Start: 2022-08-17

## 2022-07-26 NOTE — PROGRESS NOTES
FOLLOW UP VISIT     Reason for Visit:  Renal cell carcinoma (pT3a R0) s/p nephrectomy; cycle #9 (of 17) of adjuvant pembrolizumab.     History of Present Illness:  Mr. Avila is a 47-year-old man who presented with hematuria in 10/2021.  CT imaging (10/21/2021) revealed a large mass at the upper pole of the left kidney, without evidence of local or distant metastatic disease.  On 12/21/2021, he had a left radical nephrectomy which showed a 5.4 cm clear-cell renal cell carcinoma (no sarcomatoid or rhabdoid features), nucleolar grade 3, with tumor extending into the calyceal system.  His final pathological stage was pT3a G3 R0 kidney cancer.     The patient elected to receive adjuvant pembrolizumab treatment, which he started in 2/2022.  He returns today to begin cycle #9 of adjuvant pembrolizumab.  He is tolerating the treatment relatively well.     Review of systems:  The patient reports feeling generally well, and he has recovered nicely from his radical nephrectomy surgery.  He reports occasional fatigue and headaches. Reports occasional neuropathy bilaterally but especially in his right toes. Not interfering with daily activities. He has seen orthopedics in the past for long standing plantar fascitis.  Reports some burning and itching behind both knees on and off after last infusion. Pain lasts 10-20 minutes with 3-4 flares over the last three weeks. Reports redness at the site, without any other rashes. Has dry skin at baseline. Does not moisturize regularly. No vitiligo. No peripheral edema. Working on eliminating sugars. Eats well otherwise. Reports good oral hydration. Weight stable. No chest pain, shortness, or cough.  No abdominal pain, diarrhea, or constipation.  No anorexia.  No vomiting or nausea.  No urinary changes.  No mucositis.   A comprehensive 14-system review of symptoms is otherwise generally unremarkable.  His ECOG score is 0.  His pain score is 1/10.      Medications:   Pembrolizumab 200  mg IV q3wk     Allergies:    No known drug allergies.      Physical Examination:  Mr. Avila is a 47-year-old man who appears comfortable at rest.  His vital signs are unremarkable.  His HEENT examination is within normal limits.  The cardiac, pulmonary, and abdominal examinations are unremarkable.  There is no palpable lymphadenopathy.  His surgical site from the recent robotic nephrectomy appears to be healing well, without evidence of erythema or exudate.  The neuromuscular examination is grossly intact.  The extremities do not demonstrate pitting edema.  The skin evaluation is unremarkable     Laboratories (7/27/22):  His CBC is within normal limits.  His comprehensive metabolic panel shows a creatinine level of 1.19 mg/dL (GFR of 76 mL/min), but is otherwise unremarkable.  His TSH level is normal at 3.25 mU/L.     Imaging (7/5/2022):  He underwent a CT scan of the chest, abdomen, and pelvis with IV contrast on 7/5/22.  This showed no evidence of recurrent or metastatic renal cell carcinoma; there is evidence of a prior left nephrectomy; there is slight thickening of the left adrenal gland without discrete nodularity.  Overall, this scan is consistent with TONYA.        ASSESSMENT AND PLAN:  Mr. Avila is a 47-year-old man with a diagnosis of clear-cell renal cell carcinoma (pT3a G3 R0) who underwent radical nephrectomy on 12/21/2021.  He began adjuvant therapy in 2/2022, and returns today to receive cycle #9 of adjuvant pembrolizumab treatment.  His ECOG score is 0.  His pain score is 1/10.     We previously reviewed the results of the KeyNote-564 trial as well as the recent FDA approval of adjuvant pembrolizumab for kidney cancer at high risk of relapse.  The eligibility for the KeyNote-564 clinical trial involved either: pT2 disease with grade 4 differentiation or sarcomatoid features, all pT3/T4 disease, N1 disease, or resected M1 disease with current TONYA status.  The pembrolizumab was then delivered at  a dose of 200 mg IV every 3 weeks, for a total of 17 cycles (one year).  This patient clearly meets criteria for adjuvant pembrolizumab.  After reviewing the anticipated benefits and potential side effects of pembrolizumab therapy, the patient decided to embark on this adjuvant treatment.  He returns today to receive cycle #9 of pembrolizumab.  He is tolerating this well, except for some pruritus behind both knees which is manageable.      We have also reviewed the surveillance schedule for stage pT3 kidney cancers following resection.  This involves a CT scan of the chest, abdomen and pelvis every 6 months for the first 3 years.  He would then need a CT scan every 12 months during years 3-5 of follow-up.  His next CT scan will be done in January 2023.  If he developed overt metastatic kidney cancer in the future, then the most likely scenario is that he would have oligometastatic disease at that time.  If this was the case, then he could certainly be managed with metastasectomy followed by pseudo-adjuvant pembrolizumab at that time.  If he had a polymetastatic recurrence, then my preferred regimen would be the combination of pembrolizumab-axitinib or nivolumab-cabozantinib.     A total of 40 minutes were spent on this patient on the day of the consultation, of which more than 50% of this time was used for counseling and coordination of care. The patient was given the opportunity to ask multiple questions today, all of which were answered to his satisfaction.     Bart Barahona M.D.

## 2022-07-27 ENCOUNTER — APPOINTMENT (OUTPATIENT)
Dept: LAB | Facility: CLINIC | Age: 47
End: 2022-07-27
Attending: INTERNAL MEDICINE
Payer: COMMERCIAL

## 2022-07-27 ENCOUNTER — INFUSION THERAPY VISIT (OUTPATIENT)
Dept: ONCOLOGY | Facility: CLINIC | Age: 47
End: 2022-07-27
Attending: INTERNAL MEDICINE
Payer: COMMERCIAL

## 2022-07-27 VITALS
DIASTOLIC BLOOD PRESSURE: 87 MMHG | WEIGHT: 209.8 LBS | TEMPERATURE: 98.5 F | HEART RATE: 71 BPM | BODY MASS INDEX: 28.86 KG/M2 | SYSTOLIC BLOOD PRESSURE: 150 MMHG | OXYGEN SATURATION: 98 % | RESPIRATION RATE: 16 BRPM

## 2022-07-27 DIAGNOSIS — C64.9 RENAL CELL CARCINOMA, UNSPECIFIED LATERALITY (H): Primary | ICD-10-CM

## 2022-07-27 LAB
ALBUMIN SERPL-MCNC: 4 G/DL (ref 3.4–5)
ALP SERPL-CCNC: 57 U/L (ref 40–150)
ALT SERPL W P-5'-P-CCNC: 44 U/L (ref 0–70)
ANION GAP SERPL CALCULATED.3IONS-SCNC: 6 MMOL/L (ref 3–14)
AST SERPL W P-5'-P-CCNC: 28 U/L (ref 0–45)
BILIRUB SERPL-MCNC: 0.6 MG/DL (ref 0.2–1.3)
BUN SERPL-MCNC: 20 MG/DL (ref 7–30)
CALCIUM SERPL-MCNC: 9.1 MG/DL (ref 8.5–10.1)
CHLORIDE BLD-SCNC: 108 MMOL/L (ref 94–109)
CO2 SERPL-SCNC: 25 MMOL/L (ref 20–32)
CREAT SERPL-MCNC: 1.19 MG/DL (ref 0.66–1.25)
GFR SERPL CREATININE-BSD FRML MDRD: 76 ML/MIN/1.73M2
GLUCOSE BLD-MCNC: 93 MG/DL (ref 70–99)
POTASSIUM BLD-SCNC: 4.2 MMOL/L (ref 3.4–5.3)
PROT SERPL-MCNC: 7.6 G/DL (ref 6.8–8.8)
SODIUM SERPL-SCNC: 139 MMOL/L (ref 133–144)
TSH SERPL DL<=0.005 MIU/L-ACNC: 3.25 MU/L (ref 0.4–4)

## 2022-07-27 PROCEDURE — 258N000003 HC RX IP 258 OP 636: Performed by: INTERNAL MEDICINE

## 2022-07-27 PROCEDURE — 84443 ASSAY THYROID STIM HORMONE: CPT | Performed by: INTERNAL MEDICINE

## 2022-07-27 PROCEDURE — 80053 COMPREHEN METABOLIC PANEL: CPT | Performed by: INTERNAL MEDICINE

## 2022-07-27 PROCEDURE — 250N000011 HC RX IP 250 OP 636: Performed by: INTERNAL MEDICINE

## 2022-07-27 PROCEDURE — 96413 CHEMO IV INFUSION 1 HR: CPT

## 2022-07-27 PROCEDURE — G0463 HOSPITAL OUTPT CLINIC VISIT: HCPCS

## 2022-07-27 PROCEDURE — 99215 OFFICE O/P EST HI 40 MIN: CPT | Performed by: INTERNAL MEDICINE

## 2022-07-27 PROCEDURE — 36415 COLL VENOUS BLD VENIPUNCTURE: CPT | Performed by: INTERNAL MEDICINE

## 2022-07-27 RX ORDER — EPINEPHRINE 1 MG/ML
0.3 INJECTION, SOLUTION INTRAMUSCULAR; SUBCUTANEOUS EVERY 5 MIN PRN
Status: CANCELLED | OUTPATIENT
Start: 2022-09-07

## 2022-07-27 RX ORDER — DIPHENHYDRAMINE HYDROCHLORIDE 50 MG/ML
50 INJECTION INTRAMUSCULAR; INTRAVENOUS
Status: CANCELLED
Start: 2022-09-07

## 2022-07-27 RX ORDER — ALBUTEROL SULFATE 90 UG/1
1-2 AEROSOL, METERED RESPIRATORY (INHALATION)
Status: CANCELLED
Start: 2022-09-07

## 2022-07-27 RX ORDER — ALBUTEROL SULFATE 0.83 MG/ML
2.5 SOLUTION RESPIRATORY (INHALATION)
Status: CANCELLED | OUTPATIENT
Start: 2022-09-07

## 2022-07-27 RX ORDER — LORAZEPAM 2 MG/ML
0.5 INJECTION INTRAMUSCULAR EVERY 4 HOURS PRN
Status: CANCELLED | OUTPATIENT
Start: 2022-09-07

## 2022-07-27 RX ORDER — LANOLIN ALCOHOL/MO/W.PET/CERES
3 CREAM (GRAM) TOPICAL
Qty: 30 TABLET | Refills: 1 | Status: SHIPPED | OUTPATIENT
Start: 2022-07-27 | End: 2023-01-11

## 2022-07-27 RX ORDER — MEPERIDINE HYDROCHLORIDE 25 MG/ML
25 INJECTION INTRAMUSCULAR; INTRAVENOUS; SUBCUTANEOUS EVERY 30 MIN PRN
Status: CANCELLED | OUTPATIENT
Start: 2022-09-07

## 2022-07-27 RX ORDER — METHYLPREDNISOLONE SODIUM SUCCINATE 125 MG/2ML
125 INJECTION, POWDER, LYOPHILIZED, FOR SOLUTION INTRAMUSCULAR; INTRAVENOUS
Status: CANCELLED
Start: 2022-09-07

## 2022-07-27 RX ORDER — NALOXONE HYDROCHLORIDE 0.4 MG/ML
0.2 INJECTION, SOLUTION INTRAMUSCULAR; INTRAVENOUS; SUBCUTANEOUS
Status: CANCELLED | OUTPATIENT
Start: 2022-09-07

## 2022-07-27 RX ADMIN — SODIUM CHLORIDE 250 ML: 9 INJECTION, SOLUTION INTRAVENOUS at 10:31

## 2022-07-27 RX ADMIN — SODIUM CHLORIDE 200 MG: 9 INJECTION, SOLUTION INTRAVENOUS at 10:31

## 2022-07-27 ASSESSMENT — PAIN SCALES - GENERAL: PAINLEVEL: NO PAIN (0)

## 2022-07-27 NOTE — PROGRESS NOTES
Infusion Nursing Note:  Pierre Avila presents today for Cycle 9 Day 1 .    Patient seen by provider today: Yes: Dr. Barahona saw patient prior to infusion   present during visit today: Not Applicable.    Note:   Patient states he is feeling well today and had a good visit with Dr. Barahona.    Intravenous Access:  Peripheral IV placed in lab.    Treatment Conditions:   Latest Reference Range & Units 07/27/22 09:06   Sodium 133 - 144 mmol/L 139   Potassium 3.4 - 5.3 mmol/L 4.2   Chloride 94 - 109 mmol/L 108   Carbon Dioxide 20 - 32 mmol/L 25   Urea Nitrogen 7 - 30 mg/dL 20   Creatinine 0.66 - 1.25 mg/dL 1.19   GFR Estimate >60 mL/min/1.73m2 76   Calcium 8.5 - 10.1 mg/dL 9.1   Anion Gap 3 - 14 mmol/L 6   Albumin 3.4 - 5.0 g/dL 4.0   Protein Total 6.8 - 8.8 g/dL 7.6   Alkaline Phosphatase 40 - 150 U/L 57   ALT 0 - 70 U/L 44   AST 0 - 45 U/L 28   Bilirubin Total 0.2 - 1.3 mg/dL 0.6   TSH 0.40 - 4.00 mU/L 3.25   Glucose 70 - 99 mg/dL 93     Results reviewed, labs MET treatment parameters, ok to proceed with treatment.    Post Infusion Assessment:  Patient tolerated infusion without incident.  Blood return noted pre and post infusion.  Site patent and intact, free from redness, edema or discomfort.  No evidence of extravasations.  Access discontinued per protocol.     Discharge Plan:   Prescription refills given for Melatonin.  Patient chose to go downstairs to  Melatonin.  Discharge instructions reviewed with: Patient.  Patient and/or family verbalized understanding of discharge instructions and all questions answered.  AVS to patient via [employer].  Patient will return 8/17/22 for next appointment.   Patient discharged in stable condition accompanied by: self.  Departure Mode: Ambulatory.      Hallie Quezada RN

## 2022-07-27 NOTE — PATIENT INSTRUCTIONS
Contact Numbers  [facility]: [phone] (for symptom and scheduling needs)    Please call the [facility] Triage line if you experience a temperature greater than or equal to 100.4, shaking chills, have uncontrolled nausea, vomiting and/or diarrhea, dizziness, shortness of breath, chest pain, bleeding, unexplained bruising, or if you have any other new/concerning symptoms, questions or concerns.     If you are having any concerning symptoms or wish to speak to a provider before your next infusion visit, please call your care coordinator or triage to notify them so we can adequately serve you.     If you need a refill on a narcotic prescription or other medication, please call triage before your infusion appointment.

## 2022-07-27 NOTE — LETTER
7/27/2022         RE: Pierre Avila  [address] [facility] [address]        Dear Colleague,    Thank you for referring your patient, Pierre Avila, to the [facility]. Please see a copy of my visit note below.      FOLLOW UP VISIT     Reason for Visit:  Renal cell carcinoma (pT3a R0) s/p nephrectomy; cycle #9 (of 17) of adjuvant pembrolizumab.     History of Present Illness:  Mr. Avila is a 47-year-old man who presented with hematuria in 10/2021.  CT imaging (10/21/2021) revealed a large mass at the upper pole of the left kidney, without evidence of local or distant metastatic disease.  On 12/21/2021, he had a left radical nephrectomy which showed a 5.4 cm clear-cell renal cell carcinoma (no sarcomatoid or rhabdoid features), nucleolar grade 3, with tumor extending into the calyceal system.  His final pathological stage was pT3a G3 R0 kidney cancer.     The patient elected to receive adjuvant pembrolizumab treatment, which he started in 2/2022.  He returns today to begin cycle #9 of adjuvant pembrolizumab.  He is tolerating the treatment relatively well.     Review of systems:  The patient reports feeling generally well, and he has recovered nicely from his radical nephrectomy surgery.  He reports occasional fatigue and headaches. Reports occasional neuropathy bilaterally but especially in his right toes. Not interfering with daily activities. He has seen orthopedics in the past for long standing plantar fascitis.  Reports some burning and itching behind both knees on and off after last infusion. Pain lasts 10-20 minutes with 3-4 flares over the last three weeks. Reports redness at the site, without any other rashes. Has dry skin at baseline. Does not moisturize regularly. No vitiligo. No peripheral edema. Working on eliminating sugars. Eats well otherwise. Reports good oral hydration. Weight stable. No chest pain, shortness, or cough.  No abdominal pain, diarrhea, or  constipation.  No anorexia.  No vomiting or nausea.  No urinary changes.  No mucositis.   A comprehensive 14-system review of symptoms is otherwise generally unremarkable.  His ECOG score is 0.  His pain score is 1/10.      Medications:   Pembrolizumab 200 mg IV q3wk     Allergies:    No known drug allergies.      Physical Examination:  Mr. Avila is a 47-year-old man who appears comfortable at rest.  His vital signs are unremarkable.  His HEENT examination is within normal limits.  The cardiac, pulmonary, and abdominal examinations are unremarkable.  There is no palpable lymphadenopathy.  His surgical site from the recent robotic nephrectomy appears to be healing well, without evidence of erythema or exudate.  The neuromuscular examination is grossly intact.  The extremities do not demonstrate pitting edema.  The skin evaluation is unremarkable     Laboratories (7/27/22):  His CBC is within normal limits.  His comprehensive metabolic panel shows a creatinine level of 1.19 mg/dL (GFR of 76 mL/min), but is otherwise unremarkable.  His TSH level is normal at 3.25 mU/L.     Imaging (7/5/2022):  He underwent a CT scan of the chest, abdomen, and pelvis with IV contrast on 7/5/22.  This showed no evidence of recurrent or metastatic renal cell carcinoma; there is evidence of a prior left nephrectomy; there is slight thickening of the left adrenal gland without discrete nodularity.  Overall, this scan is consistent with TONYA.        ASSESSMENT AND PLAN:  Mr. Avila is a 47-year-old man with a diagnosis of clear-cell renal cell carcinoma (pT3a G3 R0) who underwent radical nephrectomy on 12/21/2021.  He began adjuvant therapy in 2/2022, and returns today to receive cycle #9 of adjuvant pembrolizumab treatment.  His ECOG score is 0.  His pain score is 1/10.     We previously reviewed the results of the KeyNote-564 trial as well as the recent FDA approval of adjuvant pembrolizumab for kidney cancer at high risk of relapse.   The eligibility for the KeyNote-564 clinical trial involved either: pT2 disease with grade 4 differentiation or sarcomatoid features, all pT3/T4 disease, N1 disease, or resected M1 disease with current TONYA status.  The pembrolizumab was then delivered at a dose of 200 mg IV every 3 weeks, for a total of 17 cycles (one year).  This patient clearly meets criteria for adjuvant pembrolizumab.  After reviewing the anticipated benefits and potential side effects of pembrolizumab therapy, the patient decided to embark on this adjuvant treatment.  He returns today to receive cycle #9 of pembrolizumab.  He is tolerating this well, except for some pruritus behind both knees which is manageable.      We have also reviewed the surveillance schedule for stage pT3 kidney cancers following resection.  This involves a CT scan of the chest, abdomen and pelvis every 6 months for the first 3 years.  He would then need a CT scan every 12 months during years 3-5 of follow-up.  His next CT scan will be done in January 2023.  If he developed overt metastatic kidney cancer in the future, then the most likely scenario is that he would have oligometastatic disease at that time.  If this was the case, then he could certainly be managed with metastasectomy followed by pseudo-adjuvant pembrolizumab at that time.  If he had a polymetastatic recurrence, then my preferred regimen would be the combination of pembrolizumab-axitinib or nivolumab-cabozantinib.     A total of 40 minutes were spent on this patient on the day of the consultation, of which more than 50% of this time was used for counseling and coordination of care. The patient was given the opportunity to ask multiple questions today, all of which were answered to his satisfaction.           Again, thank you for allowing me to participate in the care of your patient.      Sincerely,    Bart Barahona MD

## 2022-07-27 NOTE — NURSING NOTE
"Oncology Rooming Note    July 27, 2022 9:21 AM   Pierre Avila is a 47 year old male who presents for:    Chief Complaint   Patient presents with     Blood Draw     Labs drawn via PIV by RN in lab.  VS taken     Oncology Clinic Visit     Rtn Clear Cell Carcinoma     Initial Vitals: BP (!) 150/87   Pulse 71   Temp 98.5  F (36.9  C) (Oral)   Resp 16   Wt 95.2 kg (209 lb 12.8 oz)   SpO2 98%   BMI 28.86 kg/m   Estimated body mass index is 28.86 kg/m  as calculated from the following:    Height as of 5/25/22: 1.816 m (5' 11.5\").    Weight as of this encounter: 95.2 kg (209 lb 12.8 oz). Body surface area is 2.19 meters squared.  No Pain (0)  No LMP for male patient.  Allergies reviewed: Yes  Medications reviewed: Yes    Medications: Medication refills not needed today.  Pharmacy name entered into [employer]:    [facility] - [place] - [address] [place] [address] [facility] [address] [place], [place] - [address] [facility] [place], [place] - [phone] [facility] [phone]    Clinical concerns: Pt has no concerns for their provider on July 27, 2022 and would like to discuss the original chief complaint of the appointment.     Medina Galindo, EMT on July 27, 2022 at 9:22 AM            "

## 2022-08-16 NOTE — PROGRESS NOTES
August 17, 2022    FOLLOW UP VISIT     Reason for Visit:  Renal cell carcinoma (pT3a R0) s/p nephrectomy; cycle #9 (of 17) of adjuvant pembrolizumab.     History of Present Illness:  Mr. Avila is a 47-year-old man who presented with hematuria in 10/2021.  CT imaging (10/21/2021) revealed a large mass at the upper pole of the left kidney, without evidence of local or distant metastatic disease.  On 12/21/2021, he had a left radical nephrectomy which showed a 5.4 cm clear-cell renal cell carcinoma (no sarcomatoid or rhabdoid features), nucleolar grade 3, with tumor extending into the calyceal system.  His final pathological stage was pT3a G3 R0 kidney cancer.     The patient elected to receive adjuvant pembrolizumab treatment, which he started in 2/2022.  He returns today to begin cycle #10 of adjuvant pembrolizumab.  He is tolerating the treatment relatively well.     Review of systems:    Pierre is feeling overall quite well today. He is tolerating keytruda without new SE. He mentions he is sleeping better as of late with a new pillow. He has dry skin behind his knees but no rash. Denies diarrhea or cough/sob.  He has generalized aching/cramping of back. He is sore from his yoga 4x per week. He has good PO hydration. He does not have diffuse myalgias or arthralgias.     A comprehensive 14-system review of symptoms is otherwise generally unremarkable.  His ECOG score is 0.  His pain score is 1/10.      Medications:   Pembrolizumab 200 mg IV q3wk     Allergies:    No known drug allergies.      Physical Examination:    /89   Pulse 74   Temp 98.2  F (36.8  C) (Oral)   Resp 16   Wt 95.3 kg (210 lb)   SpO2 99%   BMI 28.88 kg/m    Mr. Avila is a 47-year-old man who appears comfortable at rest.  His vital signs are unremarkable.  His HEENT examination is within normal limits.  The cardiac, pulmonary, and abdominal examinations are unremarkable.  There is no palpable lymphadenopathy.  His surgical site  from the recent robotic nephrectomy appears to be healing well, without evidence of erythema or exudate.  The neuromuscular examination is grossly intact.  The extremities do not demonstrate pitting edema.  The skin evaluation is unremarkable     Laboratories:  Most Recent 3 CBC's:Recent Labs   Lab Test 07/06/22  0901 04/13/22  1022 02/09/22  1130   WBC 6.6 6.7 9.1   HGB 14.1 13.6 14.1   MCV 86 87 86    214 210   ANEUTAUTO 4.0 4.1  --      Most Recent 3 BMP's:  Recent Labs   Lab Test 08/17/22  1043 07/27/22  0906 07/06/22  0901    139 140   POTASSIUM 4.2 4.2 4.1   CHLORIDE 108 108 108   CO2 26 25 26   BUN 20 20 17   CR 1.31* 1.19 1.35*   ANIONGAP 7 6 6   CHRISTIANE 9.1 9.1 9.1   GLC 86 93 89   PROTTOTAL 7.6 7.6 7.7   ALBUMIN 4.0 4.0 4.3    Most Recent 3 LFT's:  Recent Labs   Lab Test 08/17/22  1043 07/27/22  0906 07/06/22  0901   AST 23 28 23   ALT 43 44 36   ALKPHOS 55 57 50   BILITOTAL 0.6 0.6 0.5    Most Recent 2 TSH and T4:  Recent Labs   Lab Test 08/17/22  1043 07/27/22  0906   TSH 2.04 3.25     I reviewed the above labs today.       Imaging (7/5/2022):  He underwent a CT scan of the chest, abdomen, and pelvis with IV contrast on 7/5/22.  This showed no evidence of recurrent or metastatic renal cell carcinoma; there is evidence of a prior left nephrectomy; there is slight thickening of the left adrenal gland without discrete nodularity.  Overall, this scan is consistent with TONYA.    ASSESSMENT AND PLAN:  Mr. Avila is a 47-year-old man with a diagnosis of clear-cell renal cell carcinoma (pT3a G3 R0) who underwent radical nephrectomy on 12/21/2021.  He began adjuvant therapy in 2/2022, and returns today to receive cycle #10 of adjuvant pembrolizumab treatment.  His ECOG score is 0.  .      He is tolerating therapy well without IMAE today thus we will continue with therapy. He will be seen by Dr. Barahona prior to next cycle.    In regards to his focal myalgia, we discussed conservative measures  including heat, stretching, and positioning during activity/sleep. He will also try a magnesium supplement as I have anecdotally seen this beneficial for other patients.     40 minutes spent on the date of the encounter doing chart review, review of test results, interpretation of tests, patient visit, documentation and discussion with other provider(s)     Tessy Alexander CNP on 8/17/2022 at 11:33 AM

## 2022-08-17 ENCOUNTER — ONCOLOGY VISIT (OUTPATIENT)
Dept: ONCOLOGY | Facility: CLINIC | Age: 47
End: 2022-08-17
Attending: INTERNAL MEDICINE
Payer: COMMERCIAL

## 2022-08-17 ENCOUNTER — APPOINTMENT (OUTPATIENT)
Dept: LAB | Facility: CLINIC | Age: 47
End: 2022-08-17
Attending: INTERNAL MEDICINE
Payer: COMMERCIAL

## 2022-08-17 VITALS
RESPIRATION RATE: 16 BRPM | WEIGHT: 210 LBS | HEART RATE: 74 BPM | OXYGEN SATURATION: 99 % | TEMPERATURE: 98.2 F | DIASTOLIC BLOOD PRESSURE: 89 MMHG | SYSTOLIC BLOOD PRESSURE: 130 MMHG | BODY MASS INDEX: 28.88 KG/M2

## 2022-08-17 DIAGNOSIS — C64.9 RENAL CELL CARCINOMA, UNSPECIFIED LATERALITY (H): Primary | ICD-10-CM

## 2022-08-17 LAB
ALBUMIN SERPL-MCNC: 4 G/DL (ref 3.4–5)
ALP SERPL-CCNC: 55 U/L (ref 40–150)
ALT SERPL W P-5'-P-CCNC: 43 U/L (ref 0–70)
ANION GAP SERPL CALCULATED.3IONS-SCNC: 7 MMOL/L (ref 3–14)
AST SERPL W P-5'-P-CCNC: 23 U/L (ref 0–45)
BILIRUB SERPL-MCNC: 0.6 MG/DL (ref 0.2–1.3)
BUN SERPL-MCNC: 20 MG/DL (ref 7–30)
CALCIUM SERPL-MCNC: 9.1 MG/DL (ref 8.5–10.1)
CHLORIDE BLD-SCNC: 108 MMOL/L (ref 94–109)
CO2 SERPL-SCNC: 26 MMOL/L (ref 20–32)
CREAT SERPL-MCNC: 1.31 MG/DL (ref 0.66–1.25)
GFR SERPL CREATININE-BSD FRML MDRD: 68 ML/MIN/1.73M2
GLUCOSE BLD-MCNC: 86 MG/DL (ref 70–99)
MAGNESIUM SERPL-MCNC: 2.2 MG/DL (ref 1.6–2.3)
POTASSIUM BLD-SCNC: 4.2 MMOL/L (ref 3.4–5.3)
PROT SERPL-MCNC: 7.6 G/DL (ref 6.8–8.8)
SODIUM SERPL-SCNC: 141 MMOL/L (ref 133–144)
TSH SERPL DL<=0.005 MIU/L-ACNC: 2.04 MU/L (ref 0.4–4)

## 2022-08-17 PROCEDURE — G0463 HOSPITAL OUTPT CLINIC VISIT: HCPCS

## 2022-08-17 PROCEDURE — 96413 CHEMO IV INFUSION 1 HR: CPT

## 2022-08-17 PROCEDURE — 84443 ASSAY THYROID STIM HORMONE: CPT | Performed by: INTERNAL MEDICINE

## 2022-08-17 PROCEDURE — 80053 COMPREHEN METABOLIC PANEL: CPT | Performed by: INTERNAL MEDICINE

## 2022-08-17 PROCEDURE — 258N000003 HC RX IP 258 OP 636: Performed by: INTERNAL MEDICINE

## 2022-08-17 PROCEDURE — 83735 ASSAY OF MAGNESIUM: CPT

## 2022-08-17 PROCEDURE — 250N000011 HC RX IP 250 OP 636: Performed by: INTERNAL MEDICINE

## 2022-08-17 PROCEDURE — 36415 COLL VENOUS BLD VENIPUNCTURE: CPT | Performed by: INTERNAL MEDICINE

## 2022-08-17 PROCEDURE — 99215 OFFICE O/P EST HI 40 MIN: CPT | Performed by: NURSE PRACTITIONER

## 2022-08-17 RX ADMIN — SODIUM CHLORIDE 250 ML: 9 INJECTION, SOLUTION INTRAVENOUS at 11:49

## 2022-08-17 RX ADMIN — SODIUM CHLORIDE 200 MG: 9 INJECTION, SOLUTION INTRAVENOUS at 11:51

## 2022-08-17 ASSESSMENT — PAIN SCALES - GENERAL: PAINLEVEL: NO PAIN (0)

## 2022-08-17 NOTE — NURSING NOTE
"Oncology Rooming Note    August 17, 2022 10:54 AM   Pierre Avila is a 47 year old male who presents for:    Chief Complaint   Patient presents with     Blood Draw     Labs drawn via PIV by RN in lab.  VS taken     Oncology Clinic Visit     Renal cell carcinoma     Initial Vitals: /89   Pulse 74   Temp 98.2  F (36.8  C) (Oral)   Resp 16   Wt 95.3 kg (210 lb)   SpO2 99%   BMI 28.88 kg/m   Estimated body mass index is 28.88 kg/m  as calculated from the following:    Height as of 5/25/22: 1.816 m (5' 11.5\").    Weight as of this encounter: 95.3 kg (210 lb). Body surface area is 2.19 meters squared.  No Pain (0) Comment: Data Unavailable   No LMP for male patient.  Allergies reviewed: Yes  Medications reviewed: Yes    Medications: Medication refills not needed today.  Pharmacy name entered into [facility]:    [facility] - [place] - [address] [place] [address] [facility] - [place], [place] - [address] [facility] [place], [place] - [address] [facility] [phone]    Clinical concerns: none       Kirsten Goel"

## 2022-08-17 NOTE — PATIENT INSTRUCTIONS
[facility] Triage and after hours / weekends / holidays:  [phone]    Please call the triage or after hours line if you experience a temperature greater than or equal to 100.4, shaking chills, have uncontrolled nausea, vomiting and/or diarrhea, dizziness, shortness of breath, chest pain, bleeding, unexplained bruising, or if you have any other new/concerning symptoms, questions or concerns.      If you are having any concerning symptoms or wish to speak to a provider before your next infusion visit, please call your care coordinator or triage to notify them so we can adequately serve you.     If you need a refill on a narcotic prescription or other medication, please call before your infusion appointment.

## 2022-08-17 NOTE — PROGRESS NOTES
Infusion Nursing Note:  Pierre Avila presents today for Cycle 10 Day 1 Keytruda.    Patient seen by provider today: Yes: Tessy Alexander CNP   present during visit today: Not Applicable.    Note: Pierre comes into the clinic today doing well overall and has no concerns to relay after his provider visit. He has no pain or S/S of infection today.    Intravenous Access:  Peripheral IV placed.    Treatment Conditions:  Lab Results   Component Value Date     08/17/2022    POTASSIUM 4.2 08/17/2022    MAG 2.2 08/17/2022    CR 1.31 (H) 08/17/2022    CHRISTIANE 9.1 08/17/2022    BILITOTAL 0.6 08/17/2022    ALBUMIN 4.0 08/17/2022    ALT 43 08/17/2022    AST 23 08/17/2022     Results reviewed, labs MET treatment parameters, ok to proceed with treatment.    Post Infusion Assessment:  Patient tolerated infusion without incident.  Blood return noted pre and post infusion.  Site patent and intact, free from redness, edema or discomfort.  No evidence of extravasations.  Access discontinued per protocol.     Discharge Plan:   Patient declined prescription refills.  Discharge instructions reviewed with: Patient.  Patient and/or family verbalized understanding of discharge instructions and all questions answered.  AVS to patient via [employer].  Patient will return 9/7 for next appointment.   Patient discharged in stable condition accompanied by: self.  Departure Mode: Ambulatory.      Tita Lopes RN

## 2022-09-06 RX ORDER — LORAZEPAM 2 MG/ML
0.5 INJECTION INTRAMUSCULAR EVERY 4 HOURS PRN
Status: CANCELLED | OUTPATIENT
Start: 2022-09-28

## 2022-09-06 RX ORDER — ALBUTEROL SULFATE 90 UG/1
1-2 AEROSOL, METERED RESPIRATORY (INHALATION)
Status: CANCELLED
Start: 2022-09-28

## 2022-09-06 RX ORDER — MEPERIDINE HYDROCHLORIDE 25 MG/ML
25 INJECTION INTRAMUSCULAR; INTRAVENOUS; SUBCUTANEOUS EVERY 30 MIN PRN
Status: CANCELLED | OUTPATIENT
Start: 2022-09-28

## 2022-09-06 RX ORDER — METHYLPREDNISOLONE SODIUM SUCCINATE 125 MG/2ML
125 INJECTION, POWDER, LYOPHILIZED, FOR SOLUTION INTRAMUSCULAR; INTRAVENOUS
Status: CANCELLED
Start: 2022-09-28

## 2022-09-06 RX ORDER — EPINEPHRINE 1 MG/ML
0.3 INJECTION, SOLUTION INTRAMUSCULAR; SUBCUTANEOUS EVERY 5 MIN PRN
Status: CANCELLED | OUTPATIENT
Start: 2022-09-28

## 2022-09-06 RX ORDER — NALOXONE HYDROCHLORIDE 0.4 MG/ML
0.2 INJECTION, SOLUTION INTRAMUSCULAR; INTRAVENOUS; SUBCUTANEOUS
Status: CANCELLED | OUTPATIENT
Start: 2022-09-28

## 2022-09-06 RX ORDER — DIPHENHYDRAMINE HYDROCHLORIDE 50 MG/ML
50 INJECTION INTRAMUSCULAR; INTRAVENOUS
Status: CANCELLED
Start: 2022-09-28

## 2022-09-06 RX ORDER — ALBUTEROL SULFATE 0.83 MG/ML
2.5 SOLUTION RESPIRATORY (INHALATION)
Status: CANCELLED | OUTPATIENT
Start: 2022-09-28

## 2022-09-07 ENCOUNTER — INFUSION THERAPY VISIT (OUTPATIENT)
Dept: ONCOLOGY | Facility: CLINIC | Age: 47
End: 2022-09-07
Attending: INTERNAL MEDICINE
Payer: COMMERCIAL

## 2022-09-07 ENCOUNTER — APPOINTMENT (OUTPATIENT)
Dept: LAB | Facility: CLINIC | Age: 47
End: 2022-09-07
Attending: INTERNAL MEDICINE
Payer: COMMERCIAL

## 2022-09-07 VITALS
BODY MASS INDEX: 29.12 KG/M2 | HEART RATE: 73 BPM | TEMPERATURE: 98.1 F | DIASTOLIC BLOOD PRESSURE: 83 MMHG | WEIGHT: 211.7 LBS | RESPIRATION RATE: 16 BRPM | SYSTOLIC BLOOD PRESSURE: 130 MMHG | OXYGEN SATURATION: 97 %

## 2022-09-07 DIAGNOSIS — C64.9 RENAL CELL CARCINOMA, UNSPECIFIED LATERALITY (H): Primary | ICD-10-CM

## 2022-09-07 DIAGNOSIS — C64.2 RENAL CELL CARCINOMA, LEFT (H): Primary | ICD-10-CM

## 2022-09-07 LAB
ALBUMIN SERPL BCG-MCNC: 4.7 G/DL (ref 3.5–5.2)
ALP SERPL-CCNC: 59 U/L (ref 40–129)
ALT SERPL W P-5'-P-CCNC: 36 U/L (ref 10–50)
ANION GAP SERPL CALCULATED.3IONS-SCNC: 12 MMOL/L (ref 7–15)
AST SERPL W P-5'-P-CCNC: 29 U/L (ref 10–50)
BILIRUB SERPL-MCNC: 0.5 MG/DL
BUN SERPL-MCNC: 19.8 MG/DL (ref 6–20)
CALCIUM SERPL-MCNC: 9.6 MG/DL (ref 8.6–10)
CHLORIDE SERPL-SCNC: 102 MMOL/L (ref 98–107)
CREAT SERPL-MCNC: 1.34 MG/DL (ref 0.67–1.17)
DEPRECATED HCO3 PLAS-SCNC: 23 MMOL/L (ref 22–29)
GFR SERPL CREATININE-BSD FRML MDRD: 66 ML/MIN/1.73M2
GLUCOSE SERPL-MCNC: 87 MG/DL (ref 70–99)
POTASSIUM SERPL-SCNC: 4 MMOL/L (ref 3.4–5.3)
PROT SERPL-MCNC: 7.6 G/DL (ref 6.4–8.3)
SODIUM SERPL-SCNC: 137 MMOL/L (ref 136–145)
TSH SERPL DL<=0.005 MIU/L-ACNC: 3.2 UIU/ML (ref 0.3–4.2)

## 2022-09-07 PROCEDURE — 258N000003 HC RX IP 258 OP 636: Performed by: INTERNAL MEDICINE

## 2022-09-07 PROCEDURE — 80053 COMPREHEN METABOLIC PANEL: CPT | Performed by: INTERNAL MEDICINE

## 2022-09-07 PROCEDURE — 250N000011 HC RX IP 250 OP 636: Performed by: INTERNAL MEDICINE

## 2022-09-07 PROCEDURE — 36415 COLL VENOUS BLD VENIPUNCTURE: CPT | Performed by: INTERNAL MEDICINE

## 2022-09-07 PROCEDURE — 96413 CHEMO IV INFUSION 1 HR: CPT

## 2022-09-07 PROCEDURE — 84443 ASSAY THYROID STIM HORMONE: CPT | Performed by: INTERNAL MEDICINE

## 2022-09-07 PROCEDURE — G0463 HOSPITAL OUTPT CLINIC VISIT: HCPCS

## 2022-09-07 PROCEDURE — 99215 OFFICE O/P EST HI 40 MIN: CPT | Performed by: INTERNAL MEDICINE

## 2022-09-07 RX ADMIN — SODIUM CHLORIDE 200 MG: 9 INJECTION, SOLUTION INTRAVENOUS at 15:31

## 2022-09-07 RX ADMIN — SODIUM CHLORIDE 250 ML: 9 INJECTION, SOLUTION INTRAVENOUS at 15:31

## 2022-09-07 ASSESSMENT — PAIN SCALES - GENERAL: PAINLEVEL: NO PAIN (0)

## 2022-09-07 NOTE — PROGRESS NOTES
FOLLOW UP VISIT     Reason for Visit:  Renal cell carcinoma (pT3a R0) s/p nephrectomy; cycle #11 (of 17) of adjuvant pembrolizumab.     History of Present Illness:  Mr. Avila is a 47-year-old man who presented with hematuria in 10/2021.  CT imaging (10/21/2021) revealed a large mass at the upper pole of the left kidney, without evidence of local or distant metastatic disease.  On 12/21/2021, he had a left radical nephrectomy which showed a 5.4 cm clear-cell renal cell carcinoma (no sarcomatoid or rhabdoid features), nucleolar grade 3, with tumor extending into the calyceal system.  His final pathological stage was pT3a G3 R0 kidney cancer.     The patient elected to receive adjuvant pembrolizumab treatment, which he started in 2/2022.  He returns today to begin cycle #11 of adjuvant pembrolizumab.  He is tolerating the treatment relatively well thus far.     Review of systems:  The patient reports feeling generally well, and he has recovered nicely from his radical nephrectomy surgery.  Insomnia is his biggest problem right now, and this persists despite trying melatonin.  He reports occasional fatigue and headaches. Reports occasional neuropathy bilaterally but especially in his right toes. Not interfering with daily activities. He has seen orthopedics in the past for longstanding plantar fascitis.  Reports some burning and itching behind both knees on and off after last infusion. Pain lasts 10-20 minutes with 3-4 flares over the last three weeks. Reports redness at the site, without any other rashes. Has dry skin at baseline. Does not moisturize regularly. No vitiligo. No peripheral edema. Working on eliminating sugars. Eats well otherwise. Reports good oral hydration. Weight stable. No chest pain, shortness, or cough.  No abdominal pain, diarrhea, or constipation.  No anorexia.  No vomiting or nausea.  No urinary changes.  No mucositis.   A comprehensive 14-system review of symptoms is otherwise generally  unremarkable.  His ECOG score is 0.  His pain score is 1/10.      Medications:   Pembrolizumab 200 mg IV q3wk     Allergies:    No known drug allergies.      Physical Examination:  Mr. Avila is a 47-year-old man who appears comfortable at rest.  His vital signs are unremarkable.  His HEENT examination is within normal limits.  The cardiac, pulmonary, and abdominal examinations are unremarkable.  There is no palpable cervical, axillary, supraclavicular or inguinal lymphadenopathy.  His surgical site from the recent robotic nephrectomy appears to be healing well, without evidence of erythema or exudate.  The neuromuscular examination is grossly intact.  The extremities do not demonstrate pitting edema.  The skin evaluation is unremarkable     Laboratories (9/7/22):  His CBC is within normal limits.  His comprehensive metabolic panel shows a creatinine level of 1.34 mg/dL (GFR of 66 mL/min), but is otherwise unremarkable.  His TSH level is normal at 2.04 mU/L.     Imaging (7/5/2022):  He underwent a CT scan of the chest, abdomen, and pelvis with IV contrast on 7/5/22.  This showed no evidence of recurrent or metastatic renal cell carcinoma; there is evidence of a prior left nephrectomy; there is slight thickening of the left adrenal gland without discrete nodularity.  Overall, this scan is consistent with TONYA.        ASSESSMENT AND PLAN:  Mr. Avila is a 47-year-old man with a diagnosis of clear-cell renal cell carcinoma (pT3a G3 R0) who underwent radical nephrectomy on 12/21/2021.  He began adjuvant therapy in 2/2022, and returns today to receive cycle #11 of adjuvant pembrolizumab treatment.  His ECOG score is 0.  His pain score is 1/10.     We previously reviewed the results of the KeyNote-564 trial as well as the recent FDA approval of adjuvant pembrolizumab for kidney cancer at high risk of relapse.  The eligibility for the KeyNote-564 clinical trial involved either: pT2 disease with grade 4 differentiation  or sarcomatoid features, all pT3/T4 disease, N1 disease, or resected M1 disease with current TONYA status.  The pembrolizumab was then delivered at a dose of 200 mg IV every 3 weeks, for a total of 17 cycles (one year).  This patient clearly meets criteria for adjuvant pembrolizumab.  After reviewing the anticipated benefits and potential side effects of pembrolizumab therapy, the patient decided to embark on this adjuvant treatment.  He returns today to receive cycle #11 of pembrolizumab.  He is tolerating this well, except for some pruritus behind both knees which is manageable.      We have also reviewed the surveillance schedule for stage pT3 kidney cancers following resection.  This involves a CT scan of the chest, abdomen and pelvis every 6 months for the first 3 years.  He would then need a CT scan every 12 months during years 3-5 of follow-up.  His next CT scan will be done in January 2023.  If he developed overt metastatic kidney cancer in the future, then the most likely scenario is that he would have oligometastatic disease at that time.  If this was the case, then he could certainly be managed with metastasectomy followed by pseudo-adjuvant pembrolizumab at that time.  If he had a polymetastatic recurrence, then my preferred regimen would be the combination of pembrolizumab-axitinib or nivolumab-cabozantinib.     A total of 40 minutes were spent on this patient on the day of the consultation, of which more than 50% of this time was used for counseling and coordination of care. The patient was given the opportunity to ask multiple questions today, all of which were answered to his satisfaction.     Bart Barahona M.D.

## 2022-09-07 NOTE — NURSING NOTE
"Oncology Rooming Note    September 7, 2022 2:33 PM   Peirre Avila is a 47 year old male who presents for:    Chief Complaint   Patient presents with     Blood Draw     Labs drawn with PIV start by RN. Vitals taken.     Oncology Clinic Visit     Renal Cell Carcinoma     Initial Vitals: /83 (BP Location: Right arm, Patient Position: Sitting, Cuff Size: Adult Regular)   Pulse 73   Temp 98.1  F (36.7  C) (Oral)   Resp 16   Wt 96 kg (211 lb 11.2 oz)   SpO2 97%   BMI 29.12 kg/m   Estimated body mass index is 29.12 kg/m  as calculated from the following:    Height as of 5/25/22: 1.816 m (5' 11.5\").    Weight as of this encounter: 96 kg (211 lb 11.2 oz). Body surface area is 2.2 meters squared.  No Pain (0) Comment: Data Unavailable   No LMP for male patient.  Allergies reviewed: Yes  Medications reviewed: Yes    Medications: Medication refills not needed today.  Pharmacy name entered into [facility]:    [facility] - [place], [place] - [address] [place] [address] [facility] [address] [place] [address] - [address] [facility] [place], [place] - [address] [facility] [phone]    Clinical concerns: Pt presents today for f/u.       Malina Casarez LPN  9/7/2022              "

## 2022-09-07 NOTE — PROGRESS NOTES
Infusion Nursing Note:  Pierre Mckeonson presents today for Cycle 11 Day 1 Keytruda   Patient seen by provider today: Yes: Dr. HEARN   present during visit today: Not Applicable.    Note:     Intravenous Access:  Peripheral IV placed.    Treatment Conditions:  Lab Results   Component Value Date     09/07/2022    POTASSIUM 4.0 09/07/2022    MAG 2.2 08/17/2022    CR 1.34 (H) 09/07/2022    CHRISTIANE 9.6 09/07/2022    BILITOTAL 0.5 09/07/2022    ALBUMIN 4.7 09/07/2022    ALT 36 09/07/2022    AST 29 09/07/2022     Creatinine is near baseline.     Post Infusion Assessment:  Patient tolerated infusion without incident.  Blood return noted pre and post infusion.  No evidence of extravasations.  Access discontinued per protocol.     Discharge Plan:   Discharge instructions reviewed with: Patient.  AVS to patient via [employer].  Patient will return 9/28 for next appointment.   Departure Mode: Ambulatory.      Cora Wilson RN

## 2022-09-07 NOTE — NURSING NOTE
Chief Complaint   Patient presents with     Blood Draw     Labs drawn with PIV start by RN. Vitals taken.     Labs drawn with PIV start by RN. Pt tolerated well. Vitals taken. Pt checked into next appointment.    Joy Devlin RN

## 2022-09-07 NOTE — LETTER
9/7/2022         RE: Pierre Avila  [address] [place] [facility] [address]        Dear Colleague,    Thank you for referring your patient, Pierre Avila, to the [facility]. Please see a copy of my visit note below.      FOLLOW UP VISIT     Reason for Visit:  Renal cell carcinoma (pT3a R0) s/p nephrectomy; cycle #11 (of 17) of adjuvant pembrolizumab.     History of Present Illness:  Mr. Avila is a 47-year-old man who presented with hematuria in 10/2021.  CT imaging (10/21/2021) revealed a large mass at the upper pole of the left kidney, without evidence of local or distant metastatic disease.  On 12/21/2021, he had a left radical nephrectomy which showed a 5.4 cm clear-cell renal cell carcinoma (no sarcomatoid or rhabdoid features), nucleolar grade 3, with tumor extending into the calyceal system.  His final pathological stage was pT3a G3 R0 kidney cancer.     The patient elected to receive adjuvant pembrolizumab treatment, which he started in 2/2022.  He returns today to begin cycle #11 of adjuvant pembrolizumab.  He is tolerating the treatment relatively well thus far.     Review of systems:  The patient reports feeling generally well, and he has recovered nicely from his radical nephrectomy surgery.  Insomnia is his biggest problem right now, and this persists despite trying melatonin.  He reports occasional fatigue and headaches. Reports occasional neuropathy bilaterally but especially in his right toes. Not interfering with daily activities. He has seen orthopedics in the past for longstanding plantar fascitis.  Reports some burning and itching behind both knees on and off after last infusion. Pain lasts 10-20 minutes with 3-4 flares over the last three weeks. Reports redness at the site, without any other rashes. Has dry skin at baseline. Does not moisturize regularly. No vitiligo. No peripheral edema. Working on eliminating sugars. Eats well otherwise. Reports good oral  hydration. Weight stable. No chest pain, shortness, or cough.  No abdominal pain, diarrhea, or constipation.  No anorexia.  No vomiting or nausea.  No urinary changes.  No mucositis.   A comprehensive 14-system review of symptoms is otherwise generally unremarkable.  His ECOG score is 0.  His pain score is 1/10.      Medications:   Pembrolizumab 200 mg IV q3wk     Allergies:    No known drug allergies.      Physical Examination:  Mr. Avila is a 47-year-old man who appears comfortable at rest.  His vital signs are unremarkable.  His HEENT examination is within normal limits.  The cardiac, pulmonary, and abdominal examinations are unremarkable.  There is no palpable cervical, axillary, supraclavicular or inguinal lymphadenopathy.  His surgical site from the recent robotic nephrectomy appears to be healing well, without evidence of erythema or exudate.  The neuromuscular examination is grossly intact.  The extremities do not demonstrate pitting edema.  The skin evaluation is unremarkable     Laboratories (9/7/22):  His CBC is within normal limits.  His comprehensive metabolic panel shows a creatinine level of 1.34 mg/dL (GFR of 66 mL/min), but is otherwise unremarkable.  His TSH level is normal at 2.04 mU/L.     Imaging (7/5/2022):  He underwent a CT scan of the chest, abdomen, and pelvis with IV contrast on 7/5/22.  This showed no evidence of recurrent or metastatic renal cell carcinoma; there is evidence of a prior left nephrectomy; there is slight thickening of the left adrenal gland without discrete nodularity.  Overall, this scan is consistent with TONYA.        ASSESSMENT AND PLAN:  Mr. Avila is a 47-year-old man with a diagnosis of clear-cell renal cell carcinoma (pT3a G3 R0) who underwent radical nephrectomy on 12/21/2021.  He began adjuvant therapy in 2/2022, and returns today to receive cycle #11 of adjuvant pembrolizumab treatment.  His ECOG score is 0.  His pain score is  1/10.     We previously reviewed the results of the KeyNote-564 trial as well as the recent FDA approval of adjuvant pembrolizumab for kidney cancer at high risk of relapse.  The eligibility for the KeyNote-564 clinical trial involved either: pT2 disease with grade 4 differentiation or sarcomatoid features, all pT3/T4 disease, N1 disease, or resected M1 disease with current TONYA status.  The pembrolizumab was then delivered at a dose of 200 mg IV every 3 weeks, for a total of 17 cycles (one year).  This patient clearly meets criteria for adjuvant pembrolizumab.  After reviewing the anticipated benefits and potential side effects of pembrolizumab therapy, the patient decided to embark on this adjuvant treatment.  He returns today to receive cycle #11 of pembrolizumab.  He is tolerating this well, except for some pruritus behind both knees which is manageable.      We have also reviewed the surveillance schedule for stage pT3 kidney cancers following resection.  This involves a CT scan of the chest, abdomen and pelvis every 6 months for the first 3 years.  He would then need a CT scan every 12 months during years 3-5 of follow-up.  His next CT scan will be done in January 2023.  If he developed overt metastatic kidney cancer in the future, then the most likely scenario is that he would have oligometastatic disease at that time.  If this was the case, then he could certainly be managed with metastasectomy followed by pseudo-adjuvant pembrolizumab at that time.  If he had a polymetastatic recurrence, then my preferred regimen would be the combination of pembrolizumab-axitinib or nivolumab-cabozantinib.     A total of 40 minutes were spent on this patient on the day of the consultation, of which more than 50% of this time was used for counseling and coordination of care. The patient was given the opportunity to ask multiple questions today, all of which were answered to his satisfaction.         Again, thank you for  allowing me to participate in the care of your patient.      Sincerely,    Brat Barahona MD

## 2022-09-27 NOTE — PROGRESS NOTES
FOLLOW UP VISIT     Reason for Visit:  Renal cell carcinoma (pT3a R0) s/p nephrectomy; cycle #12 (of 17) of adjuvant pembrolizumab.     History of Present Illness:  Mr. Avila is a 47-year-old man who presented with hematuria in 10/2021.  CT imaging (10/21/2021) revealed a large mass at the upper pole of the left kidney, without evidence of local or distant metastatic disease.  On 12/21/2021, he had a left radical nephrectomy which showed a 5.4 cm clear-cell renal cell carcinoma (no sarcomatoid or rhabdoid features), nucleolar grade 3, with tumor extending into the calyceal system.  His final pathological stage was pT3a G3 R0 kidney cancer.     The patient elected to receive adjuvant pembrolizumab treatment, which he started in 2/2022.  He returns today to begin cycle #12 of adjuvant pembrolizumab (out of a total of 17 planned cycles).  He is tolerating the treatment relatively well thus far.     Review of systems:  The patient reports feeling generally well, and he has recovered nicely from his radical nephrectomy surgery.  Insomnia is his biggest problem right now, and this persists despite trying melatonin.  He reports occasional fatigue and headaches. Reports occasional neuropathy bilaterally but especially in his right toes. Not interfering with daily activities. He has seen orthopedics in the past for longstanding plantar fascitis.  Reports some burning and itching behind both knees on and off after last infusion. Pain lasts 10-20 minutes with 3-4 flares over the last three weeks. Reports redness at the site, without any other rashes. Has dry skin at baseline. Does not moisturize regularly. No vitiligo. No peripheral edema. Working on eliminating sugars. Eats well otherwise. Reports good oral hydration. Weight stable. No chest pain, shortness, or cough.  No abdominal pain, diarrhea, or constipation.  No anorexia.  No vomiting or nausea.  No urinary changes.  No mucositis.   A comprehensive 14-system  review of symptoms is otherwise generally unremarkable.  His ECOG score is 0.  His pain score is 1/10.      Medications:   Pembrolizumab 200 mg IV q3wk.     Allergies:    No known drug allergies.      Physical Examination:  Mr. Avila is a 47-year-old man who appears comfortable at rest.  His vital signs are unremarkable.  His HEENT examination is within normal limits.  The cardiac, pulmonary, and abdominal examinations are unremarkable.  There is no palpable cervical, axillary, supraclavicular or inguinal lymphadenopathy.  His surgical site from the recent robotic nephrectomy appears to be healing well, without evidence of erythema or exudate.  The neuromuscular examination is grossly intact.  The extremities do not demonstrate pitting edema.  The skin evaluation is unremarkable     Laboratories (9/28/22):  His CBC is within normal limits.  His comprehensive metabolic panel shows a creatinine level of 1.3 mg/dL (GFR of 68 mL/min), but is otherwise unremarkable.  His TSH level is normal at 3.2 mU/L.     Imaging (7/5/2022):  He underwent a CT scan of the chest, abdomen, and pelvis with IV contrast on 7/5/2022.  This showed no evidence of recurrent or metastatic renal cell carcinoma; there is evidence of a prior left nephrectomy; there is slight thickening of the left adrenal gland without discrete nodularity.  Overall, this scan is consistent with TONYA.  His next scan will be in 1/2023.        ASSESSMENT AND PLAN:  Mr. Avila is a 47-year-old man with a diagnosis of clear-cell renal cell carcinoma (pT3a G3 R0) who underwent radical nephrectomy on 12/21/2021.  He began adjuvant therapy in 2/2022, and returns today to receive cycle #12 of adjuvant pembrolizumab treatment.  His ECOG score is 0.  His pain score is 1/10.     We previously reviewed the results of the KeyNote-564 trial as well as the recent FDA approval of adjuvant pembrolizumab for kidney cancer at high risk of relapse.  The eligibility for the  KeyNote-564 clinical trial involved either: pT2 disease with grade 4 differentiation or sarcomatoid features, all pT3/T4 disease, N1 disease, or resected M1 disease with current TONYA status.  The pembrolizumab was then delivered at a dose of 200 mg IV every 3 weeks, for a total of 17 cycles (one year).  This patient clearly meets criteria for adjuvant pembrolizumab.  After reviewing the anticipated benefits and potential side effects of pembrolizumab therapy, the patient decided to embark on this adjuvant treatment.  He returns today to receive cycle #12 of pembrolizumab.  He is tolerating this well, except for some pruritus behind both knees as well as some insomnia.      We have again reviewed the surveillance schedule for stage pT3 kidney cancers following resection.  This involves a CT scan of the chest, abdomen and pelvis every 6 months for the first 3 years.  He would then need a CT scan every 12 months during years 3-5 of follow-up.  His next CT scan will be done in January 2023.  If he developed overt metastatic kidney cancer in the future, then the most likely scenario is that he would have oligometastatic disease at that time.  If this was the case, then he could certainly be managed with metastasectomy followed by pseudo-adjuvant pembrolizumab at that time.  If he had a polymetastatic recurrence, then my preferred regimen would be the combination of pembrolizumab-axitinib or nivolumab-cabozantinib.     A total of 40 minutes were spent on this patient on the day of the consultation, of which more than 50% of this time was used for counseling and coordination of care. The patient was given the opportunity to ask multiple questions today, all of which were answered to his satisfaction.     Bart Barahona M.D.

## 2022-09-28 ENCOUNTER — INFUSION THERAPY VISIT (OUTPATIENT)
Dept: ONCOLOGY | Facility: CLINIC | Age: 47
End: 2022-09-28
Attending: INTERNAL MEDICINE
Payer: COMMERCIAL

## 2022-09-28 ENCOUNTER — APPOINTMENT (OUTPATIENT)
Dept: LAB | Facility: CLINIC | Age: 47
End: 2022-09-28
Attending: INTERNAL MEDICINE
Payer: COMMERCIAL

## 2022-09-28 VITALS
RESPIRATION RATE: 16 BRPM | OXYGEN SATURATION: 100 % | DIASTOLIC BLOOD PRESSURE: 90 MMHG | TEMPERATURE: 98 F | SYSTOLIC BLOOD PRESSURE: 140 MMHG | BODY MASS INDEX: 29.09 KG/M2 | HEART RATE: 65 BPM | WEIGHT: 211.5 LBS

## 2022-09-28 DIAGNOSIS — C64.9 RENAL CELL CARCINOMA, UNSPECIFIED LATERALITY (H): Primary | ICD-10-CM

## 2022-09-28 LAB
ALBUMIN SERPL BCG-MCNC: 4.7 G/DL (ref 3.5–5.2)
ALP SERPL-CCNC: 60 U/L (ref 40–129)
ALT SERPL W P-5'-P-CCNC: 32 U/L (ref 10–50)
ANION GAP SERPL CALCULATED.3IONS-SCNC: 8 MMOL/L (ref 7–15)
AST SERPL W P-5'-P-CCNC: 25 U/L (ref 10–50)
BILIRUB SERPL-MCNC: 0.6 MG/DL
BUN SERPL-MCNC: 21.2 MG/DL (ref 6–20)
CALCIUM SERPL-MCNC: 9.8 MG/DL (ref 8.6–10)
CHLORIDE SERPL-SCNC: 103 MMOL/L (ref 98–107)
CREAT SERPL-MCNC: 1.31 MG/DL (ref 0.67–1.17)
DEPRECATED HCO3 PLAS-SCNC: 29 MMOL/L (ref 22–29)
GFR SERPL CREATININE-BSD FRML MDRD: 68 ML/MIN/1.73M2
GLUCOSE SERPL-MCNC: 97 MG/DL (ref 70–99)
POTASSIUM SERPL-SCNC: 4.1 MMOL/L (ref 3.4–5.3)
PROT SERPL-MCNC: 7.5 G/DL (ref 6.4–8.3)
SODIUM SERPL-SCNC: 140 MMOL/L (ref 136–145)
TSH SERPL DL<=0.005 MIU/L-ACNC: 3.66 UIU/ML (ref 0.3–4.2)

## 2022-09-28 PROCEDURE — 84443 ASSAY THYROID STIM HORMONE: CPT | Performed by: INTERNAL MEDICINE

## 2022-09-28 PROCEDURE — G0463 HOSPITAL OUTPT CLINIC VISIT: HCPCS

## 2022-09-28 PROCEDURE — 258N000003 HC RX IP 258 OP 636: Performed by: INTERNAL MEDICINE

## 2022-09-28 PROCEDURE — 250N000011 HC RX IP 250 OP 636: Performed by: INTERNAL MEDICINE

## 2022-09-28 PROCEDURE — 36415 COLL VENOUS BLD VENIPUNCTURE: CPT | Performed by: INTERNAL MEDICINE

## 2022-09-28 PROCEDURE — 84155 ASSAY OF PROTEIN SERUM: CPT | Performed by: INTERNAL MEDICINE

## 2022-09-28 PROCEDURE — 99215 OFFICE O/P EST HI 40 MIN: CPT | Performed by: INTERNAL MEDICINE

## 2022-09-28 PROCEDURE — 96413 CHEMO IV INFUSION 1 HR: CPT

## 2022-09-28 RX ORDER — MEPERIDINE HYDROCHLORIDE 25 MG/ML
25 INJECTION INTRAMUSCULAR; INTRAVENOUS; SUBCUTANEOUS EVERY 30 MIN PRN
Status: CANCELLED | OUTPATIENT
Start: 2022-11-30

## 2022-09-28 RX ORDER — EPINEPHRINE 1 MG/ML
0.3 INJECTION, SOLUTION INTRAMUSCULAR; SUBCUTANEOUS EVERY 5 MIN PRN
Status: CANCELLED | OUTPATIENT
Start: 2022-11-30

## 2022-09-28 RX ORDER — LORAZEPAM 2 MG/ML
0.5 INJECTION INTRAMUSCULAR EVERY 4 HOURS PRN
Status: CANCELLED | OUTPATIENT
Start: 2022-12-21

## 2022-09-28 RX ORDER — ALBUTEROL SULFATE 0.83 MG/ML
2.5 SOLUTION RESPIRATORY (INHALATION)
Status: CANCELLED | OUTPATIENT
Start: 2022-10-19

## 2022-09-28 RX ORDER — ALBUTEROL SULFATE 90 UG/1
1-2 AEROSOL, METERED RESPIRATORY (INHALATION)
Status: CANCELLED
Start: 2022-11-09

## 2022-09-28 RX ORDER — ALBUTEROL SULFATE 0.83 MG/ML
2.5 SOLUTION RESPIRATORY (INHALATION)
Status: CANCELLED | OUTPATIENT
Start: 2022-11-09

## 2022-09-28 RX ORDER — MEPERIDINE HYDROCHLORIDE 25 MG/ML
25 INJECTION INTRAMUSCULAR; INTRAVENOUS; SUBCUTANEOUS EVERY 30 MIN PRN
Status: CANCELLED | OUTPATIENT
Start: 2022-11-09

## 2022-09-28 RX ORDER — ALBUTEROL SULFATE 0.83 MG/ML
2.5 SOLUTION RESPIRATORY (INHALATION)
Status: CANCELLED | OUTPATIENT
Start: 2022-11-30

## 2022-09-28 RX ORDER — LORAZEPAM 2 MG/ML
0.5 INJECTION INTRAMUSCULAR EVERY 4 HOURS PRN
Status: CANCELLED | OUTPATIENT
Start: 2022-10-19

## 2022-09-28 RX ORDER — DIPHENHYDRAMINE HYDROCHLORIDE 50 MG/ML
50 INJECTION INTRAMUSCULAR; INTRAVENOUS
Status: CANCELLED
Start: 2022-12-21

## 2022-09-28 RX ORDER — ALBUTEROL SULFATE 0.83 MG/ML
2.5 SOLUTION RESPIRATORY (INHALATION)
Status: CANCELLED | OUTPATIENT
Start: 2022-12-21

## 2022-09-28 RX ORDER — ALBUTEROL SULFATE 90 UG/1
1-2 AEROSOL, METERED RESPIRATORY (INHALATION)
Status: CANCELLED
Start: 2022-12-21

## 2022-09-28 RX ORDER — NALOXONE HYDROCHLORIDE 0.4 MG/ML
0.2 INJECTION, SOLUTION INTRAMUSCULAR; INTRAVENOUS; SUBCUTANEOUS
Status: CANCELLED | OUTPATIENT
Start: 2022-10-19

## 2022-09-28 RX ORDER — METHYLPREDNISOLONE SODIUM SUCCINATE 125 MG/2ML
125 INJECTION, POWDER, LYOPHILIZED, FOR SOLUTION INTRAMUSCULAR; INTRAVENOUS
Status: CANCELLED
Start: 2022-10-19

## 2022-09-28 RX ORDER — LORAZEPAM 2 MG/ML
0.5 INJECTION INTRAMUSCULAR EVERY 4 HOURS PRN
Status: CANCELLED | OUTPATIENT
Start: 2022-11-09

## 2022-09-28 RX ORDER — NALOXONE HYDROCHLORIDE 0.4 MG/ML
0.2 INJECTION, SOLUTION INTRAMUSCULAR; INTRAVENOUS; SUBCUTANEOUS
Status: CANCELLED | OUTPATIENT
Start: 2022-11-09

## 2022-09-28 RX ORDER — ALBUTEROL SULFATE 90 UG/1
1-2 AEROSOL, METERED RESPIRATORY (INHALATION)
Status: CANCELLED
Start: 2022-11-30

## 2022-09-28 RX ORDER — DIPHENHYDRAMINE HYDROCHLORIDE 50 MG/ML
50 INJECTION INTRAMUSCULAR; INTRAVENOUS
Status: CANCELLED
Start: 2022-11-09

## 2022-09-28 RX ORDER — NALOXONE HYDROCHLORIDE 0.4 MG/ML
0.2 INJECTION, SOLUTION INTRAMUSCULAR; INTRAVENOUS; SUBCUTANEOUS
Status: CANCELLED | OUTPATIENT
Start: 2022-11-30

## 2022-09-28 RX ORDER — MEPERIDINE HYDROCHLORIDE 25 MG/ML
25 INJECTION INTRAMUSCULAR; INTRAVENOUS; SUBCUTANEOUS EVERY 30 MIN PRN
Status: CANCELLED | OUTPATIENT
Start: 2022-10-19

## 2022-09-28 RX ORDER — NALOXONE HYDROCHLORIDE 0.4 MG/ML
0.2 INJECTION, SOLUTION INTRAMUSCULAR; INTRAVENOUS; SUBCUTANEOUS
Status: CANCELLED | OUTPATIENT
Start: 2022-12-21

## 2022-09-28 RX ORDER — DIPHENHYDRAMINE HYDROCHLORIDE 50 MG/ML
50 INJECTION INTRAMUSCULAR; INTRAVENOUS
Status: CANCELLED
Start: 2022-10-19

## 2022-09-28 RX ORDER — EPINEPHRINE 1 MG/ML
0.3 INJECTION, SOLUTION INTRAMUSCULAR; SUBCUTANEOUS EVERY 5 MIN PRN
Status: CANCELLED | OUTPATIENT
Start: 2022-10-19

## 2022-09-28 RX ORDER — EPINEPHRINE 1 MG/ML
0.3 INJECTION, SOLUTION INTRAMUSCULAR; SUBCUTANEOUS EVERY 5 MIN PRN
Status: CANCELLED | OUTPATIENT
Start: 2022-12-21

## 2022-09-28 RX ORDER — ALBUTEROL SULFATE 90 UG/1
1-2 AEROSOL, METERED RESPIRATORY (INHALATION)
Status: CANCELLED
Start: 2022-10-19

## 2022-09-28 RX ORDER — EPINEPHRINE 1 MG/ML
0.3 INJECTION, SOLUTION INTRAMUSCULAR; SUBCUTANEOUS EVERY 5 MIN PRN
Status: CANCELLED | OUTPATIENT
Start: 2022-11-09

## 2022-09-28 RX ORDER — METHYLPREDNISOLONE SODIUM SUCCINATE 125 MG/2ML
125 INJECTION, POWDER, LYOPHILIZED, FOR SOLUTION INTRAMUSCULAR; INTRAVENOUS
Status: CANCELLED
Start: 2022-11-09

## 2022-09-28 RX ORDER — DIPHENHYDRAMINE HYDROCHLORIDE 50 MG/ML
50 INJECTION INTRAMUSCULAR; INTRAVENOUS
Status: CANCELLED
Start: 2022-11-30

## 2022-09-28 RX ORDER — METHYLPREDNISOLONE SODIUM SUCCINATE 125 MG/2ML
125 INJECTION, POWDER, LYOPHILIZED, FOR SOLUTION INTRAMUSCULAR; INTRAVENOUS
Status: CANCELLED
Start: 2022-12-21

## 2022-09-28 RX ORDER — LORAZEPAM 2 MG/ML
0.5 INJECTION INTRAMUSCULAR EVERY 4 HOURS PRN
Status: CANCELLED | OUTPATIENT
Start: 2022-11-30

## 2022-09-28 RX ORDER — METHYLPREDNISOLONE SODIUM SUCCINATE 125 MG/2ML
125 INJECTION, POWDER, LYOPHILIZED, FOR SOLUTION INTRAMUSCULAR; INTRAVENOUS
Status: CANCELLED
Start: 2022-11-30

## 2022-09-28 RX ORDER — MEPERIDINE HYDROCHLORIDE 25 MG/ML
25 INJECTION INTRAMUSCULAR; INTRAVENOUS; SUBCUTANEOUS EVERY 30 MIN PRN
Status: CANCELLED | OUTPATIENT
Start: 2022-12-21

## 2022-09-28 RX ADMIN — SODIUM CHLORIDE 250 ML: 9 INJECTION, SOLUTION INTRAVENOUS at 10:17

## 2022-09-28 RX ADMIN — SODIUM CHLORIDE 200 MG: 9 INJECTION, SOLUTION INTRAVENOUS at 10:17

## 2022-09-28 ASSESSMENT — PAIN SCALES - GENERAL: PAINLEVEL: NO PAIN (0)

## 2022-09-28 NOTE — LETTER
9/28/2022         RE: Pierre Avila  [address] [place] [address]        Dear Colleague,    Thank you for referring your patient, Pierre Avila, to the [facility]. Please see a copy of my visit note below.      FOLLOW UP VISIT     Reason for Visit:  Renal cell carcinoma (pT3a R0) s/p nephrectomy; cycle #12 (of 17) of adjuvant pembrolizumab.     History of Present Illness:  Mr. Avila is a 47-year-old man who presented with hematuria in 10/2021.  CT imaging (10/21/2021) revealed a large mass at the upper pole of the left kidney, without evidence of local or distant metastatic disease.  On 12/21/2021, he had a left radical nephrectomy which showed a 5.4 cm clear-cell renal cell carcinoma (no sarcomatoid or rhabdoid features), nucleolar grade 3, with tumor extending into the calyceal system.  His final pathological stage was pT3a G3 R0 kidney cancer.     The patient elected to receive adjuvant pembrolizumab treatment, which he started in 2/2022.  He returns today to begin cycle #12 of adjuvant pembrolizumab (out of a total of 17 planned cycles).  He is tolerating the treatment relatively well thus far.     Review of systems:  The patient reports feeling generally well, and he has recovered nicely from his radical nephrectomy surgery.  Insomnia is his biggest problem right now, and this persists despite trying melatonin.  He reports occasional fatigue and headaches. Reports occasional neuropathy bilaterally but especially in his right toes. Not interfering with daily activities. He has seen orthopedics in the past for longstanding plantar fascitis.  Reports some burning and itching behind both knees on and off after last infusion. Pain lasts 10-20 minutes with 3-4 flares over the last three weeks. Reports redness at the site, without any other rashes. Has dry skin at baseline. Does not moisturize regularly. No vitiligo. No peripheral edema. Working on eliminating sugars.  Eats well otherwise. Reports good oral hydration. Weight stable. No chest pain, shortness, or cough.  No abdominal pain, diarrhea, or constipation.  No anorexia.  No vomiting or nausea.  No urinary changes.  No mucositis.   A comprehensive 14-system review of symptoms is otherwise generally unremarkable.  His ECOG score is 0.  His pain score is 1/10.      Medications:   Pembrolizumab 200 mg IV q3wk.     Allergies:    No known drug allergies.      Physical Examination:  Mr. Avila is a 47-year-old man who appears comfortable at rest.  His vital signs are unremarkable.  His HEENT examination is within normal limits.  The cardiac, pulmonary, and abdominal examinations are unremarkable.  There is no palpable cervical, axillary, supraclavicular or inguinal lymphadenopathy.  His surgical site from the recent robotic nephrectomy appears to be healing well, without evidence of erythema or exudate.  The neuromuscular examination is grossly intact.  The extremities do not demonstrate pitting edema.  The skin evaluation is unremarkable     Laboratories (9/28/22):  His CBC is within normal limits.  His comprehensive metabolic panel shows a creatinine level of 1.3 mg/dL (GFR of 68 mL/min), but is otherwise unremarkable.  His TSH level is normal at 3.2 mU/L.     Imaging (7/5/2022):  He underwent a CT scan of the chest, abdomen, and pelvis with IV contrast on 7/5/2022.  This showed no evidence of recurrent or metastatic renal cell carcinoma; there is evidence of a prior left nephrectomy; there is slight thickening of the left adrenal gland without discrete nodularity.  Overall, this scan is consistent with TONYA.  His next scan will be in 1/2023.        ASSESSMENT AND PLAN:  Mr. Avila is a 47-year-old man with a diagnosis of clear-cell renal cell carcinoma (pT3a G3 R0) who underwent radical nephrectomy on 12/21/2021.  He began adjuvant therapy in 2/2022, and returns today to receive cycle #12 of adjuvant pembrolizumab  treatment.  His ECOG score is 0.  His pain score is 1/10.     We previously reviewed the results of the KeyNote-564 trial as well as the recent FDA approval of adjuvant pembrolizumab for kidney cancer at high risk of relapse.  The eligibility for the KeyNote-564 clinical trial involved either: pT2 disease with grade 4 differentiation or sarcomatoid features, all pT3/T4 disease, N1 disease, or resected M1 disease with current TONYA status.  The pembrolizumab was then delivered at a dose of 200 mg IV every 3 weeks, for a total of 17 cycles (one year).  This patient clearly meets criteria for adjuvant pembrolizumab.  After reviewing the anticipated benefits and potential side effects of pembrolizumab therapy, the patient decided to embark on this adjuvant treatment.  He returns today to receive cycle #12 of pembrolizumab.  He is tolerating this well, except for some pruritus behind both knees as well as some insomnia.      We have again reviewed the surveillance schedule for stage pT3 kidney cancers following resection.  This involves a CT scan of the chest, abdomen and pelvis every 6 months for the first 3 years.  He would then need a CT scan every 12 months during years 3-5 of follow-up.  His next CT scan will be done in January 2023.  If he developed overt metastatic kidney cancer in the future, then the most likely scenario is that he would have oligometastatic disease at that time.  If this was the case, then he could certainly be managed with metastasectomy followed by pseudo-adjuvant pembrolizumab at that time.  If he had a polymetastatic recurrence, then my preferred regimen would be the combination of pembrolizumab-axitinib or nivolumab-cabozantinib.     A total of 40 minutes were spent on this patient on the day of the consultation, of which more than 50% of this time was used for counseling and coordination of care. The patient was given the opportunity to ask multiple questions today, all of which were  answered to his satisfaction.        Again, thank you for allowing me to participate in the care of your patient.      Sincerely,    Bart Barahona MD

## 2022-09-28 NOTE — PROGRESS NOTES
Infusion Nursing Note:  Pierre PERLA Austin presents today for C12D1 Keytruda.    Patient seen by provider today: Yes: Dr. Barahona   present during visit today: Not Applicable.    Note: Pt saw provider prior to infusion, ok for treatment.      Intravenous Access:  Peripheral IV placed.    Treatment Conditions:  Lab Results   Component Value Date     09/28/2022    POTASSIUM 4.1 09/28/2022    MAG 2.2 08/17/2022    CR 1.31 (H) 09/28/2022    CHRISTIANE 9.8 09/28/2022    BILITOTAL 0.6 09/28/2022    ALBUMIN 4.7 09/28/2022    ALT 32 09/28/2022    AST 25 09/28/2022     Results reviewed, labs MET treatment parameters, ok to proceed with treatment.      Post Infusion Assessment:  Patient tolerated infusion without incident.  Blood return noted pre and post infusion.  Site patent and intact, free from redness, edema or discomfort.  No evidence of extravasations.  Access discontinued per protocol.       Discharge Plan:   Patient declined prescription refills.  Discharge instructions reviewed with: Patient.  Patient and/or family verbalized understanding of discharge instructions and all questions answered.  AVS to patient via Skypaz.  Patient will return 10/19 for next appointment.   Patient discharged in stable condition accompanied by: self.  Departure Mode: Ambulatory.    Mireille Alvarado RN

## 2022-09-28 NOTE — NURSING NOTE
"Oncology Rooming Note    September 28, 2022 8:56 AM   Pierre Avila is a 47 year old male who presents for:    Chief Complaint   Patient presents with     Blood Draw     Vitals, blood drawn and PIV placed by LPN. Pt checked into appt.      Oncology Clinic Visit     RTN for Renal Cell Carcinoma     Initial Vitals: Blood Pressure (Abnormal) 140/90   Pulse 65   Temperature 98  F (36.7  C) (Oral)   Respiration 16   Weight 95.9 kg (211 lb 8 oz)   Oxygen Saturation 100%   Body Mass Index 29.09 kg/m   Estimated body mass index is 29.09 kg/m  as calculated from the following:    Height as of 5/25/22: 1.816 m (5' 11.5\").    Weight as of this encounter: 95.9 kg (211 lb 8 oz). Body surface area is 2.2 meters squared.  No Pain (0) Comment: Data Unavailable   No LMP for male patient.  Allergies reviewed: Yes  Medications reviewed: Yes    Medications: Medication refills not needed today.  Pharmacy name entered into MightyQuiz:    [facility] - [place] - [address] [place] [address] [facility] - [place], [place] - [address] [facility] [place], [place] - [phone]    Clinical concerns: none       Sherin Thapa MA            "

## 2022-10-09 ENCOUNTER — HEALTH MAINTENANCE LETTER (OUTPATIENT)
Age: 47
End: 2022-10-09

## 2022-10-19 ENCOUNTER — APPOINTMENT (OUTPATIENT)
Dept: LAB | Facility: CLINIC | Age: 47
End: 2022-10-19
Attending: INTERNAL MEDICINE
Payer: COMMERCIAL

## 2022-10-19 ENCOUNTER — ONCOLOGY VISIT (OUTPATIENT)
Dept: ONCOLOGY | Facility: CLINIC | Age: 47
End: 2022-10-19
Attending: INTERNAL MEDICINE
Payer: COMMERCIAL

## 2022-10-19 VITALS
RESPIRATION RATE: 16 BRPM | DIASTOLIC BLOOD PRESSURE: 83 MMHG | SYSTOLIC BLOOD PRESSURE: 122 MMHG | WEIGHT: 214.9 LBS | TEMPERATURE: 98.3 F | OXYGEN SATURATION: 100 % | HEART RATE: 66 BPM | BODY MASS INDEX: 29.56 KG/M2

## 2022-10-19 DIAGNOSIS — C64.9 RENAL CELL CARCINOMA, UNSPECIFIED LATERALITY (H): Primary | ICD-10-CM

## 2022-10-19 LAB
ALBUMIN SERPL BCG-MCNC: 4.5 G/DL (ref 3.5–5.2)
ALP SERPL-CCNC: 59 U/L (ref 40–129)
ALT SERPL W P-5'-P-CCNC: 34 U/L (ref 10–50)
ANION GAP SERPL CALCULATED.3IONS-SCNC: 9 MMOL/L (ref 7–15)
AST SERPL W P-5'-P-CCNC: 27 U/L (ref 10–50)
BILIRUB SERPL-MCNC: 0.4 MG/DL
BUN SERPL-MCNC: 21.6 MG/DL (ref 6–20)
CALCIUM SERPL-MCNC: 9.4 MG/DL (ref 8.6–10)
CHLORIDE SERPL-SCNC: 105 MMOL/L (ref 98–107)
CREAT SERPL-MCNC: 1.23 MG/DL (ref 0.67–1.17)
DEPRECATED HCO3 PLAS-SCNC: 26 MMOL/L (ref 22–29)
GFR SERPL CREATININE-BSD FRML MDRD: 73 ML/MIN/1.73M2
GLUCOSE SERPL-MCNC: 91 MG/DL (ref 70–99)
POTASSIUM SERPL-SCNC: 4.3 MMOL/L (ref 3.4–5.3)
PROT SERPL-MCNC: 7.3 G/DL (ref 6.4–8.3)
SODIUM SERPL-SCNC: 140 MMOL/L (ref 136–145)
TSH SERPL DL<=0.005 MIU/L-ACNC: 2.66 UIU/ML (ref 0.3–4.2)

## 2022-10-19 PROCEDURE — G0463 HOSPITAL OUTPT CLINIC VISIT: HCPCS

## 2022-10-19 PROCEDURE — 258N000003 HC RX IP 258 OP 636: Performed by: INTERNAL MEDICINE

## 2022-10-19 PROCEDURE — 84443 ASSAY THYROID STIM HORMONE: CPT | Performed by: INTERNAL MEDICINE

## 2022-10-19 PROCEDURE — 250N000011 HC RX IP 250 OP 636: Performed by: INTERNAL MEDICINE

## 2022-10-19 PROCEDURE — 99215 OFFICE O/P EST HI 40 MIN: CPT | Performed by: INTERNAL MEDICINE

## 2022-10-19 PROCEDURE — 96413 CHEMO IV INFUSION 1 HR: CPT

## 2022-10-19 PROCEDURE — 36415 COLL VENOUS BLD VENIPUNCTURE: CPT | Performed by: INTERNAL MEDICINE

## 2022-10-19 PROCEDURE — 80053 COMPREHEN METABOLIC PANEL: CPT | Performed by: INTERNAL MEDICINE

## 2022-10-19 RX ORDER — METHYLPREDNISOLONE SODIUM SUCCINATE 125 MG/2ML
125 INJECTION, POWDER, LYOPHILIZED, FOR SOLUTION INTRAMUSCULAR; INTRAVENOUS
Status: CANCELLED
Start: 2023-01-11

## 2022-10-19 RX ORDER — NALOXONE HYDROCHLORIDE 0.4 MG/ML
0.2 INJECTION, SOLUTION INTRAMUSCULAR; INTRAVENOUS; SUBCUTANEOUS
Status: CANCELLED | OUTPATIENT
Start: 2023-01-11

## 2022-10-19 RX ORDER — DIPHENHYDRAMINE HYDROCHLORIDE 50 MG/ML
50 INJECTION INTRAMUSCULAR; INTRAVENOUS
Status: CANCELLED
Start: 2023-01-11

## 2022-10-19 RX ORDER — ALBUTEROL SULFATE 0.83 MG/ML
2.5 SOLUTION RESPIRATORY (INHALATION)
Status: CANCELLED | OUTPATIENT
Start: 2023-01-11

## 2022-10-19 RX ORDER — EPINEPHRINE 1 MG/ML
0.3 INJECTION, SOLUTION INTRAMUSCULAR; SUBCUTANEOUS EVERY 5 MIN PRN
Status: CANCELLED | OUTPATIENT
Start: 2023-01-11

## 2022-10-19 RX ORDER — MEPERIDINE HYDROCHLORIDE 25 MG/ML
25 INJECTION INTRAMUSCULAR; INTRAVENOUS; SUBCUTANEOUS EVERY 30 MIN PRN
Status: CANCELLED | OUTPATIENT
Start: 2023-01-11

## 2022-10-19 RX ORDER — LORAZEPAM 2 MG/ML
0.5 INJECTION INTRAMUSCULAR EVERY 4 HOURS PRN
Status: CANCELLED | OUTPATIENT
Start: 2023-01-11

## 2022-10-19 RX ORDER — ALBUTEROL SULFATE 90 UG/1
1-2 AEROSOL, METERED RESPIRATORY (INHALATION)
Status: CANCELLED
Start: 2023-01-11

## 2022-10-19 RX ADMIN — SODIUM CHLORIDE 250 ML: 9 INJECTION, SOLUTION INTRAVENOUS at 09:25

## 2022-10-19 RX ADMIN — SODIUM CHLORIDE 200 MG: 9 INJECTION, SOLUTION INTRAVENOUS at 09:41

## 2022-10-19 ASSESSMENT — PAIN SCALES - GENERAL: PAINLEVEL: NO PAIN (0)

## 2022-10-19 NOTE — LETTER
10/19/2022         RE: Pierre Avila  [address] [facility] [address]        Dear Colleague,    Thank you for referring your patient, Pierre Avila, to the [facility]. Please see a copy of my visit note below.      FOLLOW UP VISIT     Reason for Visit:  Renal cell carcinoma (pT3a R0) s/p nephrectomy; cycle #13 (of 17) of adjuvant pembrolizumab.     History of Present Illness:  Mr. Avila is a 47-year-old man who presented with hematuria in 10/2021.  CT imaging (10/21/2021) revealed a large mass at the upper pole of the left kidney, without evidence of local or distant metastatic disease.  On 12/21/2021, he had a left radical nephrectomy which showed a 5.4 cm clear-cell renal cell carcinoma (no sarcomatoid or rhabdoid features), nucleolar grade 3, with tumor extending into the calyceal system.  His final pathological stage was pT3a G3 R0 kidney cancer.     The patient elected to receive adjuvant pembrolizumab treatment, which he started in 2/2022.  He returns today to begin cycle #13 of adjuvant pembrolizumab (out of a total of 17 planned cycles).  He is tolerating the treatment relatively well thus far.     Review of systems:  The patient reports feeling generally well, and he has recovered nicely from his radical nephrectomy surgery.  Insomnia is his biggest problem right now, and this persists despite trying melatonin.  He reports occasional fatigue and headaches. Reports occasional neuropathy bilaterally but especially in his right toes. Not interfering with daily activities. He has seen orthopedics in the past for longstanding plantar fascitis.  Reports some burning and itching behind both knees on and off after last infusion. Pain lasts 10-20 minutes with 3-4 flares over the last three weeks. Reports redness at the site, without any other rashes. Has dry skin at baseline. Does not moisturize regularly. No vitiligo. No peripheral edema. Working on eliminating  sugars. Eats well otherwise. Reports good oral hydration. Weight stable. No chest pain, shortness, or cough.  No abdominal pain, diarrhea, or constipation.  No anorexia.  No vomiting or nausea.  No urinary changes.  No mucositis.   A comprehensive 14-system review of symptoms is otherwise generally unremarkable.  His ECOG score is 0.  His pain score is 1/10.      Medications:   Pembrolizumab 200 mg IV q3wk.     Allergies:    No known drug allergies.      Physical Examination:  Mr. Avila is a 47-year-old man who appears comfortable at rest.  His vital signs are unremarkable.  His HEENT examination is within normal limits.  The cardiac, pulmonary, and abdominal examinations are unremarkable.  There is no palpable cervical, axillary, supraclavicular or inguinal lymphadenopathy.  His surgical site from the recent robotic nephrectomy appears to be healing well, without evidence of erythema or exudate.  The neuromuscular examination is grossly intact.  The extremities do not demonstrate pitting edema.  The skin evaluation is unremarkable     Laboratories (10/19/22):  His CBC is within normal limits.  His comprehensive metabolic panel shows a creatinine level of 1.23 mg/dL (GFR of 73 mL/min), but is otherwise unremarkable.  His TSH level is normal at 3.66 mU/L.     Imaging (7/5/2022):  He underwent a CT scan of the chest, abdomen, and pelvis with IV contrast on 7/5/2022.  This showed no evidence of recurrent or metastatic renal cell carcinoma; there is evidence of a prior left nephrectomy; there is slight thickening of the left adrenal gland without discrete nodularity.  Overall, this scan is consistent with TONYA.  His next scan will be in 1/2023.        ASSESSMENT AND PLAN:  Mr. Avila is a 47-year-old man with a diagnosis of clear-cell renal cell carcinoma (pT3a G3 R0) who underwent radical nephrectomy on 12/21/2021.  He began adjuvant therapy in 2/2022, and returns today to receive cycle #13 of adjuvant  pembrolizumab treatment.  His ECOG score is 0.  His pain score is 1/10.     We previously reviewed the results of the KeyNote-564 trial as well as the recent FDA approval of adjuvant pembrolizumab for kidney cancer at high risk of relapse.  The eligibility for the KeyNote-564 clinical trial involved either: pT2 disease with grade 4 differentiation or sarcomatoid features, all pT3/T4 disease, N1 disease, or resected M1 disease with current TONYA status.  The pembrolizumab was then delivered at a dose of 200 mg IV every 3 weeks, for a total of 17 cycles (one year).  This patient clearly meets criteria for adjuvant pembrolizumab.  After reviewing the anticipated benefits and potential side effects of pembrolizumab therapy, the patient decided to embark on this adjuvant treatment.  He returns today to receive cycle #12 of pembrolizumab.  He is tolerating this well, except for some pruritus behind both knees as well as some insomnia.      We have again reviewed the surveillance schedule for stage pT3 kidney cancers following resection.  This involves a CT scan of the chest, abdomen and pelvis every 6 months for the first 3 years.  His next CT scan will be done in January 2023.  He would then need a CT scan every 12 months during years 3-5 of follow-up.  If he developed overt metastatic kidney cancer in the future, then the most likely scenario is that he would have oligometastatic disease at that time.  If this was the case, then he could certainly be managed with metastasectomy followed by pseudo-adjuvant pembrolizumab at that time.  If he had a polymetastatic recurrence, then my preferred regimen would be the combination of pembrolizumab-axitinib or nivolumab-cabozantinib.     A total of 40 minutes were spent on this patient on the day of the consultation, of which more than 50% of this time was used for counseling and coordination of care. The patient was given the opportunity to ask multiple questions today, all of  which were answered to his satisfaction.         Again, thank you for allowing me to participate in the care of your patient.      Sincerely,    Bart Barahona MD

## 2022-10-19 NOTE — PATIENT INSTRUCTIONS
[facility] Triage and after hours / weekends / holidays:  [phone]    Please call the triage or after hours line if you experience a temperature greater than or equal to 100.4, shaking chills, have uncontrolled nausea, vomiting and/or diarrhea, dizziness, shortness of breath, chest pain, bleeding, unexplained bruising, or if you have any other new/concerning symptoms, questions or concerns.      If you are having any concerning symptoms or wish to speak to a provider before your next infusion visit, please call your care coordinator or triage to notify them so we can adequately serve you.     If you need a refill on a narcotic prescription or other medication, please call before your infusion appointment.                       Lab Results:  Recent Results (from the past 12 hour(s))   Comprehensive metabolic panel    Collection Time: 10/19/22  8:37 AM   Result Value Ref Range    Sodium 140 136 - 145 mmol/L    Potassium 4.3 3.4 - 5.3 mmol/L    Chloride 105 98 - 107 mmol/L    Carbon Dioxide (CO2) 26 22 - 29 mmol/L    Anion Gap 9 7 - 15 mmol/L    Urea Nitrogen 21.6 (H) 6.0 - 20.0 mg/dL    Creatinine 1.23 (H) 0.67 - 1.17 mg/dL    Calcium 9.4 8.6 - 10.0 mg/dL    Glucose 91 70 - 99 mg/dL    Alkaline Phosphatase 59 40 - 129 U/L    AST 27 10 - 50 U/L    ALT 34 10 - 50 U/L    Protein Total 7.3 6.4 - 8.3 g/dL    Albumin 4.5 3.5 - 5.2 g/dL    Bilirubin Total 0.4 <=1.2 mg/dL    GFR Estimate 73 >60 mL/min/1.73m2

## 2022-10-19 NOTE — PROGRESS NOTES
FOLLOW UP VISIT     Reason for Visit:  Renal cell carcinoma (pT3a R0) s/p nephrectomy; cycle #13 (of 17) of adjuvant pembrolizumab.     History of Present Illness:  Mr. Avila is a 47-year-old man who presented with hematuria in 10/2021.  CT imaging (10/21/2021) revealed a large mass at the upper pole of the left kidney, without evidence of local or distant metastatic disease.  On 12/21/2021, he had a left radical nephrectomy which showed a 5.4 cm clear-cell renal cell carcinoma (no sarcomatoid or rhabdoid features), nucleolar grade 3, with tumor extending into the calyceal system.  His final pathological stage was pT3a G3 R0 kidney cancer.     The patient elected to receive adjuvant pembrolizumab treatment, which he started in 2/2022.  He returns today to begin cycle #13 of adjuvant pembrolizumab (out of a total of 17 planned cycles).  He is tolerating the treatment relatively well thus far.     Review of systems:  The patient reports feeling generally well, and he has recovered nicely from his radical nephrectomy surgery.  Insomnia is his biggest problem right now, and this persists despite trying melatonin.  He reports occasional fatigue and headaches. Reports occasional neuropathy bilaterally but especially in his right toes. Not interfering with daily activities. He has seen orthopedics in the past for longstanding plantar fascitis.  Reports some burning and itching behind both knees on and off after last infusion. Pain lasts 10-20 minutes with 3-4 flares over the last three weeks. Reports redness at the site, without any other rashes. Has dry skin at baseline. Does not moisturize regularly. No vitiligo. No peripheral edema. Working on eliminating sugars. Eats well otherwise. Reports good oral hydration. Weight stable. No chest pain, shortness, or cough.  No abdominal pain, diarrhea, or constipation.  No anorexia.  No vomiting or nausea.  No urinary changes.  No mucositis.   A comprehensive 14-system  review of symptoms is otherwise generally unremarkable.  His ECOG score is 0.  His pain score is 1/10.      Medications:   Pembrolizumab 200 mg IV q3wk.     Allergies:    No known drug allergies.      Physical Examination:  Mr. Avila is a 47-year-old man who appears comfortable at rest.  His vital signs are unremarkable.  His HEENT examination is within normal limits.  The cardiac, pulmonary, and abdominal examinations are unremarkable.  There is no palpable cervical, axillary, supraclavicular or inguinal lymphadenopathy.  His surgical site from the recent robotic nephrectomy appears to be healing well, without evidence of erythema or exudate.  The neuromuscular examination is grossly intact.  The extremities do not demonstrate pitting edema.  The skin evaluation is unremarkable     Laboratories (10/19/22):  His CBC is within normal limits.  His comprehensive metabolic panel shows a creatinine level of 1.23 mg/dL (GFR of 73 mL/min), but is otherwise unremarkable.  His TSH level is normal at 3.66 mU/L.     Imaging (7/5/2022):  He underwent a CT scan of the chest, abdomen, and pelvis with IV contrast on 7/5/2022.  This showed no evidence of recurrent or metastatic renal cell carcinoma; there is evidence of a prior left nephrectomy; there is slight thickening of the left adrenal gland without discrete nodularity.  Overall, this scan is consistent with TONYA.  His next scan will be in 1/2023.        ASSESSMENT AND PLAN:  Mr. Avila is a 47-year-old man with a diagnosis of clear-cell renal cell carcinoma (pT3a G3 R0) who underwent radical nephrectomy on 12/21/2021.  He began adjuvant therapy in 2/2022, and returns today to receive cycle #13 of adjuvant pembrolizumab treatment.  His ECOG score is 0.  His pain score is 1/10.     We previously reviewed the results of the KeyNote-564 trial as well as the recent FDA approval of adjuvant pembrolizumab for kidney cancer at high risk of relapse.  The eligibility for the  KeyNote-564 clinical trial involved either: pT2 disease with grade 4 differentiation or sarcomatoid features, all pT3/T4 disease, N1 disease, or resected M1 disease with current TONYA status.  The pembrolizumab was then delivered at a dose of 200 mg IV every 3 weeks, for a total of 17 cycles (one year).  This patient clearly meets criteria for adjuvant pembrolizumab.  After reviewing the anticipated benefits and potential side effects of pembrolizumab therapy, the patient decided to embark on this adjuvant treatment.  He returns today to receive cycle #12 of pembrolizumab.  He is tolerating this well, except for some pruritus behind both knees as well as some insomnia.      We have again reviewed the surveillance schedule for stage pT3 kidney cancers following resection.  This involves a CT scan of the chest, abdomen and pelvis every 6 months for the first 3 years.  His next CT scan will be done in January 2023.  He would then need a CT scan every 12 months during years 3-5 of follow-up.  If he developed overt metastatic kidney cancer in the future, then the most likely scenario is that he would have oligometastatic disease at that time.  If this was the case, then he could certainly be managed with metastasectomy followed by pseudo-adjuvant pembrolizumab at that time.  If he had a polymetastatic recurrence, then my preferred regimen would be the combination of pembrolizumab-axitinib or nivolumab-cabozantinib.     A total of 40 minutes were spent on this patient on the day of the consultation, of which more than 50% of this time was used for counseling and coordination of care. The patient was given the opportunity to ask multiple questions today, all of which were answered to his satisfaction.     Bart Barahona M.D.

## 2022-10-19 NOTE — PROGRESS NOTES
Infusion Nursing Note:  Pierre Avila presents today for Cycle 13 Day 1 Keytruda.    Patient seen by provider today: Yes: Dr. Barahona   present during visit today: Not Applicable.    Note: Pt presents to infusion feeling well. He offers no new concerns following his provider appointment.    Intravenous Access:  Peripheral IV placed.    Treatment Conditions:  Lab Results   Component Value Date    HGB 14.1 07/06/2022    WBC 6.6 07/06/2022    ANEUTAUTO 4.0 07/06/2022     07/06/2022      Lab Results   Component Value Date     10/19/2022    POTASSIUM 4.3 10/19/2022    MAG 2.2 08/17/2022    CR 1.23 (H) 10/19/2022    CHRISTIANE 9.4 10/19/2022    BILITOTAL 0.4 10/19/2022    ALBUMIN 4.5 10/19/2022    ALT 34 10/19/2022    AST 27 10/19/2022     Results reviewed, labs MET treatment parameters, ok to proceed with treatment.    Post Infusion Assessment:  Patient tolerated infusion without incident.  Blood return noted pre and post infusion.  Site patent and intact, free from redness, edema or discomfort.  No evidence of extravasations.  Access discontinued per protocol.     Discharge Plan:   Patient declined prescription refills.  Discharge instructions reviewed with: Patient.  Patient and/or family verbalized understanding of discharge instructions and all questions answered.  Copy of AVS reviewed with patient and/or family.  Patient will return 11/9/22 for next appointment.  Patient discharged in stable condition accompanied by: self.  Departure Mode: Ambulatory.      Lauren Littlejohn RN

## 2022-10-19 NOTE — NURSING NOTE
Chief Complaint   Patient presents with     Oncology Clinic Visit     Renal cell carcinoma, unspecified laterality (H)     Blood Draw     Labs drawn by RN via PIV, vitals taken.     Labs drawn from PIV placed by RN. Line flushed with saline. Vitals taken. Pt checked in for appointment(s).    Jocelyn Cuellar RN

## 2022-11-08 NOTE — PROGRESS NOTES
FOLLOW UP VISIT     Reason for Visit:  Renal cell carcinoma (pT3a R0) s/p nephrectomy; cycle #14 (of 17) of adjuvant pembrolizumab.     History of Present Illness:  Mr. Avila is a 47-year-old man who presented with hematuria in 10/2021.  CT imaging (10/21/2021) revealed a large mass at the upper pole of the left kidney, without evidence of local or distant metastatic disease.  On 12/21/2021, he had a left radical nephrectomy which showed a 5.4 cm clear-cell renal cell carcinoma (no sarcomatoid or rhabdoid features), nucleolar grade 3, with tumor extending into the calyceal system.  His final pathological stage was pT3a G3 R0 kidney cancer.     The patient elected to receive adjuvant pembrolizumab treatment, which he started in 2/2022.  He returns today to begin cycle #14 of adjuvant pembrolizumab (out of a total of 17 planned cycles).  He is tolerating the treatment relatively well thus far.     Review of systems:  The patient reports feeling generally well, and he has recovered nicely from his radical nephrectomy surgery.  Insomnia is his biggest problem right now, and this persists despite trying melatonin.  He reports occasional fatigue and headaches. Reports occasional neuropathy bilaterally but especially in his right toes. Not interfering with daily activities. He has seen orthopedics in the past for longstanding plantar fascitis.  Reports some burning and itching behind both knees on and off after last infusion. Pain lasts 10-20 minutes with 3-4 flares over the last three weeks. Reports redness at the site, without any other rashes. Has dry skin at baseline. Does not moisturize regularly. No vitiligo. No peripheral edema. Working on eliminating sugars. Eats well otherwise. Reports good oral hydration. Weight stable. No chest pain, shortness, or cough.  No abdominal pain, diarrhea, or constipation.  No anorexia.  No vomiting or nausea.  No urinary changes.  No mucositis.   A comprehensive 14-system  review of symptoms is otherwise generally unremarkable.  His ECOG score is 0.  His pain score is 1/10.      Medications:   Pembrolizumab 200 mg IV q3wk.     Allergies:    No known drug allergies.      Physical Examination:  Mr. Avila is a 47-year-old man who appears comfortable at rest.  His vital signs are unremarkable.  His HEENT examination is within normal limits.  The cardiac, pulmonary, and abdominal examinations are unremarkable.  There is no palpable cervical, axillary, supraclavicular or inguinal lymphadenopathy.  His surgical site from the recent robotic nephrectomy appears to be healing well, without evidence of erythema or exudate.  The neuromuscular examination is grossly intact.  The extremities do not demonstrate pitting edema.  The skin evaluation is unremarkable     Laboratories (11/9/22):  His CBC is within normal limits.  His comprehensive metabolic panel shows a creatinine level of 1.32 mg/dL (GFR of 67 mL/min), but is otherwise unremarkable.  His TSH level is normal at 2.66 mU/L.     Imaging (7/5/2022):  He underwent a CT scan of the chest, abdomen, and pelvis with IV contrast on 7/5/2022.  This showed no evidence of recurrent or metastatic renal cell carcinoma; there is evidence of a prior left nephrectomy; there is slight thickening of the left adrenal gland without discrete nodularity.  Overall, this scan is consistent with TONYA.  His next scan will be in 1/2023.        ASSESSMENT AND PLAN:  Mr. Avila is a 47-year-old man with a diagnosis of clear-cell renal cell carcinoma (pT3a G3 R0) who underwent radical nephrectomy on 12/21/2021.  He began adjuvant therapy in 2/2022, and returns today to receive cycle #14 of adjuvant pembrolizumab treatment.  His ECOG score is 0.  His pain score is 1/10.     We previously reviewed the results of the KeyNote-564 trial as well as the recent FDA approval of adjuvant pembrolizumab for kidney cancer at high risk of relapse.  The eligibility for the  KeyNote-564 clinical trial involved either: pT2 disease with grade 4 differentiation or sarcomatoid features, all pT3/T4 disease, N1 disease, or resected M1 disease with current TONYA status.  The pembrolizumab was then delivered at a dose of 200 mg IV every 3 weeks, for a total of 17 cycles (one year).  This patient clearly meets criteria for adjuvant pembrolizumab.  After reviewing the anticipated benefits and potential side effects of pembrolizumab therapy, the patient decided to embark on this adjuvant treatment.  He returns today to receive cycle #14 of pembrolizumab.  He is tolerating this well, except for some pruritus behind both knees as well as some insomnia.      We have again reviewed the surveillance schedule for stage pT3 kidney cancers following resection.  This involves a CT scan of the chest, abdomen and pelvis every 6 months for the first 3 years.  His next CT scan will be done in January 2023.  He would then need a CT scan every 12 months during years 3-5 of follow-up.  If he developed overt metastatic kidney cancer in the future, then the most likely scenario is that he would have oligometastatic disease at that time.  If this was the case, then he could certainly be managed with metastasectomy followed by pseudo-adjuvant pembrolizumab at that time.  If he had a polymetastatic recurrence, then my preferred regimen would be the combination of pembrolizumab-axitinib or nivolumab-cabozantinib.     A total of 40 minutes were spent on this patient on the day of the consultation, of which more than 50% of this time was used for counseling and coordination of care. The patient was given the opportunity to ask multiple questions today, all of which were answered to his satisfaction.     Bart Barahona M.D.

## 2022-11-09 ENCOUNTER — APPOINTMENT (OUTPATIENT)
Dept: LAB | Facility: CLINIC | Age: 47
End: 2022-11-09
Attending: INTERNAL MEDICINE
Payer: COMMERCIAL

## 2022-11-09 ENCOUNTER — INFUSION THERAPY VISIT (OUTPATIENT)
Dept: ONCOLOGY | Facility: CLINIC | Age: 47
End: 2022-11-09
Attending: INTERNAL MEDICINE
Payer: COMMERCIAL

## 2022-11-09 VITALS
DIASTOLIC BLOOD PRESSURE: 85 MMHG | BODY MASS INDEX: 30.62 KG/M2 | WEIGHT: 218.7 LBS | RESPIRATION RATE: 16 BRPM | TEMPERATURE: 97.9 F | SYSTOLIC BLOOD PRESSURE: 136 MMHG | OXYGEN SATURATION: 98 % | HEART RATE: 67 BPM | HEIGHT: 71 IN

## 2022-11-09 DIAGNOSIS — C64.9 RENAL CELL CARCINOMA, UNSPECIFIED LATERALITY (H): Primary | ICD-10-CM

## 2022-11-09 DIAGNOSIS — C64.2 RENAL CELL CARCINOMA, LEFT (H): Primary | ICD-10-CM

## 2022-11-09 LAB
ALBUMIN SERPL BCG-MCNC: 4.5 G/DL (ref 3.5–5.2)
ALP SERPL-CCNC: 68 U/L (ref 40–129)
ALT SERPL W P-5'-P-CCNC: 41 U/L (ref 10–50)
ANION GAP SERPL CALCULATED.3IONS-SCNC: 9 MMOL/L (ref 7–15)
AST SERPL W P-5'-P-CCNC: 28 U/L (ref 10–50)
BILIRUB SERPL-MCNC: 0.3 MG/DL
BUN SERPL-MCNC: 16.1 MG/DL (ref 6–20)
CALCIUM SERPL-MCNC: 9.9 MG/DL (ref 8.6–10)
CHLORIDE SERPL-SCNC: 106 MMOL/L (ref 98–107)
CREAT SERPL-MCNC: 1.32 MG/DL (ref 0.67–1.17)
DEPRECATED HCO3 PLAS-SCNC: 24 MMOL/L (ref 22–29)
GFR SERPL CREATININE-BSD FRML MDRD: 67 ML/MIN/1.73M2
GLUCOSE SERPL-MCNC: 90 MG/DL (ref 70–99)
POTASSIUM SERPL-SCNC: 4.3 MMOL/L (ref 3.4–5.3)
PROT SERPL-MCNC: 7.4 G/DL (ref 6.4–8.3)
SODIUM SERPL-SCNC: 139 MMOL/L (ref 136–145)
TSH SERPL DL<=0.005 MIU/L-ACNC: 3.62 UIU/ML (ref 0.3–4.2)

## 2022-11-09 PROCEDURE — 258N000003 HC RX IP 258 OP 636: Performed by: INTERNAL MEDICINE

## 2022-11-09 PROCEDURE — 80053 COMPREHEN METABOLIC PANEL: CPT | Performed by: INTERNAL MEDICINE

## 2022-11-09 PROCEDURE — 250N000011 HC RX IP 250 OP 636: Performed by: INTERNAL MEDICINE

## 2022-11-09 PROCEDURE — 84443 ASSAY THYROID STIM HORMONE: CPT | Performed by: INTERNAL MEDICINE

## 2022-11-09 PROCEDURE — G0463 HOSPITAL OUTPT CLINIC VISIT: HCPCS

## 2022-11-09 PROCEDURE — 36415 COLL VENOUS BLD VENIPUNCTURE: CPT | Performed by: INTERNAL MEDICINE

## 2022-11-09 PROCEDURE — 99215 OFFICE O/P EST HI 40 MIN: CPT | Performed by: INTERNAL MEDICINE

## 2022-11-09 PROCEDURE — 96413 CHEMO IV INFUSION 1 HR: CPT

## 2022-11-09 RX ADMIN — SODIUM CHLORIDE 200 MG: 9 INJECTION, SOLUTION INTRAVENOUS at 09:52

## 2022-11-09 RX ADMIN — SODIUM CHLORIDE 250 ML: 9 INJECTION, SOLUTION INTRAVENOUS at 09:52

## 2022-11-09 ASSESSMENT — PAIN SCALES - GENERAL: PAINLEVEL: NO PAIN (0)

## 2022-11-09 NOTE — NURSING NOTE
Chief Complaint   Patient presents with     Blood Draw     Vitals, blood draw PIV placed by LPN. Pt has check in for next appt.          Angelica fajardo/ LUIS MANUEL Sousa LPN

## 2022-11-09 NOTE — LETTER
11/9/2022         RE: Pierre Avila  [address] [facility] [address]        Dear Colleague,    Thank you for referring your patient, Pierre Avila, to the [facility]. Please see a copy of my visit note below.      FOLLOW UP VISIT     Reason for Visit:  Renal cell carcinoma (pT3a R0) s/p nephrectomy; cycle #14 (of 17) of adjuvant pembrolizumab.     History of Present Illness:  Mr. Avila is a 47-year-old man who presented with hematuria in 10/2021.  CT imaging (10/21/2021) revealed a large mass at the upper pole of the left kidney, without evidence of local or distant metastatic disease.  On 12/21/2021, he had a left radical nephrectomy which showed a 5.4 cm clear-cell renal cell carcinoma (no sarcomatoid or rhabdoid features), nucleolar grade 3, with tumor extending into the calyceal system.  His final pathological stage was pT3a G3 R0 kidney cancer.     The patient elected to receive adjuvant pembrolizumab treatment, which he started in 2/2022.  He returns today to begin cycle #14 of adjuvant pembrolizumab (out of a total of 17 planned cycles).  He is tolerating the treatment relatively well thus far.     Review of systems:  The patient reports feeling generally well, and he has recovered nicely from his radical nephrectomy surgery.  Insomnia is his biggest problem right now, and this persists despite trying melatonin.  He reports occasional fatigue and headaches. Reports occasional neuropathy bilaterally but especially in his right toes. Not interfering with daily activities. He has seen orthopedics in the past for longstanding plantar fascitis.  Reports some burning and itching behind both knees on and off after last infusion. Pain lasts 10-20 minutes with 3-4 flares over the last three weeks. Reports redness at the site, without any other rashes. Has dry skin at baseline. Does not moisturize regularly. No vitiligo. No peripheral edema. Working on eliminating sugars.  Eats well otherwise. Reports good oral hydration. Weight stable. No chest pain, shortness, or cough.  No abdominal pain, diarrhea, or constipation.  No anorexia.  No vomiting or nausea.  No urinary changes.  No mucositis.   A comprehensive 14-system review of symptoms is otherwise generally unremarkable.  His ECOG score is 0.  His pain score is 1/10.      Medications:   Pembrolizumab 200 mg IV q3wk.     Allergies:    No known drug allergies.      Physical Examination:  Mr. Avila is a 47-year-old man who appears comfortable at rest.  His vital signs are unremarkable.  His HEENT examination is within normal limits.  The cardiac, pulmonary, and abdominal examinations are unremarkable.  There is no palpable cervical, axillary, supraclavicular or inguinal lymphadenopathy.  His surgical site from the recent robotic nephrectomy appears to be healing well, without evidence of erythema or exudate.  The neuromuscular examination is grossly intact.  The extremities do not demonstrate pitting edema.  The skin evaluation is unremarkable     Laboratories (11/9/22):  His CBC is within normal limits.  His comprehensive metabolic panel shows a creatinine level of 1.32 mg/dL (GFR of 67 mL/min), but is otherwise unremarkable.  His TSH level is normal at 2.66 mU/L.     Imaging (7/5/2022):  He underwent a CT scan of the chest, abdomen, and pelvis with IV contrast on 7/5/2022.  This showed no evidence of recurrent or metastatic renal cell carcinoma; there is evidence of a prior left nephrectomy; there is slight thickening of the left adrenal gland without discrete nodularity.  Overall, this scan is consistent with TONYA.  His next scan will be in 1/2023.        ASSESSMENT AND PLAN:  Mr. Avila is a 47-year-old man with a diagnosis of clear-cell renal cell carcinoma (pT3a G3 R0) who underwent radical nephrectomy on 12/21/2021.  He began adjuvant therapy in 2/2022, and returns today to receive cycle #14 of adjuvant pembrolizumab  treatment.  His ECOG score is 0.  His pain score is 1/10.     We previously reviewed the results of the KeyNote-564 trial as well as the recent FDA approval of adjuvant pembrolizumab for kidney cancer at high risk of relapse.  The eligibility for the KeyNote-564 clinical trial involved either: pT2 disease with grade 4 differentiation or sarcomatoid features, all pT3/T4 disease, N1 disease, or resected M1 disease with current TONYA status.  The pembrolizumab was then delivered at a dose of 200 mg IV every 3 weeks, for a total of 17 cycles (one year).  This patient clearly meets criteria for adjuvant pembrolizumab.  After reviewing the anticipated benefits and potential side effects of pembrolizumab therapy, the patient decided to embark on this adjuvant treatment.  He returns today to receive cycle #14 of pembrolizumab.  He is tolerating this well, except for some pruritus behind both knees as well as some insomnia.      We have again reviewed the surveillance schedule for stage pT3 kidney cancers following resection.  This involves a CT scan of the chest, abdomen and pelvis every 6 months for the first 3 years.  His next CT scan will be done in January 2023.  He would then need a CT scan every 12 months during years 3-5 of follow-up.  If he developed overt metastatic kidney cancer in the future, then the most likely scenario is that he would have oligometastatic disease at that time.  If this was the case, then he could certainly be managed with metastasectomy followed by pseudo-adjuvant pembrolizumab at that time.  If he had a polymetastatic recurrence, then my preferred regimen would be the combination of pembrolizumab-axitinib or nivolumab-cabozantinib.     A total of 40 minutes were spent on this patient on the day of the consultation, of which more than 50% of this time was used for counseling and coordination of care. The patient was given the opportunity to ask multiple questions today, all of which were  answered to his satisfaction.     Bart Barahona M.D.

## 2022-11-09 NOTE — PROGRESS NOTES
Infusion Nursing Note:  Pierre Avila presents today for C14 Pembrolizumab.    Patient seen by provider today: Yes: Dr. Barahona   present during visit today: Not Applicable.    Note: Pt saw provider prior to infusion, ok for treatment.      Intravenous Access:  Peripheral IV placed.    Treatment Conditions:  Lab Results   Component Value Date    HGB 14.1 07/06/2022    WBC 6.6 07/06/2022    ANEUTAUTO 4.0 07/06/2022     07/06/2022      Lab Results   Component Value Date     11/09/2022    POTASSIUM 4.3 11/09/2022    MAG 2.2 08/17/2022    CR 1.32 (H) 11/09/2022    CHRISTIANE 9.9 11/09/2022    BILITOTAL 0.3 11/09/2022    ALBUMIN 4.5 11/09/2022    ALT 41 11/09/2022    AST 28 11/09/2022     Results reviewed, labs MET treatment parameters, ok to proceed with treatment.      Post Infusion Assessment:  Patient tolerated infusion without incident.  Blood return noted pre and post infusion.  Site patent and intact, free from redness, edema or discomfort.  No evidence of extravasations.  Access discontinued per protocol.       Discharge Plan:   Patient declined prescription refills.  Discharge instructions reviewed with: Patient.  Patient and/or family verbalized understanding of discharge instructions and all questions answered.  AVS to patient via [employer].  Patient will return 11/30 for next appointment.   Patient discharged in stable condition accompanied by: self.  Departure Mode: Ambulatory.    Mireille Alvarado RN

## 2022-11-09 NOTE — NURSING NOTE
"Oncology Rooming Note    November 9, 2022 8:33 AM   Pierre Avila is a 47 year old male who presents for:    Chief Complaint   Patient presents with     Blood Draw     Vitals, blood draw PIV placed by LPN. Pt has check in for next appt.      Oncology Clinic Visit     [facility] RETURN - CLEAR CELL CARCINOMA     Initial Vitals: /85 (Patient Position: Sitting)   Pulse 67   Temp 97.9  F (36.6  C) (Oral)   Resp 16   Ht 1.816 m (5' 11.5\")   Wt 99.2 kg (218 lb 11.2 oz)   SpO2 98%   BMI 30.08 kg/m   Estimated body mass index is 30.08 kg/m  as calculated from the following:    Height as of this encounter: 1.816 m (5' 11.5\").    Weight as of this encounter: 99.2 kg (218 lb 11.2 oz). Body surface area is 2.24 meters squared.  No Pain (0) Comment: Data Unavailable   No LMP for male patient.  Allergies reviewed: Yes  Medications reviewed: Yes    Medications: Medication refills not needed today.  Pharmacy name entered into [facility]:    [facility] - [place], [place] - [address] [place] [address] [facility] - [place], [place] - [address] [facility] [place], [place] - [address] [place] [address] [id number]    Flo Marin LPN            "

## 2022-11-26 ENCOUNTER — HEALTH MAINTENANCE LETTER (OUTPATIENT)
Age: 47
End: 2022-11-26

## 2022-11-29 NOTE — PROGRESS NOTES
FOLLOW UP VISIT     Reason for Visit:  Renal cell carcinoma (pT3a R0) s/p nephrectomy; cycle #15 (of 17) of adjuvant pembrolizumab.     History of Present Illness:  Mr. Avila is a 47-year-old man who presented with hematuria in 10/2021.  CT imaging (10/21/2021) revealed a large mass at the upper pole of the left kidney, without evidence of local or distant metastatic disease.  On 12/21/2021, he had a left radical nephrectomy which showed a 5.4 cm clear-cell renal cell carcinoma (no sarcomatoid or rhabdoid features), nucleolar grade 3, with tumor extending into the calyceal system.  His final pathological stage was pT3a G3 R0 kidney cancer.     The patient elected to receive adjuvant pembrolizumab treatment, which he started in 2/2022.  He returns today to begin cycle #15 of adjuvant pembrolizumab (out of a total of 17 planned cycles).  He is tolerating the treatment relatively well thus far.     Review of systems:  The patient reports feeling generally well, and he has recovered nicely from his radical nephrectomy surgery.  Insomnia is his biggest problem right now, and this persists despite trying melatonin.  He reports occasional fatigue and headaches. Reports occasional neuropathy bilaterally but especially in his right toes. Not interfering with daily activities. He has seen orthopedics in the past for longstanding plantar fascitis.  Reports some burning and itching behind both knees on and off after last infusion. Pain lasts 10-20 minutes with 3-4 flares over the last three weeks. Reports redness at the site, without any other rashes. Has dry skin at baseline. Does not moisturize regularly. No vitiligo. No peripheral edema. Working on eliminating sugars. Eats well otherwise. Reports good oral hydration. Weight stable. No chest pain, shortness, or cough.  No abdominal pain, diarrhea, or constipation.  No anorexia.  No vomiting or nausea.  No urinary changes.  No mucositis.   A comprehensive 14-system  review of symptoms is otherwise generally unremarkable.  His ECOG score is 0.  His pain score is 1/10.      Medications:   Pembrolizumab 200 mg IV q3wk.     Allergies:    No known drug allergies.      Physical Examination:  Mr. Avila is a 47-year-old man who appears comfortable at rest.  His vital signs are unremarkable.  His HEENT examination is within normal limits.  The cardiac, pulmonary, and abdominal examinations are unremarkable.  There is no palpable cervical, axillary, supraclavicular or inguinal lymphadenopathy.  His surgical site from the recent robotic nephrectomy appears to be healing well, without evidence of erythema or exudate.  The neuromuscular examination is grossly intact.  The extremities do not demonstrate pitting edema.  The skin evaluation is unremarkable     Laboratories (11/30/22):  His CBC is within normal limits.  His comprehensive metabolic panel shows a creatinine level of 1.39 mg/dL (GFR of 63 mL/min), but is otherwise unremarkable.  His TSH level is normal at 2.8 mU/L.     Imaging (7/5/2022):  He underwent a CT scan of the chest, abdomen, and pelvis with IV contrast on 7/5/2022.  This showed no evidence of recurrent or metastatic renal cell carcinoma; there is evidence of a prior left nephrectomy; there is slight thickening of the left adrenal gland without discrete nodularity.  Overall, this scan is consistent with TONYA.  His next scan will be in 1/2023.        ASSESSMENT AND PLAN:  Mr. Avila is a 47-year-old man with a diagnosis of clear-cell renal cell carcinoma (pT3a G3 R0) who underwent radical nephrectomy on 12/21/2021.  He began adjuvant therapy in 2/2022, and returns today to receive cycle #15 of adjuvant pembrolizumab treatment.  His ECOG score is 0.  His pain score is 1/10.     We previously reviewed the results of the KeyNote-564 trial as well as the recent FDA approval of adjuvant pembrolizumab for kidney cancer at high risk of relapse.  The eligibility for the  KeyNote-564 clinical trial involved either: pT2 disease with grade 4 differentiation or sarcomatoid features, all pT3/T4 disease, N1 disease, or resected M1 disease with current TONYA status.  The pembrolizumab was then delivered at a dose of 200 mg IV every 3 weeks, for a total of 17 cycles (one year).  This patient clearly meets criteria for adjuvant pembrolizumab.  After reviewing the anticipated benefits and potential side effects of pembrolizumab therapy, the patient decided to embark on this adjuvant treatment.  He returns today to receive cycle #15 of pembrolizumab.  He is tolerating this well, except for some pruritus behind both knees as well as some insomnia.      We have again reviewed the surveillance schedule for stage pT3 kidney cancers following resection.  This involves a CT scan of the chest, abdomen and pelvis every 6 months for the first 3 years (his next CT scan will be done in January 2023).  He would then need a CT scan every 12 months during years 3-5 of follow-up.  If he developed overt metastatic kidney cancer in the future, then the most likely scenario is that he would have oligometastatic disease at that time.  If this was the case, then he could certainly be managed with metastasectomy followed by pseudo-adjuvant pembrolizumab at that time.  If he had a polymetastatic recurrence, then my preferred regimen would be the combination of pembrolizumab-axitinib or nivolumab-cabozantinib.     A total of 40 minutes were spent on this patient on the day of the consultation, of which more than 50% of this time was used for counseling and coordination of care. The patient was given the opportunity to ask multiple questions today, all of which were answered to his satisfaction.     Bart Barahona M.D.

## 2022-11-30 ENCOUNTER — INFUSION THERAPY VISIT (OUTPATIENT)
Dept: ONCOLOGY | Facility: CLINIC | Age: 47
End: 2022-11-30
Attending: INTERNAL MEDICINE
Payer: COMMERCIAL

## 2022-11-30 ENCOUNTER — APPOINTMENT (OUTPATIENT)
Dept: LAB | Facility: CLINIC | Age: 47
End: 2022-11-30
Attending: INTERNAL MEDICINE
Payer: COMMERCIAL

## 2022-11-30 VITALS
WEIGHT: 210.2 LBS | SYSTOLIC BLOOD PRESSURE: 142 MMHG | DIASTOLIC BLOOD PRESSURE: 90 MMHG | RESPIRATION RATE: 14 BRPM | HEART RATE: 61 BPM | BODY MASS INDEX: 28.91 KG/M2 | TEMPERATURE: 98.2 F | OXYGEN SATURATION: 99 %

## 2022-11-30 DIAGNOSIS — C64.2 RENAL CELL CARCINOMA, LEFT (H): Primary | ICD-10-CM

## 2022-11-30 DIAGNOSIS — C64.9 RENAL CELL CARCINOMA, UNSPECIFIED LATERALITY (H): Primary | ICD-10-CM

## 2022-11-30 LAB
ALBUMIN SERPL BCG-MCNC: 4.5 G/DL (ref 3.5–5.2)
ALP SERPL-CCNC: 62 U/L (ref 40–129)
ALT SERPL W P-5'-P-CCNC: 50 U/L (ref 10–50)
ANION GAP SERPL CALCULATED.3IONS-SCNC: 9 MMOL/L (ref 7–15)
AST SERPL W P-5'-P-CCNC: 33 U/L (ref 10–50)
BILIRUB SERPL-MCNC: 0.5 MG/DL
BUN SERPL-MCNC: 23.3 MG/DL (ref 6–20)
CALCIUM SERPL-MCNC: 9.1 MG/DL (ref 8.6–10)
CHLORIDE SERPL-SCNC: 103 MMOL/L (ref 98–107)
CREAT SERPL-MCNC: 1.39 MG/DL (ref 0.67–1.17)
DEPRECATED HCO3 PLAS-SCNC: 26 MMOL/L (ref 22–29)
GFR SERPL CREATININE-BSD FRML MDRD: 63 ML/MIN/1.73M2
GLUCOSE SERPL-MCNC: 90 MG/DL (ref 70–99)
POTASSIUM SERPL-SCNC: 4 MMOL/L (ref 3.4–5.3)
PROT SERPL-MCNC: 7.2 G/DL (ref 6.4–8.3)
SODIUM SERPL-SCNC: 138 MMOL/L (ref 136–145)
TSH SERPL DL<=0.005 MIU/L-ACNC: 2.81 UIU/ML (ref 0.3–4.2)

## 2022-11-30 PROCEDURE — 80053 COMPREHEN METABOLIC PANEL: CPT | Performed by: INTERNAL MEDICINE

## 2022-11-30 PROCEDURE — 84443 ASSAY THYROID STIM HORMONE: CPT | Performed by: INTERNAL MEDICINE

## 2022-11-30 PROCEDURE — 99215 OFFICE O/P EST HI 40 MIN: CPT | Performed by: INTERNAL MEDICINE

## 2022-11-30 PROCEDURE — 250N000011 HC RX IP 250 OP 636: Performed by: INTERNAL MEDICINE

## 2022-11-30 PROCEDURE — G0463 HOSPITAL OUTPT CLINIC VISIT: HCPCS

## 2022-11-30 PROCEDURE — 36415 COLL VENOUS BLD VENIPUNCTURE: CPT | Performed by: INTERNAL MEDICINE

## 2022-11-30 PROCEDURE — 96413 CHEMO IV INFUSION 1 HR: CPT

## 2022-11-30 PROCEDURE — 82040 ASSAY OF SERUM ALBUMIN: CPT | Performed by: INTERNAL MEDICINE

## 2022-11-30 PROCEDURE — 258N000003 HC RX IP 258 OP 636: Performed by: INTERNAL MEDICINE

## 2022-11-30 RX ADMIN — SODIUM CHLORIDE 200 MG: 9 INJECTION, SOLUTION INTRAVENOUS at 09:46

## 2022-11-30 RX ADMIN — SODIUM CHLORIDE 250 ML: 9 INJECTION, SOLUTION INTRAVENOUS at 09:21

## 2022-11-30 ASSESSMENT — PAIN SCALES - GENERAL: PAINLEVEL: NO PAIN (0)

## 2022-11-30 NOTE — PATIENT INSTRUCTIONS
[facility] Triage and after hours / weekends / holidays:  [phone]    Please call the triage or after hours line if you experience a temperature greater than or equal to 100.4, shaking chills, have uncontrolled nausea, vomiting and/or diarrhea, dizziness, shortness of breath, chest pain, bleeding, unexplained bruising, or if you have any other new/concerning symptoms, questions or concerns.      If you are having any concerning symptoms or wish to speak to a provider before your next infusion visit, please call your care coordinator or triage to notify them so we can adequately serve you.     If you need a refill on a narcotic prescription or other medication, please call before your infusion appointment.

## 2022-11-30 NOTE — NURSING NOTE
"Oncology Rooming Note    November 30, 2022 8:49 AM   Pierre Avila is a 47 year old male who presents for:    Chief Complaint   Patient presents with     Blood Draw     Labs drawn via PIV by RN in lab. VS taken.      Oncology Clinic Visit     Pt with hx of clear cell ca here for labs and md visit     Initial Vitals: BP (!) 142/90   Pulse 61   Temp 98.2  F (36.8  C) (Oral)   Resp 14   Wt 95.3 kg (210 lb 3.2 oz)   SpO2 99%   BMI 28.91 kg/m   Estimated body mass index is 28.91 kg/m  as calculated from the following:    Height as of 11/9/22: 1.816 m (5' 11.5\").    Weight as of this encounter: 95.3 kg (210 lb 3.2 oz). Body surface area is 2.19 meters squared.  No Pain (0) Comment: Data Unavailable   No LMP for male patient.  Allergies reviewed: Yes  Medications reviewed: Yes    Medications: Medication refills not needed today.  Pharmacy name entered into [facility]:    [facility] [place] - [address] [place] [address] [facility] [address] [place], [place] - [address] [facility] [place], [place] - [address] [facility] [phone]    Clinical concerns: none       Vivian Roberts RN              "

## 2022-11-30 NOTE — PROGRESS NOTES
Infusion Nursing Note:  Pierre Avila presents today for Cycle 15 Day 1 Keytruda.    Patient seen by provider today: Yes: Dr. Barahona   present during visit today: Not Applicable.    Note: Pierre comes into the clinic today doing well overall and has no concerns to relay following his provider visit. He has no pain and denies s/s of infection.     Intravenous Access:  Peripheral IV placed.    Treatment Conditions:  Lab Results   Component Value Date     11/30/2022    POTASSIUM 4.0 11/30/2022    MAG 2.2 08/17/2022    CR 1.39 (H) 11/30/2022    CHRISTIANE 9.1 11/30/2022    BILITOTAL 0.5 11/30/2022    ALBUMIN 4.5 11/30/2022    ALT 50 11/30/2022    AST 33 11/30/2022     Results reviewed, labs MET treatment parameters, ok to proceed with treatment.    Post Infusion Assessment:  Patient tolerated infusion without incident.  Blood return noted pre and post infusion.  Site patent and intact, free from redness, edema or discomfort.  No evidence of extravasations.  Access discontinued per protocol.     Discharge Plan:   Patient declined prescription refills.  Discharge instructions reviewed with: Patient.  Patient and/or family verbalized understanding of discharge instructions and all questions answered.  AVS to patient via [employer].  Patient will return 12/21 for next appointment.   Patient discharged in stable condition accompanied by: self.  Departure Mode: Ambulatory.      Tita Lopes RN

## 2022-11-30 NOTE — NURSING NOTE
Chief Complaint   Patient presents with     Blood Draw     Labs drawn via PIV by RN in lab. VS taken.      Labs drawn via peripheral IV. Vital signs taken. Checked into next appointment.   Tessy Dominguez RN

## 2022-11-30 NOTE — LETTER
11/30/2022         RE: Pierre Avila  [address] [facility] [address]        Dear Colleague,    Thank you for referring your patient, Pierre Avila, to the [facility]. Please see a copy of my visit note below.      FOLLOW UP VISIT     Reason for Visit:  Renal cell carcinoma (pT3a R0) s/p nephrectomy; cycle #15 (of 17) of adjuvant pembrolizumab.     History of Present Illness:  Mr. Avila is a 47-year-old man who presented with hematuria in 10/2021.  CT imaging (10/21/2021) revealed a large mass at the upper pole of the left kidney, without evidence of local or distant metastatic disease.  On 12/21/2021, he had a left radical nephrectomy which showed a 5.4 cm clear-cell renal cell carcinoma (no sarcomatoid or rhabdoid features), nucleolar grade 3, with tumor extending into the calyceal system.  His final pathological stage was pT3a G3 R0 kidney cancer.     The patient elected to receive adjuvant pembrolizumab treatment, which he started in 2/2022.  He returns today to begin cycle #15 of adjuvant pembrolizumab (out of a total of 17 planned cycles).  He is tolerating the treatment relatively well thus far.     Review of systems:  The patient reports feeling generally well, and he has recovered nicely from his radical nephrectomy surgery.  Insomnia is his biggest problem right now, and this persists despite trying melatonin.  He reports occasional fatigue and headaches. Reports occasional neuropathy bilaterally but especially in his right toes. Not interfering with daily activities. He has seen orthopedics in the past for longstanding plantar fascitis.  Reports some burning and itching behind both knees on and off after last infusion. Pain lasts 10-20 minutes with 3-4 flares over the last three weeks. Reports redness at the site, without any other rashes. Has dry skin at baseline. Does not moisturize regularly. No vitiligo. No peripheral edema. Working on eliminating  sugars. Eats well otherwise. Reports good oral hydration. Weight stable. No chest pain, shortness, or cough.  No abdominal pain, diarrhea, or constipation.  No anorexia.  No vomiting or nausea.  No urinary changes.  No mucositis.   A comprehensive 14-system review of symptoms is otherwise generally unremarkable.  His ECOG score is 0.  His pain score is 1/10.      Medications:   Pembrolizumab 200 mg IV q3wk.     Allergies:    No known drug allergies.      Physical Examination:  Mr. Avila is a 47-year-old man who appears comfortable at rest.  His vital signs are unremarkable.  His HEENT examination is within normal limits.  The cardiac, pulmonary, and abdominal examinations are unremarkable.  There is no palpable cervical, axillary, supraclavicular or inguinal lymphadenopathy.  His surgical site from the recent robotic nephrectomy appears to be healing well, without evidence of erythema or exudate.  The neuromuscular examination is grossly intact.  The extremities do not demonstrate pitting edema.  The skin evaluation is unremarkable     Laboratories (11/30/22):  His CBC is within normal limits.  His comprehensive metabolic panel shows a creatinine level of 1.39 mg/dL (GFR of 63 mL/min), but is otherwise unremarkable.  His TSH level is normal at 2.8 mU/L.     Imaging (7/5/2022):  He underwent a CT scan of the chest, abdomen, and pelvis with IV contrast on 7/5/2022.  This showed no evidence of recurrent or metastatic renal cell carcinoma; there is evidence of a prior left nephrectomy; there is slight thickening of the left adrenal gland without discrete nodularity.  Overall, this scan is consistent with TONYA.  His next scan will be in 1/2023.        ASSESSMENT AND PLAN:  Mr. Avila is a 47-year-old man with a diagnosis of clear-cell renal cell carcinoma (pT3a G3 R0) who underwent radical nephrectomy on 12/21/2021.  He began adjuvant therapy in 2/2022, and returns today to receive cycle #15 of adjuvant  pembrolizumab treatment.  His ECOG score is 0.  His pain score is 1/10.     We previously reviewed the results of the KeyNote-564 trial as well as the recent FDA approval of adjuvant pembrolizumab for kidney cancer at high risk of relapse.  The eligibility for the KeyNote-564 clinical trial involved either: pT2 disease with grade 4 differentiation or sarcomatoid features, all pT3/T4 disease, N1 disease, or resected M1 disease with current TONYA status.  The pembrolizumab was then delivered at a dose of 200 mg IV every 3 weeks, for a total of 17 cycles (one year).  This patient clearly meets criteria for adjuvant pembrolizumab.  After reviewing the anticipated benefits and potential side effects of pembrolizumab therapy, the patient decided to embark on this adjuvant treatment.  He returns today to receive cycle #15 of pembrolizumab.  He is tolerating this well, except for some pruritus behind both knees as well as some insomnia.      We have again reviewed the surveillance schedule for stage pT3 kidney cancers following resection.  This involves a CT scan of the chest, abdomen and pelvis every 6 months for the first 3 years (his next CT scan will be done in January 2023).  He would then need a CT scan every 12 months during years 3-5 of follow-up.  If he developed overt metastatic kidney cancer in the future, then the most likely scenario is that he would have oligometastatic disease at that time.  If this was the case, then he could certainly be managed with metastasectomy followed by pseudo-adjuvant pembrolizumab at that time.  If he had a polymetastatic recurrence, then my preferred regimen would be the combination of pembrolizumab-axitinib or nivolumab-cabozantinib.     A total of 40 minutes were spent on this patient on the day of the consultation, of which more than 50% of this time was used for counseling and coordination of care. The patient was given the opportunity to ask multiple questions today, all of  which were answered to his satisfaction.           Again, thank you for allowing me to participate in the care of your patient.      Sincerely,    Bart Barahona MD

## 2022-12-20 NOTE — PROGRESS NOTES
FOLLOW UP VISIT     Reason for Visit:  Renal cell carcinoma (pT3a R0) s/p nephrectomy; cycle #16 (of 17) of adjuvant pembrolizumab.     History of Present Illness:  Mr. Avila is a 47-year-old man who presented with hematuria in 10/2021.  CT imaging (10/21/2021) revealed a large mass at the upper pole of the left kidney, without evidence of local or distant metastatic disease.  On 12/21/2021, he had a left radical nephrectomy which showed a 5.4 cm clear-cell renal cell carcinoma (no sarcomatoid or rhabdoid features), nucleolar grade 3, with tumor extending into the calyceal system.  His pathological stage was pT3a G3 R0 kidney cancer.     The patient elected to receive adjuvant pembrolizumab treatment, which he started in 2/2022.  He returns today to begin cycle #16 of adjuvant pembrolizumab (out of a total of 17 planned cycles).  He is tolerating the treatment relatively well thus far.     Review of systems:  The patient reports feeling generally well, and he has recovered nicely from his radical nephrectomy surgery.  Insomnia is his biggest problem right now, and this persists despite trying melatonin.  He reports occasional fatigue and headaches. Reports dry mouth.  Also has noticed frothy urine recently.  Reports occasional neuropathy bilaterally but especially in his right toes. Not interfering with daily activities. He has seen orthopedics in the past for longstanding plantar fascitis.  Reports some burning and itching behind both knees on and off after last infusion. Pain lasts 10-20 minutes with 3-4 flares over the last three weeks. Reports redness at the site, without any other rashes. Has dry skin at baseline. Does not moisturize regularly. No vitiligo. No peripheral edema. Working on eliminating sugars. Eats well otherwise. Reports good oral hydration. Weight stable. No chest pain, shortness, or cough.  No abdominal pain, diarrhea, or constipation.  No anorexia.  No vomiting or nausea.  No urinary  changes.  No mucositis.   A comprehensive 14-system review of symptoms is otherwise generally unremarkable.  His ECOG score is 0.  His pain score is 1/10.      Medications:   Pembrolizumab 200 mg IV q3wk.     Allergies:    No known drug allergies.      Physical Examination:  Mr. Avila is a 47-year-old man who appears comfortable at rest.  His vital signs are unremarkable.  His HEENT examination is within normal limits.  The cardiac, pulmonary, and abdominal examinations are unremarkable.  There is no palpable cervical, axillary, supraclavicular or inguinal lymphadenopathy.  His surgical site from the recent robotic nephrectomy appears to be healing well, without evidence of erythema or exudate.  The neuromuscular examination is grossly intact.  The extremities do not demonstrate pitting edema.  The skin evaluation is unremarkable     Laboratories (12/21/22):  His CBC is within normal limits.  His comprehensive metabolic panel shows a creatinine level of 1.43 mg/dL (GFR of 61 mL/min), but is otherwise unremarkable.  His TSH level is normal at 2.34 mU/L.     Imaging (7/5/2022):  He underwent a CT scan of the chest, abdomen, and pelvis with IV contrast on 7/5/2022.  This showed no evidence of recurrent or metastatic renal cell carcinoma; there is evidence of a prior left nephrectomy; there is slight thickening of the left adrenal gland without discrete nodularity.  Overall, this scan is consistent with TONYA.  His next scan will be in 1/2023.        ASSESSMENT AND PLAN:  Mr. Avila is a 47-year-old man with a diagnosis of clear-cell renal cell carcinoma (pT3a G3 R0) who underwent radical nephrectomy on 12/21/2021.  He began adjuvant therapy in 2/2022, and returns today to receive cycle #16 of adjuvant pembrolizumab treatment.  His ECOG score is 0.  His pain score is 1/10.     We previously reviewed the results of the KeyNote-564 trial as well as the recent FDA approval of adjuvant pembrolizumab for kidney cancer  at high risk of relapse.  The eligibility for the KeyNote-564 clinical trial involved either: pT2 disease with grade 4 differentiation or sarcomatoid features, all pT3/T4 disease, N1 disease, or resected M1 disease with current TONYA status.  The pembrolizumab was then delivered at a dose of 200 mg IV every 3 weeks, for a total of 17 cycles (one year).  This patient clearly meets criteria for adjuvant pembrolizumab.  After reviewing the anticipated benefits and potential side effects of pembrolizumab therapy, the patient decided to embark on this adjuvant treatment.  He returns today to receive cycle #16 of pembrolizumab.  He is tolerating this well, except for some pruritus behind both knees as well as some insomnia.  He also reported frothy urine this time.      We have again reviewed the surveillance schedule for stage pT3 kidney cancers following resection.  This involves a CT scan of the chest, abdomen and pelvis every 6 months for the first 3 years (his next CT scan will be done in January 2023).  He would then need a CT scan every 12 months during years 3-5 of follow-up.  If he developed overt metastatic kidney cancer in the future, then the most likely scenario is that he would have oligometastatic disease at that time.  If this was the case, then he could certainly be managed with metastasectomy followed by pseudo-adjuvant pembrolizumab at that time.  If he had a polymetastatic recurrence, then my preferred regimen would be the combination of pembrolizumab-axitinib or nivolumab-cabozantinib.     A total of 40 minutes were spent on this patient on the day of the consultation, of which more than 50% of this time was used for counseling and coordination of care. The patient was given the opportunity to ask multiple questions today, all of which were answered to his satisfaction.     Bart Barahona M.D.

## 2022-12-21 ENCOUNTER — APPOINTMENT (OUTPATIENT)
Dept: LAB | Facility: CLINIC | Age: 47
End: 2022-12-21
Attending: INTERNAL MEDICINE
Payer: COMMERCIAL

## 2022-12-21 ENCOUNTER — ONCOLOGY VISIT (OUTPATIENT)
Dept: ONCOLOGY | Facility: CLINIC | Age: 47
End: 2022-12-21
Attending: INTERNAL MEDICINE
Payer: COMMERCIAL

## 2022-12-21 VITALS
DIASTOLIC BLOOD PRESSURE: 90 MMHG | RESPIRATION RATE: 18 BRPM | OXYGEN SATURATION: 100 % | HEART RATE: 64 BPM | SYSTOLIC BLOOD PRESSURE: 142 MMHG | WEIGHT: 217.2 LBS | TEMPERATURE: 98.1 F | HEIGHT: 71 IN | BODY MASS INDEX: 30.41 KG/M2

## 2022-12-21 DIAGNOSIS — C64.9 RENAL CELL CARCINOMA, UNSPECIFIED LATERALITY (H): Primary | ICD-10-CM

## 2022-12-21 LAB
ALBUMIN SERPL BCG-MCNC: 4.5 G/DL (ref 3.5–5.2)
ALP SERPL-CCNC: 60 U/L (ref 40–129)
ALT SERPL W P-5'-P-CCNC: 50 U/L (ref 10–50)
ANION GAP SERPL CALCULATED.3IONS-SCNC: 8 MMOL/L (ref 7–15)
AST SERPL W P-5'-P-CCNC: 29 U/L (ref 10–50)
BILIRUB SERPL-MCNC: 0.6 MG/DL
BUN SERPL-MCNC: 21.6 MG/DL (ref 6–20)
CALCIUM SERPL-MCNC: 9.4 MG/DL (ref 8.6–10)
CHLORIDE SERPL-SCNC: 105 MMOL/L (ref 98–107)
CREAT SERPL-MCNC: 1.43 MG/DL (ref 0.67–1.17)
DEPRECATED HCO3 PLAS-SCNC: 27 MMOL/L (ref 22–29)
GFR SERPL CREATININE-BSD FRML MDRD: 61 ML/MIN/1.73M2
GLUCOSE SERPL-MCNC: 96 MG/DL (ref 70–99)
POTASSIUM SERPL-SCNC: 4.2 MMOL/L (ref 3.4–5.3)
PROT SERPL-MCNC: 7.3 G/DL (ref 6.4–8.3)
SODIUM SERPL-SCNC: 140 MMOL/L (ref 136–145)
TSH SERPL DL<=0.005 MIU/L-ACNC: 2.34 UIU/ML (ref 0.3–4.2)

## 2022-12-21 PROCEDURE — 99212 OFFICE O/P EST SF 10 MIN: CPT | Mod: 25 | Performed by: INTERNAL MEDICINE

## 2022-12-21 PROCEDURE — 84443 ASSAY THYROID STIM HORMONE: CPT | Performed by: INTERNAL MEDICINE

## 2022-12-21 PROCEDURE — 250N000011 HC RX IP 250 OP 636: Performed by: INTERNAL MEDICINE

## 2022-12-21 PROCEDURE — G0463 HOSPITAL OUTPT CLINIC VISIT: HCPCS | Mod: 25

## 2022-12-21 PROCEDURE — 96413 CHEMO IV INFUSION 1 HR: CPT

## 2022-12-21 PROCEDURE — 36415 COLL VENOUS BLD VENIPUNCTURE: CPT | Performed by: INTERNAL MEDICINE

## 2022-12-21 PROCEDURE — 80053 COMPREHEN METABOLIC PANEL: CPT | Performed by: INTERNAL MEDICINE

## 2022-12-21 PROCEDURE — 99215 OFFICE O/P EST HI 40 MIN: CPT | Performed by: INTERNAL MEDICINE

## 2022-12-21 PROCEDURE — G0463 HOSPITAL OUTPT CLINIC VISIT: HCPCS

## 2022-12-21 PROCEDURE — 258N000003 HC RX IP 258 OP 636: Performed by: INTERNAL MEDICINE

## 2022-12-21 RX ADMIN — SODIUM CHLORIDE 200 MG: 9 INJECTION, SOLUTION INTRAVENOUS at 10:14

## 2022-12-21 RX ADMIN — SODIUM CHLORIDE 250 ML: 9 INJECTION, SOLUTION INTRAVENOUS at 10:13

## 2022-12-21 ASSESSMENT — PAIN SCALES - GENERAL: PAINLEVEL: NO PAIN (0)

## 2022-12-21 NOTE — NURSING NOTE
"Oncology Rooming Note    December 21, 2022 9:06 AM   Pierre Avila is a 47 year old male who presents for:    Chief Complaint   Patient presents with     Labs Only     Labs drawn and PIV placed     Oncology Clinic Visit     [facility] RETURN - CLEAR CELL CARCINOMA     Initial Vitals: BP (!) 142/90 (BP Location: Right arm, Patient Position: Sitting)   Pulse 64   Temp 98.1  F (36.7  C) (Oral)   Resp 18   Ht 1.816 m (5' 11.5\")   Wt 98.5 kg (217 lb 3.2 oz)   SpO2 100%   BMI 29.87 kg/m   Estimated body mass index is 29.87 kg/m  as calculated from the following:    Height as of this encounter: 1.816 m (5' 11.5\").    Weight as of this encounter: 98.5 kg (217 lb 3.2 oz). Body surface area is 2.23 meters squared.  No Pain (0) Comment: Data Unavailable   No LMP for male patient.  Allergies reviewed: Yes  Medications reviewed: Yes    Medications: Medication refills not needed today.  Pharmacy name entered into [employer]:    [facility] - [place], [place] - [address] [place] [address] [facility] [address] - [place], [place] - [address] [facility] [place], [place] - [address] [place] [address] [phone]    Flo Marin LPN            "

## 2022-12-21 NOTE — PROGRESS NOTES
Infusion Nursing Note:  Pierre PERLA MckeonAustin presents today for Cycle 16, Day 1 Keytruda.    Patient seen by provider today: Yes: Dr. Barahona   present during visit today: Not Applicable.    Note: N/A.    Intravenous Access:  Peripheral IV placed.    Treatment Conditions:  Lab Results   Component Value Date     12/21/2022    POTASSIUM 4.2 12/21/2022    MAG 2.2 08/17/2022    CR 1.43 (H) 12/21/2022    CHRISTIANE 9.4 12/21/2022    BILITOTAL 0.6 12/21/2022    ALBUMIN 4.5 12/21/2022    ALT 50 12/21/2022    AST 29 12/21/2022     Results reviewed, labs MET treatment parameters, ok to proceed with treatment.    Post Infusion Assessment:  Patient tolerated infusion without incident.  Blood return noted pre and post infusion.  Site patent and intact, free from redness, edema or discomfort.  No evidence of extravasations.  Access discontinued per protocol.     Discharge Plan:   Patient declined prescription refills.  AVS to patient via [employer].  Patient will return 1/11/23 for next appointment.   Patient discharged in stable condition accompanied by: self.  Departure Mode: Ambulatory.      Sakina Frias RN

## 2022-12-21 NOTE — LETTER
12/21/2022         RE: Pierre Avila  [address] [facility] [address]        Dear Colleague,    Thank you for referring your patient, Pierre Avila, to the [facility]. Please see a copy of my visit note below.      FOLLOW UP VISIT     Reason for Visit:  Renal cell carcinoma (pT3a R0) s/p nephrectomy; cycle #16 (of 17) of adjuvant pembrolizumab.     History of Present Illness:  Mr. Avila is a 47-year-old man who presented with hematuria in 10/2021.  CT imaging (10/21/2021) revealed a large mass at the upper pole of the left kidney, without evidence of local or distant metastatic disease.  On 12/21/2021, he had a left radical nephrectomy which showed a 5.4 cm clear-cell renal cell carcinoma (no sarcomatoid or rhabdoid features), nucleolar grade 3, with tumor extending into the calyceal system.  His pathological stage was pT3a G3 R0 kidney cancer.     The patient elected to receive adjuvant pembrolizumab treatment, which he started in 2/2022.  He returns today to begin cycle #16 of adjuvant pembrolizumab (out of a total of 17 planned cycles).  He is tolerating the treatment relatively well thus far.     Review of systems:  The patient reports feeling generally well, and he has recovered nicely from his radical nephrectomy surgery.  Insomnia is his biggest problem right now, and this persists despite trying melatonin.  He reports occasional fatigue and headaches. Reports dry mouth.  Also has noticed frothy urine recently.  Reports occasional neuropathy bilaterally but especially in his right toes. Not interfering with daily activities. He has seen orthopedics in the past for longstanding plantar fascitis.  Reports some burning and itching behind both knees on and off after last infusion. Pain lasts 10-20 minutes with 3-4 flares over the last three weeks. Reports redness at the site, without any other rashes. Has dry skin at baseline. Does not moisturize regularly. No  vitiligo. No peripheral edema. Working on eliminating sugars. Eats well otherwise. Reports good oral hydration. Weight stable. No chest pain, shortness, or cough.  No abdominal pain, diarrhea, or constipation.  No anorexia.  No vomiting or nausea.  No urinary changes.  No mucositis.   A comprehensive 14-system review of symptoms is otherwise generally unremarkable.  His ECOG score is 0.  His pain score is 1/10.      Medications:   Pembrolizumab 200 mg IV q3wk.     Allergies:    No known drug allergies.      Physical Examination:  Mr. Avila is a 47-year-old man who appears comfortable at rest.  His vital signs are unremarkable.  His HEENT examination is within normal limits.  The cardiac, pulmonary, and abdominal examinations are unremarkable.  There is no palpable cervical, axillary, supraclavicular or inguinal lymphadenopathy.  His surgical site from the recent robotic nephrectomy appears to be healing well, without evidence of erythema or exudate.  The neuromuscular examination is grossly intact.  The extremities do not demonstrate pitting edema.  The skin evaluation is unremarkable     Laboratories (12/21/22):  His CBC is within normal limits.  His comprehensive metabolic panel shows a creatinine level of 1.43 mg/dL (GFR of 61 mL/min), but is otherwise unremarkable.  His TSH level is normal at 2.34 mU/L.     Imaging (7/5/2022):  He underwent a CT scan of the chest, abdomen, and pelvis with IV contrast on 7/5/2022.  This showed no evidence of recurrent or metastatic renal cell carcinoma; there is evidence of a prior left nephrectomy; there is slight thickening of the left adrenal gland without discrete nodularity.  Overall, this scan is consistent with TONYA.  His next scan will be in 1/2023.        ASSESSMENT AND PLAN:  Mr. Avila is a 47-year-old man with a diagnosis of clear-cell renal cell carcinoma (pT3a G3 R0) who underwent radical nephrectomy on 12/21/2021.  He began adjuvant therapy in 2/2022, and  returns today to receive cycle #16 of adjuvant pembrolizumab treatment.  His ECOG score is 0.  His pain score is 1/10.     We previously reviewed the results of the KeyNote-564 trial as well as the recent FDA approval of adjuvant pembrolizumab for kidney cancer at high risk of relapse.  The eligibility for the KeyNote-564 clinical trial involved either: pT2 disease with grade 4 differentiation or sarcomatoid features, all pT3/T4 disease, N1 disease, or resected M1 disease with current TONYA status.  The pembrolizumab was then delivered at a dose of 200 mg IV every 3 weeks, for a total of 17 cycles (one year).  This patient clearly meets criteria for adjuvant pembrolizumab.  After reviewing the anticipated benefits and potential side effects of pembrolizumab therapy, the patient decided to embark on this adjuvant treatment.  He returns today to receive cycle #16 of pembrolizumab.  He is tolerating this well, except for some pruritus behind both knees as well as some insomnia.  He also reported frothy urine this time.      We have again reviewed the surveillance schedule for stage pT3 kidney cancers following resection.  This involves a CT scan of the chest, abdomen and pelvis every 6 months for the first 3 years (his next CT scan will be done in January 2023).  He would then need a CT scan every 12 months during years 3-5 of follow-up.  If he developed overt metastatic kidney cancer in the future, then the most likely scenario is that he would have oligometastatic disease at that time.  If this was the case, then he could certainly be managed with metastasectomy followed by pseudo-adjuvant pembrolizumab at that time.  If he had a polymetastatic recurrence, then my preferred regimen would be the combination of pembrolizumab-axitinib or nivolumab-cabozantinib.     A total of 40 minutes were spent on this patient on the day of the consultation, of which more than 50% of this time was used for counseling and coordination  of care. The patient was given the opportunity to ask multiple questions today, all of which were answered to his satisfaction.     Bart Barahona M.D.

## 2022-12-21 NOTE — NURSING NOTE
Chief Complaint   Patient presents with     Labs Only     Labs drawn and PIV placed     Labs drawn and PIV placed for infusion. Vitals checked and pt checked in for next appt.     Matilda Littlejohn RN on 12/21/2022 at 9:02 AM

## 2023-01-10 NOTE — PROGRESS NOTES
FOLLOW UP VISIT     Reason for Visit:  Renal cell carcinoma (pT3a R0) s/p nephrectomy; cycle #17 (of 17) of adjuvant pembrolizumab.     History of Present Illness:  Mr. Avila is a 47-year-old man who presented with hematuria in 10/2021.  CT imaging (10/21/2021) revealed a large mass at the upper pole of the left kidney, without evidence of local or distant metastatic disease.  On 12/21/2021, he had a left radical nephrectomy which showed a 5.4 cm clear-cell renal cell carcinoma (no sarcomatoid or rhabdoid features), nucleolar grade 3, with tumor extending into the calyceal system.  His pathological stage was pT3a G3 R0 kidney cancer.     The patient elected to receive adjuvant pembrolizumab treatment, which he started in 2/2022.  He returns today to begin cycle #17 of adjuvant pembrolizumab (out of a total of 17 planned cycles).  He has tolerating the treatment relatively well.  Today is his last dose.     Review of systems:  The patient reports feeling generally well, and he has recovered nicely from his radical nephrectomy surgery.  Insomnia is his biggest problem right now, and this persists despite trying melatonin.  He reports occasional fatigue and headaches. Reports dry mouth.  Also has noticed frothy urine recently.  Reports occasional neuropathy bilaterally but especially in his right toes. Not interfering with daily activities. He has seen orthopedics in the past for longstanding plantar fascitis.  Reports some burning and itching behind both knees on and off after last infusion. Pain lasts 10-20 minutes with 3-4 flares over the last three weeks. Reports redness at the site, without any other rashes. Has dry skin at baseline. Does not moisturize regularly. No vitiligo. No peripheral edema. Working on eliminating sugars. Eats well otherwise. Reports good oral hydration. Weight stable. No chest pain, shortness, or cough.  No abdominal pain, diarrhea, or constipation.  No anorexia.  No vomiting or  nausea.  No urinary changes.  No mucositis.   A comprehensive 14-system review of symptoms is otherwise generally unremarkable.  His ECOG score is 0.  His pain score is 1/10.      Medications:   Pembrolizumab 200 mg IV q3wk  (last dose today)     Allergies:    No known drug allergies.      Physical Examination:  Mr. Avila is a 47-year-old man who appears comfortable at rest.  His vital signs are unremarkable.  His HEENT examination is within normal limits.  The cardiac, pulmonary, and abdominal examinations are unremarkable.  There is no palpable cervical, axillary, supraclavicular or inguinal lymphadenopathy.  His surgical site from the recent robotic nephrectomy appears to be healing well, without evidence of erythema or exudate.  The neuromuscular examination is grossly intact.  The extremities do not demonstrate pitting edema.  The skin evaluation is unremarkable     Laboratories (1/11/2023):  His CBC is within normal limits.  His comprehensive metabolic panel shows a creatinine level of 1.32 mg/dL (GFR of 67 mL/min), but is otherwise unremarkable.  His TSH level is normal at 2.34 mU/L.     Imaging (7/5/2022):  He underwent a CT scan of the chest, abdomen, and pelvis with IV contrast on 7/5/2022.  This showed no evidence of recurrent or metastatic renal cell carcinoma; there is evidence of a prior left nephrectomy; there is slight thickening of the left adrenal gland without discrete nodularity.  Overall, this scan is consistent with TONYA.  His next scan will be in 1/2023.        ASSESSMENT AND PLAN:  Mr. Avila is a 47-year-old man with a diagnosis of clear-cell renal cell carcinoma (pT3a G3 R0) who underwent radical nephrectomy on 12/21/2021.  He began adjuvant therapy in 2/2022, and returns today to receive cycle #17 (last dose) of adjuvant pembrolizumab treatment.  His ECOG score is 0.  His pain score is 1/10.     We previously reviewed the results of the KeyNote-564 trial as well as the recent FDA  approval of adjuvant pembrolizumab for kidney cancer at high risk of relapse.  The eligibility for the KeyNote-564 clinical trial involved either: pT2 disease with grade 4 differentiation or sarcomatoid features, all pT3/T4 disease, N1 disease, or resected M1 disease with current TONYA status.  The pembrolizumab was then delivered at a dose of 200 mg IV every 3 weeks, for a total of 17 cycles (one year).  This patient clearly meets criteria for adjuvant pembrolizumab.  After reviewing the anticipated benefits and potential side effects of pembrolizumab therapy, the patient decided to embark on this adjuvant treatment.  He returns today to receive cycle #16 of pembrolizumab.  He has tolerated this well, except for some pruritus behind both knees as well as some insomnia.  He also reported frothy urine this time.      We have again reviewed the surveillance schedule for stage pT3 kidney cancers following resection.  This involves a CT scan of the chest, abdomen and pelvis every 6 months for the first 3 years (his next CT scan will be done on 1/30/2023).  He would then need a CT scan every 12 months during years 3-5 of follow-up.  If he developed overt metastatic kidney cancer in the future, then the most likely scenario is that he would have oligometastatic disease at that time.  If this was the case, then he could certainly be managed with metastasectomy followed by pseudo-adjuvant pembrolizumab at that time.  If he had a polymetastatic recurrence, then my preferred regimen would be the combination of pembrolizumab-axitinib or nivolumab-cabozantinib.     A total of 40 minutes were spent on this patient on the day of the consultation, of which more than 50% of this time was used for counseling and coordination of care. The patient was given the opportunity to ask multiple questions today, all of which were answered to his satisfaction.     Bart Barahona M.D.

## 2023-01-11 ENCOUNTER — INFUSION THERAPY VISIT (OUTPATIENT)
Dept: ONCOLOGY | Facility: CLINIC | Age: 48
End: 2023-01-11
Attending: INTERNAL MEDICINE
Payer: COMMERCIAL

## 2023-01-11 ENCOUNTER — APPOINTMENT (OUTPATIENT)
Dept: LAB | Facility: CLINIC | Age: 48
End: 2023-01-11
Attending: INTERNAL MEDICINE
Payer: COMMERCIAL

## 2023-01-11 VITALS
DIASTOLIC BLOOD PRESSURE: 78 MMHG | TEMPERATURE: 97.1 F | RESPIRATION RATE: 18 BRPM | BODY MASS INDEX: 29.9 KG/M2 | OXYGEN SATURATION: 99 % | HEART RATE: 61 BPM | SYSTOLIC BLOOD PRESSURE: 120 MMHG | WEIGHT: 217.4 LBS

## 2023-01-11 DIAGNOSIS — C64.2 RENAL CELL CARCINOMA, LEFT (H): Primary | ICD-10-CM

## 2023-01-11 DIAGNOSIS — C64.9 RENAL CELL CARCINOMA, UNSPECIFIED LATERALITY (H): Primary | ICD-10-CM

## 2023-01-11 LAB
ALBUMIN SERPL BCG-MCNC: 4.7 G/DL (ref 3.5–5.2)
ALP SERPL-CCNC: 62 U/L (ref 40–129)
ALT SERPL W P-5'-P-CCNC: 48 U/L (ref 10–50)
ANION GAP SERPL CALCULATED.3IONS-SCNC: 9 MMOL/L (ref 7–15)
AST SERPL W P-5'-P-CCNC: 31 U/L (ref 10–50)
BILIRUB SERPL-MCNC: 0.6 MG/DL
BUN SERPL-MCNC: 19.5 MG/DL (ref 6–20)
CALCIUM SERPL-MCNC: 9.5 MG/DL (ref 8.6–10)
CHLORIDE SERPL-SCNC: 107 MMOL/L (ref 98–107)
CREAT SERPL-MCNC: 1.32 MG/DL (ref 0.67–1.17)
DEPRECATED HCO3 PLAS-SCNC: 24 MMOL/L (ref 22–29)
GFR SERPL CREATININE-BSD FRML MDRD: 67 ML/MIN/1.73M2
GLUCOSE SERPL-MCNC: 95 MG/DL (ref 70–99)
POTASSIUM SERPL-SCNC: 4.2 MMOL/L (ref 3.4–5.3)
PROT SERPL-MCNC: 7.6 G/DL (ref 6.4–8.3)
SODIUM SERPL-SCNC: 140 MMOL/L (ref 136–145)
TSH SERPL DL<=0.005 MIU/L-ACNC: 3.07 UIU/ML (ref 0.3–4.2)

## 2023-01-11 PROCEDURE — 80053 COMPREHEN METABOLIC PANEL: CPT | Performed by: INTERNAL MEDICINE

## 2023-01-11 PROCEDURE — 99212 OFFICE O/P EST SF 10 MIN: CPT | Performed by: INTERNAL MEDICINE

## 2023-01-11 PROCEDURE — 99215 OFFICE O/P EST HI 40 MIN: CPT | Performed by: INTERNAL MEDICINE

## 2023-01-11 PROCEDURE — 96413 CHEMO IV INFUSION 1 HR: CPT

## 2023-01-11 PROCEDURE — G0463 HOSPITAL OUTPT CLINIC VISIT: HCPCS | Mod: 25

## 2023-01-11 PROCEDURE — 84443 ASSAY THYROID STIM HORMONE: CPT | Performed by: INTERNAL MEDICINE

## 2023-01-11 PROCEDURE — 258N000003 HC RX IP 258 OP 636: Performed by: INTERNAL MEDICINE

## 2023-01-11 PROCEDURE — 250N000011 HC RX IP 250 OP 636: Performed by: INTERNAL MEDICINE

## 2023-01-11 PROCEDURE — 36415 COLL VENOUS BLD VENIPUNCTURE: CPT | Performed by: INTERNAL MEDICINE

## 2023-01-11 RX ORDER — ACETAMINOPHEN 325 MG/1
325-650 TABLET ORAL EVERY 6 HOURS PRN
COMMUNITY

## 2023-01-11 RX ADMIN — SODIUM CHLORIDE 200 MG: 9 INJECTION, SOLUTION INTRAVENOUS at 09:45

## 2023-01-11 RX ADMIN — SODIUM CHLORIDE 250 ML: 9 INJECTION, SOLUTION INTRAVENOUS at 09:27

## 2023-01-11 ASSESSMENT — PAIN SCALES - GENERAL: PAINLEVEL: NO PAIN (0)

## 2023-01-11 NOTE — PATIENT INSTRUCTIONS
[facility] Triage and after hours / weekends / holidays:  [phone]    Please call the triage or after hours line if you experience a temperature greater than or equal to 100.4, shaking chills, have uncontrolled nausea, vomiting and/or diarrhea, dizziness, shortness of breath, chest pain, bleeding, unexplained bruising, or if you have any other new/concerning symptoms, questions or concerns.      If you are having any concerning symptoms or wish to speak to a provider before your next infusion visit, please call your care coordinator or triage to notify them so we can adequately serve you.     If you need a refill on a narcotic prescription or other medication, please call before your infusion appointment.                January 2023 Sunday Monday Tuesday Wednesday Thursday Friday Saturday   1     2     3     4     5     6     7       8     9     10     11    LAB PERIPHERAL   8:15 AM   (15 min.)   [facility] LAB DRAW   [facility]    RETURN   8:30 AM   (30 min.)   Bart Barahona MD   [facility]    ONC INFUSION 1 HR (60 MIN)   9:30 AM   (60 min.)    ONC INFUSION NURSE   [facility] 12     13     14       15     16     17     18     19     20     21       22     23     24     25     26     27     28       29     30    CT CHEST/ABDOMEN/PELVIS W  10:30 AM   (20 min.)   BECT1   [facility] [address] February 2023 Sunday Monday Tuesday Wednesday Thursday Friday Saturday                  1    VIDEO VISIT RETURN   9:00 AM   (30 min.)   Bart Barahona MD   [facility] 2     3     4       5     6     7     8     9     10     11       12     13     14     15     16     17     18       19     20     21     22     23     24     25       26     27     28                                           Lab Results:  Recent Results (from the past  12 hour(s))   Comprehensive metabolic panel    Collection Time: 01/11/23  8:32 AM   Result Value Ref Range    Sodium 140 136 - 145 mmol/L    Potassium 4.2 3.4 - 5.3 mmol/L    Chloride 107 98 - 107 mmol/L    Carbon Dioxide (CO2) 24 22 - 29 mmol/L    Anion Gap 9 7 - 15 mmol/L    Urea Nitrogen 19.5 6.0 - 20.0 mg/dL    Creatinine 1.32 (H) 0.67 - 1.17 mg/dL    Calcium 9.5 8.6 - 10.0 mg/dL    Glucose 95 70 - 99 mg/dL    Alkaline Phosphatase 62 40 - 129 U/L    AST 31 10 - 50 U/L    ALT 48 10 - 50 U/L    Protein Total 7.6 6.4 - 8.3 g/dL    Albumin 4.7 3.5 - 5.2 g/dL    Bilirubin Total 0.6 <=1.2 mg/dL    GFR Estimate 67 >60 mL/min/1.73m2

## 2023-01-11 NOTE — NURSING NOTE
Chief Complaint   Patient presents with     Blood Draw     Labs drawn via  by RN in lab. VS taken.      Labs collected from venipuncture by RN. Vitals taken. Checked in for appointment(s).    Ana Maria Walden RN

## 2023-01-11 NOTE — PROGRESS NOTES
Infusion Nursing Note:  Pierre Avila presents today for Cycle 17 Day 1 Keytruda.    Patient seen by provider today: Yes: Dr. Barahona   present during visit today: Not Applicable.    Note: Pt presents for his last Keytruda infusion feeling well. He offers no new concerns following his provider appointment.    Intravenous Access:  Peripheral IV placed.    Treatment Conditions:  Lab Results   Component Value Date    HGB 14.1 07/06/2022    WBC 6.6 07/06/2022    ANEUTAUTO 4.0 07/06/2022     07/06/2022      Lab Results   Component Value Date     01/11/2023    POTASSIUM 4.2 01/11/2023    MAG 2.2 08/17/2022    CR 1.32 (H) 01/11/2023    CHRISTIANE 9.5 01/11/2023    BILITOTAL 0.6 01/11/2023    ALBUMIN 4.7 01/11/2023    ALT 48 01/11/2023    AST 31 01/11/2023     Results reviewed, labs MET treatment parameters, ok to proceed with treatment.    Post Infusion Assessment:  Patient tolerated infusion without incident.  Blood return noted pre and post infusion.  Site patent and intact, free from redness, edema or discomfort.  No evidence of extravasations.  Access discontinued per protocol.     Discharge Plan:   Patient declined prescription refills.  Discharge instructions reviewed with: Patient.  Patient and/or family verbalized understanding of discharge instructions and all questions answered.  AVS to patient via [employer]HART.  Patient will return 1/30/23 (CT appt) for next appointment.   Patient discharged in stable condition accompanied by: self.  Departure Mode: Ambulatory.      Lauren Littlejohn RN

## 2023-01-11 NOTE — LETTER
1/11/2023         RE: Pierre Avila  [address] [facility] [address]        Dear Colleague,    Thank you for referring your patient, Pierre Avila, to the [facility]. Please see a copy of my visit note below.      FOLLOW UP VISIT     Reason for Visit:  Renal cell carcinoma (pT3a R0) s/p nephrectomy; cycle #17 (of 17) of adjuvant pembrolizumab.     History of Present Illness:  Mr. Avila is a 47-year-old man who presented with hematuria in 10/2021.  CT imaging (10/21/2021) revealed a large mass at the upper pole of the left kidney, without evidence of local or distant metastatic disease.  On 12/21/2021, he had a left radical nephrectomy which showed a 5.4 cm clear-cell renal cell carcinoma (no sarcomatoid or rhabdoid features), nucleolar grade 3, with tumor extending into the calyceal system.  His pathological stage was pT3a G3 R0 kidney cancer.     The patient elected to receive adjuvant pembrolizumab treatment, which he started in 2/2022.  He returns today to begin cycle #17 of adjuvant pembrolizumab (out of a total of 17 planned cycles).  He has tolerating the treatment relatively well.  Today is his last dose.     Review of systems:  The patient reports feeling generally well, and he has recovered nicely from his radical nephrectomy surgery.  Insomnia is his biggest problem right now, and this persists despite trying melatonin.  He reports occasional fatigue and headaches. Reports dry mouth.  Also has noticed frothy urine recently.  Reports occasional neuropathy bilaterally but especially in his right toes. Not interfering with daily activities. He has seen orthopedics in the past for longstanding plantar fascitis.  Reports some burning and itching behind both knees on and off after last infusion. Pain lasts 10-20 minutes with 3-4 flares over the last three weeks. Reports redness at the site, without any other rashes. Has dry skin at baseline. Does not moisturize  regularly. No vitiligo. No peripheral edema. Working on eliminating sugars. Eats well otherwise. Reports good oral hydration. Weight stable. No chest pain, shortness, or cough.  No abdominal pain, diarrhea, or constipation.  No anorexia.  No vomiting or nausea.  No urinary changes.  No mucositis.   A comprehensive 14-system review of symptoms is otherwise generally unremarkable.  His ECOG score is 0.  His pain score is 1/10.      Medications:   Pembrolizumab 200 mg IV q3wk  (last dose today)     Allergies:    No known drug allergies.      Physical Examination:  Mr. Avila is a 47-year-old man who appears comfortable at rest.  His vital signs are unremarkable.  His HEENT examination is within normal limits.  The cardiac, pulmonary, and abdominal examinations are unremarkable.  There is no palpable cervical, axillary, supraclavicular or inguinal lymphadenopathy.  His surgical site from the recent robotic nephrectomy appears to be healing well, without evidence of erythema or exudate.  The neuromuscular examination is grossly intact.  The extremities do not demonstrate pitting edema.  The skin evaluation is unremarkable     Laboratories (1/11/2023):  His CBC is within normal limits.  His comprehensive metabolic panel shows a creatinine level of 1.32 mg/dL (GFR of 67 mL/min), but is otherwise unremarkable.  His TSH level is normal at 2.34 mU/L.     Imaging (7/5/2022):  He underwent a CT scan of the chest, abdomen, and pelvis with IV contrast on 7/5/2022.  This showed no evidence of recurrent or metastatic renal cell carcinoma; there is evidence of a prior left nephrectomy; there is slight thickening of the left adrenal gland without discrete nodularity.  Overall, this scan is consistent with TONYA.  His next scan will be in 1/2023.        ASSESSMENT AND PLAN:  Mr. Avila is a 47-year-old man with a diagnosis of clear-cell renal cell carcinoma (pT3a G3 R0) who underwent radical nephrectomy on 12/21/2021.  He began  adjuvant therapy in 2/2022, and returns today to receive cycle #17 (last dose) of adjuvant pembrolizumab treatment.  His ECOG score is 0.  His pain score is 1/10.     We previously reviewed the results of the KeyNote-564 trial as well as the recent FDA approval of adjuvant pembrolizumab for kidney cancer at high risk of relapse.  The eligibility for the KeyNote-564 clinical trial involved either: pT2 disease with grade 4 differentiation or sarcomatoid features, all pT3/T4 disease, N1 disease, or resected M1 disease with current TONYA status.  The pembrolizumab was then delivered at a dose of 200 mg IV every 3 weeks, for a total of 17 cycles (one year).  This patient clearly meets criteria for adjuvant pembrolizumab.  After reviewing the anticipated benefits and potential side effects of pembrolizumab therapy, the patient decided to embark on this adjuvant treatment.  He returns today to receive cycle #16 of pembrolizumab.  He has tolerated this well, except for some pruritus behind both knees as well as some insomnia.  He also reported frothy urine this time.      We have again reviewed the surveillance schedule for stage pT3 kidney cancers following resection.  This involves a CT scan of the chest, abdomen and pelvis every 6 months for the first 3 years (his next CT scan will be done on 1/30/2023).  He would then need a CT scan every 12 months during years 3-5 of follow-up.  If he developed overt metastatic kidney cancer in the future, then the most likely scenario is that he would have oligometastatic disease at that time.  If this was the case, then he could certainly be managed with metastasectomy followed by pseudo-adjuvant pembrolizumab at that time.  If he had a polymetastatic recurrence, then my preferred regimen would be the combination of pembrolizumab-axitinib or nivolumab-cabozantinib.     A total of 40 minutes were spent on this patient on the day of the consultation, of which more than 50% of this time  was used for counseling and coordination of care. The patient was given the opportunity to ask multiple questions today, all of which were answered to his satisfaction.     Bart Baarhona M.D.

## 2023-01-11 NOTE — NURSING NOTE
"Oncology Rooming Note    January 11, 2023 8:39 AM   Pierre Avila is a 47 year old male who presents for:    Chief Complaint   Patient presents with     Blood Draw     Labs drawn via  by RN in lab. VS taken.      Oncology Clinic Visit     RCC     Initial Vitals: /78 (BP Location: Right arm, Patient Position: Sitting, Cuff Size: Adult Large)   Pulse 61   Temp 97.1  F (36.2  C) (Oral)   Resp 18   Wt 98.6 kg (217 lb 6.4 oz)   SpO2 99%   BMI 29.90 kg/m   Estimated body mass index is 29.9 kg/m  as calculated from the following:    Height as of 12/21/22: 1.816 m (5' 11.5\").    Weight as of this encounter: 98.6 kg (217 lb 6.4 oz). Body surface area is 2.23 meters squared.  No Pain (0) Comment: Data Unavailable   No LMP for male patient.  Allergies reviewed: Yes  Medications reviewed: Yes    Medications: Medication refills not needed today.  Pharmacy name entered into [facility]:    [facility] - [place], [place] - [address] [place] [address] [facility] [address] [place], [place] - [address] [facility] [place] [place] - [id number]    Clinical concerns:        Deja Elise CMA              "

## 2023-01-30 ENCOUNTER — ANCILLARY PROCEDURE (OUTPATIENT)
Dept: CT IMAGING | Facility: CLINIC | Age: 48
End: 2023-01-30
Attending: INTERNAL MEDICINE
Payer: COMMERCIAL

## 2023-01-30 DIAGNOSIS — C64.9 RENAL CELL CARCINOMA, UNSPECIFIED LATERALITY (H): ICD-10-CM

## 2023-01-30 PROCEDURE — 71260 CT THORAX DX C+: CPT | Mod: TC | Performed by: RADIOLOGY

## 2023-01-30 PROCEDURE — 74177 CT ABD & PELVIS W/CONTRAST: CPT | Mod: TC | Performed by: RADIOLOGY

## 2023-01-30 RX ORDER — IOPAMIDOL 755 MG/ML
121 INJECTION, SOLUTION INTRAVASCULAR ONCE
Status: COMPLETED | OUTPATIENT
Start: 2023-01-30 | End: 2023-01-30

## 2023-01-30 RX ADMIN — IOPAMIDOL 121 ML: 755 INJECTION, SOLUTION INTRAVASCULAR at 11:02

## 2023-01-31 NOTE — PROGRESS NOTES
Pierre Marin is a 47-year-old man who is being evaluated via a billable video visit.      Patient stated he is in the state of [place] for the visit today.    How would you like to obtain your AVS? MyChart  If the video visit is dropped, the invitation should be resent by: Text to cell phone: [phone]  Will anyone else be joining your video visit? No    Hallie Jim, Virtual Visit Facilitator    Video-Visit Details    Type of service:  Video Visit     Originating Location (pt. Location): Home    Distant Location (provider location):  [facility]  Platform used for Video Visit: Ko      ---------------------------------------------------------------------------------------    FOLLOW UP VISIT  -  TELEMEDICINE     Reason for Visit:  Renal cell carcinoma (pT3a R0) s/p nephrectomy; completed one year of adjuvant pembrolizumab.     History of Present Illness:  Mr. Avila is a 47-year-old man who presented with hematuria in 10/2021.  CT imaging (10/21/2021) revealed a large mass at the upper pole of the left kidney, without evidence of local or distant metastatic disease.  On 12/21/2021, he had a left radical nephrectomy which showed a 5.4 cm clear-cell renal cell carcinoma (no sarcomatoid or rhabdoid features), nucleolar grade 3, with tumor extending into the calyceal system.  His pathological stage was pT3a G3 R0 kidney cancer.     The patient elected to receive adjuvant pembrolizumab treatment, which he started in 2/2022.  He completed one year (17 doses) of adjuvant pembrolizumab three weeks ago.  He returns today for a follow-up visit, and to review the results of his recent CT scan.     Review of systems:  The patient reports feeling generally well, and he has recovered nicely from his radical nephrectomy surgery.  Insomnia is his biggest problem right now, and this persists despite trying melatonin.  He reports occasional fatigue and headaches. Reports dry mouth.  Also has  noticed frothy urine recently.  Reports occasional neuropathy bilaterally but especially in his right toes. Not interfering with daily activities. He has seen orthopedics in the past for longstanding plantar fascitis.  Reports some burning and itching behind both knees on and off after last infusion. Pain lasts 10-20 minutes with 3-4 flares over the last three weeks. Reports redness at the site, without any other rashes. Has dry skin at baseline. Does not moisturize regularly. No vitiligo. No peripheral edema. Working on eliminating sugars. Eats well otherwise. Reports good oral hydration. Weight stable. No chest pain, shortness, or cough.  No abdominal pain, diarrhea, or constipation.  No anorexia.  No vomiting or nausea.  No urinary changes.  No mucositis.   A comprehensive 14-system review of symptoms is otherwise generally unremarkable.  His ECOG score is 0.  His pain score is 1/10.        Physical Examination:  Mr. Avila is a 47-year-old man who appears comfortable at rest.  His vital signs are unremarkable.  His HEENT examination is within normal limits.  The cardiac, pulmonary, and abdominal examinations are unremarkable.  There is no palpable cervical, axillary, supraclavicular or inguinal lymphadenopathy.  His surgical site from the recent robotic nephrectomy appears to be healing well, without evidence of erythema or exudate.  The neuromuscular examination is grossly intact.  The extremities do not demonstrate pitting edema.  The skin evaluation is unremarkable      Imaging (7/5/2022):  This showed no evidence of recurrent or metastatic renal cell carcinoma; there is evidence of a prior left nephrectomy; there is slight thickening of the left adrenal gland without discrete nodularity.  Overall, this scan is consistent with TONYA.    Imaging (1/30/2023):  This showed no CT-based evidence of metastatic disease in the visualized chest, abdomen, or pelvis.  Overall, this scan is consistent with  TONYA.        ASSESSMENT AND PLAN:  Mr. Avila is a 47-year-old man with a diagnosis of clear-cell renal cell carcinoma (pT3a G3 R0) who underwent radical nephrectomy on 12/21/2021.  He began adjuvant therapy in 2/2022, and returns today for a follow-up visit having recently completed one year of adjuvant pembrolizumab treatment.  His ECOG score is 0.  His pain score is 1/10.     We previously reviewed the results of the KeyNote-564 trial as well as the recent FDA approval of adjuvant pembrolizumab for kidney cancer at high risk of relapse.  The eligibility for the KeyNote-564 clinical trial involved either: pT2 disease with grade 4 differentiation or sarcomatoid features, all pT3/T4 disease, N1 disease, or resected M1 disease with current TONYA status.  The pembrolizumab was then delivered at a dose of 200 mg IV every 3 weeks, for a total of 17 cycles (one year).  This patient clearly meets criteria for adjuvant pembrolizumab.  After reviewing the anticipated benefits and potential side effects of pembrolizumab therapy, the patient decided to embark on this adjuvant treatment.  He recently completed one year of adjuvant pembrolizumab treatment.  Fortunately, his CT scan from yesterday continues to show no evidence of recurrent disease.      We have again reviewed the surveillance schedule for stage pT3 kidney cancers following resection.  This involves a CT scan of the chest, abdomen and pelvis every 6 months for the first 3 years (his next CT scan will be done in 7/2023).  He would then need a CT scan every 12 months during years 3-5 of follow-up.  If he developed overt metastatic kidney cancer in the future, then the most likely scenario is that he would have oligometastatic disease at that time.  If this was the case, then he could certainly be managed with metastasectomy followed by pseudo-adjuvant pembrolizumab at that time.  If he had a polymetastatic recurrence, then my preferred regimen would be the  combination of pembrolizumab-axitinib or nivolumab-cabozantinib.     A total of 40 minutes were spent on this patient on the day of the consultation, of which more than 50% of this time was used for counseling and coordination of care. The patient was given the opportunity to ask multiple questions today, all of which were answered to his satisfaction.     Bart Barahona M.D.

## 2023-02-01 ENCOUNTER — VIRTUAL VISIT (OUTPATIENT)
Dept: ONCOLOGY | Facility: CLINIC | Age: 48
End: 2023-02-01
Attending: INTERNAL MEDICINE
Payer: COMMERCIAL

## 2023-02-01 DIAGNOSIS — C64.9 RENAL CELL CARCINOMA, UNSPECIFIED LATERALITY (H): Primary | ICD-10-CM

## 2023-02-01 PROCEDURE — 99215 OFFICE O/P EST HI 40 MIN: CPT | Mod: 95 | Performed by: INTERNAL MEDICINE

## 2023-02-01 NOTE — NURSING NOTE
Patient verified medications and allergies are correct via eCheck-in. Patient confirms no changes at this time and/or since last reviewed by clinic staff.    Hallie Jim, Virtual Facilitator

## 2023-02-01 NOTE — LETTER
2/1/2023         RE: Pierre Avila  [address] [facility] [address]        Dear Colleague,    Thank you for referring your patient, Pierre Avila, to the [facility]. Please see a copy of my visit note below.    Pierre Marin is a 47-year-old man who is being evaluated via a billable video visit.      Patient stated he is in the state of [place] for the visit today.    How would you like to obtain your AVS? MyChart  If the video visit is dropped, the invitation should be resent by: Text to cell phone: [phone]  Will anyone else be joining your video visit? Susan Jim, Virtual Visit Facilitator    Video-Visit Details    Type of service:  Video Visit     Originating Location (pt. Location): Home    Distant Location (provider location):  [facility]  Platform used for Video Visit: Ko      ---------------------------------------------------------------------------------------    FOLLOW UP VISIT  -  TELEMEDICINE     Reason for Visit:  Renal cell carcinoma (pT3a R0) s/p nephrectomy; completed one year of adjuvant pembrolizumab.     History of Present Illness:  Mr. Avila is a 47-year-old man who presented with hematuria in 10/2021.  CT imaging (10/21/2021) revealed a large mass at the upper pole of the left kidney, without evidence of local or distant metastatic disease.  On 12/21/2021, he had a left radical nephrectomy which showed a 5.4 cm clear-cell renal cell carcinoma (no sarcomatoid or rhabdoid features), nucleolar grade 3, with tumor extending into the calyceal system.  His pathological stage was pT3a G3 R0 kidney cancer.     The patient elected to receive adjuvant pembrolizumab treatment, which he started in 2/2022.  He completed one year (17 doses) of adjuvant pembrolizumab three weeks ago.  He returns today for a follow-up visit, and to review the results of his recent CT scan.     Review of systems:  The patient reports  Received INR results from home monitor per fax. INR 3.8 on 6/25/20. Left message on voicemail for patient to call me to discuss results.  Lyudmila Good RN     feeling generally well, and he has recovered nicely from his radical nephrectomy surgery.  Insomnia is his biggest problem right now, and this persists despite trying melatonin.  He reports occasional fatigue and headaches. Reports dry mouth.  Also has noticed frothy urine recently.  Reports occasional neuropathy bilaterally but especially in his right toes. Not interfering with daily activities. He has seen orthopedics in the past for longstanding plantar fascitis.  Reports some burning and itching behind both knees on and off after last infusion. Pain lasts 10-20 minutes with 3-4 flares over the last three weeks. Reports redness at the site, without any other rashes. Has dry skin at baseline. Does not moisturize regularly. No vitiligo. No peripheral edema. Working on eliminating sugars. Eats well otherwise. Reports good oral hydration. Weight stable. No chest pain, shortness, or cough.  No abdominal pain, diarrhea, or constipation.  No anorexia.  No vomiting or nausea.  No urinary changes.  No mucositis.   A comprehensive 14-system review of symptoms is otherwise generally unremarkable.  His ECOG score is 0.  His pain score is 1/10.        Physical Examination:  Mr. Avila is a 47-year-old man who appears comfortable at rest.  His vital signs are unremarkable.  His HEENT examination is within normal limits.  The cardiac, pulmonary, and abdominal examinations are unremarkable.  There is no palpable cervical, axillary, supraclavicular or inguinal lymphadenopathy.  His surgical site from the recent robotic nephrectomy appears to be healing well, without evidence of erythema or exudate.  The neuromuscular examination is grossly intact.  The extremities do not demonstrate pitting edema.  The skin evaluation is unremarkable      Imaging (7/5/2022):  This showed no evidence of recurrent or metastatic renal cell carcinoma; there is evidence of a prior left nephrectomy; there is slight thickening of the left adrenal  gland without discrete nodularity.  Overall, this scan is consistent with TONYA.    Imaging (1/30/2023):  This showed no CT-based evidence of metastatic disease in the visualized chest, abdomen, or pelvis.  Overall, this scan is consistent with TONYA.        ASSESSMENT AND PLAN:  Mr. Avila is a 47-year-old man with a diagnosis of clear-cell renal cell carcinoma (pT3a G3 R0) who underwent radical nephrectomy on 12/21/2021.  He began adjuvant therapy in 2/2022, and returns today for a follow-up visit having recently completed one year of adjuvant pembrolizumab treatment.  His ECOG score is 0.  His pain score is 1/10.     We previously reviewed the results of the KeyNote-564 trial as well as the recent FDA approval of adjuvant pembrolizumab for kidney cancer at high risk of relapse.  The eligibility for the KeyNote-564 clinical trial involved either: pT2 disease with grade 4 differentiation or sarcomatoid features, all pT3/T4 disease, N1 disease, or resected M1 disease with current TONYA status.  The pembrolizumab was then delivered at a dose of 200 mg IV every 3 weeks, for a total of 17 cycles (one year).  This patient clearly meets criteria for adjuvant pembrolizumab.  After reviewing the anticipated benefits and potential side effects of pembrolizumab therapy, the patient decided to embark on this adjuvant treatment.  He recently completed one year of adjuvant pembrolizumab treatment.  Fortunately, his CT scan from yesterday continues to show no evidence of recurrent disease.      We have again reviewed the surveillance schedule for stage pT3 kidney cancers following resection.  This involves a CT scan of the chest, abdomen and pelvis every 6 months for the first 3 years (his next CT scan will be done in 7/2023).  He would then need a CT scan every 12 months during years 3-5 of follow-up.  If he developed overt metastatic kidney cancer in the future, then the most likely scenario is that he would have oligometastatic  disease at that time.  If this was the case, then he could certainly be managed with metastasectomy followed by pseudo-adjuvant pembrolizumab at that time.  If he had a polymetastatic recurrence, then my preferred regimen would be the combination of pembrolizumab-axitinib or nivolumab-cabozantinib.     A total of 40 minutes were spent on this patient on the day of the consultation, of which more than 50% of this time was used for counseling and coordination of care. The patient was given the opportunity to ask multiple questions today, all of which were answered to his satisfaction.     Bart Barahona M.D.

## 2023-04-13 ENCOUNTER — NURSE TRIAGE (OUTPATIENT)
Dept: ONCOLOGY | Facility: CLINIC | Age: 48
End: 2023-04-13
Payer: COMMERCIAL

## 2023-04-13 NOTE — TELEPHONE ENCOUNTER
"Nurse Triage SBAR    Situation: New sx update    Background:    DX: Renal cell carcinoma   Provider:   Most recent appointment: 02/01/23 w/  Upcoming appointments: 04/26/23 w/      Assessment:   Pt states sx started 3 weeks ago.   He  has been more fatigued. Describes fatigue as \"type where your eyes are burning.\"  Has not had to stop working or exercising due to fatigue.   Has noticed some SOB with activity, not at rest. Left nostril, sometimes feels \"a little burning sensation\" and radiates down to left side of throat. Typically happens when taking a deep breath.   Does note he has been having a dry cough recently. Gets worse as the day goes on.  Having more headaches. Deep, not pulsating. \"like a head rush headache that sticks around.\" Pt reports tylenol helps to relieve headache.     Pt has been cutting back on caffeine and also does get seasonal allergies during this time. Doesn't know if sx are related to that.    Pt just wanting to check in to report the sx above and wondering if provider feels like he should be seen sooner than appt scheduled on 04/26/23.      Denies fevers, chills, night sweats, unexplained weight changes, dizziness, vision or hearing changes, new lumps or bumps, chest pain, abdominal pain, nausea, vomiting, changes to bowel or bladder, swelling of extremities, bleeding issues, or rash.     Recommendation:   Routed provider:  and Olga PUCKETT    "

## 2023-04-14 NOTE — TELEPHONE ENCOUNTER
Contacted pt to inform them of provider response that there is no need to move up current appt. Pt may be experiencing these sx due to hayfever. Advised pt to continue monitoring and to call triage if sx worsen. Pt verbalized understanding.

## 2023-04-25 NOTE — PROGRESS NOTES
Virtual Visit Details    Type of service:  Video Visit     Originating Location (pt. Location):  Home    Distant Location (provider location):  [facility]    Platform used for Video Visit:  Ko    ---------------------------------------------------------------------------------------------------------------    FOLLOW UP VISIT  -  TELEMEDICINE     Reason for Visit:  Renal cell carcinoma (pT3a R0) s/p nephrectomy; completed one year of adjuvant pembrolizumab.     History of Present Illness:  Mr. Avila is a 47-year-old man who presented with hematuria in 10/2021.  CT imaging (10/21/2021) revealed a large mass at the upper pole of the left kidney, without evidence of local or distant metastatic disease.  On 12/21/2021, he had a left radical nephrectomy which showed a 5.4 cm clear-cell renal cell carcinoma (no sarcomatoid or rhabdoid features), nucleolar grade 3, with tumor extending into the calyceal system.  His pathological stage was pT3a G3 R0 kidney cancer.     The patient elected to receive adjuvant pembrolizumab treatment, which he started in 2/2022.  He completed one year (17 doses) of adjuvant pembrolizumab on 1/11/2023.  He returns today for a virtual follow-up visit.     Review of systems:  The patient reports feeling generally well, and he has recovered nicely from his radical nephrectomy surgery.  However, he does report a new cough associated with fatigue and SOB on exertion.  Insomnia is also another problem right now, and this persists despite trying melatonin.  He reports occasional fatigue and headaches. Reports dry mouth.  Also has noticed frothy urine recently.  Reports occasional neuropathy bilaterally but especially in his right toes. Not interfering with daily activities. He has seen orthopedics in the past for longstanding plantar fascitis.  Reports some burning and itching behind both knees on and off after last infusion. Pain lasts 10-20 minutes with 3-4  flares over the last three weeks. Reports redness at the site, without any other rashes. Has dry skin at baseline. Does not moisturize regularly. No vitiligo. No peripheral edema. Working on eliminating sugars. Eats well otherwise. Reports good oral hydration. Weight stable. No chest pain, shortness, or cough.  No abdominal pain, diarrhea, or constipation.  No anorexia.  No vomiting or nausea.  No urinary changes.  No mucositis.   A comprehensive 14-system review of symptoms is otherwise generally unremarkable.  His ECOG score is 0.  His pain score is 1/10.        Physical Examination:  Mr. Avila is a 47-year-old man who appears comfortable at rest.  His vital signs are unremarkable.  His HEENT examination is within normal limits.  The cardiac, pulmonary, and abdominal examinations are unremarkable.  There is no palpable cervical, axillary, supraclavicular or inguinal lymphadenopathy.  His surgical site from the recent robotic nephrectomy appears to be healing well, without evidence of erythema or exudate.  The neuromuscular examination is grossly intact.  The extremities do not demonstrate pitting edema.  The skin evaluation is unremarkable    Imaging (1/30/2023):  This showed no CT-based evidence of metastatic disease in the visualized chest, abdomen, or pelvis.  Overall, this scan is consistent with TONYA.        ASSESSMENT AND PLAN:  Mr. Avila is a 47-year-old man with a diagnosis of clear-cell renal cell carcinoma (pT3a G3 R0) who underwent radical nephrectomy on 12/21/2021.  He began adjuvant therapy in 2/2022, and returns today for a follow-up visit having recently completed one year of adjuvant pembrolizumab treatment on 1/11/2023.  His ECOG score is 0.  His pain score is 1/10.     We previously reviewed the results of the KeyNote-564 trial as well as the recent FDA approval of adjuvant pembrolizumab for kidney cancer at high risk of relapse.  The eligibility for the KeyNote-564 clinical trial involved  either: pT2 disease with grade 4 differentiation or sarcomatoid features, all pT3/T4 disease, N1 disease, or resected M1 disease with current TONYA status.  The pembrolizumab was then delivered at a dose of 200 mg IV every 3 weeks, for a total of 17 cycles (one year).  This patient clearly meets criteria for adjuvant pembrolizumab.  After reviewing the anticipated benefits and potential side effects of pembrolizumab therapy, the patient decided to embark on this adjuvant treatment.  He recently completed one year of adjuvant pembrolizumab treatment.  Fortunately, his CT scan from yesterday continues to show no evidence of recurrent disease.      We have again reviewed the surveillance schedule for stage pT3 kidney cancers following resection.  This involves a CT scan of the chest, abdomen and pelvis every 6 months for the first 3 years (his next CT scan will be done in 7/2023).  He would then need a CT scan every 12 months during years 3-5 of follow-up.  If he developed overt metastatic kidney cancer in the future, then the most likely scenario is that he would have oligometastatic disease at that time.  If this was the case, then he could certainly be managed with metastasectomy followed by pseudo-adjuvant pembrolizumab at that time.  If he had a polymetastatic recurrence, then my preferred regimen would be the combination of pembrolizumab-axitinib or nivolumab-cabozantinib.    The patient reports a subacute onset of a dry cough, associated with significant fatigue and shortness of breath on exertion.  He is not aware of any recent COVID contacts.  He does not report fevers, chills or arthralgias.  Given the prior pembrolizumab treatment, the rare but serious possibility of immune-related pneumonitis should be excluded.  Alternative diagnoses include viral or bacterial pneumonias.  We have decided to order a CT scan of the chest in order to investigate for potential pneumonia versus pneumonitis, and this will be  conducted later this week.  I will follow-up with the patient by telephone in order to instruct him about next steps.     A total of 40 minutes were spent on this patient on the day of the consultation, of which more than 50% of this time was used for counseling and coordination of care. The patient was given the opportunity to ask multiple questions today, all of which were answered to his satisfaction.     Bart Barahona M.D.

## 2023-04-26 ENCOUNTER — VIRTUAL VISIT (OUTPATIENT)
Dept: ONCOLOGY | Facility: CLINIC | Age: 48
End: 2023-04-26
Attending: INTERNAL MEDICINE
Payer: COMMERCIAL

## 2023-04-26 DIAGNOSIS — R05.1 ACUTE COUGH: Primary | ICD-10-CM

## 2023-04-26 PROCEDURE — 99215 OFFICE O/P EST HI 40 MIN: CPT | Mod: VID | Performed by: INTERNAL MEDICINE

## 2023-04-26 NOTE — LETTER
4/26/2023         RE: Pierre Avila  [address] [facility] [address]        Dear Colleague,    Thank you for referring your patient, Pierre Avila, to the [facility]. Please see a copy of my visit note below.      Virtual Visit Details    Type of service:  Video Visit     Originating Location (pt. Location):  Home    Distant Location (provider location):  [facility]    Platform used for Video Visit:  Ko    ---------------------------------------------------------------------------------------------------------------    FOLLOW UP VISIT  -  TELEMEDICINE     Reason for Visit:  Renal cell carcinoma (pT3a R0) s/p nephrectomy; completed one year of adjuvant pembrolizumab.     History of Present Illness:  Mr. Avila is a 47-year-old man who presented with hematuria in 10/2021.  CT imaging (10/21/2021) revealed a large mass at the upper pole of the left kidney, without evidence of local or distant metastatic disease.  On 12/21/2021, he had a left radical nephrectomy which showed a 5.4 cm clear-cell renal cell carcinoma (no sarcomatoid or rhabdoid features), nucleolar grade 3, with tumor extending into the calyceal system.  His pathological stage was pT3a G3 R0 kidney cancer.     The patient elected to receive adjuvant pembrolizumab treatment, which he started in 2/2022.  He completed one year (17 doses) of adjuvant pembrolizumab on 1/11/2023.  He returns today for a virtual follow-up visit.     Review of systems:  The patient reports feeling generally well, and he has recovered nicely from his radical nephrectomy surgery.  However, he does report a new cough associated with fatigue and SOB on exertion.  Insomnia is also another problem right now, and this persists despite trying melatonin.  He reports occasional fatigue and headaches. Reports dry mouth.  Also has noticed frothy urine recently.  Reports occasional neuropathy bilaterally but  especially in his right toes. Not interfering with daily activities. He has seen orthopedics in the past for longstanding plantar fascitis.  Reports some burning and itching behind both knees on and off after last infusion. Pain lasts 10-20 minutes with 3-4 flares over the last three weeks. Reports redness at the site, without any other rashes. Has dry skin at baseline. Does not moisturize regularly. No vitiligo. No peripheral edema. Working on eliminating sugars. Eats well otherwise. Reports good oral hydration. Weight stable. No chest pain, shortness, or cough.  No abdominal pain, diarrhea, or constipation.  No anorexia.  No vomiting or nausea.  No urinary changes.  No mucositis.   A comprehensive 14-system review of symptoms is otherwise generally unremarkable.  His ECOG score is 0.  His pain score is 1/10.        Physical Examination:  Mr. Avila is a 47-year-old man who appears comfortable at rest.  His vital signs are unremarkable.  His HEENT examination is within normal limits.  The cardiac, pulmonary, and abdominal examinations are unremarkable.  There is no palpable cervical, axillary, supraclavicular or inguinal lymphadenopathy.  His surgical site from the recent robotic nephrectomy appears to be healing well, without evidence of erythema or exudate.  The neuromuscular examination is grossly intact.  The extremities do not demonstrate pitting edema.  The skin evaluation is unremarkable    Imaging (1/30/2023):  This showed no CT-based evidence of metastatic disease in the visualized chest, abdomen, or pelvis.  Overall, this scan is consistent with TONYA.        ASSESSMENT AND PLAN:  Mr. Avila is a 47-year-old man with a diagnosis of clear-cell renal cell carcinoma (pT3a G3 R0) who underwent radical nephrectomy on 12/21/2021.  He began adjuvant therapy in 2/2022, and returns today for a follow-up visit having recently completed one year of adjuvant pembrolizumab treatment on 1/11/2023.  His ECOG score  is 0.  His pain score is 1/10.     We previously reviewed the results of the KeyNote-564 trial as well as the recent FDA approval of adjuvant pembrolizumab for kidney cancer at high risk of relapse.  The eligibility for the KeyNote-564 clinical trial involved either: pT2 disease with grade 4 differentiation or sarcomatoid features, all pT3/T4 disease, N1 disease, or resected M1 disease with current TONYA status.  The pembrolizumab was then delivered at a dose of 200 mg IV every 3 weeks, for a total of 17 cycles (one year).  This patient clearly meets criteria for adjuvant pembrolizumab.  After reviewing the anticipated benefits and potential side effects of pembrolizumab therapy, the patient decided to embark on this adjuvant treatment.  He recently completed one year of adjuvant pembrolizumab treatment.  Fortunately, his CT scan from yesterday continues to show no evidence of recurrent disease.      We have again reviewed the surveillance schedule for stage pT3 kidney cancers following resection.  This involves a CT scan of the chest, abdomen and pelvis every 6 months for the first 3 years (his next CT scan will be done in 7/2023).  He would then need a CT scan every 12 months during years 3-5 of follow-up.  If he developed overt metastatic kidney cancer in the future, then the most likely scenario is that he would have oligometastatic disease at that time.  If this was the case, then he could certainly be managed with metastasectomy followed by pseudo-adjuvant pembrolizumab at that time.  If he had a polymetastatic recurrence, then my preferred regimen would be the combination of pembrolizumab-axitinib or nivolumab-cabozantinib.    The patient reports a subacute onset of a dry cough, associated with significant fatigue and shortness of breath on exertion.  He is not aware of any recent COVID contacts.  He does not report fevers, chills or arthralgias.  Given the prior pembrolizumab treatment, the rare but serious  possibility of immune-related pneumonitis should be excluded.  Alternative diagnoses include viral or bacterial pneumonias.  We have decided to order a CT scan of the chest in order to investigate for potential pneumonia versus pneumonitis, and this will be conducted later this week.  I will follow-up with the patient by telephone in order to instruct him about next steps.     A total of 40 minutes were spent on this patient on the day of the consultation, of which more than 50% of this time was used for counseling and coordination of care. The patient was given the opportunity to ask multiple questions today, all of which were answered to his satisfaction.     Bart Barahona M.D.

## 2023-04-27 ENCOUNTER — ANCILLARY PROCEDURE (OUTPATIENT)
Dept: CT IMAGING | Facility: CLINIC | Age: 48
End: 2023-04-27
Attending: INTERNAL MEDICINE
Payer: COMMERCIAL

## 2023-04-27 DIAGNOSIS — R05.1 ACUTE COUGH: ICD-10-CM

## 2023-04-27 PROCEDURE — 71260 CT THORAX DX C+: CPT | Mod: TC | Performed by: RADIOLOGY

## 2023-04-27 RX ORDER — IOPAMIDOL 755 MG/ML
90 INJECTION, SOLUTION INTRAVASCULAR ONCE
Status: COMPLETED | OUTPATIENT
Start: 2023-04-27 | End: 2023-04-27

## 2023-04-27 RX ADMIN — IOPAMIDOL 90 ML: 755 INJECTION, SOLUTION INTRAVASCULAR at 14:30

## 2023-04-30 NOTE — PROGRESS NOTES
Virtual Visit Details    Type of service:  Video Visit     Originating Location (pt. Location): Home    Distant Location (provider location):  [facility]    Platform used for Video Visit: Ko      --------------------------------------------------------------------------------------------------------------------------------------------------------------    FOLLOW UP VISIT  -  TELEMEDICINE     Reason for Visit:  Renal cell carcinoma (pT3a R0) s/p one year of adjuvant pembrolizumab; concern for new pneumonitis.     History of Present Illness:  Mr. Avila is a 47-year-old man who presented with hematuria in 10/2021.  CT imaging (10/21/2021) revealed a large mass at the upper pole of the left kidney, without evidence of local or distant metastatic disease.  On 12/21/2021, he had a left radical nephrectomy which showed a 5.4 cm clear-cell renal cell carcinoma (no sarcomatoid or rhabdoid features), nucleolar grade 3, with tumor extending into the calyceal system.  His pathological stage was pT3a G3 R0 kidney cancer.     The patient elected to receive adjuvant pembrolizumab treatment, which he started in 2/2022.  He completed one year (17 doses) of adjuvant pembrolizumab on 1/11/2023.  Last week, the patient informed me of some new shortness of breath on exertion (and even at rest), as well as significant fatigue.  He also reported the onset of a new dry cough, which is significant because the patient has never been a cougher before.  I ordered a CT scan, which is described in more detail below.  In summary, it appears to show evidence of pneumonitis which could potentially represent immune-related pneumonitis.  We conducted an urgent telemedicine visit today to discuss treatment of possible immune-related pulmonary toxicity.     Review of systems:  The patient reports that he has recovered well from his radical nephrectomy surgery.  However, he does report a new cough associated with  fatigue and SOB on exertion.  Insomnia is also another problem right now, and this persists despite trying melatonin.  He reports occasional fatigue and headaches. Reports dry mouth.  Also has noticed frothy urine recently.  Reports occasional neuropathy bilaterally but especially in his right toes. Not interfering with daily activities. He has seen orthopedics in the past for longstanding plantar fascitis.  Reports some burning and itching behind both knees on and off after last infusion. Pain lasts 10-20 minutes with 3-4 flares over the last three weeks. Reports redness at the site, without any other rashes. Has dry skin at baseline. Does not moisturize regularly. No vitiligo. No peripheral edema. Working on eliminating sugars. Eats well otherwise. Reports good oral hydration. Weight stable. No chest pain, shortness, or cough.  No abdominal pain, diarrhea, or constipation.  No anorexia.  No vomiting or nausea.  No urinary changes.  No mucositis.   A comprehensive 14-system review of symptoms is otherwise generally unremarkable.  His ECOG score is 0.  His pain score is 1/10.        Physical Examination:  Mr. Avila is a 47-year-old man who appears comfortable at rest.  He was not visibly short of breat at rest.  He did not cough during our encounter today.  The remainder of the physical examination was deferred since this was a telemedicine visit.    Previous Imaging (1/30/2023):  His prior CT scan showed no CT-based evidence of metastatic disease in the visualized chest, abdomen, or pelvis.  Overall, this scan was consistent with TONYA.    Current Imaging (4/27/2023):  His recent CT scan shows new finding of patchy bilateral groundglass opacities diffusely, but mainly seen at the lower lungs. Bibasilar atelectasis as well. Previously noted right middle lobe nodule appears to be obscured by atelectasis. However, there are multiple new irregular and indeterminant pulmonary nodules bilaterally. One of the largest  regions is somewhat flat measuring 2 cm posterior left upper lobe. Bilobed nodular opacity at the posteromedial left lower lobe is 1.3 cm. There are several other examples also seen at other locations, for example at the right upper lobe. Several slightly larger indeterminant lymph nodes. Right hilar lymph node is 1.2 cm, previously 0.8 cm. Another larger lymph node at the right infrahilar location. Larger example at the mediastinum anteriorly is 0.8 cm, previously 0.5 cm. IMPRESSION: New finding of bilateral patchy groundglass airspace opacities that may represent atypical pneumonia versus pneumonitis. New finding of multiple irregular nodules bilaterally that are indeterminate; given the clinical history, metastatic disease remains a possibility; this could also be infectious in etiology. Several larger indeterminant lymph nodes are seen in the mediastinum and hilar locations.        ASSESSMENT AND PLAN:  Mr. Avila is a 47-year-old man with a diagnosis of clear-cell renal cell carcinoma (pT3a G3 R0) who underwent radical nephrectomy on 12/21/2021.  He began adjuvant therapy in 2/2022, and returns today for a follow-up visit having recently completed one year of adjuvant pembrolizumab treatment on 1/11/2023.  There is a new concern of possible immune-related pneumonitis at this time.  His ECOG score is 0.  His pain score is 1/10.     After nephrectomy, the patient received one year of adjuvant pembrolizumab treatment according to the KeyNote-564 trial. The pembrolizumab was delivered at a dose of 200 mg IV every 3 weeks, for a total of 17 cycles (one year).  His last immunotherapy dose was on 1/11/2023.  Over the last three months, he has been doing extremely well.      However, the patient reports subacute onset of a dry cough, associated with significant fatigue and shortness of breath on exertion.  He is not aware of any recent COVID contacts.  He does not report fevers, chills or arthralgias.  Given the  prior pembrolizumab treatment, the rare but serious possibility of immune-related pneumonitis should be excluded.  Alternative diagnoses include viral or bacterial pneumonias.  His CT scan from last week suggests a range of possible diagnoses, including an atypical pneumonia or an immune-related pneumonitis or progression of pulmonary metastases.  Of these possibilities, the potential for immune-related pneumonitis (from pembrolizumab) is clearly the most urgent.  Thus, I have decided to manage him empirically for immune-related pulmonary toxicity.  To this end, I have prescribed high doses of oral steroids (prednisone 80 mg) for one week followed by a potential taper after that time if he reports a symptomatic improvement in his dyspnea and cough.  I will conduct another visit with the patient next week to review his symptomatology and to instruct him further on his prednisone taper.  If his condition does not improve over the next one week with steroids, then I will consider admitting him to the hospital for inpatient diagnosis and management of bilateral groundglass pulmonary infiltrates.  This may involve a bronchoscopy, bronchoalveolar lavage, or even a lung biopsy.  We will try to manage him in the outpatient setting for the time being.  We will plan to see each other by video in one week.  We will decrease the prednisone dose by 20 mg each week if his condition improves over time.     A total of 40 minutes were spent on this patient on the day of the consultation, of which more than 50% of this time was used for counseling and coordination of care. The patient was given the opportunity to ask multiple questions today, all of which were answered to his satisfaction.     Bart Barahona M.D.

## 2023-05-01 ENCOUNTER — VIRTUAL VISIT (OUTPATIENT)
Dept: ONCOLOGY | Facility: CLINIC | Age: 48
End: 2023-05-01
Attending: INTERNAL MEDICINE
Payer: COMMERCIAL

## 2023-05-01 DIAGNOSIS — J82.89: Primary | ICD-10-CM

## 2023-05-01 LAB
CREAT BLD-MCNC: 1.8 MG/DL (ref 0.7–1.3)
GFR SERPL CREATININE-BSD FRML MDRD: 46 ML/MIN/1.73M2

## 2023-05-01 PROCEDURE — 82565 ASSAY OF CREATININE: CPT

## 2023-05-01 PROCEDURE — 99215 OFFICE O/P EST HI 40 MIN: CPT | Mod: VID | Performed by: INTERNAL MEDICINE

## 2023-05-01 RX ORDER — PREDNISONE 20 MG/1
80 TABLET ORAL DAILY
Qty: 120 TABLET | Refills: 1 | Status: SHIPPED | OUTPATIENT
Start: 2023-05-01 | End: 2024-07-18

## 2023-05-01 NOTE — NURSING NOTE
Is the patient currently in the state of [place]? YES    Visit mode:VIDEO    If the visit is dropped, the patient can be reconnected by: VIDEO VISIT: Text to cell phone: [phone]    Will anyone else be joining the visit? NO      How would you like to obtain your AVS? MyChart    Are changes needed to the allergy or medication list? NO  Patient verified medications and allergies are correct via eCheck-in. Patient confirms no changes at this time and/or since last reviewed by clinic staff.    Reason for visit: Oncology Video Visit Return (Renal cell carcinoma, f/u)  Pierre Avila 47 year old male presents today for f/u on renal cancer and review labs/CT results.  Hallie Jim, Virtual Facilitator

## 2023-05-07 NOTE — PROGRESS NOTES
Virtual Visit Details    Type of service:  Video Visit     Originating Location (pt. Location):  Home    Distant Location (provider location):  [facility]    Platform used for Video Visit:  Ko      --------------------------------------------------------------------------------------------------------------------------------------------------------------    FOLLOW UP VISIT  -  TELEMEDICINE     Reason for Visit:  Renal cell carcinoma (pT3a R0) s/p one year of adjuvant pembrolizumab; concern for new pneumonitis; on prednisone 60 mg daily.     History of Present Illness:  Mr. Avila is a 47-year-old man who presented with hematuria in 10/2021.  CT imaging (10/21/2021) revealed a large mass at the upper pole of the left kidney, without evidence of local or distant metastatic disease.  On 12/21/2021, he had a left radical nephrectomy which showed a 5.4 cm clear-cell renal cell carcinoma (no sarcomatoid or rhabdoid features), nucleolar grade 3, with tumor extending into the calyceal system.  His pathological stage was pT3a G3 R0 kidney cancer.     The patient elected to receive adjuvant pembrolizumab treatment, which he started in 2/2022.  He completed one year (17 doses) of adjuvant pembrolizumab on 1/11/2023.  Last week, the patient informed me of some new shortness of breath on exertion (and even at rest), as well as significant fatigue.  He also reported the onset of a new dry cough, which is significant because the patient has never been a cougher before.  I ordered a CT scan, which is described in more detail below.  In summary, it appears to show evidence of pneumonitis which could potentially represent immune-related pneumonitis.  We conducted a telemedicine visit today to discuss treatment of possible immune-related pulmonary toxicity.  He was started on prednisone 80 mg last week, and we will plan to reduce his dose to 60 mg today.     Review of systems:  The patient reports  that he has recovered well from his radical nephrectomy surgery.  However, he does report a new cough associated with fatigue and SOB on exertion: this has significantly improved with steroid treatment.  Insomnia is also another problem right now, and this persists despite trying melatonin.  He reports occasional fatigue and headaches. Reports dry mouth.  Also has noticed frothy urine recently.  Reports occasional neuropathy bilaterally but especially in his right toes. Not interfering with daily activities. He has seen orthopedics in the past for longstanding plantar fascitis.  Reports some burning and itching behind both knees on and off after last infusion. Pain lasts 10-20 minutes with 3-4 flares over the last three weeks. Reports redness at the site, without any other rashes. Has dry skin at baseline. Does not moisturize regularly. No vitiligo. No peripheral edema. Working on eliminating sugars. Eats well otherwise. Reports good oral hydration. Weight stable. No chest pain, shortness, or cough.  No abdominal pain, diarrhea, or constipation.  No anorexia.  No vomiting or nausea.  No urinary changes.  No mucositis.   A comprehensive 14-system review of symptoms is otherwise generally unremarkable.  His ECOG score is 0.  His pain score is 1/10.        Physical Examination:  Mr. Avila is a 47-year-old man who appears comfortable at rest.  He was not visibly short of breat at rest.  He did not cough during our encounter today.  The remainder of the physical examination was deferred since this was a telemedicine visit.    Previous Imaging (1/30/2023):  His prior CT scan showed no CT-based evidence of metastatic disease in the visualized chest, abdomen, or pelvis.  Overall, this scan was consistent with TONYA.    Current Imaging (4/27/2023):  His recent CT scan shows new finding of patchy bilateral groundglass opacities diffusely, but mainly seen at the lower lungs. Bibasilar atelectasis as well. Previously noted  right middle lobe nodule appears to be obscured by atelectasis. However, there are multiple new irregular and indeterminant pulmonary nodules bilaterally. One of the largest regions is somewhat flat measuring 2 cm posterior left upper lobe. Bilobed nodular opacity at the posteromedial left lower lobe is 1.3 cm. There are several other examples also seen at other locations, for example at the right upper lobe. Several slightly larger indeterminant lymph nodes. Right hilar lymph node is 1.2 cm, previously 0.8 cm. Another larger lymph node at the right infrahilar location. Larger example at the mediastinum anteriorly is 0.8 cm, previously 0.5 cm.  IMPRESSION: New finding of bilateral patchy groundglass airspace opacities that may represent atypical pneumonia versus pneumonitis. New finding of multiple irregular nodules bilaterally that are indeterminate; given the clinical history, metastatic disease remains a possibility; this could also be infectious in etiology. Several larger indeterminant lymph nodes are seen in the mediastinum and hilar locations.        ASSESSMENT AND PLAN:  Mr. Avila is a 47-year-old man with a diagnosis of clear-cell renal cell carcinoma (pT3a G3 R0) who underwent radical nephrectomy on 12/21/2021.  He began adjuvant therapy in 2/2022, and returns today for a follow-up visit having recently completed one year of adjuvant pembrolizumab treatment on 1/11/2023.  There is a new concern of possible immune-related pneumonitis at this time.  His ECOG score is 0.  His pain score is 1/10.     After nephrectomy, the patient received one year of adjuvant pembrolizumab treatment according to the KeyNote-564 trial. The pembrolizumab was delivered at a dose of 200 mg IV every 3 weeks, for a total of 17 cycles (one year).  His last immunotherapy dose was on 1/11/2023.  Over the last three months, he has been doing extremely well.      However, one week ago the patient reports subacute onset of a dry  cough, associated with significant fatigue and shortness of breath on exertion.  He is not aware of any recent COVID contacts.  He does not report fevers, chills or arthralgias.  Given the prior pembrolizumab treatment, the rare but serious possibility of immune-related pneumonitis should be excluded.  Alternative diagnoses include viral or bacterial pneumonias.  His CT scan from last week suggests a range of possible diagnoses, including an atypical pneumonia or an immune-related pneumonitis or progression of pulmonary metastases.  Of these possibilities, the potential for immune-related pneumonitis (from pembrolizumab) is clearly the most urgent.  Thus, I decided to manage him empirically for immune-related pulmonary toxicity.  To this end, I previously prescribed high-dose oral steroid (prednisone 80 mg) for one week followed by a potential taper after that time if he reports a symptomatic improvement in his dyspnea and cough.  We will continue to try to manage him in the outpatient setting for the time being.  We will plan to see each other by video in 1-2 weeks.  We will decrease the prednisone dose by 20 mg each week if his condition continues to improve over time.     A total of 40 minutes were spent on this patient on the day of the consultation, of which more than 50% of this time was used for counseling and coordination of care. The patient was given the opportunity to ask multiple questions today, all of which were answered to his satisfaction.     Bart Barahona M.D.

## 2023-05-08 ENCOUNTER — VIRTUAL VISIT (OUTPATIENT)
Dept: ONCOLOGY | Facility: CLINIC | Age: 48
End: 2023-05-08
Attending: INTERNAL MEDICINE
Payer: COMMERCIAL

## 2023-05-08 ENCOUNTER — PATIENT OUTREACH (OUTPATIENT)
Dept: ONCOLOGY | Facility: CLINIC | Age: 48
End: 2023-05-08

## 2023-05-08 DIAGNOSIS — C61 MALIGNANT NEOPLASM OF PROSTATE (H): Primary | ICD-10-CM

## 2023-05-08 PROCEDURE — 99215 OFFICE O/P EST HI 40 MIN: CPT | Mod: VID | Performed by: INTERNAL MEDICINE

## 2023-05-08 NOTE — LETTER
5/8/2023         RE: Pierre Avila  [address] [facility] [address]        Dear Colleague,    Thank you for referring your patient, Pierre Avila, to the [facility]. Please see a copy of my visit note below.      Virtual Visit Details    Type of service:  Video Visit     Originating Location (pt. Location):  Home    Distant Location (provider location):  [facility]    Platform used for Video Visit:  AmWell      --------------------------------------------------------------------------------------------------------------------------------------------------------------    FOLLOW UP VISIT  -  TELEMEDICINE     Reason for Visit:  Renal cell carcinoma (pT3a R0) s/p one year of adjuvant pembrolizumab; concern for new pneumonitis; on prednisone 60 mg daily.     History of Present Illness:  Mr. Avila is a 47-year-old man who presented with hematuria in 10/2021.  CT imaging (10/21/2021) revealed a large mass at the upper pole of the left kidney, without evidence of local or distant metastatic disease.  On 12/21/2021, he had a left radical nephrectomy which showed a 5.4 cm clear-cell renal cell carcinoma (no sarcomatoid or rhabdoid features), nucleolar grade 3, with tumor extending into the calyceal system.  His pathological stage was pT3a G3 R0 kidney cancer.     The patient elected to receive adjuvant pembrolizumab treatment, which he started in 2/2022.  He completed one year (17 doses) of adjuvant pembrolizumab on 1/11/2023.  Last week, the patient informed me of some new shortness of breath on exertion (and even at rest), as well as significant fatigue.  He also reported the onset of a new dry cough, which is significant because the patient has never been a cougher before.  I ordered a CT scan, which is described in more detail below.  In summary, it appears to show evidence of pneumonitis which could potentially represent immune-related  pneumonitis.  We conducted a telemedicine visit today to discuss treatment of possible immune-related pulmonary toxicity.  He was started on prednisone 80 mg last week, and we will plan to reduce his dose to 60 mg today.     Review of systems:  The patient reports that he has recovered well from his radical nephrectomy surgery.  However, he does report a new cough associated with fatigue and SOB on exertion: this has significantly improved with steroid treatment.  Insomnia is also another problem right now, and this persists despite trying melatonin.  He reports occasional fatigue and headaches. Reports dry mouth.  Also has noticed frothy urine recently.  Reports occasional neuropathy bilaterally but especially in his right toes. Not interfering with daily activities. He has seen orthopedics in the past for longstanding plantar fascitis.  Reports some burning and itching behind both knees on and off after last infusion. Pain lasts 10-20 minutes with 3-4 flares over the last three weeks. Reports redness at the site, without any other rashes. Has dry skin at baseline. Does not moisturize regularly. No vitiligo. No peripheral edema. Working on eliminating sugars. Eats well otherwise. Reports good oral hydration. Weight stable. No chest pain, shortness, or cough.  No abdominal pain, diarrhea, or constipation.  No anorexia.  No vomiting or nausea.  No urinary changes.  No mucositis.   A comprehensive 14-system review of symptoms is otherwise generally unremarkable.  His ECOG score is 0.  His pain score is 1/10.        Physical Examination:  Mr. Avila is a 47-year-old man who appears comfortable at rest.  He was not visibly short of breat at rest.  He did not cough during our encounter today.  The remainder of the physical examination was deferred since this was a telemedicine visit.    Previous Imaging (1/30/2023):  His prior CT scan showed no CT-based evidence of metastatic disease in the visualized chest, abdomen,  or pelvis.  Overall, this scan was consistent with TONYA.    Current Imaging (4/27/2023):  His recent CT scan shows new finding of patchy bilateral groundglass opacities diffusely, but mainly seen at the lower lungs. Bibasilar atelectasis as well. Previously noted right middle lobe nodule appears to be obscured by atelectasis. However, there are multiple new irregular and indeterminant pulmonary nodules bilaterally. One of the largest regions is somewhat flat measuring 2 cm posterior left upper lobe. Bilobed nodular opacity at the posteromedial left lower lobe is 1.3 cm. There are several other examples also seen at other locations, for example at the right upper lobe. Several slightly larger indeterminant lymph nodes. Right hilar lymph node is 1.2 cm, previously 0.8 cm. Another larger lymph node at the right infrahilar location. Larger example at the mediastinum anteriorly is 0.8 cm, previously 0.5 cm.  IMPRESSION: New finding of bilateral patchy groundglass airspace opacities that may represent atypical pneumonia versus pneumonitis. New finding of multiple irregular nodules bilaterally that are indeterminate; given the clinical history, metastatic disease remains a possibility; this could also be infectious in etiology. Several larger indeterminant lymph nodes are seen in the mediastinum and hilar locations.        ASSESSMENT AND PLAN:  Mr. Avila is a 47-year-old man with a diagnosis of clear-cell renal cell carcinoma (pT3a G3 R0) who underwent radical nephrectomy on 12/21/2021.  He began adjuvant therapy in 2/2022, and returns today for a follow-up visit having recently completed one year of adjuvant pembrolizumab treatment on 1/11/2023.  There is a new concern of possible immune-related pneumonitis at this time.  His ECOG score is 0.  His pain score is 1/10.     After nephrectomy, the patient received one year of adjuvant pembrolizumab treatment according to the KeyNote-564 trial. The pembrolizumab was  delivered at a dose of 200 mg IV every 3 weeks, for a total of 17 cycles (one year).  His last immunotherapy dose was on 1/11/2023.  Over the last three months, he has been doing extremely well.      However, one week ago the patient reports subacute onset of a dry cough, associated with significant fatigue and shortness of breath on exertion.  He is not aware of any recent COVID contacts.  He does not report fevers, chills or arthralgias.  Given the prior pembrolizumab treatment, the rare but serious possibility of immune-related pneumonitis should be excluded.  Alternative diagnoses include viral or bacterial pneumonias.  His CT scan from last week suggests a range of possible diagnoses, including an atypical pneumonia or an immune-related pneumonitis or progression of pulmonary metastases.  Of these possibilities, the potential for immune-related pneumonitis (from pembrolizumab) is clearly the most urgent.  Thus, I decided to manage him empirically for immune-related pulmonary toxicity.  To this end, I previously prescribed high-dose oral steroid (prednisone 80 mg) for one week followed by a potential taper after that time if he reports a symptomatic improvement in his dyspnea and cough.  We will continue to try to manage him in the outpatient setting for the time being.  We will plan to see each other by video in 1-2 weeks.  We will decrease the prednisone dose by 20 mg each week if his condition continues to improve over time.     A total of 40 minutes were spent on this patient on the day of the consultation, of which more than 50% of this time was used for counseling and coordination of care. The patient was given the opportunity to ask multiple questions today, all of which were answered to his satisfaction.     Bart Barahona M.D.

## 2023-05-08 NOTE — NURSING NOTE
Is the patient currently in the state of [place]? YES    Visit mode:VIDEO    If the visit is dropped, the patient can be reconnected by: VIDEO VISIT: Text to cell phone: [phone]    Will anyone else be joining the visit? NO      How would you like to obtain your AVS? MyChart    Are changes needed to the allergy or medication list? NO    Reason for visit: Video Visit and Follow Up    Roopa WATKINS

## 2023-05-08 NOTE — PROGRESS NOTES
[facility]: Cancer Care                                                                                          Called Pierre today to review the cancer treatment summary that was prepared and sent via [employer]. He states he has yet to review it. We discussed the information and intent of the document. He has my direct phone number and agrees to call back with any questions or survivorship needs.     Jade Whelan RN BSN  Cancer Survivorship Coordinator

## 2023-05-18 ENCOUNTER — PATIENT OUTREACH (OUTPATIENT)
Dept: ONCOLOGY | Facility: CLINIC | Age: 48
End: 2023-05-18
Payer: COMMERCIAL

## 2023-05-18 NOTE — PROGRESS NOTES
[facility]: Cancer Care                                                                                      Message left for patient to call back regarding rescheduling his appointment from yesterday to today.  Dr. Barahona will make a special exception and do a video visit.  Requested patient call back to confirm 1:00 or 4:00 time.    5/19/23   Spoke with patient and he agreed to appointment today with Dr. Barahona.  Video visit at 4:00 was agreed upon.    Olga Santoro, RN, BSN  Oncology RN Care Coordinator  [facility]

## 2023-05-19 ENCOUNTER — VIRTUAL VISIT (OUTPATIENT)
Dept: ONCOLOGY | Facility: CLINIC | Age: 48
End: 2023-05-19
Attending: INTERNAL MEDICINE
Payer: COMMERCIAL

## 2023-05-19 DIAGNOSIS — C64.9 RENAL CELL CARCINOMA, UNSPECIFIED LATERALITY (H): Primary | ICD-10-CM

## 2023-05-19 PROCEDURE — 99215 OFFICE O/P EST HI 40 MIN: CPT | Mod: 95 | Performed by: INTERNAL MEDICINE

## 2023-05-19 NOTE — NURSING NOTE
Is the patient currently in the state of [place]? yes    Visit mode:VIDEO    If the visit is dropped, the patient can be reconnected by: VIDEO VISIT: Text to cell phone: [phone]    Will anyone else be joining the visit? NO      How would you like to obtain your AVS? MyChart    Are changes needed to the allergy or medication list? NO    Reason for visit: Video Visit (No recent vitals to report per pt)      Nancy Arciniega VF

## 2023-05-19 NOTE — LETTER
5/19/2023         RE: Pierre Avila  [address] [facility] [address]        Dear Colleague,    Thank you for referring your patient, Pierre Avila, to the [facility]. Please see a copy of my visit note below.      Virtual Visit Details    Type of service:  Video Visit     Originating Location (pt. Location):  Home    Distant Location (provider location):  [facility]  Platform used for Video Visit:  AmWell        --------------------------------------------------------------------------------------------------------------------------------------------------------------    FOLLOW UP VISIT  -  TELEMEDICINE     Reason for Visit:  Renal cell carcinoma (pT3a R0) s/p one year of adjuvant pembrolizumab; concern for new pneumonitis; on prednisone 40 mg daily.     History of Present Illness:  Mr. Avila is a 47-year-old man who presented with hematuria in 10/2021.  CT imaging (10/21/2021) revealed a large mass at the upper pole of the left kidney, without evidence of local or distant metastatic disease.  On 12/21/2021, he had a left radical nephrectomy which showed a 5.4 cm clear-cell renal cell carcinoma (no sarcomatoid or rhabdoid features), nucleolar grade 3, with tumor extending into the calyceal system.  His pathological stage was pT3a G3 R0 kidney cancer.     The patient elected to receive adjuvant pembrolizumab treatment, which he started in 2/2022.  He completed one year (17 doses) of adjuvant pembrolizumab on 1/11/2023.  Last week, the patient informed me of some new shortness of breath on exertion (and even at rest), as well as significant fatigue.  He also reported the onset of a new dry cough, which is significant because the patient has never been a cougher before.  I ordered a CT scan, which is described in more detail below.  In summary, it appears to show evidence of pneumonitis which could potentially represent immune-related  pneumonitis.  We conducted a telemedicine visit today to discuss treatment of possible immune-related pulmonary toxicity.  He was started on prednisone 80 mg three weeks ago, and we will plan to reduce his dose to 40 mg today.     Review of systems: The patient reports a continued improvement of his dyspnea and his cough has completely resolved.  He did not notice any worsening of his respiratory symptoms after decreasing his prednisone dose from 80 mg down to 60 mg.  Yesterday, he was able to mow his lawn without feeling short of breath, although he did cough once or twice during that activity.  He also reports some insomnia, which is probably related to the steroid effect.  He continues to have dry skin at baseline. Does not moisturize regularly. No vitiligo. No peripheral edema. Working on eliminating sugars. Eats well otherwise. Reports good oral hydration. Weight stable. No chest pain, shortness, or cough.  No abdominal pain, diarrhea, or constipation.  No anorexia.  No vomiting or nausea.  No urinary changes.  No mucositis.   A comprehensive 14-system review of symptoms is otherwise generally unremarkable.  His ECOG score is 0.  His pain score is 1/10.        Physical Examination:  Mr. Avila is a 47-year-old man who appears comfortable at rest.  He was not visibly short of breat at rest.  He did not cough during our encounter today.  The remainder of the physical examination was deferred since this was a telemedicine visit.    Previous Imaging (1/30/2023):  His prior CT scan showed no CT-based evidence of metastatic disease in the visualized chest, abdomen, or pelvis.  Overall, this scan was consistent with TONYA.    Current Imaging (4/27/2023):  His recent CT scan shows new finding of patchy bilateral groundglass opacities diffusely, but mainly seen at the lower lungs. Bibasilar atelectasis as well. Previously noted right middle lobe nodule appears to be obscured by atelectasis. However, there are multiple  new irregular and indeterminant pulmonary nodules bilaterally. One of the largest regions is somewhat flat measuring 2 cm posterior left upper lobe. Bilobed nodular opacity at the posteromedial left lower lobe is 1.3 cm. There are several other examples also seen at other locations, for example at the right upper lobe. Several slightly larger indeterminant lymph nodes. Right hilar lymph node is 1.2 cm, previously 0.8 cm. Another larger lymph node at the right infrahilar location. Larger example at the mediastinum anteriorly is 0.8 cm, previously 0.5 cm.  IMPRESSION: New finding of bilateral patchy groundglass airspace opacities that may represent atypical pneumonia versus pneumonitis. New finding of multiple irregular nodules bilaterally that are indeterminate; given the clinical history, metastatic disease remains a possibility; this could also be infectious in etiology. Several larger indeterminant lymph nodes are seen in the mediastinum and hilar locations.        ASSESSMENT AND PLAN:  Mr. Avila is a 47-year-old man with a diagnosis of clear-cell renal cell carcinoma (pT3a G3 R0) who underwent radical nephrectomy on 12/21/2021.  He began adjuvant therapy in 2/2022, and returns today for a follow-up visit having recently completed one year of adjuvant pembrolizumab treatment on 1/11/2023.  He is currently being treated for possible immune-related pneumonitis at this time.  His ECOG score is 0.  His pain score is 1/10.     After nephrectomy, the patient received one year of adjuvant pembrolizumab treatment according to the KeyNote-564 trial. The pembrolizumab was delivered at a dose of 200 mg IV every 3 weeks, for a total of 17 cycles (one year).  His last immunotherapy dose was on 1/11/2023.  Over the last three months, he has been doing extremely well.      However, one week ago the patient reports subacute onset of a dry cough, associated with significant fatigue and shortness of breath on exertion.  He is  not aware of any recent COVID contacts.  He does not report fevers, chills or arthralgias.  Given the prior pembrolizumab treatment, the rare but serious possibility of immune-related pneumonitis should be excluded.  Alternative diagnoses include viral or bacterial pneumonias.  His CT scan from last week suggests a range of possible diagnoses, including an atypical pneumonia or an immune-related pneumonitis or progression of pulmonary metastases.  Of these possibilities, the potential for immune-related pneumonitis (from pembrolizumab) is clearly the most urgent.  Thus, I decided to manage him empirically for immune-related pulmonary toxicity.  To this end, I previously prescribed high-dose oral steroid (prednisone 80 mg) for one week followed by a potential taper after that time if he reports a symptomatic improvement in his dyspnea and cough.  We will continue to try to manage him in the outpatient setting for the time being.  We will plan to see each other by video in about 2 weeks.  We will decrease the prednisone dose by 20 mg each week if his condition continues to improve over time.     A total of 40 minutes were spent on this patient on the day of the consultation, of which more than 50% of this time was used for counseling and coordination of care. The patient was given the opportunity to ask multiple questions today, all of which were answered to his satisfaction.     Bart Barahona M.D.     English

## 2023-05-19 NOTE — PROGRESS NOTES
Virtual Visit Details    Type of service:  Video Visit     Originating Location (pt. Location):  Home    Distant Location (provider location):  [facility]  Platform used for Video Visit:  Ko

## 2023-06-12 ENCOUNTER — VIRTUAL VISIT (OUTPATIENT)
Dept: ONCOLOGY | Facility: CLINIC | Age: 48
End: 2023-06-12
Attending: INTERNAL MEDICINE
Payer: COMMERCIAL

## 2023-06-12 VITALS — WEIGHT: 210 LBS | BODY MASS INDEX: 30.06 KG/M2 | HEIGHT: 70 IN

## 2023-06-12 DIAGNOSIS — J82.89: Primary | ICD-10-CM

## 2023-06-12 PROCEDURE — 99215 OFFICE O/P EST HI 40 MIN: CPT | Mod: 95 | Performed by: INTERNAL MEDICINE

## 2023-06-12 RX ORDER — PREDNISONE 5 MG/1
5 TABLET ORAL DAILY
Qty: 30 TABLET | Refills: 3 | Status: SHIPPED | OUTPATIENT
Start: 2023-06-12 | End: 2024-07-18

## 2023-06-12 ASSESSMENT — PAIN SCALES - GENERAL: PAINLEVEL: NO PAIN (0)

## 2023-06-12 NOTE — NURSING NOTE
Is the patient currently in the state of [place]? YES    Visit mode:VIDEO    If the visit is dropped, the patient can be reconnected by: VIDEO VISIT: Text to cell phone: [phone]    Will anyone else be joining the visit? NO      How would you like to obtain your AVS? MyChart    Are changes needed to the allergy or medication list? NO    Reason for visit: NIGHAT Davis

## 2023-06-12 NOTE — LETTER
6/12/2023         RE: Pierre Avila  [address] [facility] [address]        Dear Colleague,    Thank you for referring your patient, Pierre Avila, to the [facility]. Please see a copy of my visit note below.      Virtual Visit Details     Type of service:  Video Visit     Originating Location (pt. Location):  Home    Distant Location (provider location):  [facility]    Platform used for Video Visit:  Ko    -----------------------------------------------------------------------------------------------------------------------------    FOLLOW UP VISIT  -  TELEMEDICINE     Reason for Visit:  Renal cell carcinoma (pT3a R0) s/p one year of adjuvant pembrolizumab; concern for new pneumonitis; on prednisone 20 mg daily.     History of Present Illness:  Mr. Avila is a 48-year-old man who presented with hematuria in 10/2021.  CT imaging (10/21/2021) revealed a large mass at the upper pole of the left kidney, without evidence of local or distant metastatic disease.  On 12/21/2021, he had a left radical nephrectomy which showed a 5.4 cm clear-cell renal cell carcinoma (no sarcomatoid or rhabdoid features), nucleolar grade 3, with tumor extending into the calyceal system.  His pathological stage was pT3a G3 R0 kidney cancer.     The patient elected to receive adjuvant pembrolizumab treatment, which he started in 2/2022.  He completed one year (17 doses) of adjuvant pembrolizumab on 1/11/2023.  Two months ago, the patient informed me of some new shortness of breath on exertion (and even at rest), as well as significant fatigue.  He also reported the onset of a new dry cough, which is significant because the patient has never been a cougher before.  I ordered a CT scan, which is described in more detail below.  In summary, it appeared to show evidence of pneumonitis which could potentially represent immune-related pneumonitis.  We conducted a  telemedicine visit today to discuss ongoing management of possible immune-related pulmonary toxicity.  He was started on prednisone 80 mg six weeks ago, and he is currently on 20 mg daily.  He will drop down to 10 mg today.     Review of systems: The patient reports a continued improvement of his dyspnea and his cough has completely resolved.  He did not notice any worsening of his respiratory symptoms after decreasing his prednisone down to 20 mg.  He has been able to mow his lawn without feeling short of breath, although he did cough once or twice during that activity.  He also reports some insomnia, which is probably related to the steroid effect.  He continues to have dry skin at baseline. Does not moisturize regularly. No vitiligo. No peripheral edema. Working on eliminating sugars. Eats well otherwise. Reports good oral hydration. Weight stable. No chest pain, shortness, or cough.  No abdominal pain, diarrhea, or constipation.  No anorexia.  No vomiting or nausea.  No urinary changes.  No mucositis.   A comprehensive 14-system review of symptoms is otherwise generally unremarkable.  His ECOG score is 0.  His pain score is 1/10.        Imaging (4/27/2023):  His recent CT scan shows new finding of patchy bilateral groundglass opacities diffusely, but mainly seen at the lower lungs. Bibasilar atelectasis as well. Previously noted right middle lobe nodule appears to be obscured by atelectasis. However, there are multiple new irregular and indeterminant pulmonary nodules bilaterally. One of the largest regions is somewhat flat measuring 2 cm posterior left upper lobe. Bilobed nodular opacity at the posteromedial left lower lobe is 1.3 cm. There are several other examples also seen at other locations, for example at the right upper lobe. Several slightly larger indeterminant lymph nodes. Right hilar lymph node is 1.2 cm, previously 0.8 cm. Another larger lymph node at the right infrahilar location. Larger example at  the mediastinum anteriorly is 0.8 cm, previously 0.5 cm.  IMPRESSION: New finding of bilateral patchy groundglass airspace opacities that may represent atypical pneumonia versus pneumonitis. New finding of multiple irregular nodules bilaterally that are indeterminate; given the clinical history, metastatic disease remains a possibility; this could also be infectious in etiology. Several larger indeterminant lymph nodes are seen in the mediastinum and hilar locations.    Physical Examination:  Mr. Avila is a 48-year-old man who appears comfortable at rest.  He was not visibly short of breat at rest.  He did not cough during our encounter today.  The remainder of the physical examination was deferred since this was a telemedicine visit.        ASSESSMENT AND PLAN:  Mr. Avila is a 48-year-old man with a diagnosis of clear-cell renal cell carcinoma (pT3a G3 R0) who underwent radical nephrectomy on 12/21/2021.  He began adjuvant therapy in 2/2022, and returns today for a follow-up visit having recently completed one year of adjuvant pembrolizumab treatment on 1/11/2023.  He is currently being treated with oral steroids for possible immune-related pneumonitis.  His ECOG score is 0.  His pain score is 1/10.     After nephrectomy, the patient received one year of adjuvant pembrolizumab treatment according to the KeyNote-564 trial. The pembrolizumab was delivered at a dose of 200 mg IV every 3 weeks, for a total of 17 cycles (one year).  His last immunotherapy dose was on 1/11/2023.  Over the last six months, he has been doing extremely well from a cancer perspective.      However, two months ago the patient reported subacute onset of a dry cough, associated with significant fatigue and shortness of breath on exertion.  He was not aware of any recent COVID contacts.  He did not report fevers, chills or arthralgias.  Given the prior pembrolizumab treatment, the rare but serious possibility of immune-related  pneumonitis needed to be excluded.  Alternative diagnoses included viral or bacterial pneumonias.  His CT scan suggested a range of possible diagnoses, including an atypical pneumonia or an immune-related pneumonitis or progression of pulmonary metastases.  Of these, the potential for immune-related pneumonitis (from pembrolizumab) was clearly the most urgent.  Thus, I decided to manage him empirically for immune-related pulmonary toxicity.  To this end, I previously prescribed high-dose oral steroid (prednisone 80 mg) for one week followed by a potential taper after that time if he reported a symptomatic improvement in his dyspnea and cough.  He is currently down to 20 mg of prednisone daily.  We will continue to try to manage him in the outpatient setting for the time being.  I have instructed him to reduce the dose down to 10 mg daily for the next 15 days, then 5 mg daily after that.  We will plan to see each other by video in about 4 weeks.  We will repeat a CT scan in late July 2023.     A total of 40 minutes were spent on this patient on the day of the consultation, of which more than 50% of this time was used for counseling and coordination of care. The patient was given the opportunity to ask multiple questions today, all of which were answered to his satisfaction.     Bart Barahona M.D.        Virtual Visit Details    Type of service:  Video Visit     Originating Location (pt. Location):  Home    Distant Location (provider location):  [facility]    Platform used for Video Visit:  [employer]

## 2023-06-12 NOTE — PROGRESS NOTES
Virtual Visit Details     Type of service:  Video Visit     Originating Location (pt. Location):  Home    Distant Location (provider location):  [facility]    Platform used for Video Visit:  Ko    -----------------------------------------------------------------------------------------------------------------------------    FOLLOW UP VISIT  -  TELEMEDICINE     Reason for Visit:  Renal cell carcinoma (pT3a R0) s/p one year of adjuvant pembrolizumab; concern for new pneumonitis; on prednisone 20 mg daily.     History of Present Illness:  Mr. Avila is a 48-year-old man who presented with hematuria in 10/2021.  CT imaging (10/21/2021) revealed a large mass at the upper pole of the left kidney, without evidence of local or distant metastatic disease.  On 12/21/2021, he had a left radical nephrectomy which showed a 5.4 cm clear-cell renal cell carcinoma (no sarcomatoid or rhabdoid features), nucleolar grade 3, with tumor extending into the calyceal system.  His pathological stage was pT3a G3 R0 kidney cancer.     The patient elected to receive adjuvant pembrolizumab treatment, which he started in 2/2022.  He completed one year (17 doses) of adjuvant pembrolizumab on 1/11/2023.  Two months ago, the patient informed me of some new shortness of breath on exertion (and even at rest), as well as significant fatigue.  He also reported the onset of a new dry cough, which is significant because the patient has never been a cougher before.  I ordered a CT scan, which is described in more detail below.  In summary, it appeared to show evidence of pneumonitis which could potentially represent immune-related pneumonitis.  We conducted a telemedicine visit today to discuss ongoing management of possible immune-related pulmonary toxicity.  He was started on prednisone 80 mg six weeks ago, and he is currently on 20 mg daily.  He will drop down to 10 mg today.     Review of systems: The patient  reports a continued improvement of his dyspnea and his cough has completely resolved.  He did not notice any worsening of his respiratory symptoms after decreasing his prednisone down to 20 mg.  He has been able to mow his lawn without feeling short of breath, although he did cough once or twice during that activity.  He also reports some insomnia, which is probably related to the steroid effect.  He continues to have dry skin at baseline. Does not moisturize regularly. No vitiligo. No peripheral edema. Working on eliminating sugars. Eats well otherwise. Reports good oral hydration. Weight stable. No chest pain, shortness, or cough.  No abdominal pain, diarrhea, or constipation.  No anorexia.  No vomiting or nausea.  No urinary changes.  No mucositis.   A comprehensive 14-system review of symptoms is otherwise generally unremarkable.  His ECOG score is 0.  His pain score is 1/10.        Imaging (4/27/2023):  His recent CT scan shows new finding of patchy bilateral groundglass opacities diffusely, but mainly seen at the lower lungs. Bibasilar atelectasis as well. Previously noted right middle lobe nodule appears to be obscured by atelectasis. However, there are multiple new irregular and indeterminant pulmonary nodules bilaterally. One of the largest regions is somewhat flat measuring 2 cm posterior left upper lobe. Bilobed nodular opacity at the posteromedial left lower lobe is 1.3 cm. There are several other examples also seen at other locations, for example at the right upper lobe. Several slightly larger indeterminant lymph nodes. Right hilar lymph node is 1.2 cm, previously 0.8 cm. Another larger lymph node at the right infrahilar location. Larger example at the mediastinum anteriorly is 0.8 cm, previously 0.5 cm.  IMPRESSION: New finding of bilateral patchy groundglass airspace opacities that may represent atypical pneumonia versus pneumonitis. New finding of multiple irregular nodules bilaterally that are  indeterminate; given the clinical history, metastatic disease remains a possibility; this could also be infectious in etiology. Several larger indeterminant lymph nodes are seen in the mediastinum and hilar locations.    Physical Examination:  Mr. Avila is a 48-year-old man who appears comfortable at rest.  He was not visibly short of breat at rest.  He did not cough during our encounter today.  The remainder of the physical examination was deferred since this was a telemedicine visit.        ASSESSMENT AND PLAN:  Mr. Avila is a 48-year-old man with a diagnosis of clear-cell renal cell carcinoma (pT3a G3 R0) who underwent radical nephrectomy on 12/21/2021.  He began adjuvant therapy in 2/2022, and returns today for a follow-up visit having recently completed one year of adjuvant pembrolizumab treatment on 1/11/2023.  He is currently being treated with oral steroids for possible immune-related pneumonitis.  His ECOG score is 0.  His pain score is 1/10.     After nephrectomy, the patient received one year of adjuvant pembrolizumab treatment according to the KeyNote-564 trial. The pembrolizumab was delivered at a dose of 200 mg IV every 3 weeks, for a total of 17 cycles (one year).  His last immunotherapy dose was on 1/11/2023.  Over the last six months, he has been doing extremely well from a cancer perspective.      However, two months ago the patient reported subacute onset of a dry cough, associated with significant fatigue and shortness of breath on exertion.  He was not aware of any recent COVID contacts.  He did not report fevers, chills or arthralgias.  Given the prior pembrolizumab treatment, the rare but serious possibility of immune-related pneumonitis needed to be excluded.  Alternative diagnoses included viral or bacterial pneumonias.  His CT scan suggested a range of possible diagnoses, including an atypical pneumonia or an immune-related pneumonitis or progression of pulmonary metastases.  Of  these, the potential for immune-related pneumonitis (from pembrolizumab) was clearly the most urgent.  Thus, I decided to manage him empirically for immune-related pulmonary toxicity.  To this end, I previously prescribed high-dose oral steroid (prednisone 80 mg) for one week followed by a potential taper after that time if he reported a symptomatic improvement in his dyspnea and cough.  He is currently down to 20 mg of prednisone daily.  We will continue to try to manage him in the outpatient setting for the time being.  I have instructed him to reduce the dose down to 10 mg daily for the next 15 days, then 5 mg daily after that.  We will plan to see each other by video in about 4 weeks.  We will repeat a CT scan in late July 2023.     A total of 40 minutes were spent on this patient on the day of the consultation, of which more than 50% of this time was used for counseling and coordination of care. The patient was given the opportunity to ask multiple questions today, all of which were answered to his satisfaction.     Bart Barahona M.D.

## 2023-06-12 NOTE — PROGRESS NOTES
Virtual Visit Details    Type of service:  Video Visit     Originating Location (pt. Location):  Home    Distant Location (provider location):  [facility]    Platform used for Video Visit:  Ko

## 2023-06-13 ENCOUNTER — PATIENT OUTREACH (OUTPATIENT)
Dept: ONCOLOGY | Facility: CLINIC | Age: 48
End: 2023-06-13
Payer: COMMERCIAL

## 2023-06-13 NOTE — PROGRESS NOTES
[facility]: Cancer Care                                                                                          Received a phone call from Pierre requesting a copy of his cancer treatment summary be placed in the mail. A copy was placed in the mail today. Encouraged him to call back with any other questions.     Jade Whelan RN, BSN, OCN  Cancer Survivorship Nurse Coordinator

## 2023-07-09 NOTE — PROGRESS NOTES
Virtual Visit Details    Type of service:  Video Visit     Originating Location (pt. Location):  Home    Distant Location (provider location):  [facility]  Platform used for Video Visit:  Ko      ----------------------------------------------------------------------------------------------------------    FOLLOW UP VISIT  -  TELEMEDICINE     Reason for Visit:  Renal cell carcinoma (pT3a R0) s/p one year of adjuvant pembrolizumab.  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     History of Present Illness:  Mr. Avila is a 48-year-old man who presented with hematuria in 10/2021.  CT imaging (10/21/2021) revealed a large mass at the upper pole of the left kidney, without evidence of local or distant metastatic disease.  On 12/21/2021, he had a left radical nephrectomy which showed a 5.4 cm clear-cell renal cell carcinoma (no sarcomatoid or rhabdoid features), nucleolar grade 3, with tumor extending into the calyceal system.  His pathological stage was pT3a G3 R0 kidney cancer.  The patient elected to receive adjuvant pembrolizumab treatment, which he started in 2/2022.  He completed one year (17 doses) of adjuvant pembrolizumab on 1/11/2023.      Three months ago, the patient informed me of some new shortness of breath on exertion (and even at rest), as well as significant fatigue.  He also reported the onset of a new dry cough, which is significant because the patient has never been a cougher before.  I ordered a CT scan, which is described in more detail below.  In summary, it appeared to show evidence of pneumonitis which could potentially represent immune-related pneumonitis.  We conducted a telemedicine visit today to discuss ongoing management of possible immune-related pulmonary toxicity.  He was started on prednisone 80 mg twelve weeks ago, and he is currently down to 5 mg daily.      Review of systems: The patient reports a continued improvement of his dyspnea  and his cough has completely resolved.  He did not notice any worsening of his respiratory symptoms after decreasing his prednisone down to 5 mg.  He has been able to mow his lawn without feeling short of breath, although he did cough once or twice during that activity.  He also reports some insomnia, which is probably related to the steroid effect.  He continues to have dry skin at baseline. Does not moisturize regularly. No vitiligo. No peripheral edema. Working on eliminating sugars. Eats well otherwise. Reports good oral hydration. Weight stable. No chest pain, shortness, or cough.  No abdominal pain, diarrhea, or constipation.  No anorexia.  No vomiting or nausea.  No urinary changes.  No mucositis.   A comprehensive 14-system review of symptoms is otherwise generally unremarkable.  His ECOG score is 0.  His pain score is 1/10.        Imaging (4/27/2023):  His recent CT scan shows new finding of patchy bilateral groundglass opacities diffusely, but mainly seen at the lower lungs. Bibasilar atelectasis as well. Previously noted right middle lobe nodule appears to be obscured by atelectasis. However, there are multiple new irregular and indeterminant pulmonary nodules bilaterally. One of the largest regions is somewhat flat measuring 2 cm posterior left upper lobe. Bilobed nodular opacity at the posteromedial left lower lobe is 1.3 cm. There are several other examples also seen at other locations, for example at the right upper lobe. Several slightly larger indeterminant lymph nodes. Right hilar lymph node is 1.2 cm, previously 0.8 cm. Another larger lymph node at the right infrahilar location. Larger example at the mediastinum anteriorly is 0.8 cm, previously 0.5 cm. IMPRESSION: New finding of bilateral patchy groundglass airspace opacities that may represent atypical pneumonia versus pneumonitis. New finding of multiple irregular nodules bilaterally that are indeterminate; given the clinical history, metastatic  disease remains a possibility; this could also be infectious in etiology. Several larger indeterminant lymph nodes are seen in the mediastinum and hilar locations.    Physical Examination:  Mr. Avila is a 48-year-old man who appears comfortable at rest.  He was not visibly short of breat at rest.  He did not cough during our encounter today.  The remainder of the physical examination was deferred since this was a telemedicine visit.        ASSESSMENT AND PLAN:  Mr. Avila is a 48-year-old man with a diagnosis of clear-cell renal cell carcinoma (pT3a G3 R0) who underwent radical nephrectomy on 12/21/2021.  He began adjuvant therapy in 2/2022, and returns today for a follow-up visit having recently completed one year of adjuvant pembrolizumab treatment on 1/11/2023.  He is currently being treated with oral steroids for possible immune-related pneumonitis, now on prednisone 10 mg daily.  His ECOG score is 0.  His pain score is 1/10.     After nephrectomy, the patient received one year of adjuvant pembrolizumab treatment according to the KeyNote-564 trial. The pembrolizumab was delivered at a dose of 200 mg IV every 3 weeks, for a total of 17 cycles (one year).  His last immunotherapy dose was on 1/11/2023.  Over the last six months, he has been doing extremely well from a cancer perspective.      However, three months ago the patient reported subacute onset of a dry cough, associated with significant fatigue and shortness of breath on exertion.  He was not aware of any recent COVID contacts.  He did not report fevers, chills or arthralgias.  Given the prior pembrolizumab treatment, the rare but serious possibility of immune-related pneumonitis needed to be excluded.  Alternative diagnoses included viral or bacterial pneumonias.  His CT scan suggested a range of possible diagnoses, including an atypical pneumonia or an immune-related pneumonitis or progression of pulmonary metastases.  Of these, the potential for  immune-related pneumonitis (from pembrolizumab) was clearly the most urgent.  Thus, I decided to manage him empirically for immune-related pulmonary toxicity.  To this end, I previously prescribed high-dose oral steroid (prednisone 80 mg) for one week followed by a potential taper after that time if he reported a symptomatic improvement in his dyspnea and cough.  He is currently down to 10 mg of prednisone daily.  We will continue to try to manage him in the outpatient setting for the time being.  I have instructed him to reduce the dose down to 5 mg daily for the next 15 days, then 2.5 mg daily after that.  We will plan to see each other by video in about 3 weeks.  We will repeat a CT scan in late July 2023.     A total of 40 minutes were spent on this patient on the day of the consultation, of which more than 50% of this time was used for counseling and coordination of care. The patient was given the opportunity to ask multiple questions today, all of which were answered to his satisfaction.     Bart Barahona M.D.

## 2023-07-10 ENCOUNTER — VIRTUAL VISIT (OUTPATIENT)
Dept: ONCOLOGY | Facility: CLINIC | Age: 48
End: 2023-07-10
Attending: INTERNAL MEDICINE
Payer: COMMERCIAL

## 2023-07-10 VITALS — HEIGHT: 70 IN | WEIGHT: 210 LBS | BODY MASS INDEX: 30.06 KG/M2

## 2023-07-10 DIAGNOSIS — C64.9 RENAL CELL CARCINOMA, UNSPECIFIED LATERALITY (H): Primary | ICD-10-CM

## 2023-07-10 PROCEDURE — 99215 OFFICE O/P EST HI 40 MIN: CPT | Mod: VID | Performed by: INTERNAL MEDICINE

## 2023-07-10 ASSESSMENT — PAIN SCALES - GENERAL: PAINLEVEL: NO PAIN (0)

## 2023-07-10 NOTE — NURSING NOTE
Is the patient currently in the state of [place]? YES    Visit mode:VIDEO    If the visit is dropped, the patient can be reconnected by: VIDEO VISIT: Send to e-mail at: [email or web address]    Will anyone else be joining the visit? NO      How would you like to obtain your AVS? MyChart    Are changes needed to the allergy or medication list? NO    Reason for visit: NIGHAT QUINONEZ, VF

## 2023-07-10 NOTE — LETTER
7/10/2023         RE: Pierre Avila  [address] [facility] [address]        Dear Colleague,    Thank you for referring your patient, Pierre Avila, to the [facility]CER [facility]. Please see a copy of my visit note below.      Virtual Visit Details    Type of service:  Video Visit     Originating Location (pt. Location):  Home    Distant Location (provider location):  [facility]  Platform used for Video Visit:  Ko      ----------------------------------------------------------------------------------------------------------    FOLLOW UP VISIT  -  TELEMEDICINE     Reason for Visit:  Renal cell carcinoma (pT3a R0) s/p one year of adjuvant pembrolizumab.  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     History of Present Illness:  Mr. Avila is a 48-year-old man who presented with hematuria in 10/2021.  CT imaging (10/21/2021) revealed a large mass at the upper pole of the left kidney, without evidence of local or distant metastatic disease.  On 12/21/2021, he had a left radical nephrectomy which showed a 5.4 cm clear-cell renal cell carcinoma (no sarcomatoid or rhabdoid features), nucleolar grade 3, with tumor extending into the calyceal system.  His pathological stage was pT3a G3 R0 kidney cancer.  The patient elected to receive adjuvant pembrolizumab treatment, which he started in 2/2022.  He completed one year (17 doses) of adjuvant pembrolizumab on 1/11/2023.      Three months ago, the patient informed me of some new shortness of breath on exertion (and even at rest), as well as significant fatigue.  He also reported the onset of a new dry cough, which is significant because the patient has never been a cougher before.  I ordered a CT scan, which is described in more detail below.  In summary, it appeared to show evidence of pneumonitis which could potentially represent immune-related pneumonitis.  We conducted a telemedicine visit  today to discuss ongoing management of possible immune-related pulmonary toxicity.  He was started on prednisone 80 mg twelve weeks ago, and he is currently down to 5 mg daily.      Review of systems: The patient reports a continued improvement of his dyspnea and his cough has completely resolved.  He did not notice any worsening of his respiratory symptoms after decreasing his prednisone down to 5 mg.  He has been able to mow his lawn without feeling short of breath, although he did cough once or twice during that activity.  He also reports some insomnia, which is probably related to the steroid effect.  He continues to have dry skin at baseline. Does not moisturize regularly. No vitiligo. No peripheral edema. Working on eliminating sugars. Eats well otherwise. Reports good oral hydration. Weight stable. No chest pain, shortness, or cough.  No abdominal pain, diarrhea, or constipation.  No anorexia.  No vomiting or nausea.  No urinary changes.  No mucositis.   A comprehensive 14-system review of symptoms is otherwise generally unremarkable.  His ECOG score is 0.  His pain score is 1/10.        Imaging (4/27/2023):  His recent CT scan shows new finding of patchy bilateral groundglass opacities diffusely, but mainly seen at the lower lungs. Bibasilar atelectasis as well. Previously noted right middle lobe nodule appears to be obscured by atelectasis. However, there are multiple new irregular and indeterminant pulmonary nodules bilaterally. One of the largest regions is somewhat flat measuring 2 cm posterior left upper lobe. Bilobed nodular opacity at the posteromedial left lower lobe is 1.3 cm. There are several other examples also seen at other locations, for example at the right upper lobe. Several slightly larger indeterminant lymph nodes. Right hilar lymph node is 1.2 cm, previously 0.8 cm. Another larger lymph node at the right infrahilar location. Larger example at the mediastinum anteriorly is 0.8 cm,  previously 0.5 cm. IMPRESSION: New finding of bilateral patchy groundglass airspace opacities that may represent atypical pneumonia versus pneumonitis. New finding of multiple irregular nodules bilaterally that are indeterminate; given the clinical history, metastatic disease remains a possibility; this could also be infectious in etiology. Several larger indeterminant lymph nodes are seen in the mediastinum and hilar locations.    Physical Examination:  Mr. Avila is a 48-year-old man who appears comfortable at rest.  He was not visibly short of breat at rest.  He did not cough during our encounter today.  The remainder of the physical examination was deferred since this was a telemedicine visit.        ASSESSMENT AND PLAN:  Mr. Avila is a 48-year-old man with a diagnosis of clear-cell renal cell carcinoma (pT3a G3 R0) who underwent radical nephrectomy on 12/21/2021.  He began adjuvant therapy in 2/2022, and returns today for a follow-up visit having recently completed one year of adjuvant pembrolizumab treatment on 1/11/2023.  He is currently being treated with oral steroids for possible immune-related pneumonitis, now on prednisone 10 mg daily.  His ECOG score is 0.  His pain score is 1/10.     After nephrectomy, the patient received one year of adjuvant pembrolizumab treatment according to the KeyNote-564 trial. The pembrolizumab was delivered at a dose of 200 mg IV every 3 weeks, for a total of 17 cycles (one year).  His last immunotherapy dose was on 1/11/2023.  Over the last six months, he has been doing extremely well from a cancer perspective.      However, three months ago the patient reported subacute onset of a dry cough, associated with significant fatigue and shortness of breath on exertion.  He was not aware of any recent COVID contacts.  He did not report fevers, chills or arthralgias.  Given the prior pembrolizumab treatment, the rare but serious possibility of immune-related pneumonitis  needed to be excluded.  Alternative diagnoses included viral or bacterial pneumonias.  His CT scan suggested a range of possible diagnoses, including an atypical pneumonia or an immune-related pneumonitis or progression of pulmonary metastases.  Of these, the potential for immune-related pneumonitis (from pembrolizumab) was clearly the most urgent.  Thus, I decided to manage him empirically for immune-related pulmonary toxicity.  To this end, I previously prescribed high-dose oral steroid (prednisone 80 mg) for one week followed by a potential taper after that time if he reported a symptomatic improvement in his dyspnea and cough.  He is currently down to 10 mg of prednisone daily.  We will continue to try to manage him in the outpatient setting for the time being.  I have instructed him to reduce the dose down to 5 mg daily for the next 15 days, then 2.5 mg daily after that.  We will plan to see each other by video in about 3 weeks.  We will repeat a CT scan in late July 2023.     A total of 40 minutes were spent on this patient on the day of the consultation, of which more than 50% of this time was used for counseling and coordination of care. The patient was given the opportunity to ask multiple questions today, all of which were answered to his satisfaction.     Bart Barahona M.D.

## 2023-07-24 ENCOUNTER — ANCILLARY PROCEDURE (OUTPATIENT)
Dept: CT IMAGING | Facility: CLINIC | Age: 48
End: 2023-07-24
Attending: INTERNAL MEDICINE
Payer: COMMERCIAL

## 2023-07-24 DIAGNOSIS — C64.9 RENAL CELL CARCINOMA, UNSPECIFIED LATERALITY (H): Primary | ICD-10-CM

## 2023-07-24 LAB
CREAT BLD-MCNC: 1.6 MG/DL (ref 0.7–1.3)
GFR SERPL CREATININE-BSD FRML MDRD: 53 ML/MIN/1.73M2

## 2023-07-24 PROCEDURE — 71260 CT THORAX DX C+: CPT | Performed by: INTERNAL MEDICINE

## 2023-07-24 PROCEDURE — 74177 CT ABD & PELVIS W/CONTRAST: CPT | Performed by: INTERNAL MEDICINE

## 2023-07-24 PROCEDURE — 82565 ASSAY OF CREATININE: CPT

## 2023-07-24 RX ORDER — IOPAMIDOL 755 MG/ML
117 INJECTION, SOLUTION INTRAVASCULAR ONCE
Status: COMPLETED | OUTPATIENT
Start: 2023-07-24 | End: 2023-07-24

## 2023-07-24 RX ADMIN — IOPAMIDOL 117 ML: 755 INJECTION, SOLUTION INTRAVASCULAR at 14:57

## 2023-08-01 NOTE — PROGRESS NOTES
Virtual Visit Details    Type of service:  Video Visit     Originating Location (pt. Location):  Home    Distant Location (provider location):  [facility]  Platform used for Video Visit:  Ko      ----------------------------------------------------------------------------------------------------------    FOLLOW UP VISIT  -  TELEMEDICINE     Reason for Visit:  Renal cell carcinoma (pT3a R0) s/p one year of adjuvant pembrolizumab.  Developed immune pneumonitis; on prednisone 5 mg daily.     History of Present Illness:  Mr. Avila is a 48-year-old man who presented with hematuria in 10/2021.  CT imaging (10/21/2021) revealed a large mass at the upper pole of the left kidney, without evidence of local or distant metastatic disease.  On 12/21/2021, he had a left radical nephrectomy which showed a 5.4 cm clear-cell renal cell carcinoma (no sarcomatoid or rhabdoid features), nucleolar grade 3, with tumor extending into the calyceal system.  His pathological stage was pT3a G3 R0 kidney cancer.  The patient elected to receive adjuvant pembrolizumab treatment, which he started in 2/2022.  He completed one year (17 doses) of adjuvant pembrolizumab on 1/11/2023.      Three months ago, the patient informed me of some new shortness of breath on exertion (and even at rest), as well as significant fatigue.  He also reported the onset of a new dry cough, which is significant because the patient has never been a cougher before.  I ordered a CT scan, which showed evidence of pneumonitis which could potentially represent immune-related pneumonitis.  We conducted a telemedicine visit today to discuss ongoing management of possible immune-related pulmonary toxicity.  He was started on prednisone 80 mg fourteen weeks ago, and he is currently down to 5 mg daily.      Review of systems: The patient reports a continued improvement of his dyspnea and his cough has completely resolved.  He did not notice  any worsening of his respiratory symptoms after decreasing his prednisone down to 5 mg.  He has been able to mow his lawn without feeling short of breath, although he did cough once or twice during that activity.  He also reports some insomnia, which is probably related to the steroid effect.  He continues to have dry skin at baseline. Does not moisturize regularly. No vitiligo. No peripheral edema. Working on eliminating sugars. Eats well otherwise. Reports good oral hydration. Weight stable. No chest pain, shortness, or cough.  No abdominal pain, diarrhea, or constipation.  No anorexia.  No vomiting or nausea.  No urinary changes.  No mucositis.   A comprehensive 14-system review of symptoms is otherwise generally unremarkable.  His ECOG score is 0.  His pain score is 1/10.        Imaging (7/24/2023):  His recent CT scan showed that multiple pulmonary nodules have resolved or decreased in size compared to prior as well as multiple mediastinal and hilar lymph nodes suggesting a resolving infectious/inflammatory process. However, there is a new right upper lobe pulmonary nodule which is indeterminate for which follow-up with chest CT in 3-6 months is recommended.  Otherwise, no evidence of metastatic kidney cancer.    Laboratories (7/24/2023):  Creatinine 1.6 mg/dL. Estimated GFR 53 mL/min.    Physical Examination:  Mr. Avila is a 48-year-old man who appears comfortable at rest.  He was not visibly short of breat at rest.  He did not cough during our encounter today.  The remainder of the physical examination was deferred since this was a telemedicine visit.        ASSESSMENT AND PLAN:  Mr. Avila is a 48-year-old man with a diagnosis of clear-cell renal cell carcinoma (pT3a G3 R0) who underwent radical nephrectomy on 12/21/2021.  He began adjuvant therapy in 2/2022, and returns today for a follow-up visit having recently completed one year of adjuvant pembrolizumab treatment on 1/11/2023.  He is currently  being treated with oral steroids for possible immune-related pneumonitis, now on prednisone 5 mg daily.  His ECOG score is 0.  His pain score is 1/10.     After nephrectomy, the patient received one year of adjuvant pembrolizumab treatment according to the KeyNote-564 trial. The pembrolizumab was delivered at a dose of 200 mg IV every 3 weeks, for a total of 17 cycles (one year).  His last immunotherapy dose was on 1/11/2023.  Over the last six months, he has been doing extremely well from a cancer perspective.      However, three months ago the patient reported subacute onset of a dry cough, associated with significant fatigue and shortness of breath on exertion.  He was not aware of any recent COVID contacts.  He did not report fevers, chills or arthralgias.  Given the prior pembrolizumab treatment, the rare but serious possibility of immune-related pneumonitis needed to be excluded.  Alternative diagnoses included viral or bacterial pneumonias.  His CT scan suggested a range of possible diagnoses, including an atypical pneumonia or an immune-related pneumonitis or progression of pulmonary metastases.  Of these, the potential for immune-related pneumonitis (from pembrolizumab) was clearly the most urgent.  Thus, I decided to manage him empirically for immune-related pulmonary toxicity.  To this end, I previously prescribed high-dose oral steroid (prednisone 80 mg) followed by a potential taper after that time if he reported a symptomatic improvement in his dyspnea and cough.  He is currently down to 5 mg of prednisone daily.  We will continue to try to manage him in the outpatient setting for the time being.  I have instructed him to reduce the dose down to 5 mg daily for the next 15 days, then 2.5 mg daily after that.  His last dose of prednisone will be on 8/30/2023.  We will plan to see each other by video in about 6 weeks.  We will repeat a CT scan in October or November 2023.     A total of 40 minutes were  spent on this patient on the day of the consultation, of which more than 50% of this time was used for counseling and coordination of care. The patient was given the opportunity to ask multiple questions today, all of which were answered to his satisfaction.     Bart Barahona M.D.

## 2023-08-02 ENCOUNTER — VIRTUAL VISIT (OUTPATIENT)
Dept: ONCOLOGY | Facility: CLINIC | Age: 48
End: 2023-08-02
Attending: INTERNAL MEDICINE
Payer: COMMERCIAL

## 2023-08-02 VITALS — HEIGHT: 70 IN | BODY MASS INDEX: 30.13 KG/M2

## 2023-08-02 DIAGNOSIS — C64.9 RENAL CELL CARCINOMA, UNSPECIFIED LATERALITY (H): Primary | ICD-10-CM

## 2023-08-02 PROCEDURE — 99215 OFFICE O/P EST HI 40 MIN: CPT | Mod: 95 | Performed by: INTERNAL MEDICINE

## 2023-08-02 ASSESSMENT — PAIN SCALES - GENERAL: PAINLEVEL: NO PAIN (0)

## 2023-08-02 NOTE — LETTER
8/2/2023         RE: Pierre Avila  [address] [facility] [address]        Dear Colleague,    Thank you for referring your patient, Pierre Avila, to the [facility]. Please see a copy of my visit note below.      Virtual Visit Details    Type of service:  Video Visit     Originating Location (pt. Location):  Home    Distant Location (provider location):  [facility]  Platform used for Video Visit:  Ko      ----------------------------------------------------------------------------------------------------------    FOLLOW UP VISIT  -  TELEMEDICINE     Reason for Visit:  Renal cell carcinoma (pT3a R0) s/p one year of adjuvant pembrolizumab.  Developed immune pneumonitis; on prednisone 5 mg daily.     History of Present Illness:  Mr. Avila is a 48-year-old man who presented with hematuria in 10/2021.  CT imaging (10/21/2021) revealed a large mass at the upper pole of the left kidney, without evidence of local or distant metastatic disease.  On 12/21/2021, he had a left radical nephrectomy which showed a 5.4 cm clear-cell renal cell carcinoma (no sarcomatoid or rhabdoid features), nucleolar grade 3, with tumor extending into the calyceal system.  His pathological stage was pT3a G3 R0 kidney cancer.  The patient elected to receive adjuvant pembrolizumab treatment, which he started in 2/2022.  He completed one year (17 doses) of adjuvant pembrolizumab on 1/11/2023.      Three months ago, the patient informed me of some new shortness of breath on exertion (and even at rest), as well as significant fatigue.  He also reported the onset of a new dry cough, which is significant because the patient has never been a cougher before.  I ordered a CT scan, which showed evidence of pneumonitis which could potentially represent immune-related pneumonitis.  We conducted a telemedicine visit today to discuss ongoing management of possible immune-related  pulmonary toxicity.  He was started on prednisone 80 mg fourteen weeks ago, and he is currently down to 5 mg daily.      Review of systems: The patient reports a continued improvement of his dyspnea and his cough has completely resolved.  He did not notice any worsening of his respiratory symptoms after decreasing his prednisone down to 5 mg.  He has been able to mow his lawn without feeling short of breath, although he did cough once or twice during that activity.  He also reports some insomnia, which is probably related to the steroid effect.  He continues to have dry skin at baseline. Does not moisturize regularly. No vitiligo. No peripheral edema. Working on eliminating sugars. Eats well otherwise. Reports good oral hydration. Weight stable. No chest pain, shortness, or cough.  No abdominal pain, diarrhea, or constipation.  No anorexia.  No vomiting or nausea.  No urinary changes.  No mucositis.   A comprehensive 14-system review of symptoms is otherwise generally unremarkable.  His ECOG score is 0.  His pain score is 1/10.        Imaging (7/24/2023):  His recent CT scan showed that multiple pulmonary nodules have resolved or decreased in size compared to prior as well as multiple mediastinal and hilar lymph nodes suggesting a resolving infectious/inflammatory process. However, there is a new right upper lobe pulmonary nodule which is indeterminate for which follow-up with chest CT in 3-6 months is recommended.  Otherwise, no evidence of metastatic kidney cancer.    Laboratories (7/24/2023):  Creatinine 1.6 mg/dL. Estimated GFR 53 mL/min.    Physical Examination:  Mr. Avila is a 48-year-old man who appears comfortable at rest.  He was not visibly short of breat at rest.  He did not cough during our encounter today.  The remainder of the physical examination was deferred since this was a telemedicine visit.        ASSESSMENT AND PLAN:  Mr. Avila is a 48-year-old man with a diagnosis of clear-cell renal  cell carcinoma (pT3a G3 R0) who underwent radical nephrectomy on 12/21/2021.  He began adjuvant therapy in 2/2022, and returns today for a follow-up visit having recently completed one year of adjuvant pembrolizumab treatment on 1/11/2023.  He is currently being treated with oral steroids for possible immune-related pneumonitis, now on prednisone 5 mg daily.  His ECOG score is 0.  His pain score is 1/10.     After nephrectomy, the patient received one year of adjuvant pembrolizumab treatment according to the KeyNote-564 trial. The pembrolizumab was delivered at a dose of 200 mg IV every 3 weeks, for a total of 17 cycles (one year).  His last immunotherapy dose was on 1/11/2023.  Over the last six months, he has been doing extremely well from a cancer perspective.      However, three months ago the patient reported subacute onset of a dry cough, associated with significant fatigue and shortness of breath on exertion.  He was not aware of any recent COVID contacts.  He did not report fevers, chills or arthralgias.  Given the prior pembrolizumab treatment, the rare but serious possibility of immune-related pneumonitis needed to be excluded.  Alternative diagnoses included viral or bacterial pneumonias.  His CT scan suggested a range of possible diagnoses, including an atypical pneumonia or an immune-related pneumonitis or progression of pulmonary metastases.  Of these, the potential for immune-related pneumonitis (from pembrolizumab) was clearly the most urgent.  Thus, I decided to manage him empirically for immune-related pulmonary toxicity.  To this end, I previously prescribed high-dose oral steroid (prednisone 80 mg) followed by a potential taper after that time if he reported a symptomatic improvement in his dyspnea and cough.  He is currently down to 5 mg of prednisone daily.  We will continue to try to manage him in the outpatient setting for the time being.  I have instructed him to reduce the dose down to 5 mg  daily for the next 15 days, then 2.5 mg daily after that.  His last dose of prednisone will be on 8/30/2023.  We will plan to see each other by video in about 6 weeks.  We will repeat a CT scan in October or November 2023.     A total of 40 minutes were spent on this patient on the day of the consultation, of which more than 50% of this time was used for counseling and coordination of care. The patient was given the opportunity to ask multiple questions today, all of which were answered to his satisfaction.     Bart Barahona M.D.

## 2023-08-02 NOTE — NURSING NOTE
Is the patient currently in the state of [place]? YES    Visit mode:VIDEO    If the visit is dropped, the patient can be reconnected by: VIDEO VISIT: Text to cell phone: [phone]    Will anyone else be joining the visit? NO      How would you like to obtain your AVS? MyChart    Are changes needed to the allergy or medication list? NO    Reason for visit: NIGHAT Browne, Virtual Facilitator

## 2023-11-06 ENCOUNTER — ANCILLARY PROCEDURE (OUTPATIENT)
Dept: CT IMAGING | Facility: CLINIC | Age: 48
End: 2023-11-06
Attending: INTERNAL MEDICINE
Payer: COMMERCIAL

## 2023-11-06 ENCOUNTER — LAB (OUTPATIENT)
Dept: LAB | Facility: CLINIC | Age: 48
End: 2023-11-06
Payer: COMMERCIAL

## 2023-11-06 DIAGNOSIS — C64.9 RENAL CELL CARCINOMA, UNSPECIFIED LATERALITY (H): Primary | ICD-10-CM

## 2023-11-06 DIAGNOSIS — C64.9 RENAL CELL CARCINOMA, UNSPECIFIED LATERALITY (H): ICD-10-CM

## 2023-11-06 LAB
CREAT BLD-MCNC: 1.4 MG/DL (ref 0.7–1.3)
EGFRCR SERPLBLD CKD-EPI 2021: >60 ML/MIN/1.73M2

## 2023-11-06 PROCEDURE — 71260 CT THORAX DX C+: CPT | Mod: TC | Performed by: INTERNAL MEDICINE

## 2023-11-06 PROCEDURE — 82565 ASSAY OF CREATININE: CPT

## 2023-11-06 PROCEDURE — 74177 CT ABD & PELVIS W/CONTRAST: CPT | Mod: TC | Performed by: INTERNAL MEDICINE

## 2023-11-06 RX ORDER — IOPAMIDOL 755 MG/ML
119 INJECTION, SOLUTION INTRAVASCULAR ONCE
Status: COMPLETED | OUTPATIENT
Start: 2023-11-06 | End: 2023-11-06

## 2023-11-06 RX ADMIN — IOPAMIDOL 119 ML: 755 INJECTION, SOLUTION INTRAVASCULAR at 14:17

## 2023-11-14 NOTE — PROGRESS NOTES
Virtual Visit Details    Type of service:  Video Visit   Originating Location (pt. Location):  Home    Distant Location (provider location):  [facility]  Platform used for Video Visit:  Ko     ----------------------------------------------------------------------------------------------------------     FOLLOW UP VISIT  -  TELEMEDICINE     Reason for Visit:  Renal cell carcinoma (pT3a R0) s/p one year of adjuvant pembrolizumab; follow up visit.     History of Present Illness:  Mr. Avila is a 48-year-old man who presented with hematuria in 10/2021.  CT imaging (10/21/2021) revealed a large mass at the upper pole of the left kidney, without evidence of local or distant metastatic disease.  On 12/21/2021, he had a left radical nephrectomy which showed a 5.4 cm clear-cell renal cell carcinoma (no sarcomatoid or rhabdoid features), nucleolar grade 3, with tumor extending into the calyceal system.  His pathological stage was pT3a G3 R0 kidney cancer.  The patient elected to receive adjuvant pembrolizumab treatment, which he started in 2/2022.  He completed one year (17 doses) of adjuvant pembrolizumab on 1/11/2023.       Seven months ago, the patient developed new shortness of breath on exertion (and even at rest), as well as significant fatigue.  He also reported the onset of a new dry cough, which is significant because the patient has never been a cougher before.  I ordered a CT scan (4/2023), which showed evidence of pneumonitis which could potentially represent immune-related pneumonitis.  We conducted a telemedicine visit today to discuss ongoing management of resolving immune-related pulmonary toxicity.  His prednisone has been tapered down to 2.5 mg daily.  We also took the opportunity to discuss his recent CT scan (11/6/23), which shows no evidence of recurrent or metastatic disease.     Review of systems: The patient reports a continued improvement of his dyspnea and his  cough has completely resolved.  He did not notice any worsening of his respiratory symptoms after decreasing his prednisone down to 2.5 mg.  He has been able to mow his lawn without feeling short of breath, although he did cough once or twice during that activity.  He also reports some insomnia, which is probably related to the steroid effect.  He continues to have dry skin at baseline. Does not moisturize regularly. No vitiligo. No peripheral edema. Working on eliminating sugars. Eats well otherwise. Reports good oral hydration. Weight stable. No chest pain, shortness, or cough.  No abdominal pain, diarrhea, or constipation.  No anorexia.  No vomiting or nausea.  No urinary changes.  No mucositis.   A comprehensive 14-system review of symptoms is otherwise generally unremarkable.  His ECOG score is 0.  His pain score is 1/10.        Physical Examination:  Mr. Avila is a 48-year-old man who appears comfortable at rest.  He was not visibly short of breat at rest.  He did not cough during our encounter today.  The remainder of the physical examination was deferred since this was a telemedicine visit.    CT scan (7/24/2023):  This showed that multiple pulmonary nodules have resolved or decreased in size compared to prior as well as multiple mediastinal and hilar lymph nodes suggesting a resolving infectious/inflammatory process. However, there is a new right upper lobe pulmonary nodule which is indeterminate for which follow-up with chest CT in 3-6 months is recommended.  Otherwise, no evidence of metastatic kidney cancer.    CT scan (11/6/2023):  This shows resolving infectious/inflammatory focus right lung.  There are no new findings to suggest active pneumonitis or metastatic disease in the chest, abdomen, or pelvis.     Laboratories (11/6/2023):  Creatinine 1.4 mg/dL. Estimated GFR >60 mL/min.        ASSESSMENT AND PLAN:  Mr. Avila is a 48-year-old man with a diagnosis of clear-cell renal cell carcinoma  (pT3a G3 R0) who underwent radical nephrectomy on 12/21/2021.  He began adjuvant therapy in 2/2022, and returns today for a follow-up visit having recently completed one year of adjuvant pembrolizumab treatment on 1/11/2023.  He is currently being treated with oral steroids for possible immune-related pneumonitis, now on prednisone 2.5 mg daily.  His ECOG score is 0.  His pain score is 1/10.     After nephrectomy, the patient received one year of adjuvant pembrolizumab treatment according to the KeyNote-564 trial. The pembrolizumab was delivered at a dose of 200 mg IV every 3 weeks, for a total of 17 cycles (one year).  His last immunotherapy dose was on 1/11/2023.  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      As mentioned above, seven months ago the patient reported subacute onset of a dry cough, associated with significant fatigue and shortness of breath on exertion.  He was not aware of any recent COVID contacts.  He did not report fevers, chills or arthralgias.  Given the prior pembrolizumab treatment, the rare but serious possibility of immune-related pneumonitis needed to be excluded.  Alternative diagnoses included viral or bacterial pneumonias.  His CT scan suggested a range of possible diagnoses, including an atypical pneumonia or an immune-related pneumonitis or progression of pulmonary metastases.  Of these, the potential for immune-related pneumonitis (from pembrolizumab) was clearly the most urgent.  Thus, I decided to manage him empirically for immune-related pulmonary toxicity.  To this end, I previously prescribed high-dose oral steroid (prednisone 80 mg) followed by a potential taper after that time if he reported a symptomatic improvement in his dyspnea and cough.  He has been on a slow steroid taper over the last four months.  He is currently down to 2.5 mg of prednisone daily.  Since our last visit,  he has been able to come off the prednisone completely as of 8/30/2023.  Fortunately, he has not noticed any recurrence of pulmonary symptoms despite stopping prednisone.      I also took some time to discuss the results of his recent CT scan dated 11/6/2023.  Fortunately, this shows a resolving infectious/inflammatory process in the lungs.  Furthermore, there is no evidence of local kidney cancer recurrence or metastatic disease; thus, he remains TONYA at this time.  Moving forward, he should undergo CT imaging once every 6 months.  In addition, I would like to see him approximately every 3 months, and his next clinic visit will be scheduled in February 2024.  His next CT scan will be conducted in May 2024.  The patient was given the opportunity to ask multiple questions today, all of which were answered to his satisfaction.     A total of 40 minutes were spent on this patient on the day of the consultation, of which more than 50% of this time was used for counseling and coordination of care.     Bart Barahona M.D.

## 2023-11-15 ENCOUNTER — VIRTUAL VISIT (OUTPATIENT)
Dept: ONCOLOGY | Facility: CLINIC | Age: 48
End: 2023-11-15
Attending: INTERNAL MEDICINE
Payer: COMMERCIAL

## 2023-11-15 VITALS — HEIGHT: 70 IN | WEIGHT: 210 LBS | BODY MASS INDEX: 30.06 KG/M2

## 2023-11-15 DIAGNOSIS — C64.9 RENAL CELL CARCINOMA, UNSPECIFIED LATERALITY (H): Primary | ICD-10-CM

## 2023-11-15 PROCEDURE — 99215 OFFICE O/P EST HI 40 MIN: CPT | Mod: 95 | Performed by: INTERNAL MEDICINE

## 2023-11-15 NOTE — NURSING NOTE
Is the patient currently in the state of [place]? YES    Visit mode:VIDEO    If the visit is dropped, the patient can be reconnected by: TELEPHONE VISIT: Phone number: [phone]    Will anyone else be joining the visit? NO  (If patient encounters technical issues they should call [phone]    How would you like to obtain your AVS? MyChart    Are changes needed to the allergy or medication list? No    Reason for visit: RODNEYECK    Smiley ROMERO

## 2023-11-15 NOTE — LETTER
11/15/2023         RE: Pierre Avila  [address] [facility] [address]        Dear Colleague,    Thank you for referring your patient, Pierre Avila, to the [facility]. Please see a copy of my visit note below.      Virtual Visit Details    Type of service:  Video Visit   Originating Location (pt. Location):  Home    Distant Location (provider location):  [facility]  Platform used for Video Visit:  Ko     ----------------------------------------------------------------------------------------------------------     FOLLOW UP VISIT  -  TELEMEDICINE     Reason for Visit:  Renal cell carcinoma (pT3a R0) s/p one year of adjuvant pembrolizumab; follow up visit.     History of Present Illness:  Mr. Avila is a 48-year-old man who presented with hematuria in 10/2021.  CT imaging (10/21/2021) revealed a large mass at the upper pole of the left kidney, without evidence of local or distant metastatic disease.  On 12/21/2021, he had a left radical nephrectomy which showed a 5.4 cm clear-cell renal cell carcinoma (no sarcomatoid or rhabdoid features), nucleolar grade 3, with tumor extending into the calyceal system.  His pathological stage was pT3a G3 R0 kidney cancer.  The patient elected to receive adjuvant pembrolizumab treatment, which he started in 2/2022.  He completed one year (17 doses) of adjuvant pembrolizumab on 1/11/2023.       Seven months ago, the patient developed new shortness of breath on exertion (and even at rest), as well as significant fatigue.  He also reported the onset of a new dry cough, which is significant because the patient has never been a cougher before.  I ordered a CT scan (4/2023), which showed evidence of pneumonitis which could potentially represent immune-related pneumonitis.  We conducted a telemedicine visit today to discuss ongoing management of resolving immune-related pulmonary toxicity.  His prednisone has  been tapered down to 2.5 mg daily.  We also took the opportunity to discuss his recent CT scan (11/6/23), which shows no evidence of recurrent or metastatic disease.     Review of systems: The patient reports a continued improvement of his dyspnea and his cough has completely resolved.  He did not notice any worsening of his respiratory symptoms after decreasing his prednisone down to 2.5 mg.  He has been able to mow his lawn without feeling short of breath, although he did cough once or twice during that activity.  He also reports some insomnia, which is probably related to the steroid effect.  He continues to have dry skin at baseline. Does not moisturize regularly. No vitiligo. No peripheral edema. Working on eliminating sugars. Eats well otherwise. Reports good oral hydration. Weight stable. No chest pain, shortness, or cough.  No abdominal pain, diarrhea, or constipation.  No anorexia.  No vomiting or nausea.  No urinary changes.  No mucositis.   A comprehensive 14-system review of symptoms is otherwise generally unremarkable.  His ECOG score is 0.  His pain score is 1/10.        Physical Examination:  Mr. Avila is a 48-year-old man who appears comfortable at rest.  He was not visibly short of breat at rest.  He did not cough during our encounter today.  The remainder of the physical examination was deferred since this was a telemedicine visit.    CT scan (7/24/2023):  This showed that multiple pulmonary nodules have resolved or decreased in size compared to prior as well as multiple mediastinal and hilar lymph nodes suggesting a resolving infectious/inflammatory process. However, there is a new right upper lobe pulmonary nodule which is indeterminate for which follow-up with chest CT in 3-6 months is recommended.  Otherwise, no evidence of metastatic kidney cancer.    CT scan (11/6/2023):  This shows resolving infectious/inflammatory focus right lung.  There are no new findings to suggest active  pneumonitis or metastatic disease in the chest, abdomen, or pelvis.     Laboratories (11/6/2023):  Creatinine 1.4 mg/dL. Estimated GFR >60 mL/min.        ASSESSMENT AND PLAN:  Mr. Avila is a 48-year-old man with a diagnosis of clear-cell renal cell carcinoma (pT3a G3 R0) who underwent radical nephrectomy on 12/21/2021.  He began adjuvant therapy in 2/2022, and returns today for a follow-up visit having recently completed one year of adjuvant pembrolizumab treatment on 1/11/2023.  He is currently being treated with oral steroids for possible immune-related pneumonitis, now on prednisone 2.5 mg daily.  His ECOG score is 0.  His pain score is 1/10.     After nephrectomy, the patient received one year of adjuvant pembrolizumab treatment according to the KeyNote-564 trial. The pembrolizumab was delivered at a dose of 200 mg IV every 3 weeks, for a total of 17 cycles (one year).  His last immunotherapy dose was on 1/11/2023.  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      As mentioned above, seven months ago the patient reported subacute onset of a dry cough, associated with significant fatigue and shortness of breath on exertion.  He was not aware of any recent COVID contacts.  He did not report fevers, chills or arthralgias.  Given the prior pembrolizumab treatment, the rare but serious possibility of immune-related pneumonitis needed to be excluded.  Alternative diagnoses included viral or bacterial pneumonias.  His CT scan suggested a range of possible diagnoses, including an atypical pneumonia or an immune-related pneumonitis or progression of pulmonary metastases.  Of these, the potential for immune-related pneumonitis (from pembrolizumab) was clearly the most urgent.  Thus, I decided to manage him empirically for immune-related pulmonary toxicity.  To this end, I previously prescribed high-dose oral steroid  (prednisone 80 mg) followed by a potential taper after that time if he reported a symptomatic improvement in his dyspnea and cough.  He has been on a slow steroid taper over the last four months.  He is currently down to 2.5 mg of prednisone daily.  Since our last visit, he has been able to come off the prednisone completely as of 8/30/2023.  Fortunately, he has not noticed any recurrence of pulmonary symptoms despite stopping prednisone.      I also took some time to discuss the results of his recent CT scan dated 11/6/2023.  Fortunately, this shows a resolving infectious/inflammatory process in the lungs.  Furthermore, there is no evidence of local kidney cancer recurrence or metastatic disease; thus, he remains TONYA at this time.  Moving forward, he should undergo CT imaging once every 6 months.  In addition, I would like to see him approximately every 3 months, and his next clinic visit will be scheduled in February 2024.  His next CT scan will be conducted in May 2024.  The patient was given the opportunity to ask multiple questions today, all of which were answered to his satisfaction.     A total of 40 minutes were spent on this patient on the day of the consultation, of which more than 50% of this time was used for counseling and coordination of care.     Bart Barahona M.D.

## 2023-11-20 ENCOUNTER — TELEPHONE (OUTPATIENT)
Dept: ONCOLOGY | Facility: CLINIC | Age: 48
End: 2023-11-20
Payer: COMMERCIAL

## 2023-11-20 NOTE — TELEPHONE ENCOUNTER
CLINICAL NUTRITION SERVICES     Reason for Contact: Patient was contacted by phone due to requested to speak with a Dietitian on the Oncology Distress Screening tool.  9. If you want to be contacted by one of our professionals, I can send a message to them right now. Oncology Dietitian       Action: RD called patient indicating reason for phone call. Left a VM with a return call back number.     Follow up: Wait for a return phone call.    Mile Ledesma RD, , LD  [facility]  [phone]

## 2024-01-06 ENCOUNTER — HEALTH MAINTENANCE LETTER (OUTPATIENT)
Age: 49
End: 2024-01-06

## 2024-02-13 NOTE — NURSING NOTE
"Oncology Rooming Note    October 19, 2022 8:50 AM   Pierre Avila is a 47 year old male who presents for:    Chief Complaint   Patient presents with     Oncology Clinic Visit     Renal cell carcinoma, unspecified laterality (H)     Blood Draw     Labs drawn by RN via PIV, vitals taken.     Initial Vitals: /83   Pulse 66   Temp 98.3  F (36.8  C)   Resp 16   Wt 97.5 kg (214 lb 14.4 oz)   SpO2 100%   BMI 29.56 kg/m   Estimated body mass index is 29.56 kg/m  as calculated from the following:    Height as of 5/25/22: 1.816 m (5' 11.5\").    Weight as of this encounter: 97.5 kg (214 lb 14.4 oz). Body surface area is 2.22 meters squared.  No Pain (0) Comment: Data Unavailable   No LMP for male patient.  Allergies reviewed: Yes  Medications reviewed: Yes    Medications: Medication refills not needed today.  Pharmacy name entered into Fl[employer]:    [facility] [place] - [address] [place] [address] [facility] [address] [place] [address] - [address] [facility] [place], [place] - [address] [facility] [phone]    Clinical concerns: None       Alpesh Cerna            " Department of Anesthesiology  Postprocedure Note    Patient: Jono Lindquist  MRN: [id number]  YOB: 1939  Date of evaluation: 2/13/2024    Procedure Summary       Date: 02/13/24 Room / Location: [address]    Anesthesia Start: 1222 Anesthesia Stop: 1245    Procedure: EGD BIOPSY Diagnosis:       Nausea and vomiting, unspecified vomiting type      (Nausea and vomiting, unspecified vomiting type [R11.2])    Surgeons: Jorge Alberto Thurman MD Responsible Provider: Denisa Giordano MD    Anesthesia Type: general ASA Status: 3            Anesthesia Type: No value filed.    Rudy Phase I: Rudy Score: 9    Rudy Phase II:      Anesthesia Post Evaluation    Comments: POST- ANESTHESIA EVALUATION       Pt Name: Jono Lindquist  MRN: [id number]  YOB: 1939  Date of evaluation: 2/13/2024  Time:  2:33 PM      BP (!) 106/50   Pulse 63   Temp 97.7 °F (36.5 °C) (Temporal)   Resp 16   Ht 1.6 m (5' 3\")   Wt 89.6 kg (197 lb 8 oz)   LMP  (LMP Unknown)   SpO2 98%   BMI 34.99 kg/m²      Consciousness Level  Awake  Cardiopulmonary Status  Stable  Pain Adequately Treated YES  Nausea / Vomiting  NO  Adequate Hydration  YES  Anesthesia Related Complications NONE      Electronically signed by Denisa Giordano MD on 2/13/2024 at 2:33 PM      No notable events documented.

## 2024-05-01 ENCOUNTER — LAB (OUTPATIENT)
Dept: LAB | Facility: CLINIC | Age: 49
End: 2024-05-01
Payer: COMMERCIAL

## 2024-05-01 ENCOUNTER — ANCILLARY PROCEDURE (OUTPATIENT)
Dept: CT IMAGING | Facility: CLINIC | Age: 49
End: 2024-05-01
Attending: INTERNAL MEDICINE
Payer: COMMERCIAL

## 2024-05-01 DIAGNOSIS — C64.9 RENAL CELL CARCINOMA, UNSPECIFIED LATERALITY (H): ICD-10-CM

## 2024-05-01 LAB
BASOPHILS # BLD AUTO: 0 10E3/UL (ref 0–0.2)
BASOPHILS NFR BLD AUTO: 0 %
EOSINOPHIL # BLD AUTO: 0.3 10E3/UL (ref 0–0.7)
EOSINOPHIL NFR BLD AUTO: 3 %
ERYTHROCYTE [DISTWIDTH] IN BLOOD BY AUTOMATED COUNT: 12.8 % (ref 10–15)
HCT VFR BLD AUTO: 39.7 % (ref 40–53)
HGB BLD-MCNC: 13.8 G/DL (ref 13.3–17.7)
IMM GRANULOCYTES # BLD: 0 10E3/UL
IMM GRANULOCYTES NFR BLD: 1 %
LYMPHOCYTES # BLD AUTO: 2.2 10E3/UL (ref 0.8–5.3)
LYMPHOCYTES NFR BLD AUTO: 27 %
MCH RBC QN AUTO: 30.1 PG (ref 26.5–33)
MCHC RBC AUTO-ENTMCNC: 34.8 G/DL (ref 31.5–36.5)
MCV RBC AUTO: 87 FL (ref 78–100)
MONOCYTES # BLD AUTO: 0.9 10E3/UL (ref 0–1.3)
MONOCYTES NFR BLD AUTO: 11 %
NEUTROPHILS # BLD AUTO: 4.6 10E3/UL (ref 1.6–8.3)
NEUTROPHILS NFR BLD AUTO: 58 %
PLATELET # BLD AUTO: 217 10E3/UL (ref 150–450)
RBC # BLD AUTO: 4.59 10E6/UL (ref 4.4–5.9)
WBC # BLD AUTO: 7.9 10E3/UL (ref 4–11)

## 2024-05-01 PROCEDURE — 84520 ASSAY OF UREA NITROGEN: CPT

## 2024-05-01 PROCEDURE — 84075 ASSAY ALKALINE PHOSPHATASE: CPT

## 2024-05-01 PROCEDURE — 82565 ASSAY OF CREATININE: CPT

## 2024-05-01 PROCEDURE — 84155 ASSAY OF PROTEIN SERUM: CPT

## 2024-05-01 PROCEDURE — 84460 ALANINE AMINO (ALT) (SGPT): CPT

## 2024-05-01 PROCEDURE — 74177 CT ABD & PELVIS W/CONTRAST: CPT | Mod: TC | Performed by: RADIOLOGY

## 2024-05-01 PROCEDURE — 80051 ELECTROLYTE PANEL: CPT

## 2024-05-01 PROCEDURE — 36415 COLL VENOUS BLD VENIPUNCTURE: CPT

## 2024-05-01 PROCEDURE — 84450 TRANSFERASE (AST) (SGOT): CPT

## 2024-05-01 PROCEDURE — 71260 CT THORAX DX C+: CPT | Mod: TC | Performed by: RADIOLOGY

## 2024-05-01 PROCEDURE — 82310 ASSAY OF CALCIUM: CPT

## 2024-05-01 PROCEDURE — 85025 COMPLETE CBC W/AUTO DIFF WBC: CPT

## 2024-05-01 PROCEDURE — 82247 BILIRUBIN TOTAL: CPT

## 2024-05-01 PROCEDURE — 82040 ASSAY OF SERUM ALBUMIN: CPT

## 2024-05-01 RX ORDER — IOPAMIDOL 755 MG/ML
100 INJECTION, SOLUTION INTRAVASCULAR ONCE
Status: COMPLETED | OUTPATIENT
Start: 2024-05-01 | End: 2024-05-01

## 2024-05-01 RX ADMIN — IOPAMIDOL 100 ML: 755 INJECTION, SOLUTION INTRAVASCULAR at 14:06

## 2024-05-02 LAB
ALBUMIN SERPL BCG-MCNC: 4.5 G/DL (ref 3.5–5.2)
ALP SERPL-CCNC: 68 U/L (ref 40–150)
ALT SERPL W P-5'-P-CCNC: 57 U/L (ref 0–70)
ANION GAP SERPL CALCULATED.3IONS-SCNC: 11 MMOL/L (ref 7–15)
AST SERPL W P-5'-P-CCNC: 42 U/L (ref 0–45)
BILIRUB SERPL-MCNC: 0.4 MG/DL
BUN SERPL-MCNC: 20.9 MG/DL (ref 6–20)
CALCIUM SERPL-MCNC: 9.3 MG/DL (ref 8.6–10)
CHLORIDE SERPL-SCNC: 101 MMOL/L (ref 98–107)
CREAT BLD-MCNC: 1.5 MG/DL (ref 0.7–1.3)
CREAT SERPL-MCNC: 1.34 MG/DL (ref 0.67–1.17)
DEPRECATED HCO3 PLAS-SCNC: 24 MMOL/L (ref 22–29)
EGFRCR SERPLBLD CKD-EPI 2021: 57 ML/MIN/1.73M2
EGFRCR SERPLBLD CKD-EPI 2021: 65 ML/MIN/1.73M2
GLUCOSE SERPL-MCNC: ABNORMAL MG/DL
POTASSIUM SERPL-SCNC: 4.4 MMOL/L (ref 3.4–5.3)
PROT SERPL-MCNC: 7 G/DL (ref 6.4–8.3)
SODIUM SERPL-SCNC: 136 MMOL/L (ref 135–145)

## 2024-05-02 PROCEDURE — 82565 ASSAY OF CREATININE: CPT

## 2024-05-03 ENCOUNTER — DOCUMENTATION ONLY (OUTPATIENT)
Dept: ONCOLOGY | Facility: CLINIC | Age: 49
End: 2024-05-03
Payer: COMMERCIAL

## 2024-05-04 NOTE — PROGRESS NOTES
The glucose testing for Pierre Avila from 5/1/2024 at 2:07 PM  was canceled at our testing lab due to lab error.  If you require a glucose result, please order a GLUCOSE test, and let your care team know to contact the patient to schedule a Lab Only appointment.  Thank you,  Cinda Guo CMA([facility])

## 2024-05-04 NOTE — PROGRESS NOTES
FOLLOW UP VISIT      Reason for Visit:  Renal cell carcinoma (pT3a R0) s/p one year of adjuvant pembrolizumab; follow up visit.     History of Present Illness:  Mr. Avila is a 48-year-old man who presented with hematuria in 10/2021.  CT imaging (10/21/2021) revealed a large mass at the upper pole of the left kidney, without evidence of local or distant metastatic disease.  On 12/21/2021, he had a left radical nephrectomy which showed a 5.4 cm clear-cell renal cell carcinoma (no sarcomatoid or rhabdoid features), nucleolar grade 3, with tumor extending into the calyceal system.  His pathological stage was pT3a G3 R0 kidney cancer.  The patient elected to receive adjuvant pembrolizumab treatment, which he started in 2/2022.  He completed one year (17 doses) of adjuvant pembrolizumab on 1/11/2023.       Seven months ago, the patient developed new shortness of breath on exertion (and even at rest), as well as significant fatigue.  He also reported the onset of a new dry cough, which is significant because the patient has never been a cougher before.  I ordered a CT scan (4/2023), which showed evidence of pneumonitis which could potentially represent immune-related pneumonitis.  We conducted a telemedicine visit today to discuss ongoing management of resolving immune-related pulmonary toxicity.  His prednisone has been tapered off.  We also took the opportunity to discuss his recent CT scan (5/1/24), which shows no evidence of recurrent or metastatic disease.     Review of systems: The patient reports a continued improvement of his dyspnea and his cough has completely resolved.  He did not notice any worsening of his respiratory symptoms after decreasing his prednisone down to 2.5 mg.  He has been able to mow his lawn without feeling short of breath, although he did cough once or twice during that activity.  He also reports some insomnia, which is probably related to the steroid effect.  He continues to have dry  skin at baseline. Does not moisturize regularly. No vitiligo. No peripheral edema. Working on eliminating sugars. Eats well otherwise. Reports good oral hydration. Weight stable. No chest pain, shortness, or cough.  No abdominal pain, diarrhea, or constipation.  No anorexia.  No vomiting or nausea.  No urinary changes.  No mucositis.   A comprehensive 14-system review of symptoms is otherwise generally unremarkable.  His ECOG score is 0.  His pain score is 1/10.        Physical Examination:  Mr. Avila is a 48-year-old man who appears comfortable at rest.  He was not visibly short of breat at rest.  He did not cough during our encounter today.  The remainder of the physical examination was deferred since this was a telemedicine visit.    CT scan (11/6/2023):  This shows resolving infectious/inflammatory focus right lung.  There are no new findings to suggest active pneumonitis or metastatic disease in the chest, abdomen, or pelvis.    CT scan (5/1/2024):  Stable exam without evidence for new metastatic disease. Few stable pulmonary nodules. Previous noted ill-defined groundglass opacity is less prominent at the right middle lobe suggesting an infectious or inflammatory etiology. Fatty liver. Stable mildly prominent right hilar lymph nodes.     Laboratories (5/1/2024):  Creatinine 1.34 mg/dL. Estimated GFR 65 mL/min. Rest of CBC and CMP are within normal limits.        ASSESSMENT AND PLAN:  Mr. Avila is a 48-year-old man with a diagnosis of clear-cell renal cell carcinoma (pT3a G3 R0) who underwent radical nephrectomy on 12/21/2021.  He began adjuvant therapy in 2/2022, and returns today for a follow-up visit having recently completed one year of adjuvant pembrolizumab treatment on 1/11/2023.  His ECOG score is 0.  His pain score is 1/10.     After nephrectomy, the patient received one year of adjuvant pembrolizumab treatment according to the KeyNote-564 trial. The pembrolizumab was delivered at a dose of 200  mg IV every 3 weeks, for a total of 17 cycles (one year).  His last immunotherapy dose was on 1/11/2023.  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      As mentioned above, seven months ago the patient reported subacute onset of a dry cough, associated with significant fatigue and shortness of breath on exertion.  He was not aware of any recent COVID contacts.  He did not report fevers, chills or arthralgias.  Given prior pembrolizumab treatment, the rare but serious possibility of immune-related pneumonitis needed to be excluded.  Alternative diagnoses included viral or bacterial pneumonias.  His CT scan suggested a range of possible diagnoses, including an atypical pneumonia or an immune-related pneumonitis or progression of pulmonary metastases.  Of these, the potential for immune-related pneumonitis (from pembrolizumab) was clearly the most urgent.  Thus, I decided to manage him empirically for immune-related pulmonary toxicity.  To this end, I previously prescribed high-dose oral steroid (prednisone 80 mg) followed by a potential taper after that time if he reported a symptomatic improvement in his dyspnea and cough.  He has been on a slow steroid taper over the last four months.  Since our last visit, he has been able to come off the prednisone completely as of 8/30/2023.  Fortunately, he has not noticed any recurrence of pulmonary symptoms despite stopping prednisone.      I also took some time to discuss the results of his recent CT scan dated 5/1/2024.  Fortunately, this shows a resolving infectious/inflammatory process in the lungs.  Furthermore, there is no evidence of local kidney cancer recurrence or metastatic disease; thus, he remains TONYA at this time.  Moving forward, he should undergo CT imaging once every 6 months.  In addition, I would like to see him approximately every 3 months, and his next clinic  visit will be scheduled in August 2024.  His next CT scan will be conducted in November 2024.  The patient was given the opportunity to ask multiple questions today, all of which were answered to his satisfaction.     A total of 40 minutes were spent on this patient on the date of the encounter conducting chart review, review of test results, interpretation of tests, patient visit, documentation and discussion with other provider(s). The patient was given the opportunity to ask multiple questions today, all of which were answered to his satisfaction.     Bart Barahona M.D.

## 2024-05-06 ENCOUNTER — ONCOLOGY VISIT (OUTPATIENT)
Dept: ONCOLOGY | Facility: CLINIC | Age: 49
End: 2024-05-06
Attending: INTERNAL MEDICINE
Payer: COMMERCIAL

## 2024-05-06 VITALS
HEART RATE: 66 BPM | BODY MASS INDEX: 31.15 KG/M2 | OXYGEN SATURATION: 98 % | WEIGHT: 217.6 LBS | SYSTOLIC BLOOD PRESSURE: 126 MMHG | RESPIRATION RATE: 16 BRPM | TEMPERATURE: 98.1 F | DIASTOLIC BLOOD PRESSURE: 78 MMHG | HEIGHT: 70 IN

## 2024-05-06 DIAGNOSIS — E03.2 DRUG-INDUCED HYPOTHYROIDISM: ICD-10-CM

## 2024-05-06 DIAGNOSIS — C64.9 RENAL CELL CARCINOMA, UNSPECIFIED LATERALITY (H): Primary | ICD-10-CM

## 2024-05-06 PROCEDURE — 99213 OFFICE O/P EST LOW 20 MIN: CPT | Performed by: INTERNAL MEDICINE

## 2024-05-06 PROCEDURE — 99215 OFFICE O/P EST HI 40 MIN: CPT | Performed by: INTERNAL MEDICINE

## 2024-05-06 ASSESSMENT — PAIN SCALES - GENERAL: PAINLEVEL: NO PAIN (0)

## 2024-05-06 NOTE — NURSING NOTE
"Oncology Rooming Note    May 6, 2024 10:18 AM   Pierre Avila is a 48 year old male who presents for:    Chief Complaint   Patient presents with    Oncology Clinic Visit     [facility] RETURN - CLEAR CELL CARCINOMA      Initial Vitals: /78 (BP Location: Right arm, Patient Position: Chair, Cuff Size: Adult Large)   Pulse 66   Temp 98.1  F (36.7  C)   Resp 16   Ht 1.778 m (5' 10\")   Wt 98.7 kg (217 lb 9.6 oz)   SpO2 98%   BMI 31.22 kg/m   Estimated body mass index is 31.22 kg/m  as calculated from the following:    Height as of this encounter: 1.778 m (5' 10\").    Weight as of this encounter: 98.7 kg (217 lb 9.6 oz). Body surface area is 2.21 meters squared.  No Pain (0) Comment: Data Unavailable   No LMP for male patient.  Allergies reviewed: Yes  Medications reviewed: Yes    Medications: Medication refills not needed today.  Pharmacy name entered into [facility]:    [facility] - [place], [place] - [address] [place] [address] PHARMACY [place], [place] - [id number]    Frailty Screening:   Is the patient here for a new oncology consult visit in cancer care? 2. No    Flo Marin LPN              "

## 2024-05-06 NOTE — LETTER
5/6/2024         RE: Pierre Avila  [address] [facility] [address]        Dear Colleague,    Thank you for referring your patient, Pierre Avila, to the [facility]. Please see a copy of my visit note below.       FOLLOW UP VISIT      Reason for Visit:  Renal cell carcinoma (pT3a R0) s/p one year of adjuvant pembrolizumab; follow up visit.     History of Present Illness:  Mr. Avila is a 48-year-old man who presented with hematuria in 10/2021.  CT imaging (10/21/2021) revealed a large mass at the upper pole of the left kidney, without evidence of local or distant metastatic disease.  On 12/21/2021, he had a left radical nephrectomy which showed a 5.4 cm clear-cell renal cell carcinoma (no sarcomatoid or rhabdoid features), nucleolar grade 3, with tumor extending into the calyceal system.  His pathological stage was pT3a G3 R0 kidney cancer.  The patient elected to receive adjuvant pembrolizumab treatment, which he started in 2/2022.  He completed one year (17 doses) of adjuvant pembrolizumab on 1/11/2023.       Seven months ago, the patient developed new shortness of breath on exertion (and even at rest), as well as significant fatigue.  He also reported the onset of a new dry cough, which is significant because the patient has never been a cougher before.  I ordered a CT scan (4/2023), which showed evidence of pneumonitis which could potentially represent immune-related pneumonitis.  We conducted a telemedicine visit today to discuss ongoing management of resolving immune-related pulmonary toxicity.  His prednisone has been tapered off.  We also took the opportunity to discuss his recent CT scan (5/1/24), which shows no evidence of recurrent or metastatic disease.     Review of systems: The patient reports a continued improvement of his dyspnea and his cough has completely resolved.  He did not notice any worsening of his respiratory symptoms after decreasing his  prednisone down to 2.5 mg.  He has been able to mow his lawn without feeling short of breath, although he did cough once or twice during that activity.  He also reports some insomnia, which is probably related to the steroid effect.  He continues to have dry skin at baseline. Does not moisturize regularly. No vitiligo. No peripheral edema. Working on eliminating sugars. Eats well otherwise. Reports good oral hydration. Weight stable. No chest pain, shortness, or cough.  No abdominal pain, diarrhea, or constipation.  No anorexia.  No vomiting or nausea.  No urinary changes.  No mucositis.   A comprehensive 14-system review of symptoms is otherwise generally unremarkable.  His ECOG score is 0.  His pain score is 1/10.        Physical Examination:  Mr. Avila is a 48-year-old man who appears comfortable at rest.  He was not visibly short of breat at rest.  He did not cough during our encounter today.  The remainder of the physical examination was deferred since this was a telemedicine visit.    CT scan (11/6/2023):  This shows resolving infectious/inflammatory focus right lung.  There are no new findings to suggest active pneumonitis or metastatic disease in the chest, abdomen, or pelvis.    CT scan (5/1/2024):  Stable exam without evidence for new metastatic disease. Few stable pulmonary nodules. Previous noted ill-defined groundglass opacity is less prominent at the right middle lobe suggesting an infectious or inflammatory etiology. Fatty liver. Stable mildly prominent right hilar lymph nodes.     Laboratories (5/1/2024):  Creatinine 1.34 mg/dL. Estimated GFR 65 mL/min. Rest of CBC and CMP are within normal limits.        ASSESSMENT AND PLAN:  Mr. Avila is a 48-year-old man with a diagnosis of clear-cell renal cell carcinoma (pT3a G3 R0) who underwent radical nephrectomy on 12/21/2021.  He began adjuvant therapy in 2/2022, and returns today for a follow-up visit having recently completed one year of adjuvant  pembrolizumab treatment on 1/11/2023.  His ECOG score is 0.  His pain score is 1/10.     After nephrectomy, the patient received one year of adjuvant pembrolizumab treatment according to the KeyNote-564 trial. The pembrolizumab was delivered at a dose of 200 mg IV every 3 weeks, for a total of 17 cycles (one year).  His last immunotherapy dose was on 1/11/2023.  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      As mentioned above, seven months ago the patient reported subacute onset of a dry cough, associated with significant fatigue and shortness of breath on exertion.  He was not aware of any recent COVID contacts.  He did not report fevers, chills or arthralgias.  Given prior pembrolizumab treatment, the rare but serious possibility of immune-related pneumonitis needed to be excluded.  Alternative diagnoses included viral or bacterial pneumonias.  His CT scan suggested a range of possible diagnoses, including an atypical pneumonia or an immune-related pneumonitis or progression of pulmonary metastases.  Of these, the potential for immune-related pneumonitis (from pembrolizumab) was clearly the most urgent.  Thus, I decided to manage him empirically for immune-related pulmonary toxicity.  To this end, I previously prescribed high-dose oral steroid (prednisone 80 mg) followed by a potential taper after that time if he reported a symptomatic improvement in his dyspnea and cough.  He has been on a slow steroid taper over the last four months.  Since our last visit, he has been able to come off the prednisone completely as of 8/30/2023.  Fortunately, he has not noticed any recurrence of pulmonary symptoms despite stopping prednisone.      I also took some time to discuss the results of his recent CT scan dated 5/1/2024.  Fortunately, this shows a resolving infectious/inflammatory process in the lungs.  Furthermore, there is no  evidence of local kidney cancer recurrence or metastatic disease; thus, he remains TONYA at this time.  Moving forward, he should undergo CT imaging once every 6 months.  In addition, I would like to see him approximately every 3 months, and his next clinic visit will be scheduled in August 2024.  His next CT scan will be conducted in November 2024.  The patient was given the opportunity to ask multiple questions today, all of which were answered to his satisfaction.     A total of 40 minutes were spent on this patient on the date of the encounter conducting chart review, review of test results, interpretation of tests, patient visit, documentation and discussion with other provider(s). The patient was given the opportunity to ask multiple questions today, all of which were answered to his satisfaction.     Bart Barahona M.D.

## 2024-06-14 NOTE — PROGRESS NOTES
"  Assessment & Plan     Open wound  Skin lesion  On R side of chest   - amoxicillin-clavulanate (AUGMENTIN) 875-125 MG tablet; Take 1 tablet by mouth 2 times daily for 10 days  - Adult Dermatology  Referral; Future  Unclear as to etiology. Has been present for months now per pt. Did not follow trauma to area or know of any small mole/skin lesion prior.  Wound does appear to have some fluid settled at edges, will treat for underlying infection as some pus is present.  Previous renal cell carcinoma in past. Due to look, I will also send to dermatology to see site, assuming the wound does not begin to heal with abx. I suspect a biopsy will be done.        BMI  Estimated body mass index is 31.42 kg/m  as calculated from the following:    Height as of this encounter: 1.778 m (5' 10\").    Weight as of this encounter: 99.3 kg (219 lb).   Weight management plan: defer today      Follow up with dermatology     Subjective   Pierre is a 49 year old, presenting for the following health issues:  Derm Problem        6/21/2024     2:32 PM   Additional Questions   Roomed by Rea Lucia MA   Accompanied by Self     History of Present Illness       Reason for visit:  [place] rash on chest  Symptom onset:  More than a month  Symptom intensity:  Mild  Symptom progression:  Staying the sameHe consumes 0 sweetened beverage(s) daily. He exercises with enough effort to increase his heart rate 5 days per week.          Review of Systems  Constitutional, HEENT, cardiovascular, pulmonary, gi and gu systems are negative, except as otherwise noted.      Objective    /82   Pulse 65   Temp 97.9  F (36.6  C) (Tympanic)   Resp 14   Ht 1.778 m (5' 10\")   Wt 99.3 kg (219 lb)   SpO2 97%   BMI 31.42 kg/m    Body mass index is 31.42 kg/m .      Physical Exam   GENERAL: alert and no distress  EYES: Eyes grossly normal to inspection, PERRL and conjunctivae and sclerae normal  SKIN: 20 x 17 mm open wound on R side of chest with some pus " present and small amount of fluid just outside wound edging. No streaking, no warmth present.  Wound is irregular in shape overall         Signed Electronically by: Blake Marquez PA-C

## 2024-06-21 ENCOUNTER — OFFICE VISIT (OUTPATIENT)
Dept: FAMILY MEDICINE | Facility: CLINIC | Age: 49
End: 2024-06-21
Payer: COMMERCIAL

## 2024-06-21 ENCOUNTER — TELEPHONE (OUTPATIENT)
Dept: DERMATOLOGY | Facility: CLINIC | Age: 49
End: 2024-06-21

## 2024-06-21 VITALS
HEART RATE: 65 BPM | HEIGHT: 70 IN | SYSTOLIC BLOOD PRESSURE: 137 MMHG | BODY MASS INDEX: 31.35 KG/M2 | OXYGEN SATURATION: 97 % | DIASTOLIC BLOOD PRESSURE: 82 MMHG | WEIGHT: 219 LBS | TEMPERATURE: 97.9 F | RESPIRATION RATE: 14 BRPM

## 2024-06-21 DIAGNOSIS — L98.9 SKIN LESION: ICD-10-CM

## 2024-06-21 DIAGNOSIS — T14.8XXA OPEN WOUND: Primary | ICD-10-CM

## 2024-06-21 PROCEDURE — 99213 OFFICE O/P EST LOW 20 MIN: CPT | Performed by: PHYSICIAN ASSISTANT

## 2024-06-21 ASSESSMENT — PAIN SCALES - GENERAL: PAINLEVEL: NO PAIN (0)

## 2024-06-21 NOTE — TELEPHONE ENCOUNTER
This encounter is being sent to inform the clinic that this patient has a referral from Blake Marquez PA-C for the diagnoses of Skin Lesion and has requested that this patient be seen within 1-2 weeks. Based on the availability of our provider(s), we are unable to accommodate this request.    Were all sites offered this patient?  Yes. Patient prefers [facility] but ok with [facility] or [facility].    Does scheduling algorithm request to schedule next available?  Patient has been scheduled for the first available opening with Fern Mondragon PA-C on 7/11/2024.  We have informed the patient that the clinic will review their referral and reach out if a sooner appointment is medically necessary.

## 2024-06-24 NOTE — TELEPHONE ENCOUNTER
"RN reviewed chart. See notes below from FP. 7/11/24 appointment is appropriate.     \"Unclear as to etiology. Has been present for months now per pt. Did not follow trauma to area or know of any small mole/skin lesion prior.  Wound does appear to have some fluid settled at edges, will treat for underlying infection as some pus is present.  Previous renal cell carcinoma in past. Due to look, I will also send to dermatology to see site, assuming the wound does not begin to heal with abx. I suspect a biopsy will be done.\"       Kimmy Mccord RN on 6/24/2024 at 11:35 AM    "

## 2024-07-05 NOTE — PROGRESS NOTES
[facility] Dermatology Note  Encounter Date: Jul 11, 2024  Office Visit     Reviewed patients past medical history and pertinent chart review prior to patients visit today.     Dermatology Problem List:  Last skin check: 07/11/24    0. NUB Right upper back, left upper back, right upper chest, left flank shave biopsy 07/11/24 .    Personal history of atypical nevi, treated at outside clinic (patient is unsure of where he was seen previously)    Personal Hx: no personal history of skin cancer  Family Hx: Sister had Melanoma. Father had unknown type of skin cancer.   _________________________________________    Assessment & Plan:     # Neoplasm of uncertain behavior:  Right upper back  DDx includes DN vs MM. Shave biopsy today.  # Neoplasm of uncertain behavior:  left upper back  DDx includes DN  vs MM. Shave biopsy today.  # Neoplasm of uncertain behavior:  Right upper chest  DDx includes BCC  vs other. Shave biopsy today.  # Neoplasm of uncertain behavior:  Left flank  DDx includes  DN vs MM other  Shave biopsy today.    Procedure Note: Biopsy by shave technique  The risks and benefits of the procedure were described to the patient. These include but are not limited to bleeding, infection, scar, incomplete removal, and non-diagnostic biopsy. Verbal informed consent was obtained. The above site(s) was cleansed with an alcohol pad and injected with 1% lidocaine with epinephrine. Once anesthesia was obtained, a biopsy(ies) was performed with Gilette blade. The tissue(s) was placed in a labeled container(s) with formalin and sent to pathology. Hemostasis was achieved with aluminum chloride. Vaseline and a bandage were applied to the wound(s). The patient tolerated the procedure well and was given post biopsy care instructions.    # Benign skin findings including: seborrheic keratoses, lentigines and benign nevi.   - No further intervention required. Patient to report changes.   - Patient reassured of  the benign nature of these lesions.    #Signs and Symptoms of non-melanoma skin cancer and ABCDEs of melanoma reviewed with patient. Patient encouraged to perform monthly self skin exams and educated on how to perform them. UV precautions reviewed with patient. Patient was asked about new or changing moles/lesions on body.     #Reviewed Sunscreen: Apply 20 minutes prior to going outdoors and reapply every two hours, when wet or sweating. We recommend using an SPF 30 or higher, and to use one that is water resistant.       Follow-up: 6 months for follow up full body skin exam, prn for new or changing lesions or new concerns    Fern Mondragon PA-C  [facility]  Dermatology     ____________________________________________    CC: Derm Problem (Right side of chest, puffy, bigger now, [place], has had for about a year. /Family history of skin cancer- sister (Melanoma), father- unknown type. /History of renal cancer. 2 years ago. )    HPI:  Mr. Pierre Avila is a(n) 49 year old male who presents today as a new patient for a full body skin cancer screening. Patient has concerns today about a spot on his right chest. This has been present for about a year and will bleed if traumatized. Patient reports being diligent with photoprotection.     Patient is otherwise feeling well, without additional skin concerns.     Physical Exam:  Vitals: There were no vitals taken for this visit.  SKIN: Total skin excluding the genitalia areas was performed. The exam included the head/face, neck, both arms, chest, back, abdomen, both legs, digits, mons pubis, buttock and nails.   -NUB, right upper chest, pearly pink nodule with telangiectasias, rolled borders and central ulcerations  - NUB, left flank, dark brown to tan macule with streaks   -NUB, left upper back, erythematous to brown macules   -NUB, right upper back, tan to brown asymmetric macule   -multiple tan/brown flat round macules and raised papules scattered throughout  trunk, extremities and head. No worrisome features for malignancy noted on examination.  -scattered tan, homogenous macules scattered on sun exposed areas of trunk, extremities and face.   -scattered waxy, stuck on tan/brown papules and patches on the trunk                       - No other lesions of concern on areas examined.     Medications:  Current Outpatient Medications   Medication Sig Dispense Refill    acetaminophen (TYLENOL) 325 MG tablet Take 325-650 mg by mouth every 6 hours as needed for mild pain      predniSONE (DELTASONE) 20 MG tablet Take 4 tablets (80 mg) by mouth daily 120 tablet 1    predniSONE (DELTASONE) 5 MG tablet Take 1 tablet (5 mg) by mouth daily 30 tablet 3     No current facility-administered medications for this visit.      Past Medical History:   Patient Active Problem List   Diagnosis    Renal mass    Renal cell carcinoma, unspecified laterality (H)     No past medical history on file.    CC Blake Marquez PA-C  [address] [place] [address] on close of this encounter.

## 2024-07-11 ENCOUNTER — OFFICE VISIT (OUTPATIENT)
Dept: DERMATOLOGY | Facility: CLINIC | Age: 49
End: 2024-07-11
Payer: COMMERCIAL

## 2024-07-11 DIAGNOSIS — Z12.83 SCREENING EXAM FOR SKIN CANCER: Primary | ICD-10-CM

## 2024-07-11 DIAGNOSIS — Z80.8 FAMILY HISTORY OF MELANOMA: ICD-10-CM

## 2024-07-11 DIAGNOSIS — D48.5 NEOPLASM OF UNCERTAIN BEHAVIOR OF SKIN: ICD-10-CM

## 2024-07-11 DIAGNOSIS — L98.9 SKIN LESION: ICD-10-CM

## 2024-07-11 DIAGNOSIS — T14.8XXA OPEN WOUND: ICD-10-CM

## 2024-07-11 PROCEDURE — 11103 TANGNTL BX SKIN EA SEP/ADDL: CPT | Performed by: STUDENT IN AN ORGANIZED HEALTH CARE EDUCATION/TRAINING PROGRAM

## 2024-07-11 PROCEDURE — 88305 TISSUE EXAM BY PATHOLOGIST: CPT | Performed by: DERMATOLOGY

## 2024-07-11 PROCEDURE — 11102 TANGNTL BX SKIN SINGLE LES: CPT | Performed by: STUDENT IN AN ORGANIZED HEALTH CARE EDUCATION/TRAINING PROGRAM

## 2024-07-11 PROCEDURE — 99203 OFFICE O/P NEW LOW 30 MIN: CPT | Mod: 25 | Performed by: STUDENT IN AN ORGANIZED HEALTH CARE EDUCATION/TRAINING PROGRAM

## 2024-07-11 ASSESSMENT — PAIN SCALES - GENERAL: PAINLEVEL: NO PAIN (0)

## 2024-07-11 NOTE — PATIENT INSTRUCTIONS
Patient Education       Proper skin care from [facility]:    -Eliminate harsh soaps as they strip the natural oils from the skin, often resulting in dry itchy skin ( i.e. Dial, Zest, [place])  -Use mild soaps such as Cetaphil or Dove Sensitive Skin in the shower. You do not need to use soap on arms, legs, and trunk every time you shower unless visibly soiled.   -Avoid hot or cold showers.  -After showering, lightly dry off and apply moisturizing within 2-3 minutes. This will help trap moisture in the skin.   -Aggressive use of a moisturizer at least 1-2 times a day to the entire body (including -Vanicream, Cetaphil, Aquaphor or Cerave) and moisturize hands after every washing.  -We recommend using moisturizers that come in a tub that needs to be scooped out, not a pump. This has more of an oil base. It will hold moisture in your skin much better than a water base moisturizer. The above recommended are non-pore clogging.      Wear a sunscreen with at least SPF 30 on your face, ears, neck and V of the chest daily. Wear sunscreen on other areas of the body if those areas are exposed to the sun throughout the day. Sunscreens can contain physical and/or chemical blockers. Physical blockers are less likely to clog pores, these include zinc oxide and titanium dioxide. Reapply every two hour and after swimming.     Sunscreen examples: https://www.ewg.org/sunscreen/    UV radiation  UVA radiation remains constant throughout the day and throughout the year. It is a longer wavelength than UVB and therefore penetrates deeper into the skin leading to immediate and delayed tanning, photoaging, and skin cancer. 70-80% of UVA and UVB radiation occurs between the hours of 10am-2pm.  UVB radiation  UVB radiation causes the most harmful effects and is more significant during the summer months. However, snow and ice can reflect UVB radiation leading to skin damage during the winter months as well. UVB radiation is  responsible for tanning, burning, inflammation, delayed erythema (pinkness), pigmentation (brown spots), and skin cancer.     I recommend self monthly full body exams and yearly full body exams with a dermatology provider. If you develop a new or changing lesion please follow up for examination. Most skin cancers are pink and scaly or pink and pearly. However, we do see blue/brown/black skin cancers.  Consider the ABCDEs of melanoma when giving yourself your monthly full body exam ( don't forget the groin, buttocks, feet, toes, etc). A-asymmetry, B-borders, C-color, D-diameter, E-elevation or evolving. If you see any of these changes please follow up in clinic. If you cannot see your back I recommend purchasing a hand held mirror to use with a larger wall mirror.       Checking for Skin Cancer  You can find cancer early by checking your skin each month. There are 3 kinds of skin cancer. They are melanoma, basal cell carcinoma, and squamous cell carcinoma. Doing monthly skin checks is the best way to find new marks or skin changes. Follow the instructions below for checking your skin.   The ABCDEs of checking moles for melanoma   Check your moles or growths for signs of melanoma using ABCDE:   Asymmetry: the sides of the mole or growth don t match  Border: the edges are ragged, notched, or blurred  Color: the color within the mole or growth varies  Diameter: the mole or growth is larger than 6 mm (size of a pencil eraser)  Evolving: the size, shape, or color of the mole or growth is changing (evolving is not shown in the images below)    Checking for other types of skin cancer  Basal cell carcinoma or squamous cell carcinoma have symptoms such as:     A spot or mole that looks different from all other marks on your skin  Changes in how an area feels, such as itching, tenderness, or pain  Changes in the skin's surface, such as oozing, bleeding, or scaliness  A sore that does not heal  New swelling or redness beyond  the border of a mole    Who s at risk?  Anyone can get skin cancer. But you are at greater risk if you have:   Fair skin, light-colored hair, or light-colored eyes  Many moles or abnormal moles on your skin  A history of sunburns from sunlight or tanning beds  A family history of skin cancer  A history of exposure to radiation or chemicals  A weakened immune system  If you have had skin cancer in the past, you are at risk for recurring skin cancer.   How to check your skin  Do your monthly skin checkups in front of a full-length mirror. Check all parts of your body, including your:   Head (ears, face, neck, and scalp)  Torso (front, back, and sides)  Arms (tops, undersides, upper, and lower armpits)  Hands (palms, backs, and fingers, including under the nails)  Buttocks and genitals  Legs (front, back, and sides)  Feet (tops, soles, toes, including under the nails, and between toes)  If you have a lot of moles, take digital photos of them each month. Make sure to take photos both up close and from a distance. These can help you see if any moles change over time.   Most skin changes are not cancer. But if you see any changes in your skin, call your doctor right away. Only he or she can diagnose a problem. If you have skin cancer, seeing your doctor can be the first step toward getting the treatment that could save your life.   [employer] last reviewed this educational content on 4/1/2019 2000-2020 [employer]. [address] [place] [address], [place] [address]. All rights reserved. This information is not intended as a substitute for professional medical care. Always follow your healthcare professional's instructions.       When should I call my doctor?  If you are worsening or not improving, please, contact us or seek urgent care as noted below.     Who should I call with questions (adults)?  [facility] (adult and pediatric): [phone]  [facility]  [place] (adult): [phone]  [facility] ([place], [place], [place] and [place]) [phone]  For urgent needs outside of business hours call the [facility] at [phone] and ask for the dermatology resident on call to be paged  If this is a medical emergency and you are unable to reach an ER, Call 911      If you need a prescription refill, please contact your pharmacy. Refills are approved or denied by our Physicians during normal business hours, Monday through Fridays  Per office policy, refills will not be granted if you have not been seen within the past year (or sooner depending on your child's condition Wound Care After a Biopsy    What is a skin biopsy?  A skin biopsy allows the doctor to examine a very small piece of tissue under the microscope to determine the diagnosis and the best treatment for the skin condition. A local anesthetic (numbing medicine) is injected with a very small needle into the skin area to be tested. A small piece of skin is taken from the area. Sometimes a suture (stitch) is used.     What are the risks of a skin biopsy?  I will experience scar, bleeding, swelling, pain, crusting and redness. I may experience incomplete removal or recurrence. Risks of this procedure are excessive bleeding, bruising, infection, nerve damage, numbness, thick (hypertrophic or keloidal) scar and non-diagnostic biopsy.    How should I care for my wound for the first 24 hours?  Keep the wound dry and covered for 24 hours  If it bleeds, hold direct pressure on the area for 15 minutes. If bleeding does not stop, call us or go to the emergency room  Avoid strenuous exercise the first 1-2 days or as your doctor instructs you    How should I care for the wound after 24 hours?  After 24 hours, remove the bandage  You may bathe or shower as normal  If you had a scalp biopsy, you can shampoo as usual and can use shower water to clean the biopsy site daily  Clean the  wound once a day with gentle soap and water  Do not scrub, be gentle  Apply white petroleum/Vaseline after cleaning the wound with a cotton swab or a clean finger, and keep the site covered with a Bandaid /bandage. Bandages are not necessary with a scalp biopsy  If you are unable to cover the site with a Bandaid /bandage, re-apply ointment 2-3 times a day to keep the site moist. Moisture will help with healing  Avoid strenuous activity for first 1-2 days  Avoid lakes, rivers, pools, and oceans until the stitches are removed or the site is healed    How do I clean my wound?  Wash hands thoroughly with soap or use hand  before all wound care  Clean the wound with gentle soap and water  Apply white petroleum/Vaseline  to wound after it is clean  Replace the Bandaid /bandage to keep the wound covered for the first few days or as instructed by your doctor  If you had a scalp biopsy, warm shower water to the area on a daily basis should suffice    What should I use to clean my wound?   Cotton-tipped applicators (Qtips )  White petroleum jelly (Vaseline ). Use a clean new container and use Q-tips to apply.  Bandaids  as needed  Gentle soap     How should I care for my wound long term?  Do not get your wound dirty  Keep up with wound care for one week or until the area is healed.  A small scab will form and fall off by itself when the area is completely healed. The area will be red and will become pink in color as it heals. Sun protection is very important for how your scar will turn out. Sunscreen with an SPF 30 or greater is recommended once the area is healed.  You should have some soreness but it should be mild and slowly go away over several days. Talk to your doctor about using tylenol for pain,    When should I call my doctor?  If you have increased:   Pain or swelling  Pus or drainage (clear or slightly yellow drainage is ok)  Temperature over 100F  Spreading redness or warmth around wound    When will I  hear about my results?  The biopsy results can take 2 weeks to come back.  Your results will automatically release to [facility] before your provider has even reviewed them.  The clinic will call you with the results, send you a [facility] message, or have you schedule a follow-up clinic or phone time to discuss the results.  Contact our clinics if you do not hear from us in 2 weeks.    Who should I call with questions?  [facility]: [phone]  [facility]: [phone]  For urgent needs outside of business hours call the [facility] at [phone] and ask for the dermatology resident on call

## 2024-07-11 NOTE — NURSING NOTE
The following medication was given:     MEDICATION:  Lidocaine with epinephrine 1% [id number]  ROUTE: SQ  SITE: see procedure note  DOSE: 2 mL  LOT #: [id number]  : [employer]  EXPIRATION DATE: 07-  NDC#: 39413-609-41  Was there drug waste? No.   Multi-dose vial: Yes    Ramonita Baird RN  July 11, 2024

## 2024-07-11 NOTE — NURSING NOTE
Pierre Avila's chief complaint for this visit includes:  Chief Complaint   Patient presents with    Derm Problem     Right side of chest, puffy, bigger now, [place], has had for about a year.   Family history of skin cancer- sister (Melanoma), father- unknown type.   History of renal cancer. 2 years ago.      PCP: [facility] - [facility]    Referring Provider:  Blake Marquez PA-C  [address] [place] [address]    There were no vitals taken for this visit.  No Pain (0)        Allergies   Allergen Reactions    Seasonal Allergies          Do you need any medication refills at today's visit?   No.       Ramonita Baird RN on 7/11/2024 at 7:39 AM

## 2024-07-11 NOTE — LETTER
7/11/2024      Pierre Avila  [address] [facility] [[email or web address]] [id number]      Dear Colleague,    Thank you for referring your patient, Pierre Avila, to the [facility]. Please see a copy of my visit note below.    [facility] Dermatology Note  Encounter Date: Jul 11, 2024  Office Visit     Reviewed patients past medical history and pertinent chart review prior to patients visit today.     Dermatology Problem List:  Last skin check: 07/11/24    0. NUB Right upper back, left upper back, right upper chest, left flank shave biopsy 07/11/24 .    Personal history of atypical nevi, treated at outside clinic     Personal Hx: no personal history of skin cancer  Family Hx: Sister had Melanoma. Father had unknown type of skin cancer.   _________________________________________    Assessment & Plan:     # Neoplasm of uncertain behavior:  Right upper back  DDx includes DN vs MM. Shave biopsy today.  # Neoplasm of uncertain behavior:  left upper back  DDx includes DN  vs MM. Shave biopsy today.  # Neoplasm of uncertain behavior:  Right upper chest  DDx includes BCC  vs other. Shave biopsy today.  # Neoplasm of uncertain behavior:  Left flank  DDx includes  DN vs MM other  Shave biopsy today.    Procedure Note: Biopsy by shave technique  The risks and benefits of the procedure were described to the patient. These include but are not limited to bleeding, infection, scar, incomplete removal, and non-diagnostic biopsy. Verbal informed consent was obtained. The above site(s) was cleansed with an alcohol pad and injected with 1% lidocaine with epinephrine. Once anesthesia was obtained, a biopsy(ies) was performed with Gilette blade. The tissue(s) was placed in a labeled container(s) with formalin and sent to pathology. Hemostasis was achieved with aluminum chloride. Vaseline and a bandage were applied to the wound(s). The patient tolerated the procedure well and was given post biopsy care  instructions.    # Benign skin findings including: seborrheic keratoses, lentigines and benign nevi.   - No further intervention required. Patient to report changes.   - Patient reassured of the benign nature of these lesions.    #Signs and Symptoms of non-melanoma skin cancer and ABCDEs of melanoma reviewed with patient. Patient encouraged to perform monthly self skin exams and educated on how to perform them. UV precautions reviewed with patient. Patient was asked about new or changing moles/lesions on body.     #Reviewed Sunscreen: Apply 20 minutes prior to going outdoors and reapply every two hours, when wet or sweating. We recommend using an SPF 30 or higher, and to use one that is water resistant.       Follow-up: 6 months for follow up full body skin exam, prn for new or changing lesions or new concerns    Fern Mondragon PA-C  [facility]  Dermatology     ____________________________________________    CC: Derm Problem (Right side of chest, puffy, bigger now, [place], has had for about a year. /Family history of skin cancer- sister (Melanoma), father- unknown type. /History of renal cancer. 2 years ago. )    HPI:  Mr. Pierre Avila is a(n) 49 year old male who presents today as a new patient for a full body skin cancer screening. Patient has concerns today about a spot on his right chest. This has been present for about a year and will bleed if traumatized. Patient reports being diligent with photoprotection.     Patient is otherwise feeling well, without additional skin concerns.     Physical Exam:  Vitals: There were no vitals taken for this visit.  SKIN: Total skin excluding the genitalia areas was performed. The exam included the head/face, neck, both arms, chest, back, abdomen, both legs, digits, mons pubis, buttock and nails.   -NUB, right upper chest, pearly pink nodule with telangiectasias, rolled borders and central ulcerations  - NUB, left flank, dark brown to tan macule with streaks    -NUB, left upper back, erythematous to brown macules   -NUB, right upper back, tan to brown asymmetric macule   -multiple tan/brown flat round macules and raised papules scattered throughout trunk, extremities and head. No worrisome features for malignancy noted on examination.  -scattered tan, homogenous macules scattered on sun exposed areas of trunk, extremities and face.   -scattered waxy, stuck on tan/brown papules and patches on the trunk                       - No other lesions of concern on areas examined.     Medications:  Current Outpatient Medications   Medication Sig Dispense Refill     acetaminophen (TYLENOL) 325 MG tablet Take 325-650 mg by mouth every 6 hours as needed for mild pain       predniSONE (DELTASONE) 20 MG tablet Take 4 tablets (80 mg) by mouth daily 120 tablet 1     predniSONE (DELTASONE) 5 MG tablet Take 1 tablet (5 mg) by mouth daily 30 tablet 3     No current facility-administered medications for this visit.      Past Medical History:   Patient Active Problem List   Diagnosis     Renal mass     Renal cell carcinoma, unspecified laterality (H)     No past medical history on file.    CC Blake Marquez PA-C  [address] [place] [address] on close of this encounter.      Again, thank you for allowing me to participate in the care of your patient.        Sincerely,        Fern Mondragon PA-C

## 2024-07-14 LAB
PATH REPORT.COMMENTS IMP SPEC: ABNORMAL
PATH REPORT.COMMENTS IMP SPEC: ABNORMAL
PATH REPORT.COMMENTS IMP SPEC: YES
PATH REPORT.FINAL DX SPEC: ABNORMAL
PATH REPORT.GROSS SPEC: ABNORMAL
PATH REPORT.MICROSCOPIC SPEC OTHER STN: ABNORMAL
PATH REPORT.RELEVANT HX SPEC: ABNORMAL

## 2024-07-18 ENCOUNTER — TELEPHONE (OUTPATIENT)
Dept: DERMATOLOGY | Facility: CLINIC | Age: 49
End: 2024-07-18
Payer: COMMERCIAL

## 2024-07-18 NOTE — TELEPHONE ENCOUNTER
Excision/Mohs previsit information                                                    Diagnosis: basal cell carcinoma  Site(s): Right upper chest:    Is the surgical site below the waist?  NO  If yes, instruct the patient to purchase over the counter chlorhexidine surgical soap and wash all skin below the belly button twice before surgery    Allergies   Allergen Reactions    Seasonal Allergies        Review and update allergy and medication list    Do you take the following medications:  Coumadin, Eliquis, Pradaxa, Xarelto:  NO.  If on Coumadin, INR should be checked within 7 days of surgery.  Range should be 3.5 or less or within therapeutic range.    Do you currently or have you previously had any of the following conditions:  Hepatitis:  NO  HIV/AIDS:  NO  Prolonged bleeding or bleeding disorder:  NO  Pacemaker: NO  Defibrillator:  NO  History of artificial or heart valve replacement:  NO  Endocarditis (inflammation of the inner lining of the heart's chambers and valves):  NO  Have you ever had a prosthetic joint infection:  NO  Pregnant or Breastfeeding:  N/A  Mobility device (wheelchair, transfer difficulty): NO    Important Reminders:                                                      -Ok to take all of their medications as prescribed  -Patients can eat, no need to be fasting  -If face is being treated, please come with a make-up free face  -If scalp is being treated, please come with clean hair free from product  -Patient will not be able to get the site wet for 48 hrs  -No submerging wound in standing water (lake, pool, bathtub, hot tub) for 2 weeks  -No physical activity for 48 hrs (further restrictions will be discussed by MD at time of visit)    If any positives, send to RN for further review  Maliha Chavez RN     Writer called pt to discuss pathology results. Pt scheduled for mohs consult/procedure. Excision checklist complete and all questions were negative. Med list and allergies reviewed  with pt. Pt denied having any questions or concerns at this time.      Maliha Chavez RN on 7/18/2024 at 10:09 AM        Final Diagnosis  A. Right upper back:  - Compound dysplastic nevus with mild atypia - (see description)     B. Left upper back:  - Compound melanocytic nevus with mild atypia - (see description)  - Adjacent Lentiginous compound melanocytic nevus with mild atypia - (see description)     C. Left flank:  - Junctional dysplastic nevus with mild atypia - (see description)     D. Right upper chest:  - Basal cell carcinoma, nodular and infiltrating types - (see description)  Electronically signed by Garcia Hendrickson MD on 7/14/2024 at 10:33 PM        Result Notes     Olga Nelson CMA  7/16/2024 11:43 AM CDT Back to Top    Attempted to reach patient. Left message for patient to return call to clinic at [phone].   Fern Mondragon PA-C  7/15/2024  8:38 AM CDT     Please let patient know that biopsy(s) showed the following with the below treatment recommendations:     A. Right upper back, mildly atypical nevus, no further treatment needed at this time.  B. Left upper back, mildly atypical nevus, no further treatment needed at this time.  C. Left flank, mildly atypical nevus, no further treatment needed at this time.  D. Right upper chest, BCC, Mohs Micrographic Surgery needed.

## 2024-07-31 ENCOUNTER — VIRTUAL VISIT (OUTPATIENT)
Dept: DERMATOLOGY | Facility: CLINIC | Age: 49
End: 2024-07-31
Payer: COMMERCIAL

## 2024-07-31 DIAGNOSIS — C44.519 BASAL CELL CARCINOMA OF ANTERIOR CHEST: Primary | ICD-10-CM

## 2024-07-31 PROCEDURE — 99213 OFFICE O/P EST LOW 20 MIN: CPT | Performed by: DERMATOLOGY

## 2024-07-31 NOTE — NURSING NOTE
Pierre Avila's goals for this visit include:   Chief Complaint   Patient presents with    Consult     BCC right upper chest. Mohs 8/28.       He requests these members of his care team be copied on today's visit information:     PCP: [facility]IKUNJ [facility]    Referring Provider:  Fern Mondragon PA-C  [address] [place] [address]    There were no vitals taken for this visit.    Do you need any medication refills at today's visit?         Vivienne LOAIZA      Teledermatology Nurse Call Patients:     Are you in the [facility] at the time of the encounter? yes    Today's visit will be billed to you and your insurance.    A teledermatology visit is not as thorough as an in-person visit and the quality of the photograph sent may not be of the same quality as that taken by the dermatology clinic.

## 2024-07-31 NOTE — LETTER
"7/31/2024      Pierre Avila  [address] [facility] [address]      Dear Colleague,    Thank you for referring your patient, Pierre Avila, to the [facility]. Please see a copy of my visit note below.    [facility] Dermatology Note  Encounter Date: Jul 31, 2024  Store-and-Forward and Telephone. Location of teledermatologist: [facility].  Start time: 12:17 pm. End time: 12:25 pm.    Dermatologic Surgery Telemedicine Consult Note    Dermatology Problem List:  Last skin check: 07/11/24    1. nBCC, R upper chest, infiltrating, s/p bx 7/11/24, pending Mohs 8/28/24  2. Hx of dysplastic nevi   - CDN, R upper back, mild atypia, s/p bx 7/11/24  - CMN, L upper back, mild atypia, s/p bx 7/11/24   - DN, L flank, mild atypia, s/p bx 7/11/24   - Nevi treated at unknown outside facility previously     Personal Hx: no personal history of skin cancer  Family Hx: Sister had Melanoma. Father had unknown type of skin cancer.   _____________    CC: Consult (BCC right upper chest. Mohs 8/28.)      Subjective: Pierre Avila is a 49 year old male who presents today for Mohs micrographic surgery consultation for a recent diagnosis of skin cancer.  - Skin cancer(s): nBCC  - Location(s): right upper chest   - no other concerns today         Objective:   Skin: Focused examination of the right upper chest within the teledermatology photograph(s) on 7/11/24 was performed.   - Right upper chest, there is erythematous plaque with rolled broders and central ulceration      [id number] Path report from 7/11/24:   D(4). Skin, Right Upper chest: Basal cell carcinoma, nodular and infiltrating types   The specimen is received in formalin with proper patient identification, labeled \"right upper chest\".  The specimen consists of a 1.2 x 0.6 cm skin shave with a white-pink surface.  The resection margin is inked blue.  The specimen is bisected and entirely submitted in " cassette D1.         Assessment and Plan:     1. Plan for Mohs micrographic surgery for skin cancer(s) above:  *Review lab result(s): Dermpath report   - We discussed the nature of the diagnosis/condition above. We discussed the treatment options, including the risks benefits and expectations of these options. We recommend micrographic surgery as the most effective and most tissue sparing option for treatment, and the patient agrees to proceed with this.  The patient is aware of the risks, benefits and expectations of this procedure. The patient will be scheduled for this procedure, if not already done so.  - We anticipate the following closure type: Linear closure, such as complex linear closure (CLC) vs. Sliding or lifting flap    The patient was discussed with and evaluated by attending physician, Romulo Devine DO.    Fern Mondragon PA-C  [facility]   Dermatology      Staff Physician Comments:   I saw the photos and evaluated the patient with the physician assistant (Fern Mondragon PA-C) and I agree with the assessment and plan. I was present for the key portions of the telephone call.    Romulo Devine DO    Department of Dermatology  [facility]: Phone: [phone], Fax:[phone]  [facility]: Phone: [phone], Fax: [phone]        Again, thank you for allowing me to participate in the care of your patient.        Sincerely,        Romulo Devine MD

## 2024-07-31 NOTE — PROGRESS NOTES
"[facility] Dermatology Note  Encounter Date: Jul 31, 2024  Store-and-Forward and Telephone. Location of teledermatologist: [facility].  Start time: 12:17 pm. End time: 12:25 pm.    Dermatologic Surgery Telemedicine Consult Note    Dermatology Problem List:  Last skin check: 07/11/24    1. nBCC, R upper chest, infiltrating, s/p bx 7/11/24, pending Mohs 8/28/24  2. Hx of dysplastic nevi   - CDN, R upper back, mild atypia, s/p bx 7/11/24  - CMN, L upper back, mild atypia, s/p bx 7/11/24   - DN, L flank, mild atypia, s/p bx 7/11/24   - Nevi treated at unknown outside facility previously     Personal Hx: no personal history of skin cancer  Family Hx: Sister had Melanoma. Father had unknown type of skin cancer.   _____________    CC: Consult (BCC right upper chest. Mohs 8/28.)      Subjective: Pierre Avila is a 49 year old male who presents today for Mohs micrographic surgery consultation for a recent diagnosis of skin cancer.  - Skin cancer(s): nBCC  - Location(s): right upper chest   - no other concerns today         Objective:   Skin: Focused examination of the right upper chest within the teledermatology photograph(s) on 7/11/24 was performed.   - Right upper chest, there is erythematous plaque with rolled broders and central ulceration      [id number] Path report from 7/11/24:   D(4). Skin, Right Upper chest: Basal cell carcinoma, nodular and infiltrating types   The specimen is received in formalin with proper patient identification, labeled \"right upper chest\".  The specimen consists of a 1.2 x 0.6 cm skin shave with a white-pink surface.  The resection margin is inked blue.  The specimen is bisected and entirely submitted in cassette D1.         Assessment and Plan:     1. Plan for Mohs micrographic surgery for skin cancer(s) above:  *Review lab result(s): Dermpath report   - We discussed the nature of the diagnosis/condition above. We discussed the treatment " options, including the risks benefits and expectations of these options. We recommend micrographic surgery as the most effective and most tissue sparing option for treatment, and the patient agrees to proceed with this.  The patient is aware of the risks, benefits and expectations of this procedure. The patient will be scheduled for this procedure, if not already done so.  - We anticipate the following closure type: Linear closure, such as complex linear closure (CLC) vs. Sliding or lifting flap    The patient was discussed with and evaluated by attending physician, Romulo Devine DO.    Fern Mondragon PA-C  [facility]   Dermatology      Staff Physician Comments:   I saw the photos and evaluated the patient with the physician assistant (Fern Mondragon PA-C) and I agree with the assessment and plan. I was present for the key portions of the telephone call.    Romulo Devine DO    Department of Dermatology  [facility]: Phone: [phone], Fax:[phone]  [facility]: Phone: [phone], Fax: [phone]

## 2024-08-06 NOTE — PROGRESS NOTES
FOLLOW UP VISIT      Reason for Visit:  Renal cell carcinoma (pT3a R0) s/p one year of adjuvant pembrolizumab; follow up visit.     History of Present Illness:  Mr. Avila is a 49-year-old man who presented with hematuria in 10/2021.  CT imaging (10/21/2021) revealed a large mass at the upper pole of the left kidney, without evidence of local or distant metastatic disease.  On 12/21/2021, he had a left radical nephrectomy which showed a 5.4 cm clear-cell renal cell carcinoma (no sarcomatoid or rhabdoid features), nucleolar grade 3, with tumor extending into the calyceal system.  His pathological stage was pT3a G3 R0 kidney cancer.  The patient elected to receive adjuvant pembrolizumab treatment, which he started in 2/2022.  He completed one year (17 doses) of adjuvant pembrolizumab on 1/11/2023.       Seven months ago, the patient developed new shortness of breath on exertion (and even at rest), as well as significant fatigue.  He also reported the onset of a new dry cough, which is significant because the patient has never been a cougher before.  I ordered a CT scan (4/2023), which showed evidence of pneumonitis which could potentially represent immune-related pneumonitis.  We conducted an in-person visit today to discuss ongoing management of resolving immune-related pulmonary toxicity.  His prednisone has been tapered off.  We also took the opportunity to discuss his recent CT scan (5/1/24), which shows no evidence of recurrent or metastatic disease.     Review of systems: The patient reports a continued improvement of his dyspnea and his cough has completely resolved.  He did not notice any worsening of his respiratory symptoms after decreasing his prednisone down to 2.5 mg.  He has been able to mow his lawn without feeling short of breath, although he did cough once or twice during that activity.  He also reports some insomnia, which is probably related to the steroid effect.  He continues to have dry  skin at baseline. Does not moisturize regularly. No vitiligo. No peripheral edema. Working on eliminating sugars. Eats well otherwise. Reports good oral hydration. Weight stable. No chest pain, shortness, or cough.  No abdominal pain, diarrhea, or constipation.  No anorexia.  No vomiting or nausea.  No urinary changes.  No mucositis.   A comprehensive 14-system review of symptoms is otherwise generally unremarkable.  His ECOG score is 0.  His pain score is 1/10.        Physical Examination:  Mr. Avila is a 49-year-old man who appears comfortable at rest.  He was not visibly short of breat at rest.  He did not cough during our encounter today.  His vital signs are generally within normal limits.  The HEENT examination is unremarkable.  There is no palpable supraclavicular, cervical, axillary or inguinal lymphadenopathy.  The cardiovascular, respiratory, and gastrointestinal examinations are generally within normal limits.  The neuromuscular examination is grossly intact.  The extremities do not demonstrate pitting edema.  The skin evaluation is generally unremarkable.    CT scan (5/1/2024):  Stable exam without evidence for new metastatic disease. Few stable pulmonary nodules. Previous noted ill-defined groundglass opacity is less prominent at the right middle lobe suggesting an infectious or inflammatory etiology. Fatty liver. Stable mildly prominent right hilar lymph nodes.     Laboratories (8/7/2024):  Creatinine 1.28 mg/dL. Estimated GFR 69 mL/min. Rest of CBC and CMP are within normal limits. TSH is normal.        ASSESSMENT AND PLAN:  Mr. Avila is a 49-year-old man with a diagnosis of clear-cell renal cell carcinoma (pT3a G3 R0) who underwent radical nephrectomy on 12/21/2021.  He began adjuvant therapy in 2/2022, and returns today for a follow-up visit having recently completed one year of adjuvant pembrolizumab treatment on 1/11/2023.  His ECOG score is 0.  His pain score is 1/10.     After  nephrectomy, the patient received one year of adjuvant pembrolizumab treatment according to the KeyNote-564 trial. The pembrolizumab was delivered at a dose of 200 mg IV every 3 weeks, for a total of 17 cycles (one year).  His last immunotherapy dose was on 1/11/2023.  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      As mentioned above, one year ago the patient reported subacute onset of a dry cough, associated with significant fatigue and shortness of breath on exertion.  He was not aware of any recent COVID contacts.  He did not report fevers, chills or arthralgias.  Given prior pembrolizumab treatment, the rare but serious possibility of immune-related pneumonitis needed to be excluded.  Alternative diagnoses included viral or bacterial pneumonias.  His CT scan suggested a range of possible diagnoses, including an atypical pneumonia or an immune-related pneumonitis or progression of pulmonary metastases.  Of these, the potential for immune-related pneumonitis (from pembrolizumab) was clearly the most urgent.  Thus, I decided to manage him empirically for immune-related pulmonary toxicity.  To this end, I previously prescribed high-dose oral steroid (prednisone 80 mg) followed by a potential taper after that time if he reported a symptomatic improvement in his dyspnea and cough.  He has been on a slow steroid taper over the last six months.  Since our last visit, he has been able to come off the prednisone completely as of 8/30/2023.  Fortunately, he has not noticed any recurrence of pulmonary symptoms despite stopping prednisone.      I also took some time today to discuss the results of his recent CT scan dated 5/1/2024.  Fortunately, this shows a resolving infectious/inflammatory process in the lungs.  Furthermore, there is no evidence of local kidney cancer recurrence or metastatic disease; thus, he remains TONYA at this  time.  Moving forward, he should undergo CT imaging once every 6 months (next scan in early November 2024).  In addition, I would like to see him approximately every 3 months although we can certainly alternate in-person with virtual visits.  The patient was given the opportunity to ask multiple questions today, all of which were answered to his satisfaction.     A total of 40 minutes were spent on this patient on the date of the encounter conducting chart review, review of test results, interpretation of tests, patient visit, documentation and discussion with other provider(s). The patient was given the opportunity to ask multiple questions today, all of which were answered to his satisfaction.     Bart Barahona M.D.

## 2024-08-07 ENCOUNTER — ONCOLOGY VISIT (OUTPATIENT)
Dept: ONCOLOGY | Facility: CLINIC | Age: 49
End: 2024-08-07
Attending: INTERNAL MEDICINE
Payer: COMMERCIAL

## 2024-08-07 ENCOUNTER — PATIENT OUTREACH (OUTPATIENT)
Dept: ONCOLOGY | Facility: CLINIC | Age: 49
End: 2024-08-07

## 2024-08-07 ENCOUNTER — APPOINTMENT (OUTPATIENT)
Dept: LAB | Facility: CLINIC | Age: 49
End: 2024-08-07
Attending: INTERNAL MEDICINE
Payer: COMMERCIAL

## 2024-08-07 VITALS
RESPIRATION RATE: 16 BRPM | TEMPERATURE: 97.9 F | OXYGEN SATURATION: 98 % | WEIGHT: 218.7 LBS | DIASTOLIC BLOOD PRESSURE: 81 MMHG | HEART RATE: 53 BPM | BODY MASS INDEX: 31.38 KG/M2 | SYSTOLIC BLOOD PRESSURE: 143 MMHG

## 2024-08-07 DIAGNOSIS — E03.2 DRUG-INDUCED HYPOTHYROIDISM: ICD-10-CM

## 2024-08-07 DIAGNOSIS — C64.9 RENAL CELL CARCINOMA, UNSPECIFIED LATERALITY (H): ICD-10-CM

## 2024-08-07 DIAGNOSIS — N28.89 RENAL MASS: Primary | ICD-10-CM

## 2024-08-07 LAB
ALBUMIN SERPL BCG-MCNC: 4.3 G/DL (ref 3.5–5.2)
ALP SERPL-CCNC: 65 U/L (ref 40–150)
ALT SERPL W P-5'-P-CCNC: 33 U/L (ref 0–70)
ANION GAP SERPL CALCULATED.3IONS-SCNC: 9 MMOL/L (ref 7–15)
AST SERPL W P-5'-P-CCNC: 27 U/L (ref 0–45)
BASOPHILS # BLD AUTO: 0.1 10E3/UL (ref 0–0.2)
BASOPHILS NFR BLD AUTO: 1 %
BILIRUB SERPL-MCNC: 0.4 MG/DL
BUN SERPL-MCNC: 20 MG/DL (ref 6–20)
CALCIUM SERPL-MCNC: 9.3 MG/DL (ref 8.8–10.4)
CHLORIDE SERPL-SCNC: 105 MMOL/L (ref 98–107)
CREAT SERPL-MCNC: 1.28 MG/DL (ref 0.67–1.17)
EGFRCR SERPLBLD CKD-EPI 2021: 69 ML/MIN/1.73M2
EOSINOPHIL # BLD AUTO: 1.5 10E3/UL (ref 0–0.7)
EOSINOPHIL NFR BLD AUTO: 18 %
ERYTHROCYTE [DISTWIDTH] IN BLOOD BY AUTOMATED COUNT: 12.4 % (ref 10–15)
GLUCOSE SERPL-MCNC: 93 MG/DL (ref 70–99)
HCO3 SERPL-SCNC: 25 MMOL/L (ref 22–29)
HCT VFR BLD AUTO: 39.5 % (ref 40–53)
HGB BLD-MCNC: 13.9 G/DL (ref 13.3–17.7)
IMM GRANULOCYTES # BLD: 0.1 10E3/UL
IMM GRANULOCYTES NFR BLD: 1 %
LYMPHOCYTES # BLD AUTO: 2 10E3/UL (ref 0.8–5.3)
LYMPHOCYTES NFR BLD AUTO: 24 %
MCH RBC QN AUTO: 29.6 PG (ref 26.5–33)
MCHC RBC AUTO-ENTMCNC: 35.2 G/DL (ref 31.5–36.5)
MCV RBC AUTO: 84 FL (ref 78–100)
MONOCYTES # BLD AUTO: 0.7 10E3/UL (ref 0–1.3)
MONOCYTES NFR BLD AUTO: 8 %
NEUTROPHILS # BLD AUTO: 4.2 10E3/UL (ref 1.6–8.3)
NEUTROPHILS NFR BLD AUTO: 48 %
NRBC # BLD AUTO: 0 10E3/UL
NRBC BLD AUTO-RTO: 0 /100
PLATELET # BLD AUTO: 195 10E3/UL (ref 150–450)
POTASSIUM SERPL-SCNC: 4 MMOL/L (ref 3.4–5.3)
PROT SERPL-MCNC: 6.9 G/DL (ref 6.4–8.3)
RBC # BLD AUTO: 4.7 10E6/UL (ref 4.4–5.9)
SODIUM SERPL-SCNC: 139 MMOL/L (ref 135–145)
TSH SERPL DL<=0.005 MIU/L-ACNC: 3.82 UIU/ML (ref 0.3–4.2)
WBC # BLD AUTO: 8.6 10E3/UL (ref 4–11)

## 2024-08-07 PROCEDURE — 99215 OFFICE O/P EST HI 40 MIN: CPT | Performed by: INTERNAL MEDICINE

## 2024-08-07 PROCEDURE — 36415 COLL VENOUS BLD VENIPUNCTURE: CPT | Performed by: INTERNAL MEDICINE

## 2024-08-07 PROCEDURE — 82247 BILIRUBIN TOTAL: CPT | Performed by: INTERNAL MEDICINE

## 2024-08-07 PROCEDURE — 85041 AUTOMATED RBC COUNT: CPT | Performed by: INTERNAL MEDICINE

## 2024-08-07 PROCEDURE — 99213 OFFICE O/P EST LOW 20 MIN: CPT | Performed by: INTERNAL MEDICINE

## 2024-08-07 PROCEDURE — 84443 ASSAY THYROID STIM HORMONE: CPT | Performed by: INTERNAL MEDICINE

## 2024-08-07 ASSESSMENT — PAIN SCALES - GENERAL: PAINLEVEL: NO PAIN (0)

## 2024-08-07 NOTE — NURSING NOTE
Chief Complaint   Patient presents with    Blood Draw     Labs drawn via  by RN in lab, vitals taken.      Labs collected from venipuncture by RN. Vitals taken. Checked in for appointment(s).     Maria Del Rosario Huber RN

## 2024-08-07 NOTE — PROGRESS NOTES
[facility]: Cancer Care Short Note                                                                                          Per Dr. Barahona's request, completed requisition form for [employer] saliva testing. Required H&P, demographic information, and visit notes uploaded with the requisition.  [id number].     Olga Santoro, RN, BSN  RN Care Coordinator  [facility]

## 2024-08-07 NOTE — NURSING NOTE
"Oncology Rooming Note    August 7, 2024 8:37 AM   Pierre Avila is a 49 year old male who presents for:    Chief Complaint   Patient presents with    Blood Draw     Labs drawn via  by RN in lab, vitals taken.     Oncology Clinic Visit     Renal cell carcinoma     Initial Vitals: BP (!) 143/81 (BP Location: Right arm, Patient Position: Sitting, Cuff Size: Adult Regular)   Pulse 53   Temp 97.9  F (36.6  C) (Oral)   Resp 16   Wt 99.2 kg (218 lb 11.2 oz)   SpO2 98%   BMI 31.38 kg/m   Estimated body mass index is 31.38 kg/m  as calculated from the following:    Height as of 6/21/24: 1.778 m (5' 10\").    Weight as of this encounter: 99.2 kg (218 lb 11.2 oz). Body surface area is 2.21 meters squared.  No Pain (0) Comment: Data Unavailable   No LMP for male patient.  Allergies reviewed: Yes  Medications reviewed: Yes    Medications: Medication refills not needed today.  Pharmacy name entered into [facility]:    [facility] - [place], [place] - [address] [place] [address] PHARMACY [place], [place] - [address] [facility] [id number]    Frailty Screening:   Is the patient here for a new oncology consult visit in cancer care? 2. No      Clinical concerns: Patient states no new concerns to discuss with provider.  Dr. Barahona was NOT notified.      Koby Charles EMT            "

## 2024-08-07 NOTE — LETTER
8/7/2024      Pierre Avila  [address] [facility] [address]      Dear Colleague,    Thank you for referring your patient, Pierre Avila, to the [facility]. Please see a copy of my visit note below.       FOLLOW UP VISIT      Reason for Visit:  Renal cell carcinoma (pT3a R0) s/p one year of adjuvant pembrolizumab; follow up visit.     History of Present Illness:  Mr. Avila is a 49-year-old man who presented with hematuria in 10/2021.  CT imaging (10/21/2021) revealed a large mass at the upper pole of the left kidney, without evidence of local or distant metastatic disease.  On 12/21/2021, he had a left radical nephrectomy which showed a 5.4 cm clear-cell renal cell carcinoma (no sarcomatoid or rhabdoid features), nucleolar grade 3, with tumor extending into the calyceal system.  His pathological stage was pT3a G3 R0 kidney cancer.  The patient elected to receive adjuvant pembrolizumab treatment, which he started in 2/2022.  He completed one year (17 doses) of adjuvant pembrolizumab on 1/11/2023.       Seven months ago, the patient developed new shortness of breath on exertion (and even at rest), as well as significant fatigue.  He also reported the onset of a new dry cough, which is significant because the patient has never been a cougher before.  I ordered a CT scan (4/2023), which showed evidence of pneumonitis which could potentially represent immune-related pneumonitis.  We conducted an in-person visit today to discuss ongoing management of resolving immune-related pulmonary toxicity.  His prednisone has been tapered off.  We also took the opportunity to discuss his recent CT scan (5/1/24), which shows no evidence of recurrent or metastatic disease.     Review of systems: The patient reports a continued improvement of his dyspnea and his cough has completely resolved.  He did not notice any worsening of his respiratory symptoms after decreasing his prednisone down to  2.5 mg.  He has been able to mow his lawn without feeling short of breath, although he did cough once or twice during that activity.  He also reports some insomnia, which is probably related to the steroid effect.  He continues to have dry skin at baseline. Does not moisturize regularly. No vitiligo. No peripheral edema. Working on eliminating sugars. Eats well otherwise. Reports good oral hydration. Weight stable. No chest pain, shortness, or cough.  No abdominal pain, diarrhea, or constipation.  No anorexia.  No vomiting or nausea.  No urinary changes.  No mucositis.   A comprehensive 14-system review of symptoms is otherwise generally unremarkable.  His ECOG score is 0.  His pain score is 1/10.        Physical Examination:  Mr. Avila is a 49-year-old man who appears comfortable at rest.  He was not visibly short of breat at rest.  He did not cough during our encounter today.  His vital signs are generally within normal limits.  The HEENT examination is unremarkable.  There is no palpable supraclavicular, cervical, axillary or inguinal lymphadenopathy.  The cardiovascular, respiratory, and gastrointestinal examinations are generally within normal limits.  The neuromuscular examination is grossly intact.  The extremities do not demonstrate pitting edema.  The skin evaluation is generally unremarkable.    CT scan (5/1/2024):  Stable exam without evidence for new metastatic disease. Few stable pulmonary nodules. Previous noted ill-defined groundglass opacity is less prominent at the right middle lobe suggesting an infectious or inflammatory etiology. Fatty liver. Stable mildly prominent right hilar lymph nodes.     Laboratories (8/7/2024):  Creatinine 1.28 mg/dL. Estimated GFR 69 mL/min. Rest of CBC and CMP are within normal limits. TSH is normal.        ASSESSMENT AND PLAN:  Mr. Avila is a 49-year-old man with a diagnosis of clear-cell renal cell carcinoma (pT3a G3 R0) who underwent radical nephrectomy on  12/21/2021.  He began adjuvant therapy in 2/2022, and returns today for a follow-up visit having recently completed one year of adjuvant pembrolizumab treatment on 1/11/2023.  His ECOG score is 0.  His pain score is 1/10.     After nephrectomy, the patient received one year of adjuvant pembrolizumab treatment according to the KeyNote-564 trial. The pembrolizumab was delivered at a dose of 200 mg IV every 3 weeks, for a total of 17 cycles (one year).  His last immunotherapy dose was on 1/11/2023.  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      As mentioned above, one year ago the patient reported subacute onset of a dry cough, associated with significant fatigue and shortness of breath on exertion.  He was not aware of any recent COVID contacts.  He did not report fevers, chills or arthralgias.  Given prior pembrolizumab treatment, the rare but serious possibility of immune-related pneumonitis needed to be excluded.  Alternative diagnoses included viral or bacterial pneumonias.  His CT scan suggested a range of possible diagnoses, including an atypical pneumonia or an immune-related pneumonitis or progression of pulmonary metastases.  Of these, the potential for immune-related pneumonitis (from pembrolizumab) was clearly the most urgent.  Thus, I decided to manage him empirically for immune-related pulmonary toxicity.  To this end, I previously prescribed high-dose oral steroid (prednisone 80 mg) followed by a potential taper after that time if he reported a symptomatic improvement in his dyspnea and cough.  He has been on a slow steroid taper over the last six months.  Since our last visit, he has been able to come off the prednisone completely as of 8/30/2023.  Fortunately, he has not noticed any recurrence of pulmonary symptoms despite stopping prednisone.      I also took some time today to discuss the results of his  recent CT scan dated 5/1/2024.  Fortunately, this shows a resolving infectious/inflammatory process in the lungs.  Furthermore, there is no evidence of local kidney cancer recurrence or metastatic disease; thus, he remains TONYA at this time.  Moving forward, he should undergo CT imaging once every 6 months (next scan in early November 2024).  In addition, I would like to see him approximately every 3 months although we can certainly alternate in-person with virtual visits.  The patient was given the opportunity to ask multiple questions today, all of which were answered to his satisfaction.     A total of 40 minutes were spent on this patient on the date of the encounter conducting chart review, review of test results, interpretation of tests, patient visit, documentation and discussion with other provider(s). The patient was given the opportunity to ask multiple questions today, all of which were answered to his satisfaction.     Bart Barahona M.D.      Again, thank you for allowing me to participate in the care of your patient.        Sincerely,        Bart Barahona MD

## 2024-08-28 ENCOUNTER — OFFICE VISIT (OUTPATIENT)
Dept: DERMATOLOGY | Facility: CLINIC | Age: 49
End: 2024-08-28
Payer: COMMERCIAL

## 2024-08-28 VITALS — DIASTOLIC BLOOD PRESSURE: 83 MMHG | HEART RATE: 44 BPM | SYSTOLIC BLOOD PRESSURE: 144 MMHG | RESPIRATION RATE: 99 BRPM

## 2024-08-28 DIAGNOSIS — C44.519 BASAL CELL CARCINOMA OF ANTERIOR CHEST: Primary | ICD-10-CM

## 2024-08-28 PROCEDURE — 17313 MOHS 1 STAGE T/A/L: CPT | Performed by: DERMATOLOGY

## 2024-08-28 PROCEDURE — 12034 INTMD RPR S/TR/EXT 7.6-12.5: CPT | Performed by: DERMATOLOGY

## 2024-08-28 PROCEDURE — 17314 MOHS ADDL STAGE T/A/L: CPT | Performed by: DERMATOLOGY

## 2024-08-28 ASSESSMENT — PAIN SCALES - GENERAL: PAINLEVEL: WORST PAIN (10)

## 2024-08-28 NOTE — PROGRESS NOTES
[facility] Dermatologic Surgery Clinic Ward Procedure Note    Dermatology Problem List:  Last skin check: 07/11/24     1. nBCC, R upper chest, s/p Mohs 8/28/24  2. Hx of dysplastic nevi   - CDN, R upper back, mild atypia, s/p bx 7/11/24  - CMN, L upper back, mild atypia, s/p bx 7/11/24   - DN, L flank, mild atypia, s/p bx 7/11/24   - Nevi treated at unknown outside facility previously     Personal Hx: no personal history of skin cancer  Family Hx: Sister had Melanoma. Father had unknown type of skin cancer.   _____________    Date of Service:  Aug 28, 2024  Surgery: Mohs micrographic surgery    Case 1  Repair Type: intermediate  Repair Size: 8.7 cm  Suture Material: 3-0 Vicryl, 4-0 monocryl, prolene 4-0  Tumor Type: BCC - Basal cell carcinoma  Location: right upper chest  Derm-Path Accession #: [id number]  PreOp Size: 3.4x3.0 cm  PostOp Size: 6.2x5.9 cm  Mohs Accession #: [id number]  Level of Defect: fat      Procedure:  We discussed the principles of treatment and most likely complications including scarring, bleeding, infection, swelling, pain, crusting, nerve damage, large wound,  incomplete excision, wound dehiscence,  nerve damage, recurrence, and a second procedure may be recommended to obtain the best cosmetic or functional result.    Informed consent was obtained and the patient underwent the procedure as follows:  The patient was placed supine on the operating table.  The cancer was identified, outlined with a marker, and verified by the patient.  The entire surgical field was prepped with ChoraPrep.  The surgical site was anesthetized using Lidocaine 1% with epi 1:100,000.      The area of clinically apparent tumor was debulked. The layer of tissue was then surgically excised using a #15 blade and was then transferred onto a specimen sheet maintaining the orientation of the specimen. Hemostasis was obtained using bipolar electrocoagulation. The wound site was then covered with a dressing while  the tissue samples were processed for examination.    The excised tissue was transported to the Mohs histology laboratory maintaining the tissue orientation.  The tissue specimen was relaxed so that the entire surgical margin was in a a single horizontal plane for sectioning and inked for precise mapping.  A precise reference map was drawn to reflect the sectioning of the specimen, colored inking of the margins, and orientation on the patient.  The tissue was processed using horizontal sectioning of the base and continuous peripheral margins.  The histopathologic sections were reviewed in conjunction with the reference map.    Total blocks: 4    Total slides:  7    Residual tumor was identified and indicated in red on the reference map, identifying the location where further tissue excision was necessary. Therefore, an additional stage of Mohs Micrographic surgery was deemed necessary.     Stage II   The patient was returned to the operating room, and the area prepped in the usual manner. The residual tumor was excised using the reference map as a guide. The specimen was transfered to a labeled specimen sheet maintaining the orientation of the specimen. Hemostasis was obtained and the wound site was covered with a dressing while the tissue was processed for examination.     The excised tissue was transported to the Mohs histology laboratory maintaining orientation. The specimen margins were inked for precise mapping and a reference map was prepared for the is additional stage to maintain precise orientation as described above. The tissue was processed using horizontal sectioning of the base and continuous peripheral margins. The histopathologic sections were reviewed in conjunction with the reference map.     Total blocks: 2    Total slides:  4    There were no cancer cells visualized on examination, therefore Mohs surgery was complete.     Reconstruction: Intermediate Linear Closure      The patient was taken to  the operative suite and placed supine on the operating room table. The defect was identified.  Appropriate markings were made with a marking pen to plan the repair. The area was infiltrated with Lidocaine 1% with epi 1:100,000 and prepped with ChoraPrep and draped with sterile towels.     The wound was debeveled and undermined widely. Cones were excised within relaxed skin tension lines on both sides of the defect. Hemostasis was obtained using bipolar electrocoagulation. The deeper layers of subcutaneous and superficial (nonmuscle) fascia tissues were then approximated using vicryl 4-0;monocryl 4-0 buried vertical mattress sutures (deep layer suturing). The wound edges were then approximated additional  buried sutures were placed in a similar fashion where needed. prolene 4-0 simple running sutures (superficial layer suturing) were carefully placed for maximum eversion and meticulous approximation.    Repair Size: 8.7 cm    The wound was cleansed with saline and ointment was applied along the wound surface.     A sterile pressure dressing was applied.  Wound care instructions were given verbally and in writing.  The patient left the operating suite in stable condition.  Patient was informed that additional refinement of the resulting surgical scar may be used as a second stage of this reconstruction.     The attending surgeon was present for entire minor procedure and examination.      Scribe Disclosure:   I, Ivana Alvarado, am serving as a scribe; to document services personally performed by Dr. Romulo Devine - -based on data collection and the provider's statements to me.     Provider Disclosure:   The documentation recorded by the scribe accurately reflects the services I personally performed and the decisions made by me. I personally performed the procedures today.    Romulo Devine DO    Department of Dermatology  [facility]: Phone:  [phone], Fax:[phone]  [facility]: Phone: [phone], Fax: [phone]    Care and Laboratory Testing Performed at:  [facility]   Dermatology Clinic  [address] [place] [address]

## 2024-08-28 NOTE — PATIENT INSTRUCTIONS
Caring for your skin after surgery    After your surgery, a pressure bandage will be placed over the area. This will prevent bleeding. Please follow these instructions over the next 1 to 2 weeks. Following this regimen will help to prevent complications as your wound heals.     For the first 48 hours after your surgery:    Leave the pressure dressing on and keep it dry. If it should come loose, you may re-tape it, but do not take it off.  Relax and take it easy. Do not do any vigorous exercise, heavy lifting or bending forward. This could cause the wound to bleed.  Post-operative pain is usually mild. You may alternate between 1000 mg of Tylenol (acetaminophen) and 400 mg of Ibuprofen every 4 hours.  Do not take more than 4,000 mg of acetaminophen in a 24-hour period or 3200 mg of Ibuprofen in a 24-hr period.  Avoid alcohol and vitamin E as these may increase your tendency to bleed.  You may put an ice pack around the bandaged area for 20 minutes at a time as needed. This may help reduce swelling, bruising, and pain. Make sure the ice pack is waterproof so that the pressure bandage doesn't get wet.  You may see a small amount of drainage or blood on your pressure bandage. This is normal. However:  If drainage or bleeding continues or saturates the bandage, you will need to apply firm pressure over the bandage with a clean washcloth for 15 minutes.  If bleeding continues after applying pressure for 15 minutes, apply an ice pack with gentle pressure to the bandaged area for another 15 minutes.  If bleeding still continues, call our office or go to the nearest emergency room.    48 Hours After Surgery:    Remove outer white bandage down to clear plastic film (Tegaderm).  You may notice dark brown, dried blood under the Tegaderm.  This is normal.    Leave the clear plastic film (Tegaderm) on for up to 2 weeks, as long as it is intact.  If it falls off prior to 2 weeks follow daily wound care below.  If it stays intact  for the full 2 weeks, then remove and treat as normal, healthy skin.    Daily Wound Care (if Tegaderm and Steri-Strips fall off prior to 2 weeks):    Wash wound with a mild soap and water.  Use caution when washing the wound, be gentle and do not let the forceful shower stream hit the wound directly. DO NOT WASH WITH HYDROGEN PEROXIDE AS THIS MIGHT CAUSE THE STITCHES TO DISSOLVE FASTER THAN WHAT WE WANT.    Pat dry.    Apply Vaseline (from a new container or tube) over the suture line with a Q-tip until it is completely healed. It is very important to keep the wound continuously moist, as wounds heal best in a moist environment.    Keep the site covered until it's healed.  You can cover it with a Telfa (non-stick) dressing and tape or a band-aid until healed with normal, healthy skin.      Call Us If:    You have bleeding that will not stop after applying pressure and ice.  You have pain that is not controlled with Tylenol and Ibuprofen.  You have signs or symptoms of an infection such as fever over 100 degrees Fahrenheit, redness, warmth or foul-smelling drainage from the wound    [facility]: [phone]   [facility]: [phone]  For urgent needs outside of business hours call the [facility] at [phone] and ask to speak with the dermatology resident on call

## 2024-08-28 NOTE — NURSING NOTE
The following medication was given:     MEDICATION:  Lidocaine with epinephrine 1% 1:[id number]  ROUTE: SQ  SITE: see procedure note  DOSE: 20 mL  LOT #: [id number]  : [employer]  EXPIRATION DATE: 4/30/25  NDC#: 16871-412-01  Was there drug waste? No  Multi-dose vial: Yes    Paper tape, Tegaderm and pressure dressing applied to Mohs site on right upper chest.  Wound care instructions reviewed with patient and AVS provided.  Patient verbalized understanding.  Patient will follow up for suture removal: No, wife to remove sutures at home in 2 weeks.  Disposable suture removal kit provided.  No further questions or concerns at this time.    Candida Osborn RN  August 28, 2024

## 2024-08-28 NOTE — NURSING NOTE
Pierre Avila's chief complaint for this visit includes:  Chief Complaint   Patient presents with    Mohs     BCC right upper chest     PCP: NIKUNJ [facility]    Referring Provider:  Fern Mondragon PA-C  [address] [place] [address]    BP (!) 144/83   Pulse (!) 44   Resp (!) 99   Worst Pain (10)        Allergies   Allergen Reactions    Seasonal Allergies          Do you need any medication refills at today's visit? Susan Nelson CMA

## 2024-08-28 NOTE — LETTER
8/28/2024      Pierre Avila  [address] [facility] [address]      Dear Colleague,    Thank you for referring your patient, Pierre Avila, to the [facility]. Please see a copy of my visit note below.    [facility] Dermatologic Surgery Clinic [place] Procedure Note    Dermatology Problem List:  Last skin check: 07/11/24     1. nBCC, R upper chest, s/p Mohs 8/28/24  2. Hx of dysplastic nevi   - CDN, R upper back, mild atypia, s/p bx 7/11/24  - CMN, L upper back, mild atypia, s/p bx 7/11/24   - DN, L flank, mild atypia, s/p bx 7/11/24   - Nevi treated at unknown outside facility previously     Personal Hx: no personal history of skin cancer  Family Hx: Sister had Melanoma. Father had unknown type of skin cancer.   _____________    Date of Service:  Aug 28, 2024  Surgery: Mohs micrographic surgery    Case 1  Repair Type: intermediate  Repair Size: 8.7 cm  Suture Material: 3-0 Vicryl, 4-0 monocryl, prolene 4-0  Tumor Type: BCC - Basal cell carcinoma  Location: right upper chest  Derm-Path Accession #: [id number]  PreOp Size: 3.4x3.0 cm  PostOp Size: 6.2x5.9 cm  Mohs Accession #: [id number]  Level of Defect: fat      Procedure:  We discussed the principles of treatment and most likely complications including scarring, bleeding, infection, swelling, pain, crusting, nerve damage, large wound,  incomplete excision, wound dehiscence,  nerve damage, recurrence, and a second procedure may be recommended to obtain the best cosmetic or functional result.    Informed consent was obtained and the patient underwent the procedure as follows:  The patient was placed supine on the operating table.  The cancer was identified, outlined with a marker, and verified by the patient.  The entire surgical field was prepped with ChoraPrep.  The surgical site was anesthetized using Lidocaine 1% with epi 1:100,000.      The area of clinically apparent tumor was debulked. The layer of tissue was then  surgically excised using a #15 blade and was then transferred onto a specimen sheet maintaining the orientation of the specimen. Hemostasis was obtained using bipolar electrocoagulation. The wound site was then covered with a dressing while the tissue samples were processed for examination.    The excised tissue was transported to the Mohs histology laboratory maintaining the tissue orientation.  The tissue specimen was relaxed so that the entire surgical margin was in a a single horizontal plane for sectioning and inked for precise mapping.  A precise reference map was drawn to reflect the sectioning of the specimen, colored inking of the margins, and orientation on the patient.  The tissue was processed using horizontal sectioning of the base and continuous peripheral margins.  The histopathologic sections were reviewed in conjunction with the reference map.    Total blocks: 4    Total slides:  7    Residual tumor was identified and indicated in red on the reference map, identifying the location where further tissue excision was necessary. Therefore, an additional stage of Mohs Micrographic surgery was deemed necessary.     Stage II   The patient was returned to the operating room, and the area prepped in the usual manner. The residual tumor was excised using the reference map as a guide. The specimen was transfered to a labeled specimen sheet maintaining the orientation of the specimen. Hemostasis was obtained and the wound site was covered with a dressing while the tissue was processed for examination.     The excised tissue was transported to the Mohs histology laboratory maintaining orientation. The specimen margins were inked for precise mapping and a reference map was prepared for the is additional stage to maintain precise orientation as described above. The tissue was processed using horizontal sectioning of the base and continuous peripheral margins. The histopathologic sections were reviewed in  conjunction with the reference map.     Total blocks: 2    Total slides:  4    There were no cancer cells visualized on examination, therefore Mohs surgery was complete.     Reconstruction: Intermediate Linear Closure      The patient was taken to the operative suite and placed supine on the operating room table. The defect was identified.  Appropriate markings were made with a marking pen to plan the repair. The area was infiltrated with Lidocaine 1% with epi 1:100,000 and prepped with ChoraPrep and draped with sterile towels.     The wound was debeveled and undermined widely. Cones were excised within relaxed skin tension lines on both sides of the defect. Hemostasis was obtained using bipolar electrocoagulation. The deeper layers of subcutaneous and superficial (nonmuscle) fascia tissues were then approximated using vicryl 4-0;monocryl 4-0 buried vertical mattress sutures (deep layer suturing). The wound edges were then approximated additional  buried sutures were placed in a similar fashion where needed. prolene 4-0 simple running sutures (superficial layer suturing) were carefully placed for maximum eversion and meticulous approximation.    Repair Size: 8.7 cm    The wound was cleansed with saline and ointment was applied along the wound surface.     A sterile pressure dressing was applied.  Wound care instructions were given verbally and in writing.  The patient left the operating suite in stable condition.  Patient was informed that additional refinement of the resulting surgical scar may be used as a second stage of this reconstruction.     The attending surgeon was present for entire minor procedure and examination.      Scribe Disclosure:   Ivana BURRIS, am serving as a scribe; to document services personally performed by Dr. Romulo Devine - -based on data collection and the provider's statements to me.     Provider Disclosure:   The documentation recorded by the scribe accurately reflects the services  I personally performed and the decisions made by me. I personally performed the procedures today.    Romulo Devine DO    Department of Dermatology  [facility]: Phone: [phone], Fax:[phone]  [facility]: Phone: [phone], Fax: [phone]    Care and Laboratory Testing Performed at:  [facility]   Dermatology Clinic  [address] [place] [address]      Again, thank you for allowing me to participate in the care of your patient.        Sincerely,        Romulo Devine MD

## 2024-08-29 ENCOUNTER — TELEPHONE (OUTPATIENT)
Dept: DERMATOLOGY | Facility: CLINIC | Age: 49
End: 2024-08-29
Payer: COMMERCIAL

## 2024-08-29 NOTE — TELEPHONE ENCOUNTER
Pierre is 1 day s/p Mohs on chest    What are you doing to manage your pain?  Patient denies pain.    Are you applying ice? Yes    Have you had any noticeable bleeding through the bandage?  No, dressing is dry and intact.    Do you have any concerns?  No     Wound care directions reviewed.  Patient declined a post op appointment.  Next skin check has been scheduled.     Candida Osborn RN

## 2024-09-06 ENCOUNTER — TELEPHONE (OUTPATIENT)
Dept: DERMATOLOGY | Facility: CLINIC | Age: 49
End: 2024-09-06
Payer: COMMERCIAL

## 2024-09-06 NOTE — TELEPHONE ENCOUNTER
[facility] Call Center    Phone Message    May a detailed message be left on voicemail: yes     Reason for Call: Other: Pt says is wound bandage has a bad odor and he wonders if it might have an infection, No feer a sight or visible oozing. Please call to advise     Action Taken: Message routed to:  Other: [facility]    Travel Screening: Not Applicable

## 2024-09-06 NOTE — TELEPHONE ENCOUNTER
Patient denies increased pain, warmth, and redness. He is wondering if he should take the bandage off and clean the site as there's dried blood underneath. I advised patient to keep the bandage on as long as it is still intact, but if he continues to have concerns he can come in for a nurse visit or he can remove the bandage and perform daily wound care as outlined in his AVS. Pt will keep the bandage in place and reach out again if anything changes.     Kimmy Mccord RN on 9/6/2024 at 10:11 AM

## 2024-09-12 ENCOUNTER — TELEPHONE (OUTPATIENT)
Dept: DERMATOLOGY | Facility: CLINIC | Age: 49
End: 2024-09-12
Payer: COMMERCIAL

## 2024-09-12 NOTE — TELEPHONE ENCOUNTER
Pt called, he stated he was able to get sutures out. Pt advised to continue to apply Vaseline and call us if he has any symptoms of infection.        Maliha Chavez RN on 9/12/2024 at 1:38 PM

## 2024-10-10 ENCOUNTER — OFFICE VISIT (OUTPATIENT)
Dept: ORTHOPEDICS | Facility: CLINIC | Age: 49
End: 2024-10-10
Payer: COMMERCIAL

## 2024-10-10 ENCOUNTER — ANCILLARY PROCEDURE (OUTPATIENT)
Dept: GENERAL RADIOLOGY | Facility: CLINIC | Age: 49
End: 2024-10-10
Attending: PEDIATRICS
Payer: COMMERCIAL

## 2024-10-10 DIAGNOSIS — M79.671 RIGHT FOOT PAIN: Primary | ICD-10-CM

## 2024-10-10 DIAGNOSIS — M72.2 PLANTAR FASCIITIS: ICD-10-CM

## 2024-10-10 PROCEDURE — 99204 OFFICE O/P NEW MOD 45 MIN: CPT | Performed by: PEDIATRICS

## 2024-10-10 PROCEDURE — 73630 X-RAY EXAM OF FOOT: CPT | Mod: TC | Performed by: RADIOLOGY

## 2024-10-10 NOTE — LETTER
10/10/2024      Pierre Avila  [address] [facility] [address]      Dear Colleague,    Thank you for referring your patient, Pierre Avila, to the [facility]. Please see a copy of my visit note below.    ASSESSMENT & PLAN    Pierre was seen today for pain.    Diagnoses and all orders for this visit:    Right foot pain  -     XR Foot Right G/E 3 Views  -     Physical Therapy  Referral; Future  -     Ankle/Foot Bracing Supplies Order Ankle Brace; Right  -     Ankle/Foot Bracing Supplies Order Foot Insert; Bilateral    Plantar fasciitis  -     Physical Therapy  Referral; Future  -     Ankle/Foot Bracing Supplies Order Ankle Brace; Right  -     Ankle/Foot Bracing Supplies Order Foot Insert; Bilateral      This issue is chronic and Unchanged.        ICD-10-CM    1. Right foot pain  M79.671 XR Foot Right G/E 3 Views     Physical Therapy  Referral     Ankle/Foot Bracing Supplies Order Ankle Brace; Right     Ankle/Foot Bracing Supplies Order Foot Insert; Bilateral      2. Plantar fasciitis  M72.2 Physical Therapy  Referral     Ankle/Foot Bracing Supplies Order Ankle Brace; Right     Ankle/Foot Bracing Supplies Order Foot Insert; Bilateral          Discussed pathophysiology of this condition and implications.  Questions answered. Inserts or heel cup recommended. Good walking shoes or running shoes recommended for activities.  Minimize high-impact activities. Stretching twice daily. Ice after activity. We discussed possibly further treatment including night splints, CAM Boot immobilization, physical therapy or podiatry referral.  Home Handout Provided.     Plan:  - Today's Plan of Care:  Superfeet inserts - 10  Ankle brace    Referral to Physical Therapy    Discussed activity considerations and other supportive care including Ice/Heat, OTC and other topical medications as needed.    -We also discussed other future treatment options:  MRI if not  improving  Custom inserts  Referral for US guided injections, consideration of percutaneous tenotomy (TENEX)  Referral to Podiatry    Follow Up: 6 - 8 weeks    Concerning signs and symptoms were reviewed and all questions were answered at this time.    Nasreen Moscoso MD [facility]  Sports Medicine Physician  [facility]    -----  Chief Complaint   Patient presents with     Right Foot - Pain       SUBJECTIVE  Pierre Avila is a/an 49 year old male who is seen as a self referral for evaluation of right foot pain, right heel pain.     The patient is seen by themselves.    Onset: few years(s) ago re-injury/pain this summer while playing golf, painful for ~ 2 months. Reports insidious onset without acute precipitating event.  Location of Pain: right calcaneous, lateral aspect of the plantar of the foot  Worsened by: walking, getting going in the AM, golfing, pain is consistent through the day, great toe extension  Better with: ankle bracing, icing, stretching   Treatments tried: rest/activity avoidance, elevation, ice, home exercises, casting/splinting/bracing, and prior shoe inserts  Associated symptoms: swelling, weakness of right foot, numbness/tingling, and shooting pains more frequent     Orthopedic/Surgical history: YES - Date: chronic right foot pain, hx of renal cell carcinoma  Social History/Occupation: working on his feet, standing all day       REVIEW OF SYSTEMS:  Review of Systems    OBJECTIVE:  There were no vitals taken for this visit.   General: healthy, alert and in no distress  Skin: no suspicious lesions or rash.  CV: distal perfusion intact   Resp: normal respiratory effort without conversational dyspnea   Psych: normal mood and affect  Gait: NORMAL  Neuro: Normal light sensory exam of lower extremity    Right Foot and Ankle Exam:    Inspection:       no visible ecchymosis       no visible edema or effusion    Foot inspection:       no deformity noted    Tender:       Plantar aspect  heel    Non-Tender:       remainder of ankle and foot bilateral    ROM:        Full active and passive ROM, ankle dorsiflexion, plantarflexion, inversion, eversion, great toe dorsiflexion, remainder of toes, midfoot and subtalar bilateral    Strength:       ankle dorsiflexion 5/5 bilateral       plantarflexion 5/5 bilateral       inversion 5/5 bilateral       eversion 5/5 bilateral    Special Tests:       neg (-) anterior drawer right       neg (-) talar tilt right       neg (-)  Padilla test right    Gait:       normal    Flexibility:       Ankle dorsiflexion (with leg extended) 0 degrees bilateral    Neurovascular:       2+ peripheral pulses bilaterally and brisk capillary refill       sensation grossly intact      RADIOLOGY:  Final results and radiologist's interpretation, available in the [facility] health record.  Images were reviewed with the patient in the office today.  My personal interpretation of the performed imaging:     3 XR views of right foot reviewed: no acute bony abnormality, no significant degenerative change, achilles and plantar bone spur  - will follow official read      Review of the result(s) of each unique test - XR             Again, thank you for allowing me to participate in the care of your patient.        Sincerely,        Nasreen Moscoso MD

## 2024-10-10 NOTE — PROGRESS NOTES
ASSESSMENT & PLAN    Pierre was seen today for pain.    Diagnoses and all orders for this visit:    Right foot pain  -     XR Foot Right G/E 3 Views  -     Physical Therapy  Referral; Future  -     Ankle/Foot Bracing Supplies Order Ankle Brace; Right  -     Ankle/Foot Bracing Supplies Order Foot Insert; Bilateral    Plantar fasciitis  -     Physical Therapy  Referral; Future  -     Ankle/Foot Bracing Supplies Order Ankle Brace; Right  -     Ankle/Foot Bracing Supplies Order Foot Insert; Bilateral      This issue is chronic and Unchanged.        ICD-10-CM    1. Right foot pain  M79.671 XR Foot Right G/E 3 Views     Physical Therapy  Referral     Ankle/Foot Bracing Supplies Order Ankle Brace; Right     Ankle/Foot Bracing Supplies Order Foot Insert; Bilateral      2. Plantar fasciitis  M72.2 Physical Therapy  Referral     Ankle/Foot Bracing Supplies Order Ankle Brace; Right     Ankle/Foot Bracing Supplies Order Foot Insert; Bilateral          Discussed pathophysiology of this condition and implications.  Questions answered. Inserts or heel cup recommended. Good walking shoes or running shoes recommended for activities.  Minimize high-impact activities. Stretching twice daily. Ice after activity. We discussed possibly further treatment including night splints, CAM Boot immobilization, physical therapy or podiatry referral.  Home Handout Provided.     Plan:  - Today's Plan of Care:  Superfeet inserts - 10  Ankle brace    Referral to Physical Therapy    Discussed activity considerations and other supportive care including Ice/Heat, OTC and other topical medications as needed.    -We also discussed other future treatment options:  MRI if not improving  Custom inserts  Referral for US guided injections, consideration of percutaneous tenotomy (TENEX)  Referral to Podiatry    Follow Up: 6 - 8 weeks    Concerning signs and symptoms were reviewed and all questions were answered at this  time.    Nasreen Moscoso MD [facility]  Sports Medicine Physician  [facility]    -----  Chief Complaint   Patient presents with    Right Foot - Pain       SUBJECTIVE  Pierre Avila is a/an 49 year old male who is seen as a self referral for evaluation of right foot pain, right heel pain.     The patient is seen by themselves.    Onset: few years(s) ago re-injury/pain this summer while playing golf, painful for ~ 2 months. Reports insidious onset without acute precipitating event.  Location of Pain: right calcaneous, lateral aspect of the plantar of the foot  Worsened by: walking, getting going in the AM, golfing, pain is consistent through the day, great toe extension  Better with: ankle bracing, icing, stretching   Treatments tried: rest/activity avoidance, elevation, ice, home exercises, casting/splinting/bracing, and prior shoe inserts  Associated symptoms: swelling, weakness of right foot, numbness/tingling, and shooting pains more frequent     Orthopedic/Surgical history: YES - Date: chronic right foot pain, hx of renal cell carcinoma  Social History/Occupation: working on his feet, standing all day       REVIEW OF SYSTEMS:  Review of Systems    OBJECTIVE:  There were no vitals taken for this visit.   General: healthy, alert and in no distress  Skin: no suspicious lesions or rash.  CV: distal perfusion intact   Resp: normal respiratory effort without conversational dyspnea   Psych: normal mood and affect  Gait: NORMAL  Neuro: Normal light sensory exam of lower extremity    Right Foot and Ankle Exam:    Inspection:       no visible ecchymosis       no visible edema or effusion    Foot inspection:       no deformity noted    Tender:       Plantar aspect heel    Non-Tender:       remainder of ankle and foot bilateral    ROM:        Full active and passive ROM, ankle dorsiflexion, plantarflexion, inversion, eversion, great toe dorsiflexion, remainder of toes, midfoot and subtalar  bilateral    Strength:       ankle dorsiflexion 5/5 bilateral       plantarflexion 5/5 bilateral       inversion 5/5 bilateral       eversion 5/5 bilateral    Special Tests:       neg (-) anterior drawer right       neg (-) talar tilt right       neg (-)  Padilla test right    Gait:       normal    Flexibility:       Ankle dorsiflexion (with leg extended) 0 degrees bilateral    Neurovascular:       2+ peripheral pulses bilaterally and brisk capillary refill       sensation grossly intact      RADIOLOGY:  Final results and radiologist's interpretation, available in the [facility] health record.  Images were reviewed with the patient in the office today.  My personal interpretation of the performed imaging:     3 XR views of right foot reviewed: no acute bony abnormality, no significant degenerative change, achilles and plantar bone spur  - will follow official read      Review of the result(s) of each unique test - XR

## 2024-10-10 NOTE — PATIENT INSTRUCTIONS
Discussed pathophysiology of this condition and implications.  Questions answered. Inserts or heel cup recommended. Good walking shoes or running shoes recommended for activities.  Minimize high-impact activities. Stretching twice daily. Ice after activity. We discussed possibly further treatment including night splints, CAM Boot immobilization, physical therapy or podiatry referral.  Home Handout Provided.     Plan:  - Today's Plan of Care:  Superfeet inserts - 10  Ankle brace    Referral to Physical Therapy    Discussed activity considerations and other supportive care including Ice/Heat, OTC and other topical medications as needed.    -We also discussed other future treatment options:  MRI if not improving  Custom inserts  Referral for US guided injections, consideration of percutaneous tenotomy (TENEX)  Referral to Podiatry    Follow Up: 6 - 8 weeks    If you have any further questions for your physician or physician s care team you can call [phone].

## 2024-10-14 ENCOUNTER — TELEPHONE (OUTPATIENT)
Dept: FAMILY MEDICINE | Facility: CLINIC | Age: 49
End: 2024-10-14
Payer: COMMERCIAL

## 2024-10-14 NOTE — TELEPHONE ENCOUNTER
Patient Quality Outreach    Patient is due for the following:   Colon Cancer Screening    Next Steps:   Patient has upcoming appointment, these items will be addressed at that time.    Type of outreach:    Chart review performed, no outreach needed.    Next Steps:  Reach out within 90 days via  none .    Max number of attempts reached: No. Will try again in 90 days if patient still on fail list.    Questions for provider review:    None           Rea Lucia MA

## 2024-11-01 ENCOUNTER — LAB (OUTPATIENT)
Dept: LAB | Facility: CLINIC | Age: 49
End: 2024-11-01
Payer: COMMERCIAL

## 2024-11-01 ENCOUNTER — ANCILLARY PROCEDURE (OUTPATIENT)
Dept: CT IMAGING | Facility: CLINIC | Age: 49
End: 2024-11-01
Attending: INTERNAL MEDICINE
Payer: COMMERCIAL

## 2024-11-01 DIAGNOSIS — C64.9 RENAL CELL CARCINOMA, UNSPECIFIED LATERALITY (H): ICD-10-CM

## 2024-11-01 LAB
BASOPHILS # BLD AUTO: 0 10E3/UL (ref 0–0.2)
BASOPHILS NFR BLD AUTO: 0 %
CREAT BLD-MCNC: 1.5 MG/DL (ref 0.7–1.3)
EGFRCR SERPLBLD CKD-EPI 2021: 57 ML/MIN/1.73M2
EOSINOPHIL # BLD AUTO: 0.4 10E3/UL (ref 0–0.7)
EOSINOPHIL NFR BLD AUTO: 5 %
ERYTHROCYTE [DISTWIDTH] IN BLOOD BY AUTOMATED COUNT: 12.7 % (ref 10–15)
HCT VFR BLD AUTO: 39.1 % (ref 40–53)
HGB BLD-MCNC: 13.6 G/DL (ref 13.3–17.7)
IMM GRANULOCYTES # BLD: 0.1 10E3/UL
IMM GRANULOCYTES NFR BLD: 1 %
LYMPHOCYTES # BLD AUTO: 2.3 10E3/UL (ref 0.8–5.3)
LYMPHOCYTES NFR BLD AUTO: 29 %
MCH RBC QN AUTO: 30 PG (ref 26.5–33)
MCHC RBC AUTO-ENTMCNC: 34.8 G/DL (ref 31.5–36.5)
MCV RBC AUTO: 86 FL (ref 78–100)
MONOCYTES # BLD AUTO: 0.7 10E3/UL (ref 0–1.3)
MONOCYTES NFR BLD AUTO: 9 %
NEUTROPHILS # BLD AUTO: 4.4 10E3/UL (ref 1.6–8.3)
NEUTROPHILS NFR BLD AUTO: 56 %
PLATELET # BLD AUTO: 201 10E3/UL (ref 150–450)
RBC # BLD AUTO: 4.53 10E6/UL (ref 4.4–5.9)
WBC # BLD AUTO: 8 10E3/UL (ref 4–11)

## 2024-11-01 PROCEDURE — 85025 COMPLETE CBC W/AUTO DIFF WBC: CPT

## 2024-11-01 PROCEDURE — 74177 CT ABD & PELVIS W/CONTRAST: CPT | Mod: TC | Performed by: STUDENT IN AN ORGANIZED HEALTH CARE EDUCATION/TRAINING PROGRAM

## 2024-11-01 PROCEDURE — 82565 ASSAY OF CREATININE: CPT | Mod: 59

## 2024-11-01 PROCEDURE — 71260 CT THORAX DX C+: CPT | Mod: TC | Performed by: STUDENT IN AN ORGANIZED HEALTH CARE EDUCATION/TRAINING PROGRAM

## 2024-11-01 PROCEDURE — 80053 COMPREHEN METABOLIC PANEL: CPT

## 2024-11-01 PROCEDURE — 36415 COLL VENOUS BLD VENIPUNCTURE: CPT

## 2024-11-01 RX ORDER — IOPAMIDOL 755 MG/ML
500 INJECTION, SOLUTION INTRAVASCULAR ONCE
Status: COMPLETED | OUTPATIENT
Start: 2024-11-01 | End: 2024-11-01

## 2024-11-01 RX ADMIN — IOPAMIDOL 134 ML: 755 INJECTION, SOLUTION INTRAVASCULAR at 14:48

## 2024-11-01 RX ADMIN — Medication 78 ML: at 14:48

## 2024-11-02 LAB
ALBUMIN SERPL BCG-MCNC: 4.4 G/DL (ref 3.5–5.2)
ALP SERPL-CCNC: 61 U/L (ref 40–150)
ALT SERPL W P-5'-P-CCNC: 57 U/L (ref 0–70)
ANION GAP SERPL CALCULATED.3IONS-SCNC: 10 MMOL/L (ref 7–15)
AST SERPL W P-5'-P-CCNC: 31 U/L (ref 0–45)
BILIRUB SERPL-MCNC: 0.2 MG/DL
BUN SERPL-MCNC: 19.4 MG/DL (ref 6–20)
CALCIUM SERPL-MCNC: 9.3 MG/DL (ref 8.8–10.4)
CHLORIDE SERPL-SCNC: 105 MMOL/L (ref 98–107)
CREAT SERPL-MCNC: 1.42 MG/DL (ref 0.67–1.17)
EGFRCR SERPLBLD CKD-EPI 2021: 61 ML/MIN/1.73M2
GLUCOSE SERPL-MCNC: 83 MG/DL (ref 70–99)
HCO3 SERPL-SCNC: 25 MMOL/L (ref 22–29)
POTASSIUM SERPL-SCNC: 4.4 MMOL/L (ref 3.4–5.3)
PROT SERPL-MCNC: 7 G/DL (ref 6.4–8.3)
SODIUM SERPL-SCNC: 140 MMOL/L (ref 135–145)

## 2024-11-05 NOTE — PROGRESS NOTES
Virtual Visit Details    Type of service:  Video Visit   Originating Location (pt. 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     History of Present Illness:  Mr. Avila is a 49-year-old man who presented with hematuria in 10/2021.  CT imaging (10/21/2021) revealed a large mass at the upper pole of the left kidney, without evidence of local or distant metastatic disease.  On 12/21/2021, he had a left radical nephrectomy which showed a 5.4 cm clear-cell renal cell carcinoma (no sarcomatoid or rhabdoid features), nucleolar grade 3, with tumor extending into the calyceal system.  His pathological stage was pT3a G3 R0 kidney cancer.  The patient elected to receive adjuvant pembrolizumab treatment, which he started in 2/2022.  He completed one year (17 doses) of adjuvant pembrolizumab on 1/11/2023.       Seven months ago, the patient developed new shortness of breath on exertion (and even at rest), as well as significant fatigue.  He also reported the onset of a new dry cough, which is significant because the patient has never been a cougher before.  I ordered a CT scan (4/2023), which showed evidence of pneumonitis which could potentially represent immune-related pneumonitis.  We conducted a video visit today to discuss ongoing management of resolving immune- related pulmonary toxicity.  His prednisone has been tapered off.  We also took the opportunity to discuss his recent CT scan (11/1/24), which shows no evidence of recurrent or metastatic disease.     Review of systems: The patient reports a continued improvement of his dyspnea and his cough has  completely resolved.  He did not notice any worsening of his respiratory symptoms after decreasing his prednisone down to 2.5 mg.  He has been able to mow his lawn without feeling short of breath, although he did cough once or twice during that activity.  He also reports some insomnia, which is probably related to the steroid effect.  He continues to have dry skin at baseline. Does not moisturize regularly. No vitiligo. No peripheral edema. Working on eliminating sugars. Eats well otherwise. Reports good oral hydration. Weight stable. No chest pain, shortness, or cough.  No abdominal pain, diarrhea, or constipation.  No anorexia.  No vomiting or nausea.  No urinary changes.  No mucositis.   A comprehensive 14-system review of symptoms is otherwise generally unremarkable.  His ECOG score is 0.  His pain score is 1/10.        Physical Examination:  Mr. Avila is a 49-year-old man who appears comfortable at rest.  He was not visibly short of breat at rest.  He did not cough during our encounter today.  His vital signs are generally within normal limits.  The HEENT examination is unremarkable.  There is no palpable supraclavicular, cervical, axillary or inguinal lymphadenopathy.  The cardiovascular, respiratory, and gastrointestinal examinations are generally within normal limits.  The neuromuscular examination is grossly intact.  The extremities do not demonstrate pitting edema.  The skin evaluation is generally unremarkable.    CT scan (5/1/2024):  Stable exam without evidence for new metastatic disease. Few stable pulmonary nodules. Previous noted ill-defined groundglass opacity is less prominent at the right middle lobe suggesting an infectious or inflammatory etiology. Hepatic steatosis. Stable mildly prominent right hilar lymph nodes.    CT scan (11/1/2024):  No evidence of progressive metastatic disease within the chest, abdomen or pelvis.  No focal consolidation or pleural effusion. Calcified granuloma.  Similar right upper lobe 3 mm nodule. Similar faint groundglass in the right middle lobe. No new or growing suspicious pulmonary nodule.  Hepatic steatosis.     Laboratories (11/1/2024):  Creatinine 1.42 mg/dL. Estimated GFR 61 mL/min. Rest of CBC and CMP are within normal limits. TSH is normal.        ASSESSMENT AND PLAN:  Mr. 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  He began adjuvant therapy in 2/2022, and returns today for a follow-up visit having recently completed one year of adjuvant pembrolizumab treatment on 1/11/2023.  His ECOG score is 0.  His pain score is 1/10.     After nephrectomy, the patient received one year of adjuvant pembrolizumab treatment according to the KeyNote-564 trial. The pembrolizumab was delivered at a dose of 200 mg IV every 3 weeks, for a total of 17 cycles (one year).  His last immunotherapy dose was on 1/11/2023.  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      As mentioned above, 1.5 years ago the patient reported subacute onset of a dry cough, associated with significant fatigue and shortness of breath on exertion.  He was not aware of any recent COVID contacts.  He did not report fevers, chills or arthralgias.  Given prior pembrolizumab treatment, the rare but serious possibility of immune-related pneumonitis needed to be excluded.  Alternative diagnoses included viral or bacterial pneumonias.  His CT scan suggested a range of possible diagnoses, including an atypical pneumonia or an immune-related pneumonitis or progression of pulmonary metastases.  Of these, the potential for immune-related pneumonitis (from pembrolizumab) was clearly the most urgent.  Thus, I decided to manage him empirically for immune-related pulmonary toxicity.  To this end, I previously prescribed  high-dose oral steroid (prednisone 80 mg) followed by a potential taper after that time if he reported a symptomatic improvement in his dyspnea and cough.  He has been on a slow steroid taper over the last nine months.  He has been able to come off the prednisone completely as of 8/30/2023.  Fortunately, he has not noticed any recurrence of pulmonary symptoms despite stopping prednisone.      I also took some time today to discuss the results of his recent CT scan dated 11/1/2024.  Fortunately, this shows a resolving infectious/ inflammatory process in the lungs.  Furthermore, there is no evidence of local kidney cancer recurrence or metastatic disease; thus, he remains TONYA at this time.  Moving forward, he should undergo CT imaging once every 6 months (next scan in early May 2025).  Due to the BAP1 germline mutation, we will also order a brain MRI scan in May 2025.  In addition, I would like to see him approximately every 3 months although we can certainly alternate in-person with virtual visits.  The patient was given the opportunity to ask multiple questions today, all of which were answered to his satisfaction.     A total of 40 minutes were spent on this patient on the date of the encounter conducting chart review, review of test results, interpretation of tests, patient visit, documentation and discussion with other provider(s). The patient was given the opportunity to ask multiple questions today, all of which were answered to his satisfaction.     Bart Barahona M.D.

## 2024-11-06 ENCOUNTER — VIRTUAL VISIT (OUTPATIENT)
Dept: ONCOLOGY | Facility: CLINIC | Age: 49
End: 2024-11-06
Attending: INTERNAL MEDICINE
Payer: COMMERCIAL

## 2024-11-06 VITALS — BODY MASS INDEX: 30.06 KG/M2 | WEIGHT: 210 LBS | HEIGHT: 70 IN

## 2024-11-06 DIAGNOSIS — C64.9 RENAL CELL CARCINOMA, UNSPECIFIED LATERALITY (H): Primary | ICD-10-CM

## 2024-11-06 PROCEDURE — 99215 OFFICE O/P EST HI 40 MIN: CPT | Mod: 95 | Performed by: INTERNAL MEDICINE

## 2024-11-06 ASSESSMENT — PAIN SCALES - GENERAL: PAINLEVEL_OUTOF10: WORST PAIN (10)

## 2024-11-06 NOTE — LETTER
11/6/2024      Pierer Avila  [address] [place] [address]      Dear Colleague,    Thank you for referring your patient, Pierre Avila, to the [facility]. Please see a copy of my visit note below.      Virtual Visit Details    Type of service:  Video Visit   Originating Location (pt. 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     History of Present Illness:  Mr. Avila is a 49-year-old man who presented with hematuria in 10/2021.  CT imaging (10/21/2021) revealed a large mass at the upper pole of the left kidney, without evidence of local or distant metastatic disease.  On 12/21/2021, he had a left radical nephrectomy which showed a 5.4 cm clear-cell renal cell carcinoma (no sarcomatoid or rhabdoid features), nucleolar grade 3, with tumor extending into the calyceal system.  His pathological stage was pT3a G3 R0 kidney cancer.  The patient elected to receive adjuvant pembrolizumab treatment, which he started in 2/2022.  He completed one year (17 doses) of adjuvant pembrolizumab on 1/11/2023.       Seven months ago, the patient developed new shortness of breath on exertion (and even at rest), as well as significant fatigue.  He also reported the onset of a new dry cough, which is significant because the patient has never been a cougher before.  I ordered a CT scan (4/2023), which showed evidence of pneumonitis which could potentially represent immune-related pneumonitis.  We conducted a video visit today to discuss ongoing management of resolving immune- related pulmonary toxicity.  His prednisone has been  tapered off.  We also took the opportunity to discuss his recent CT scan (11/1/24), which shows no evidence of recurrent or metastatic disease.     Review of systems: The patient reports a continued improvement of his dyspnea and his cough has completely resolved.  He did not notice any worsening of his respiratory symptoms after decreasing his prednisone down to 2.5 mg.  He has been able to mow his lawn without feeling short of breath, although he did cough once or twice during that activity.  He also reports some insomnia, which is probably related to the steroid effect.  He continues to have dry skin at baseline. Does not moisturize regularly. No vitiligo. No peripheral edema. Working on eliminating sugars. Eats well otherwise. Reports good oral hydration. Weight stable. No chest pain, shortness, or cough.  No abdominal pain, diarrhea, or constipation.  No anorexia.  No vomiting or nausea.  No urinary changes.  No mucositis.   A comprehensive 14-system review of symptoms is otherwise generally unremarkable.  His ECOG score is 0.  His pain score is 1/10.        Physical Examination:  Mr. Avila is a 49-year-old man who appears comfortable at rest.  He was not visibly short of breat at rest.  He did not cough during our encounter today.  His vital signs are generally within normal limits.  The HEENT examination is unremarkable.  There is no palpable supraclavicular, cervical, axillary or inguinal lymphadenopathy.  The cardiovascular, respiratory, and gastrointestinal examinations are generally within normal limits.  The neuromuscular examination is grossly intact.  The extremities do not demonstrate pitting edema.  The skin evaluation is generally unremarkable.    CT scan (5/1/2024):  Stable exam without evidence for new metastatic disease. Few stable pulmonary nodules. Previous noted ill-defined groundglass opacity is less prominent at the right middle lobe suggesting an infectious or inflammatory etiology.  Hepatic steatosis. Stable mildly prominent right hilar lymph nodes.    CT scan (11/1/2024):  No evidence of progressive metastatic disease within the chest, abdomen or pelvis.  No focal consolidation or pleural effusion. Calcified granuloma. Similar right upper lobe 3 mm nodule. Similar faint groundglass in the right middle lobe. No new or growing suspicious pulmonary nodule.  Hepatic steatosis.     Laboratories (11/1/2024):  Creatinine 1.42 mg/dL. Estimated GFR 61 mL/min. Rest of CBC and CMP are within normal limits. TSH is normal.        ASSESSMENT AND PLAN:  Mr. 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  He began adjuvant therapy in 2/2022, and returns today for a follow-up visit having recently completed one year of adjuvant pembrolizumab treatment on 1/11/2023.  His ECOG score is 0.  His pain score is 1/10.     After nephrectomy, the patient received one year of adjuvant pembrolizumab treatment according to the KeyNote-564 trial. The pembrolizumab was delivered at a dose of 200 mg IV every 3 weeks, for a total of 17 cycles (one year).  His last immunotherapy dose was on 1/11/2023.  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      As mentioned above, 1.5 years ago the patient reported subacute onset of a dry cough, associated with significant fatigue and shortness of breath on exertion.  He was not aware of any recent COVID contacts.  He did not report fevers, chills or arthralgias.  Given prior pembrolizumab treatment, the rare but serious possibility of immune-related pneumonitis needed to be excluded.  Alternative diagnoses included viral or bacterial pneumonias.  His CT scan suggested a range of possible diagnoses, including an atypical pneumonia or an immune-related pneumonitis or progression of  pulmonary metastases.  Of these, the potential for immune-related pneumonitis (from pembrolizumab) was clearly the most urgent.  Thus, I decided to manage him empirically for immune-related pulmonary toxicity.  To this end, I previously prescribed high-dose oral steroid (prednisone 80 mg) followed by a potential taper after that time if he reported a symptomatic improvement in his dyspnea and cough.  He has been on a slow steroid taper over the last nine months.  He has been able to come off the prednisone completely as of 8/30/2023.  Fortunately, he has not noticed any recurrence of pulmonary symptoms despite stopping prednisone.      I also took some time today to discuss the results of his recent CT scan dated 11/1/2024.  Fortunately, this shows a resolving infectious/ inflammatory process in the lungs.  Furthermore, there is no evidence of local kidney cancer recurrence or metastatic disease; thus, he remains TONYA at this time.  Moving forward, he should undergo CT imaging once every 6 months (next scan in early May 2025).  Due to the BAP1 germline mutation, we will also order a brain MRI scan in May 2025.  In addition, I would like to see him approximately every 3 months although we can certainly alternate in-person with virtual visits.  The patient was given the opportunity to ask multiple questions today, all of which were answered to his satisfaction.     A total of 40 minutes were spent on this patient on the date of the encounter conducting chart review, review of test results, interpretation of tests, patient visit, documentation and discussion with other provider(s). The patient was given the opportunity to ask multiple questions today, all of which were answered to his satisfaction.     Bart Barahona M.D.      Again, thank you for allowing me to participate in the care of your patient.        Sincerely,        Bart Barahona MD

## 2024-11-06 NOTE — NURSING NOTE
Current patient location: [address] [place] [id number]    Is the patient currently in the state of [place]? YES    Visit mode:VIDEO    If the visit is dropped, the patient can be reconnected by: VIDEO VISIT: Text to cell phone:   Telephone Information:   Mobile [phone]    and VIDEO VISIT: Send to e-mail at: [email or web address]    Will anyone else be joining the visit? NO  (If patient encounters technical issues they should call [phone]    Are changes needed to the allergy or medication list? No    Are refills needed on medications prescribed by this physician? NO    Rooming Documentation:  Questionnaire(s) completed    Reason for visit: NIGHAT TELLEZF

## 2025-01-06 NOTE — PATIENT INSTRUCTIONS
Patient Education       Proper skin care from [facility]:    -Eliminate harsh soaps as they strip the natural oils from the skin, often resulting in dry itchy skin ( i.e. Dial, Zest, [place] Spring)  -Use mild soaps such as Cetaphil or Dove Sensitive Skin in the shower. You do not need to use soap on arms, legs, and trunk every time you shower unless visibly soiled.   -Avoid hot or cold showers.  -After showering, lightly dry off and apply moisturizing within 2-3 minutes. This will help trap moisture in the skin.   -Aggressive use of a moisturizer at least 1-2 times a day to the entire body (including -Vanicream, Cetaphil, Aquaphor or Cerave) and moisturize hands after every washing.  -We recommend using moisturizers that come in a tub that needs to be scooped out, not a pump. This has more of an oil base. It will hold moisture in your skin much better than a water base moisturizer. The above recommended are non-pore clogging.      Wear a sunscreen with at least SPF 30 on your face, ears, neck and V of the chest daily. Wear sunscreen on other areas of the body if those areas are exposed to the sun throughout the day. Sunscreens can contain physical and/or chemical blockers. Physical blockers are less likely to clog pores, these include zinc oxide and titanium dioxide. Reapply every two hour and after swimming.     Sunscreen examples: https://www.ewg.org/sunscreen/    UV radiation  UVA radiation remains constant throughout the day and throughout the year. It is a longer wavelength than UVB and therefore penetrates deeper into the skin leading to immediate and delayed tanning, photoaging, and skin cancer. 70-80% of UVA and UVB radiation occurs between the hours of 10am-2pm.  UVB radiation  UVB radiation causes the most harmful effects and is more significant during the summer months. However, snow and ice can reflect UVB radiation leading to skin damage during the winter months as well. UVB radiation is  responsible for tanning, burning, inflammation, delayed erythema (pinkness), pigmentation (brown spots), and skin cancer.     I recommend self monthly full body exams and yearly full body exams with a dermatology provider. If you develop a new or changing lesion please follow up for examination. Most skin cancers are pink and scaly or pink and pearly. However, we do see blue/brown/black skin cancers.  Consider the ABCDEs of melanoma when giving yourself your monthly full body exam ( don't forget the groin, buttocks, feet, toes, etc). A-asymmetry, B-borders, C-color, D-diameter, E-elevation or evolving. If you see any of these changes please follow up in clinic. If you cannot see your back I recommend purchasing a hand held mirror to use with a larger wall mirror.       Checking for Skin Cancer  You can find cancer early by checking your skin each month. There are 3 kinds of skin cancer. They are melanoma, basal cell carcinoma, and squamous cell carcinoma. Doing monthly skin checks is the best way to find new marks or skin changes. Follow the instructions below for checking your skin.   The ABCDEs of checking moles for melanoma   Check your moles or growths for signs of melanoma using ABCDE:   Asymmetry: the sides of the mole or growth don t match  Border: the edges are ragged, notched, or blurred  Color: the color within the mole or growth varies  Diameter: the mole or growth is larger than 6 mm (size of a pencil eraser)  Evolving: the size, shape, or color of the mole or growth is changing (evolving is not shown in the images below)    Checking for other types of skin cancer  Basal cell carcinoma or squamous cell carcinoma have symptoms such as:     A spot or mole that looks different from all other marks on your skin  Changes in how an area feels, such as itching, tenderness, or pain  Changes in the skin's surface, such as oozing, bleeding, or scaliness  A sore that does not heal  New swelling or redness beyond  the border of a mole    Who s at risk?  Anyone can get skin cancer. But you are at greater risk if you have:   Fair skin, light-colored hair, or light-colored eyes  Many moles or abnormal moles on your skin  A history of sunburns from sunlight or tanning beds  A family history of skin cancer  A history of exposure to radiation or chemicals  A weakened immune system  If you have had skin cancer in the past, you are at risk for recurring skin cancer.   How to check your skin  Do your monthly skin checkups in front of a full-length mirror. Check all parts of your body, including your:   Head (ears, face, neck, and scalp)  Torso (front, back, and sides)  Arms (tops, undersides, upper, and lower armpits)  Hands (palms, backs, and fingers, including under the nails)  Buttocks and genitals  Legs (front, back, and sides)  Feet (tops, soles, toes, including under the nails, and between toes)  If you have a lot of moles, take digital photos of them each month. Make sure to take photos both up close and from a distance. These can help you see if any moles change over time.   Most skin changes are not cancer. But if you see any changes in your skin, call your doctor right away. Only he or she can diagnose a problem. If you have skin cancer, seeing your doctor can be the first step toward getting the treatment that could save your life.   [employer] last reviewed this educational content on 4/1/2019 2000-2020 [employer]. [address] [place] [address], [place] [address]. All rights reserved. This information is not intended as a substitute for professional medical care. Always follow your healthcare professional's instructions.       When should I call my doctor?  If you are worsening or not improving, please, contact us or seek urgent care as noted below.     Who should I call with questions (adults)?  [facility] (adult and pediatric): [phone]  [facility]  [place] (adult): [phone]  [facility] ([place], [place], [place] and [place]) [phone]  For urgent needs outside of business hours call the [facility] at [phone] and ask for the dermatology resident on call to be paged  If this is a medical emergency and you are unable to reach an ER, Call 911      If you need a prescription refill, please contact your pharmacy. Refills are approved or denied by our Physicians during normal business hours, Monday through Fridays  Per office policy, refills will not be granted if you have not been seen within the past year (or sooner depending on your child's condition The ABCDEs of Melanoma    Skin cancer can develop anywhere on the skin. Ask someone for help when checking your skin, especially in hard to see places. If you notice a mole different from others, or that changes, enlarges, itches, or bleeds (even if it is small), you should see a dermatologist.

## 2025-01-06 NOTE — PROGRESS NOTES
[facility] Dermatology Note  Encounter Date: Jan 16, 2025  Office Visit     Reviewed patients past medical history and pertinent chart review prior to patients visit today.     Dermatology Problem List:  Last skin check: 01/16/25    1.  History of nonmelanoma skin cancer  -nBCC, R upper chest, s/p Mohs 8/28/24  2. Hx of dysplastic nevi   - CDN, R upper back, mild atypia, s/p bx 7/11/24  - CMN, L upper back, mild atypia, s/p bx 7/11/24   - DN, L flank, mild atypia, s/p bx 7/11/24   - Nevi treated at unknown outside facility previously    Family Hx: Family history of skin cancer- sister (Melanoma), dad.   Personal Hx:  Hx of renal call carcinoma s/p treatment with pembrolizumab, continuing to follow with oncology  ____________________________________________    Assessment & Plan:     # Personal history of nonmelanoma skin cancer  - No signs of recurrence. Continued observation recommended.   - Sun protection: Counseled SPF 30+ sunscreen, UPF clothing, sun avoidance, tanning bed avoidance.    # kourtney protuberance, right heel   - nodule feels apart of attachment of achilles to calcaneous. Recommend patient follow up with primary in regards to this.     # Multiple nevi, trunk and extremities  # Solar lentigines  - Nevi demonstrate no concerning features on dermoscopy. We discussed the importance of self exams at home.   - ABCDEs: Counseled ABCDEs of melanoma: Asymmetry, Border (irregularity), Color (not uniform, changes in color), Diameter (greater than 6 mm which is about the size of a pencil eraser), and Evolving (any changes in preexisting moles).    # Cherry angiomas  # Seborrheic keratoses  - We discussed the benign nature of the skin lesions. No treatment required. Continued observation recommended. Follow up with any concerns.      Follow-up:  6 months for follow up full body skin exam, as needed for new or changing lesions or new concerns    All risks, benefits and alternatives were discussed  with patient.  Patient is in agreement and understands the assessment and plan.  All questions were answered.  Fern Mondragon PA-C  [facility]    ____________________________________________    CC: Skin Check (FBSC- central chest (sometimes itches), right ankle (lump, unsure if it is a calcium build up). Hx of NMSC. )    HPI:  Mr. Pierre Avila is a(n) 49 year old male who presents today as a return patient for a full body skin cancer screening. The patient has a history of nonmelanoma skin cancer. The patient was last seen in dermatology 08/28/2024. Today, the patient reports an area of concern on the chest that can be pruritic. He is wondering if it is of concern. Also notes a bump of concern on the right heel. He is wondering if this is musculoskeletal vs. Dermal. It can be tender. No other cutaneous concerns. Patient reports being diligent with photoprotection.      Physical Exam:  Vitals: There were no vitals taken for this visit.   SKIN: Total skin excluding the genitalia areas was performed. The exam included the scalp, face, neck, bilateral arms, chest, back, abdomen, bilateral legs, digits, mons pubis, buttocks, and nails.   - Reyes II.  - right heel, firm nodule that feels fixed to bone  - central chest, no overlying epidermal skin changes  - Multiple tan/brown macules and papules scattered throughout exam, consistent with benign nevi. No concerning features on dermoscopy.   - Scattered tan, homogenous macules scattered on sun exposed skin, consistent with solar lentigines.   - Scattered waxy, stuck on appearing papules and patches, consistent with seborrheic keratoses.  - Several 1-2 mm red dome shaped symmetric papules, consistent with cherry angiomas.   - No other lesions of concern on areas examined.     Medications:  Current Outpatient Medications   Medication Sig Dispense Refill    acetaminophen (TYLENOL) 325 MG tablet Take 325-650 mg by mouth every 6 hours as needed  for mild pain       No current facility-administered medications for this visit.      Past Medical History:   Patient Active Problem List   Diagnosis    Renal mass    Renal cell carcinoma, unspecified laterality (H)     No past medical history on file.    CC Referred Self, MD  No address on file on close of this encounter.

## 2025-01-16 ENCOUNTER — OFFICE VISIT (OUTPATIENT)
Dept: DERMATOLOGY | Facility: CLINIC | Age: 50
End: 2025-01-16
Payer: COMMERCIAL

## 2025-01-16 DIAGNOSIS — L81.4 SOLAR LENTIGINOSIS: ICD-10-CM

## 2025-01-16 DIAGNOSIS — Z80.8 FAMILY HISTORY OF MELANOMA: Primary | ICD-10-CM

## 2025-01-16 DIAGNOSIS — Z85.828 HISTORY OF NONMELANOMA SKIN CANCER: ICD-10-CM

## 2025-01-16 DIAGNOSIS — D22.9 MULTIPLE BENIGN NEVI: ICD-10-CM

## 2025-01-16 DIAGNOSIS — D18.01 CHERRY ANGIOMA: ICD-10-CM

## 2025-01-16 DIAGNOSIS — L82.1 SEBORRHEIC KERATOSES: ICD-10-CM

## 2025-01-16 ASSESSMENT — PAIN SCALES - GENERAL: PAINLEVEL_OUTOF10: NO PAIN (0)

## 2025-01-16 NOTE — NURSING NOTE
Pierre Avila's chief complaint for this visit includes:  Chief Complaint   Patient presents with    Skin Check     FBSC- central chest (sometimes itches), right ankle (lump, unsure if it is a calcium build up). Hx of NMSC.      PCP: [facility]    Referring Provider:  Referred Self, MD  No address on file    There were no vitals taken for this visit.  No Pain (0)        Allergies   Allergen Reactions    Seasonal Allergies          Do you need any medication refills at today's visit?  No.        Ramonita Baird RN on 1/16/2025 at 8:24 AM

## 2025-01-16 NOTE — LETTER
1/16/2025      Pierre Avila  [address] [facility] [address]      Dear Colleague,    Thank you for referring your patient, Pierre Avila, to the [facility]. Please see a copy of my visit note below.    [facility] Dermatology Note  Encounter Date: Jan 16, 2025  Office Visit     Reviewed patients past medical history and pertinent chart review prior to patients visit today.     Dermatology Problem List:  Last skin check: 01/16/25    1.  History of nonmelanoma skin cancer  -nBCC, R upper chest, s/p Mohs 8/28/24  2. Hx of dysplastic nevi   - CDN, R upper back, mild atypia, s/p bx 7/11/24  - CMN, L upper back, mild atypia, s/p bx 7/11/24   - DN, L flank, mild atypia, s/p bx 7/11/24   - Nevi treated at unknown outside facility previously    Family Hx: Family history of skin cancer- sister (Melanoma), dad.   Personal Hx:  Hx of renal call carcinoma s/p treatment with pembrolizumab, continuing to follow with oncology  ____________________________________________    Assessment & Plan:     # Personal history of nonmelanoma skin cancer  - No signs of recurrence. Continued observation recommended.   - Sun protection: Counseled SPF 30+ sunscreen, UPF clothing, sun avoidance, tanning bed avoidance.    # kourtney protuberance, right heel   - nodule feels apart of attachment of achilles to calcaneous. Recommend patient follow up with primary in regards to this.     # Multiple nevi, trunk and extremities  # Solar lentigines  - Nevi demonstrate no concerning features on dermoscopy. We discussed the importance of self exams at home.   - ABCDEs: Counseled ABCDEs of melanoma: Asymmetry, Border (irregularity), Color (not uniform, changes in color), Diameter (greater than 6 mm which is about the size of a pencil eraser), and Evolving (any changes in preexisting moles).    # Cherry angiomas  # Seborrheic keratoses  - We discussed the benign nature of the skin lesions. No treatment  required. Continued observation recommended. Follow up with any concerns.      Follow-up:  6 months for follow up full body skin exam, as needed for new or changing lesions or new concerns    All risks, benefits and alternatives were discussed with patient.  Patient is in agreement and understands the assessment and plan.  All questions were answered.  Fern Mondragon PA-C  [facility]    ____________________________________________    CC: Skin Check (FBSC- central chest (sometimes itches), right ankle (lump, unsure if it is a calcium build up). Hx of NMSC. )    HPI:  Mr. Pierre Avila is a(n) 49 year old male who presents today as a return patient for a full body skin cancer screening. The patient has a history of nonmelanoma skin cancer. The patient was last seen in dermatology 08/28/2024. Today, the patient reports an area of concern on the chest that can be pruritic. He is wondering if it is of concern. Also notes a bump of concern on the right heel. He is wondering if this is musculoskeletal vs. Dermal. It can be tender. No other cutaneous concerns. Patient reports being diligent with photoprotection.      Physical Exam:  Vitals: There were no vitals taken for this visit.   SKIN: Total skin excluding the genitalia areas was performed. The exam included the scalp, face, neck, bilateral arms, chest, back, abdomen, bilateral legs, digits, mons pubis, buttocks, and nails.   - Reyes II.  - right heel, firm nodule that feels fixed to bone  - central chest, no overlying epidermal skin changes  - Multiple tan/brown macules and papules scattered throughout exam, consistent with benign nevi. No concerning features on dermoscopy.   - Scattered tan, homogenous macules scattered on sun exposed skin, consistent with solar lentigines.   - Scattered waxy, stuck on appearing papules and patches, consistent with seborrheic keratoses.  - Several 1-2 mm red dome shaped symmetric papules, consistent  with cherry angiomas.   - No other lesions of concern on areas examined.     Medications:  Current Outpatient Medications   Medication Sig Dispense Refill     acetaminophen (TYLENOL) 325 MG tablet Take 325-650 mg by mouth every 6 hours as needed for mild pain       No current facility-administered medications for this visit.      Past Medical History:   Patient Active Problem List   Diagnosis     Renal mass     Renal cell carcinoma, unspecified laterality (H)     No past medical history on file.    CC Referred Self, MD  No address on file on close of this encounter.      Again, thank you for allowing me to participate in the care of your patient.        Sincerely,        Fern Mondragon PA-C    Electronically signed

## 2025-01-22 ENCOUNTER — TELEPHONE (OUTPATIENT)
Dept: FAMILY MEDICINE | Facility: CLINIC | Age: 50
End: 2025-01-22
Payer: COMMERCIAL

## 2025-01-22 NOTE — TELEPHONE ENCOUNTER
Patient Quality Outreach    Patient is due for the following:   Colon Cancer Screening    Action(s) Taken:   Patient has upcoming appointment, these items will be addressed at that time.    Type of outreach:    Chart review performed, no outreach needed.    Questions for provider review:    None           Rea Lucia MA

## 2025-01-25 ENCOUNTER — HEALTH MAINTENANCE LETTER (OUTPATIENT)
Age: 50
End: 2025-01-25

## 2025-02-04 ENCOUNTER — LAB (OUTPATIENT)
Dept: LAB | Facility: CLINIC | Age: 50
End: 2025-02-04
Payer: COMMERCIAL

## 2025-02-04 DIAGNOSIS — C64.9 RENAL CELL CARCINOMA, UNSPECIFIED LATERALITY (H): ICD-10-CM

## 2025-02-04 LAB
BASOPHILS # BLD AUTO: 0 10E3/UL (ref 0–0.2)
BASOPHILS NFR BLD AUTO: 0 %
EOSINOPHIL # BLD AUTO: 0.5 10E3/UL (ref 0–0.7)
EOSINOPHIL NFR BLD AUTO: 7 %
ERYTHROCYTE [DISTWIDTH] IN BLOOD BY AUTOMATED COUNT: 12.6 % (ref 10–15)
HCT VFR BLD AUTO: 42.2 % (ref 40–53)
HGB BLD-MCNC: 14.5 G/DL (ref 13.3–17.7)
IMM GRANULOCYTES # BLD: 0 10E3/UL
IMM GRANULOCYTES NFR BLD: 0 %
LYMPHOCYTES # BLD AUTO: 2.3 10E3/UL (ref 0.8–5.3)
LYMPHOCYTES NFR BLD AUTO: 29 %
MCH RBC QN AUTO: 29.5 PG (ref 26.5–33)
MCHC RBC AUTO-ENTMCNC: 34.4 G/DL (ref 31.5–36.5)
MCV RBC AUTO: 86 FL (ref 78–100)
MONOCYTES # BLD AUTO: 0.7 10E3/UL (ref 0–1.3)
MONOCYTES NFR BLD AUTO: 9 %
NEUTROPHILS # BLD AUTO: 4.2 10E3/UL (ref 1.6–8.3)
NEUTROPHILS NFR BLD AUTO: 54 %
PLATELET # BLD AUTO: 211 10E3/UL (ref 150–450)
RBC # BLD AUTO: 4.92 10E6/UL (ref 4.4–5.9)
WBC # BLD AUTO: 7.7 10E3/UL (ref 4–11)

## 2025-02-04 PROCEDURE — 85025 COMPLETE CBC W/AUTO DIFF WBC: CPT

## 2025-02-04 PROCEDURE — 80053 COMPREHEN METABOLIC PANEL: CPT

## 2025-02-04 PROCEDURE — 36415 COLL VENOUS BLD VENIPUNCTURE: CPT

## 2025-02-05 LAB
ALBUMIN SERPL BCG-MCNC: 4.7 G/DL (ref 3.5–5.2)
ALP SERPL-CCNC: 63 U/L (ref 40–150)
ALT SERPL W P-5'-P-CCNC: 89 U/L (ref 0–70)
ANION GAP SERPL CALCULATED.3IONS-SCNC: 15 MMOL/L (ref 7–15)
AST SERPL W P-5'-P-CCNC: 46 U/L (ref 0–45)
BILIRUB SERPL-MCNC: 0.4 MG/DL
BUN SERPL-MCNC: 17.5 MG/DL (ref 6–20)
CALCIUM SERPL-MCNC: 9.4 MG/DL (ref 8.8–10.4)
CHLORIDE SERPL-SCNC: 105 MMOL/L (ref 98–107)
CREAT SERPL-MCNC: 1.35 MG/DL (ref 0.67–1.17)
EGFRCR SERPLBLD CKD-EPI 2021: 64 ML/MIN/1.73M2
GLUCOSE SERPL-MCNC: 92 MG/DL (ref 70–99)
HCO3 SERPL-SCNC: 20 MMOL/L (ref 22–29)
POTASSIUM SERPL-SCNC: 4.3 MMOL/L (ref 3.4–5.3)
PROT SERPL-MCNC: 7.6 G/DL (ref 6.4–8.3)
SODIUM SERPL-SCNC: 140 MMOL/L (ref 135–145)

## 2025-02-09 NOTE — PROGRESS NOTES
Virtual Visit Details    Type of service:  Video Visit   Originating Location (pt. Location):  Home    Distant Location (provider location):  [facility]  Platform used for Video Visit:  Ko    -----------------------------------------------------------------------------------------------------------     FOLLOW UP VISIT  -  TELEMEDICINE         Reason for Visit:  BAP1-positive renal cell carcinoma (pT3a R0) s/p one year of adjuvant pembrolizumab; follow up visit.     History of Present Illness:  Mr. Avila is a 49-year-old man who presented with hematuria in 10/2021.  CT imaging (10/21/2021) revealed a large mass at the upper pole of the left kidney, without evidence of local or distant metastatic disease.  On 12/21/2021, he had a left radical nephrectomy which showed a 5.4 cm clear-cell renal cell carcinoma (no sarcomatoid or rhabdoid features), nucleolar grade 3, with tumor extending into the calyceal system.  His pathological stage was pT3a G3 R0 kidney cancer.  The patient elected to receive adjuvant pembrolizumab treatment, which he started in 2/2022.  He completed one year (17 doses) of adjuvant pembrolizumab on 1/11/2023.       Twenty-two months ago, the patient developed new shortness of breath on exertion (and even at rest), as well as significant fatigue.  He also reported the onset of a new dry cough, which is significant because the patient has never been a cougher before.  I ordered a CT scan (4/2023), which showed evidence of pneumonitis which could potentially represent immune-related pneumonitis.  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  His prednisone has been tapered off.  We also took the opportunity to discuss his recent CT scan (11/1/24), which shows no evidence of recurrent or metastatic disease.     Review of systems:  The patient reports a continued improvement of his dyspnea and his  cough has completely resolved.  He did not notice any worsening of his respiratory symptoms after decreasing his prednisone down to 2.5 mg.  He has been able to mow his lawn without feeling short of breath, although he did cough once or twice during that activity.  He also reports ongoing insomnia, which has recently gotten worse and he will try taking melatonin for this.  He continues to have dry skin at baseline.  Does not moisturize regularly.  No vitiligo.  No peripheral edema.  Working on eliminating sugars, and also has occasional 36-hour fasts.  Eats well otherwise.  Reports good oral hydration.  Weight stable.  No chest pain, shortness, or cough.  No abdominal pain, diarrhea, or constipation.  No anorexia.  No vomiting or nausea.  No urinary changes.  No mucositis.   A comprehensive 14-system review of symptoms is otherwise generally unremarkable.  His ECOG score is 0.  His pain score is 1/10.        Physical Examination:  Mr. Avila is a 49-year-old man who appears comfortable at rest.  He was not visibly short of breat at rest.  He did not cough during our encounter today.  His vital signs are generally within normal limits.  The HEENT examination is unremarkable.  There is no palpable supraclavicular, cervical, axillary or inguinal lymphadenopathy.  The cardiovascular, respiratory, and gastrointestinal examinations are generally within normal limits.  The neuromuscular examination is grossly intact.  The extremities do not demonstrate pitting edema.  The skin evaluation is generally unremarkable.    CT scan (5/1/2024):  Stable exam without evidence for new metastatic disease.  Few stable pulmonary nodules.  Previous noted ill-defined groundglass opacity is less prominent at the right middle lobe suggesting an infectious or inflammatory etiology.  Hepatic steatosis.  Stable mildly prominent right hilar lymph nodes.    CT scan (11/1/2024):  No evidence of progressive metastatic disease within the chest,  abdomen or pelvis.  No focal consolidation or pleural effusion. Calcified granuloma. Similar right upper lobe 3 mm nodule. Similar faint groundglass in the right middle lobe. No new or growing suspicious pulmonary nodule.  Hepatic steatosis.     Laboratories (2/4/2025):  Creatinine 1.35 mg/dL. Estimated GFR 64 mL/min. Rest of CBC and CMP are within normal limits, except for slight elevations of AST (46 U/L) and ALT (89 U/L) levels. TSH is normal.        ASSESSMENT AND PLAN:  Mr. 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  He began adjuvant therapy in 2/2022, and returns today for a follow-up visit having recently completed one year of adjuvant pembrolizumab treatment on 1/11/2023.  His ECOG score is 0.  His pain score is 1/10.     After nephrectomy, the patient received one year of adjuvant pembrolizumab treatment according to the KeyNote-564 trial. The pembrolizumab was delivered at a dose of 200 mg IV every 3 weeks, for a total of 17 cycles (one year).  His last immunotherapy dose was on 1/11/2023.  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      As mentioned above, 22 months ago the patient reported subacute onset of a dry cough, associated with significant fatigue and shortness of breath on exertion.  He was not aware of any recent COVID contacts.  He did not report fevers, chills or arthralgias.  Given prior pembrolizumab treatment, the rare but serious possibility of immune-related pneumonitis needed to be excluded.  Alternative diagnoses included viral or bacterial pneumonias.  His CT scan suggested a range of possible diagnoses, including an atypical pneumonia or an immune-related pneumonitis or progression of pulmonary metastases.  Of these, the potential for immune-related pneumonitis (from  pembrolizumab) was clearly the most urgent.  Thus, I decided to manage him empirically for immune-related pulmonary toxicity.  To this end, I previously prescribed high-dose oral steroid (prednisone 80 mg) followed by a potential taper after that time if he reported a symptomatic improvement in his dyspnea and cough.  He has been on a slow steroid taper over the last nine months.  He has been able to come off the prednisone completely as of 8/30/2023.  Fortunately, he has not noticed any recurrence of pulmonary symptoms despite stopping prednisone.  Of note, his LTFs today are slightly elevated, and we will need to monitor these moving forward to make sure that he is not developing an immune hepatitis.    I also took some time today to discuss the results of his recent CT scan dated 11/1/2024.  Fortunately, this shows a resolving infectious/ inflammatory process in the lungs.  Furthermore, there is no evidence of local kidney cancer recurrence or metastatic disease; thus, he remains TONYA at this time.  Moving forward, he should undergo CT imaging once every 6 months (next scan is due in early May 2025).  Due to the BAP1 germline mutation, we will also order a brain MRI scan in May 2025.  In addition, I would like to see him approximately every 3 months although we can certainly alternate in-person with virtual visits.  The patient was given the opportunity to ask multiple questions today, all of which were answered to his satisfaction.     A total of 40 minutes were spent on this patient on the date of the encounter conducting chart review, review of test results, interpretation of tests, patient visit, documentation and discussion with other provider(s). The patient was given the opportunity to ask multiple questions today, all of which were answered to his satisfaction.     Bart Barahona M.D.

## 2025-02-10 ENCOUNTER — VIRTUAL VISIT (OUTPATIENT)
Dept: ONCOLOGY | Facility: CLINIC | Age: 50
End: 2025-02-10
Attending: INTERNAL MEDICINE
Payer: COMMERCIAL

## 2025-02-10 DIAGNOSIS — C64.9 RENAL CELL CARCINOMA, UNSPECIFIED LATERALITY (H): ICD-10-CM

## 2025-02-10 DIAGNOSIS — G47.01 INSOMNIA DUE TO MEDICAL CONDITION: Primary | ICD-10-CM

## 2025-02-10 PROCEDURE — 98007 SYNCH AUDIO-VIDEO EST HI 40: CPT | Performed by: INTERNAL MEDICINE

## 2025-02-10 NOTE — LETTER
2/10/2025      Pierre Avila  [address] [facility] [address]      Dear Colleague,    Thank you for referring your patient, Pierre Avila, to the [facility]. Please see a copy of my visit note below.      Virtual Visit Details    Type of service:  Video Visit   Originating Location (pt. Location):  Home    Distant Location (provider location):  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     History of Present Illness:  Mr. Avila is a 49-year-old man who presented with hematuria in 10/2021.  CT imaging (10/21/2021) revealed a large mass at the upper pole of the left kidney, without evidence of local or distant metastatic disease.  On 12/21/2021, he had a left radical nephrectomy which showed a 5.4 cm clear-cell renal cell carcinoma (no sarcomatoid or rhabdoid features), nucleolar grade 3, with tumor extending into the calyceal system.  His pathological stage was pT3a G3 R0 kidney cancer.  The patient elected to receive adjuvant pembrolizumab treatment, which he started in 2/2022.  He completed one year (17 doses) of adjuvant pembrolizumab on 1/11/2023.       Twenty-two months ago, the patient developed new shortness of breath on exertion (and even at rest), as well as significant fatigue.  He also reported the onset of a new dry cough, which is significant because the patient has never been a cougher before.  I ordered a CT scan (4/2023), which showed evidence of pneumonitis which could potentially represent immune-related pneumonitis.  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  His prednisone has  been tapered off.  We also took the opportunity to discuss his recent CT scan (11/1/24), which shows no evidence of recurrent or metastatic disease.     Review of systems:  The patient reports a continued improvement of his dyspnea and his cough has completely resolved.  He did not notice any worsening of his respiratory symptoms after decreasing his prednisone down to 2.5 mg.  He has been able to mow his lawn without feeling short of breath, although he did cough once or twice during that activity.  He also reports ongoing insomnia, which has recently gotten worse and he will try taking melatonin for this.  He continues to have dry skin at baseline.  Does not moisturize regularly.  No vitiligo.  No peripheral edema.  Working on eliminating sugars, and also has occasional 36-hour fasts.  Eats well otherwise.  Reports good oral hydration.  Weight stable.  No chest pain, shortness, or cough.  No abdominal pain, diarrhea, or constipation.  No anorexia.  No vomiting or nausea.  No urinary changes.  No mucositis.   A comprehensive 14-system review of symptoms is otherwise generally unremarkable.  His ECOG score is 0.  His pain score is 1/10.        Physical Examination:  Mr. Avila is a 49-year-old man who appears comfortable at rest.  He was not visibly short of breat at rest.  He did not cough during our encounter today.  His vital signs are generally within normal limits.  The HEENT examination is unremarkable.  There is no palpable supraclavicular, cervical, axillary or inguinal lymphadenopathy.  The cardiovascular, respiratory, and gastrointestinal examinations are generally within normal limits.  The neuromuscular examination is grossly intact.  The extremities do not demonstrate pitting edema.  The skin evaluation is generally unremarkable.    CT scan (5/1/2024):  Stable exam without evidence for new metastatic disease.  Few stable pulmonary nodules.  Previous noted ill-defined groundglass opacity is less  prominent at the right middle lobe suggesting an infectious or inflammatory etiology.  Hepatic steatosis.  Stable mildly prominent right hilar lymph nodes.    CT scan (11/1/2024):  No evidence of progressive metastatic disease within the chest, abdomen or pelvis.  No focal consolidation or pleural effusion. Calcified granuloma. Similar right upper lobe 3 mm nodule. Similar faint groundglass in the right middle lobe. No new or growing suspicious pulmonary nodule.  Hepatic steatosis.     Laboratories (2/4/2025):  Creatinine 1.35 mg/dL. Estimated GFR 64 mL/min. Rest of CBC and CMP are within normal limits, except for slight elevations of AST (46 U/L) and ALT (89 U/L) levels. TSH is normal.        ASSESSMENT AND PLAN:  Mr. 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  He began adjuvant therapy in 2/2022, and returns today for a follow-up visit having recently completed one year of adjuvant pembrolizumab treatment on 1/11/2023.  His ECOG score is 0.  His pain score is 1/10.     After nephrectomy, the patient received one year of adjuvant pembrolizumab treatment according to the KeyNote-564 trial. The pembrolizumab was delivered at a dose of 200 mg IV every 3 weeks, for a total of 17 cycles (one year).  His last immunotherapy dose was on 1/11/2023.  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      As mentioned above, 22 months ago the patient reported subacute onset of a dry cough, associated with significant fatigue and shortness of breath on exertion.  He was not aware of any recent COVID contacts.  He did not report fevers, chills or arthralgias.  Given prior pembrolizumab treatment, the rare but serious possibility of immune-related pneumonitis needed to be excluded.  Alternative diagnoses included viral or bacterial  pneumonias.  His CT scan suggested a range of possible diagnoses, including an atypical pneumonia or an immune-related pneumonitis or progression of pulmonary metastases.  Of these, the potential for immune-related pneumonitis (from pembrolizumab) was clearly the most urgent.  Thus, I decided to manage him empirically for immune-related pulmonary toxicity.  To this end, I previously prescribed high-dose oral steroid (prednisone 80 mg) followed by a potential taper after that time if he reported a symptomatic improvement in his dyspnea and cough.  He has been on a slow steroid taper over the last nine months.  He has been able to come off the prednisone completely as of 8/30/2023.  Fortunately, he has not noticed any recurrence of pulmonary symptoms despite stopping prednisone.  Of note, his LTFs today are slightly elevated, and we will need to monitor these moving forward to make sure that he is not developing an immune hepatitis.    I also took some time today to discuss the results of his recent CT scan dated 11/1/2024.  Fortunately, this shows a resolving infectious/ inflammatory process in the lungs.  Furthermore, there is no evidence of local kidney cancer recurrence or metastatic disease; thus, he remains TONYA at this time.  Moving forward, he should undergo CT imaging once every 6 months (next scan is due in early May 2025).  Due to the BAP1 germline mutation, we will also order a brain MRI scan in May 2025.  In addition, I would like to see him approximately every 3 months although we can certainly alternate in-person with virtual visits.  The patient was given the opportunity to ask multiple questions today, all of which were answered to his satisfaction.     A total of 40 minutes were spent on this patient on the date of the encounter conducting chart review, review of test results, interpretation of tests, patient visit, documentation and discussion with other provider(s). The patient was given the  opportunity to ask multiple questions today, all of which were answered to his satisfaction.     Bart Barahona M.D.      Again, thank you for allowing me to participate in the care of your patient.        Sincerely,        Bart Barahona MD    Electronically signed

## 2025-02-10 NOTE — NURSING NOTE
Current patient location: [address] [place] [id number]    Is the patient currently in the state of [place]? YES    Visit mode: VIDEO    If the visit is dropped, the patient can be reconnected by:VIDEO VISIT: Text to cell phone:   Telephone Information:   Mobile [phone]       Will anyone else be joining the visit? NO  (If patient encounters technical issues they should call [phone])    Are changes needed to the allergy or medication list? Pt stated no changes to allergies and Pt stated no med changes    Are refills needed on medications prescribed by this physician? NO    Rooming Documentation:  Questionnaire(s) completed    Reason for visit: RECHECK    Mariama ROMERO

## 2025-04-23 ENCOUNTER — TELEPHONE (OUTPATIENT)
Dept: FAMILY MEDICINE | Facility: CLINIC | Age: 50
End: 2025-04-23
Payer: COMMERCIAL

## 2025-04-23 NOTE — TELEPHONE ENCOUNTER
Patient Quality Outreach    Patient is due for the following:   Colon Cancer Screening    Action(s) Taken:   Patient has upcoming appointment, these items will be addressed at that time.    Type of outreach:    Chart review performed, no outreach needed.    Questions for provider review:    None         Rea Lucia MA  Chart routed to None.

## 2025-05-01 ENCOUNTER — LAB (OUTPATIENT)
Dept: LAB | Facility: CLINIC | Age: 50
End: 2025-05-01
Payer: COMMERCIAL

## 2025-05-01 DIAGNOSIS — C64.9 RENAL CELL CARCINOMA, UNSPECIFIED LATERALITY (H): ICD-10-CM

## 2025-05-01 LAB
BASOPHILS # BLD AUTO: 0.1 10E3/UL (ref 0–0.2)
BASOPHILS NFR BLD AUTO: 1 %
EOSINOPHIL # BLD AUTO: 0.6 10E3/UL (ref 0–0.7)
EOSINOPHIL NFR BLD AUTO: 7 %
ERYTHROCYTE [DISTWIDTH] IN BLOOD BY AUTOMATED COUNT: 12.5 % (ref 10–15)
HCT VFR BLD AUTO: 41.5 % (ref 40–53)
HGB BLD-MCNC: 14.5 G/DL (ref 13.3–17.7)
IMM GRANULOCYTES # BLD: 0 10E3/UL
IMM GRANULOCYTES NFR BLD: 1 %
LYMPHOCYTES # BLD AUTO: 2.4 10E3/UL (ref 0.8–5.3)
LYMPHOCYTES NFR BLD AUTO: 28 %
MCH RBC QN AUTO: 29.7 PG (ref 26.5–33)
MCHC RBC AUTO-ENTMCNC: 34.9 G/DL (ref 31.5–36.5)
MCV RBC AUTO: 85 FL (ref 78–100)
MONOCYTES # BLD AUTO: 0.7 10E3/UL (ref 0–1.3)
MONOCYTES NFR BLD AUTO: 8 %
NEUTROPHILS # BLD AUTO: 4.7 10E3/UL (ref 1.6–8.3)
NEUTROPHILS NFR BLD AUTO: 55 %
PLATELET # BLD AUTO: 219 10E3/UL (ref 150–450)
RBC # BLD AUTO: 4.89 10E6/UL (ref 4.4–5.9)
WBC # BLD AUTO: 8.4 10E3/UL (ref 4–11)

## 2025-05-06 NOTE — PROGRESS NOTES
Virtual Visit Details    Type of service:  Video Visit   Originating Location (pt. Location):  Home    Distant Location (provider location):  [facility] [place]  Platform used for Video Visit:  Ko    -----------------------------------------------------------------------------------------------------------     FOLLOW UP VISIT  -  TELEMEDICINE       Reason for Visit:  BAP1-positive renal cell carcinoma (pT3a R0) s/p one year of adjuvant pembrolizumab; follow up visit.     History of Present Illness:  Mr. Avila is a 49-year-old man who presented with hematuria in 10/2021.  CT imaging (10/21/2021) revealed a large mass at the upper pole of the left kidney, without evidence of local or distant metastatic disease.  On 12/21/2021, he had a left radical nephrectomy which showed a 5.4 cm clear-cell renal cell carcinoma (no sarcomatoid or rhabdoid features), nucleolar grade 3, with tumor extending into the calyceal system.  His pathological stage was pT3a G3 R0 kidney cancer.  The patient elected to receive adjuvant pembrolizumab treatment, which he started in 2/2022.  He completed one year (17 doses) of adjuvant pembrolizumab in 2/2023.       Twenty-two months ago, the patient developed new shortness of breath on exertion (and even at rest), as well as significant fatigue.  He also reported the onset of a new dry cough, which is significant because the patient has never been a cougher before.  I ordered a CT scan (4/2023), which showed evidence of pneumonitis which could potentially represent immune-related pneumonitis.  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  His prednisone has been tapered off.  We also took the opportunity to discuss his recent CT scan (5/1/25) and brain MRI scan (5/1/25), both of which show no evidence of recurrent or metastatic disease.     Review of systems:  The patient reports a continued  improvement of his dyspnea and his cough has completely resolved.  He did not notice any worsening of his respiratory symptoms.  He has been able to mow his lawn without feeling short of breath, although he did cough once or twice during that activity.  He also reports ongoing insomnia, which has recently gotten worse and he will try taking melatonin for this.  He continues to have dry skin at baseline.  Does not moisturize regularly.  No vitiligo.  No peripheral edema.  Working on eliminating sugars, and also has occasional 36-hour fasts.  Eats well otherwise.  Reports good oral hydration.  Weight stable.  No chest pain, shortness, or cough.  No abdominal pain, diarrhea, or constipation.  No anorexia.  No vomiting or nausea.  No urinary changes.  No mucositis.   A comprehensive 14-system review of symptoms is otherwise generally unremarkable.  His ECOG score is 0.  His pain score is 1/10.        Physical Examination:  Mr. Avila is a 49-year-old man who appears comfortable at rest.  He was not visibly short of breat at rest.  He did not cough during our encounter today.  His vital signs are generally within normal limits.  The HEENT examination is unremarkable.  There is no palpable supraclavicular, cervical, axillary or inguinal lymphadenopathy.  The cardiovascular, respiratory, and gastrointestinal examinations are generally within normal limits.  The neuromuscular examination is grossly intact.  The extremities do not demonstrate pitting edema.  The skin evaluation is generally unremarkable.    CT scan (5/1/2024):  Stable exam without evidence for new metastatic disease.  Few stable pulmonary nodules.  Previous noted ill-defined groundglass opacity is less prominent at the right middle lobe suggesting an infectious or inflammatory etiology.  Hepatic steatosis.  Stable mildly prominent right hilar lymph nodes.    CT scan (11/1/2024):  No evidence of progressive metastatic disease within the chest, abdomen or  pelvis.  No focal consolidation or pleural effusion. Calcified granuloma. Similar right upper lobe 3 mm nodule. Similar faint groundglass in the right middle lobe. No new or growing suspicious pulmonary nodule.  Hepatic steatosis.    CT scan (5/1/2025):  Post-surgical changes of left nephrectomy.  No evidence of local recurrence.  No evidence of metastatic disease in the chest, abdomen, or pelvis.  Hepatic steatosis.    MRI brain (5/1/2025):  Unremarkable brain MRI for age.  No intracranial lesions identified.      Laboratories (5/1/2025):  Creatinine 1.36 mg/dL. Estimated GFR 64 mL/min. Rest of CBC and CMP are within normal limits, except for slight elevations of AST (37 U/L) and ALT (65 U/L) levels. TSH is normal at 3.82 IU/mL.        ASSESSMENT AND PLAN:  Mr. 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  He began adjuvant therapy in 2/2022, and returns today for a follow-up visit having completed one year of adjuvant pembrolizumab treatment in 2/2023.  His ECOG score is 0.  His pain score is 1/10.     After nephrectomy, the patient received one year of adjuvant pembrolizumab treatment according to the KeyNote-564 trial. The pembrolizumab was delivered at a dose of 200 mg IV every 3 weeks, for a total of 17 cycles (one year).  His last immunotherapy dose was in 2/2023.  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      As mentioned above, 2 years ago the patient reported subacute onset of a dry cough, associated with significant fatigue and shortness of breath on exertion.  He was not aware of any recent COVID contacts.  He did not report fevers, chills or arthralgias.  Given prior pembrolizumab treatment, the rare but serious possibility of immune-related pneumonitis needed to be excluded.  Alternative diagnoses  included viral or bacterial pneumonias.  His CT scan suggested a range of possible diagnoses, including an atypical pneumonia or an immune-related pneumonitis or progression of pulmonary metastases.  Of these, the potential for immune-related pneumonitis (from pembrolizumab) was clearly the most urgent.  Thus, I decided to manage him empirically for immune-related pulmonary toxicity.  To this end, I previously prescribed high-dose oral steroid (prednisone 80 mg) followed by a potential taper after that time if he reported a symptomatic improvement in his dyspnea and cough.  He has been on a slow steroid taper over the last nine months.  He has been able to come off the prednisone completely as of 8/2023.  Fortunately, he has not noticed any recurrence of pulmonary symptoms despite stopping prednisone.  Of note, his LTFs today are slightly elevated, and we will need to monitor these moving forward to make sure that he is not developing an immune hepatitis.    I also took some time today to discuss the results of his recent CT scan dated 5/1/2025.  Fortunately, this shows a resolving infectious/ inflammatory process in the lungs.  Furthermore, there is no evidence of local kidney cancer recurrence or metastatic disease; thus, he remains TONYA at this time.  Moving forward, he should undergo CT imaging once every 6 months (next scan is due in early November 2025).  Due to the BAP1 germline mutation, we will also order a brain MRI scan once every two years.  In addition, I would like to see him approximately every 3 months although we can certainly alternate in-person with virtual visits.  The patient was given the opportunity to ask multiple questions today, all of which were answered to his satisfaction.     A total of 40 minutes were spent on this patient on the date of the encounter conducting chart review, review of test results, interpretation of tests, patient visit, documentation and discussion with other  provider(s).  The patient was given the opportunity to ask multiple questions today, all of which were answered to his satisfaction.     Bart Barahona M.D.

## 2025-05-07 ENCOUNTER — VIRTUAL VISIT (OUTPATIENT)
Dept: ONCOLOGY | Facility: CLINIC | Age: 50
End: 2025-05-07
Attending: INTERNAL MEDICINE
Payer: COMMERCIAL

## 2025-05-07 VITALS — WEIGHT: 210 LBS | HEIGHT: 70 IN | BODY MASS INDEX: 30.06 KG/M2

## 2025-05-07 DIAGNOSIS — C64.9 RENAL CELL CARCINOMA, UNSPECIFIED LATERALITY (H): Primary | ICD-10-CM

## 2025-05-07 ASSESSMENT — PAIN SCALES - GENERAL: PAINLEVEL_OUTOF10: NO PAIN (0)

## 2025-05-07 NOTE — LETTER
5/7/2025      Pierre Avila  [address] [facility] [address]      Dear Colleague,    Thank you for referring your patient, Pierre Avila, to the [facility]. Please see a copy of my visit note below.      Virtual Visit Details    Type of service:  Video Visit   Originating Location (pt. 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     History of Present Illness:  Mr. Avila is a 49-year-old man who presented with hematuria in 10/2021.  CT imaging (10/21/2021) revealed a large mass at the upper pole of the left kidney, without evidence of local or distant metastatic disease.  On 12/21/2021, he had a left radical nephrectomy which showed a 5.4 cm clear-cell renal cell carcinoma (no sarcomatoid or rhabdoid features), nucleolar grade 3, with tumor extending into the calyceal system.  His pathological stage was pT3a G3 R0 kidney cancer.  The patient elected to receive adjuvant pembrolizumab treatment, which he started in 2/2022.  He completed one year (17 doses) of adjuvant pembrolizumab in 2/2023.       Twenty-two months ago, the patient developed new shortness of breath on exertion (and even at rest), as well as significant fatigue.  He also reported the onset of a new dry cough, which is significant because the patient has never been a cougher before.  I ordered a CT scan (4/2023), which showed evidence of pneumonitis which could potentially represent immune-related pneumonitis.  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  His prednisone has been  tapered off.  We also took the opportunity to discuss his recent CT scan (5/1/25) and brain MRI scan (5/1/25), both of which show no evidence of recurrent or metastatic disease.     Review of systems:  The patient reports a continued improvement of his dyspnea and his cough has completely resolved.  He did not notice any worsening of his respiratory symptoms.  He has been able to mow his lawn without feeling short of breath, although he did cough once or twice during that activity.  He also reports ongoing insomnia, which has recently gotten worse and he will try taking melatonin for this.  He continues to have dry skin at baseline.  Does not moisturize regularly.  No vitiligo.  No peripheral edema.  Working on eliminating sugars, and also has occasional 36-hour fasts.  Eats well otherwise.  Reports good oral hydration.  Weight stable.  No chest pain, shortness, or cough.  No abdominal pain, diarrhea, or constipation.  No anorexia.  No vomiting or nausea.  No urinary changes.  No mucositis.   A comprehensive 14-system review of symptoms is otherwise generally unremarkable.  His ECOG score is 0.  His pain score is 1/10.        Physical Examination:  Mr. Avila is a 49-year-old man who appears comfortable at rest.  He was not visibly short of breat at rest.  He did not cough during our encounter today.  His vital signs are generally within normal limits.  The HEENT examination is unremarkable.  There is no palpable supraclavicular, cervical, axillary or inguinal lymphadenopathy.  The cardiovascular, respiratory, and gastrointestinal examinations are generally within normal limits.  The neuromuscular examination is grossly intact.  The extremities do not demonstrate pitting edema.  The skin evaluation is generally unremarkable.    CT scan (5/1/2024):  Stable exam without evidence for new metastatic disease.  Few stable pulmonary nodules.  Previous noted ill-defined groundglass opacity is less prominent at the right  middle lobe suggesting an infectious or inflammatory etiology.  Hepatic steatosis.  Stable mildly prominent right hilar lymph nodes.    CT scan (11/1/2024):  No evidence of progressive metastatic disease within the chest, abdomen or pelvis.  No focal consolidation or pleural effusion. Calcified granuloma. Similar right upper lobe 3 mm nodule. Similar faint groundglass in the right middle lobe. No new or growing suspicious pulmonary nodule.  Hepatic steatosis.    CT scan (5/1/2025):  Post-surgical changes of left nephrectomy.  No evidence of local recurrence.  No evidence of metastatic disease in the chest, abdomen, or pelvis.  Hepatic steatosis.    MRI brain (5/1/2025):  Unremarkable brain MRI for age.  No intracranial lesions identified.      Laboratories (5/1/2025):  Creatinine 1.36 mg/dL. Estimated GFR 64 mL/min. Rest of CBC and CMP are within normal limits, except for slight elevations of AST (37 U/L) and ALT (65 U/L) levels. TSH is normal at 3.82 IU/mL.        ASSESSMENT AND PLAN:  Mr. 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  He began adjuvant therapy in 2/2022, and returns today for a follow-up visit having completed one year of adjuvant pembrolizumab treatment in 2/2023.  His ECOG score is 0.  His pain score is 1/10.     After nephrectomy, the patient received one year of adjuvant pembrolizumab treatment according to the KeyNote-564 trial. The pembrolizumab was delivered at a dose of 200 mg IV every 3 weeks, for a total of 17 cycles (one year).  His last immunotherapy dose was in 2/2023.  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      As mentioned above, 2 years ago the patient reported subacute onset of a dry cough, associated with significant fatigue and shortness of breath on exertion.  He was not  aware of any recent COVID contacts.  He did not report fevers, chills or arthralgias.  Given prior pembrolizumab treatment, the rare but serious possibility of immune-related pneumonitis needed to be excluded.  Alternative diagnoses included viral or bacterial pneumonias.  His CT scan suggested a range of possible diagnoses, including an atypical pneumonia or an immune-related pneumonitis or progression of pulmonary metastases.  Of these, the potential for immune-related pneumonitis (from pembrolizumab) was clearly the most urgent.  Thus, I decided to manage him empirically for immune-related pulmonary toxicity.  To this end, I previously prescribed high-dose oral steroid (prednisone 80 mg) followed by a potential taper after that time if he reported a symptomatic improvement in his dyspnea and cough.  He has been on a slow steroid taper over the last nine months.  He has been able to come off the prednisone completely as of 8/2023.  Fortunately, he has not noticed any recurrence of pulmonary symptoms despite stopping prednisone.  Of note, his LTFs today are slightly elevated, and we will need to monitor these moving forward to make sure that he is not developing an immune hepatitis.    I also took some time today to discuss the results of his recent CT scan dated 5/1/2025.  Fortunately, this shows a resolving infectious/ inflammatory process in the lungs.  Furthermore, there is no evidence of local kidney cancer recurrence or metastatic disease; thus, he remains TONYA at this time.  Moving forward, he should undergo CT imaging once every 6 months (next scan is due in early November 2025).  Due to the BAP1 germline mutation, we will also order a brain MRI scan once every two years.  In addition, I would like to see him approximately every 3 months although we can certainly alternate in-person with virtual visits.  The patient was given the opportunity to ask multiple questions today, all of which were answered to his  satisfaction.     A total of 40 minutes were spent on this patient on the date of the encounter conducting chart review, review of test results, interpretation of tests, patient visit, documentation and discussion with other provider(s).  The patient was given the opportunity to ask multiple questions today, all of which were answered to his satisfaction.     Bart Barahona M.D.      Again, thank you for allowing me to participate in the care of your patient.        Sincerely,        Bart Barahona MD    Electronically signed

## 2025-05-07 NOTE — NURSING NOTE
Is the patient currently in the state of [place]? YES    Visit mode: VIDEO    If the visit is dropped, the patient can be reconnected by:VIDEO VISIT: Send to e-mail at: [email or web address]    Will anyone else be joining the visit? NO  (If patient encounters technical issues they should call [phone]    Are changes needed to the allergy or medication list? No    Are refills needed on medications prescribed by this physician? NO    Rooming Documentation:  Questionnaire(s) completed    Reason for visit: Video Visit (Follow up )    Brigida ROMERO

## 2025-05-13 NOTE — PROGRESS NOTES
05/13/25    Team Meeting   Meeting Type Daily Rounds   Initial Conference Date 05/13/25   Team Members Present   Team Members Present Physician;Nurse;;Occupational Therapist   Physician Team Member Grecia HOGUE, Corby JAY   Nursing Team Member Fernando  RN   Care Management Team Member Leticia RASHID   OT Team Member Nasir SMITH   Patient/Family Present   Patient Present No   Patient's Family Present No   201 mild anxiety slept visible social D/C today med complaint.     --------------------------------------------------------------------------------------------------------------------------------------------------------------    FOLLOW UP VISIT  -  TELEMEDICINE     Reason for Visit:  Renal cell carcinoma (pT3a R0) s/p one year of adjuvant pembrolizumab; concern for new pneumonitis; on prednisone 40 mg daily.     History of Present Illness:  Mr. Avila is a 47-year-old man who presented with hematuria in 10/2021.  CT imaging (10/21/2021) revealed a large mass at the upper pole of the left kidney, without evidence of local or distant metastatic disease.  On 12/21/2021, he had a left radical nephrectomy which showed a 5.4 cm clear-cell renal cell carcinoma (no sarcomatoid or rhabdoid features), nucleolar grade 3, with tumor extending into the calyceal system.  His pathological stage was pT3a G3 R0 kidney cancer.     The patient elected to receive adjuvant pembrolizumab treatment, which he started in 2/2022.  He completed one year (17 doses) of adjuvant pembrolizumab on 1/11/2023.  Last week, the patient informed me of some new shortness of breath on exertion (and even at rest), as well as significant fatigue.  He also reported the onset of a new dry cough, which is significant because the patient has never been a cougher before.  I ordered a CT scan, which is described in more detail below.  In summary, it appears to show evidence of pneumonitis which could potentially represent immune-related pneumonitis.  We conducted a telemedicine visit today to discuss treatment of possible immune-related pulmonary toxicity.  He was started on prednisone 80 mg three weeks ago, and we will plan to reduce his dose to 40 mg today.     Review of systems: The patient reports a continued improvement of his dyspnea and his cough has completely resolved.  He did not notice any worsening of his respiratory symptoms after decreasing his prednisone dose from 80 mg down to 60 mg.  Yesterday, he was able  to mow his lawn without feeling short of breath, although he did cough once or twice during that activity.  He also reports some insomnia, which is probably related to the steroid effect.  He continues to have dry skin at baseline. Does not moisturize regularly. No vitiligo. No peripheral edema. Working on eliminating sugars. Eats well otherwise. Reports good oral hydration. Weight stable. No chest pain, shortness, or cough.  No abdominal pain, diarrhea, or constipation.  No anorexia.  No vomiting or nausea.  No urinary changes.  No mucositis.   A comprehensive 14-system review of symptoms is otherwise generally unremarkable.  His ECOG score is 0.  His pain score is 1/10.        Physical Examination:  Mr. Avila is a 47-year-old man who appears comfortable at rest.  He was not visibly short of breat at rest.  He did not cough during our encounter today.  The remainder of the physical examination was deferred since this was a telemedicine visit.    Previous Imaging (1/30/2023):  His prior CT scan showed no CT-based evidence of metastatic disease in the visualized chest, abdomen, or pelvis.  Overall, this scan was consistent with TONYA.    Current Imaging (4/27/2023):  His recent CT scan shows new finding of patchy bilateral groundglass opacities diffusely, but mainly seen at the lower lungs. Bibasilar atelectasis as well. Previously noted right middle lobe nodule appears to be obscured by atelectasis. However, there are multiple new irregular and indeterminant pulmonary nodules bilaterally. One of the largest regions is somewhat flat measuring 2 cm posterior left upper lobe. Bilobed nodular opacity at the posteromedial left lower lobe is 1.3 cm. There are several other examples also seen at other locations, for example at the right upper lobe. Several slightly larger indeterminant lymph nodes. Right hilar lymph node is 1.2 cm, previously 0.8 cm. Another larger lymph node at the right infrahilar location. Larger  example at the mediastinum anteriorly is 0.8 cm, previously 0.5 cm.  IMPRESSION: New finding of bilateral patchy groundglass airspace opacities that may represent atypical pneumonia versus pneumonitis. New finding of multiple irregular nodules bilaterally that are indeterminate; given the clinical history, metastatic disease remains a possibility; this could also be infectious in etiology. Several larger indeterminant lymph nodes are seen in the mediastinum and hilar locations.        ASSESSMENT AND PLAN:  Mr. Avila is a 47-year-old man with a diagnosis of clear-cell renal cell carcinoma (pT3a G3 R0) who underwent radical nephrectomy on 12/21/2021.  He began adjuvant therapy in 2/2022, and returns today for a follow-up visit having recently completed one year of adjuvant pembrolizumab treatment on 1/11/2023.  He is currently being treated for possible immune-related pneumonitis at this time.  His ECOG score is 0.  His pain score is 1/10.     After nephrectomy, the patient received one year of adjuvant pembrolizumab treatment according to the [employer] trial. The pembrolizumab was delivered at a dose of 200 mg IV every 3 weeks, for a total of 17 cycles (one year).  His last immunotherapy dose was on 1/11/2023.  Over the last three months, he has been doing extremely well.      However, one week ago the patient reports subacute onset of a dry cough, associated with significant fatigue and shortness of breath on exertion.  He is not aware of any recent COVID contacts.  He does not report fevers, chills or arthralgias.  Given the prior pembrolizumab treatment, the rare but serious possibility of immune-related pneumonitis should be excluded.  Alternative diagnoses include viral or bacterial pneumonias.  His CT scan from last week suggests a range of possible diagnoses, including an atypical pneumonia or an immune-related pneumonitis or progression of pulmonary metastases.  Of these possibilities, the potential for  immune-related pneumonitis (from pembrolizumab) is clearly the most urgent.  Thus, I decided to manage him empirically for immune-related pulmonary toxicity.  To this end, I previously prescribed high-dose oral steroid (prednisone 80 mg) for one week followed by a potential taper after that time if he reports a symptomatic improvement in his dyspnea and cough.  We will continue to try to manage him in the outpatient setting for the time being.  We will plan to see each other by video in about 2 weeks.  We will decrease the prednisone dose by 20 mg each week if his condition continues to improve over time.     A total of 40 minutes were spent on this patient on the day of the consultation, of which more than 50% of this time was used for counseling and coordination of care. The patient was given the opportunity to ask multiple questions today, all of which were answered to his satisfaction.     Bart Barahona M.D.

## 2025-05-22 ENCOUNTER — PATIENT OUTREACH (OUTPATIENT)
Dept: CARE COORDINATION | Facility: CLINIC | Age: 50
End: 2025-05-22
Payer: COMMERCIAL

## (undated) DEVICE — DRSG TELFA 3X8" 1238

## (undated) DEVICE — DRSG STERI STRIP 1/2X4" R1547

## (undated) DEVICE — BLADE CLIPPER SGL USE 9680

## (undated) DEVICE — SUCTION MANIFOLD NEPTUNE 2 SYS 4 PORT 0702-020-000

## (undated) DEVICE — DEVICE SUTURE GRASPER TROCAR CLOSURE 14GA PMITCSG

## (undated) DEVICE — ADH LIQUID MASTISOL TOPICAL VIAL 2-3ML 0523-48

## (undated) DEVICE — BASKET NITINOL TIPLESS HALO  1.5FRX120CM 554120

## (undated) DEVICE — SOL WATER IRRIG 1000ML BOTTLE 2F7114

## (undated) DEVICE — PAD CHUX UNDERPAD 30X30"

## (undated) DEVICE — SOL NACL 0.9% IRRIG 3000ML BAG 2B7477

## (undated) DEVICE — ADAPTER SCOPE UROLOK II LF M0067301400

## (undated) DEVICE — GLOVE BIOGEL SKINSENSE PC SZ 7.0 31470

## (undated) DEVICE — DRAPE LEGGINGS CLEAR 8430

## (undated) DEVICE — STPL POWERED ECHELON VASC 35MM PVE35A

## (undated) DEVICE — SHEATH URETERAL ACCESS NAVIGATOR HD 11/13FRX36CM M0062502220

## (undated) DEVICE — SU VICRYL 0 UR-6 27" J603H

## (undated) DEVICE — LINEN TOWEL PACK X5 5464

## (undated) DEVICE — DAVINCI XI MONOPOLAR SCISSORS HOT SHEARS 8MM 470179

## (undated) DEVICE — DRSG PRIMAPORE 02X3" 7133

## (undated) DEVICE — DRSG TEGADERM 4X4 3/4" 1626W

## (undated) DEVICE — JELLY LUBRICATING SURGILUBE 2OZ TUBE

## (undated) DEVICE — RAD RX CONRAY 60% (50ML) CHARGE PER ML

## (undated) DEVICE — TUBING CONMED AIRSEAL SMOKE EVAC INSUFFLATION ASM-EVAC

## (undated) DEVICE — Device

## (undated) DEVICE — DAVINCI XI HANDPIECE ESU VESSEL SEALER 8MM EXT 480422

## (undated) DEVICE — NDL INSUFFLATION 13GA 120MM C2201

## (undated) DEVICE — CATH URETERAL OPEN END 5FRX70CM M0064002010

## (undated) DEVICE — PACK DAVINCI UROL

## (undated) DEVICE — ENDO TROCAR CONMED AIRSEAL BLADELESS 12X120MM IAS12-120LP

## (undated) DEVICE — PACK CYSTO CUSTOM ASC

## (undated) DEVICE — DAVINCI HOT SHEARS TIP COVER  400180

## (undated) DEVICE — CLIP ENDO HEMO-LOC PURPLE LG 544240

## (undated) DEVICE — DAVINCI XI DRIVER NDL LARGE 8MM EXT 471006

## (undated) DEVICE — COVER CAMERA IN-LIGHT DISP LT-C02

## (undated) DEVICE — LINEN TOWEL PACK X6 WHITE 5487

## (undated) DEVICE — LINEN TOWEL PACK X30 5481

## (undated) DEVICE — GUIDEWIRE SENSOR DUAL FLEX STR 0.035"X150CM M0066703080

## (undated) DEVICE — DRAPE C-ARM W/STRAPS 42X72" 07-CA104

## (undated) DEVICE — DAVINCI XI DRAPE COLUMN 470341

## (undated) DEVICE — RX SURGIFLO HEMOSTATIC MATRIX W/THROMBIN 8ML 2994

## (undated) DEVICE — ADH SKIN CLOSURE PREMIERPRO EXOFIN 1.0ML 3470

## (undated) DEVICE — SU PDS II 1 TP-1 54" Z879G

## (undated) DEVICE — DAVINCI XI GRASPER PROGRASP 8MM EXT 471093

## (undated) DEVICE — DAVINCI XI DRAPE ARM 470015

## (undated) DEVICE — DRAPE U SPLIT 74X120" 29440

## (undated) DEVICE — SUCTION MANIFOLD NEPTUNE 2 SYS 1 PORT 702-025-000

## (undated) DEVICE — DRAPE IOBAN INCISE 23X17" 6650EZ

## (undated) DEVICE — SURGICEL HEMOSTAT 4X8" 1952

## (undated) DEVICE — BLADE SWITCH SCISSORS TIP 5MM 89-5100B

## (undated) DEVICE — TUBING SET THERMEDX UROLOGY SGL USE LL0006

## (undated) DEVICE — DAVINCI XI SEAL UNIVERSAL 5-8MM 470361

## (undated) DEVICE — ENDO POUCH UNIVERSAL RETRIEVAL SYSTEM INZII 12/15MM CD004

## (undated) DEVICE — PREP CHLORAPREP 26ML TINTED ORANGE  260815

## (undated) DEVICE — GLOVE PROTEXIS BLUE W/NEU-THERA 7.5  2D73EB75

## (undated) DEVICE — GLOVE PROTEXIS W/NEU-THERA 7.0  2D73TE70

## (undated) DEVICE — SU MONOCRYL 4-0 PS-2 27" UND Y426H

## (undated) DEVICE — SU VICRYL 3-0 SH 27" UND J416H

## (undated) DEVICE — DAVINCI XI ESU BIPOLAR 3MM ENDOWRIST FENESTRATED EXT 471205

## (undated) RX ORDER — GABAPENTIN 300 MG/1
CAPSULE ORAL
Status: DISPENSED
Start: 2021-11-16

## (undated) RX ORDER — PROPOFOL 10 MG/ML
INJECTION, EMULSION INTRAVENOUS
Status: DISPENSED
Start: 2021-11-16

## (undated) RX ORDER — FENTANYL CITRATE 50 UG/ML
INJECTION, SOLUTION INTRAMUSCULAR; INTRAVENOUS
Status: DISPENSED
Start: 2021-12-21

## (undated) RX ORDER — LIDOCAINE HYDROCHLORIDE 20 MG/ML
INJECTION, SOLUTION EPIDURAL; INFILTRATION; INTRACAUDAL; PERINEURAL
Status: DISPENSED
Start: 2021-11-16

## (undated) RX ORDER — ACETAMINOPHEN 325 MG/1
TABLET ORAL
Status: DISPENSED
Start: 2021-12-21

## (undated) RX ORDER — HYDROMORPHONE HYDROCHLORIDE 1 MG/ML
INJECTION, SOLUTION INTRAMUSCULAR; INTRAVENOUS; SUBCUTANEOUS
Status: DISPENSED
Start: 2021-12-21

## (undated) RX ORDER — ACETAMINOPHEN 325 MG/1
TABLET ORAL
Status: DISPENSED
Start: 2021-11-16

## (undated) RX ORDER — PROPOFOL 10 MG/ML
INJECTION, EMULSION INTRAVENOUS
Status: DISPENSED
Start: 2021-12-21

## (undated) RX ORDER — CIPROFLOXACIN 2 MG/ML
INJECTION, SOLUTION INTRAVENOUS
Status: DISPENSED
Start: 2021-11-16

## (undated) RX ORDER — CEFAZOLIN SODIUM 2 G/100ML
INJECTION, SOLUTION INTRAVENOUS
Status: DISPENSED
Start: 2021-12-21

## (undated) RX ORDER — GABAPENTIN 300 MG/1
CAPSULE ORAL
Status: DISPENSED
Start: 2021-12-21

## (undated) RX ORDER — ONDANSETRON 2 MG/ML
INJECTION INTRAMUSCULAR; INTRAVENOUS
Status: DISPENSED
Start: 2021-12-21

## (undated) RX ORDER — LIDOCAINE HYDROCHLORIDE 10 MG/ML
INJECTION, SOLUTION EPIDURAL; INFILTRATION; INTRACAUDAL; PERINEURAL
Status: DISPENSED
Start: 2021-12-21

## (undated) RX ORDER — ALBUMIN, HUMAN INJ 5% 5 %
SOLUTION INTRAVENOUS
Status: DISPENSED
Start: 2021-12-21

## (undated) RX ORDER — DEXAMETHASONE SODIUM PHOSPHATE 4 MG/ML
INJECTION, SOLUTION INTRA-ARTICULAR; INTRALESIONAL; INTRAMUSCULAR; INTRAVENOUS; SOFT TISSUE
Status: DISPENSED
Start: 2021-11-16

## (undated) RX ORDER — LIDOCAINE HYDROCHLORIDE 20 MG/ML
INJECTION, SOLUTION EPIDURAL; INFILTRATION; INTRACAUDAL; PERINEURAL
Status: DISPENSED
Start: 2021-12-21

## (undated) RX ORDER — HEPARIN SODIUM 5000 [USP'U]/.5ML
INJECTION, SOLUTION INTRAVENOUS; SUBCUTANEOUS
Status: DISPENSED
Start: 2021-12-21

## (undated) RX ORDER — ONDANSETRON 2 MG/ML
INJECTION INTRAMUSCULAR; INTRAVENOUS
Status: DISPENSED
Start: 2021-11-16

## (undated) RX ORDER — FUROSEMIDE 10 MG/ML
INJECTION INTRAMUSCULAR; INTRAVENOUS
Status: DISPENSED
Start: 2021-12-21

## (undated) RX ORDER — LIDOCAINE HYDROCHLORIDE 20 MG/ML
JELLY TOPICAL
Status: DISPENSED
Start: 2021-10-20

## (undated) RX ORDER — FENTANYL CITRATE 50 UG/ML
INJECTION, SOLUTION INTRAMUSCULAR; INTRAVENOUS
Status: DISPENSED
Start: 2021-11-16

## (undated) RX ORDER — OXYCODONE HYDROCHLORIDE 5 MG/1
TABLET ORAL
Status: DISPENSED
Start: 2021-11-16

## (undated) RX ORDER — SODIUM CHLORIDE 9 MG/ML
INJECTION, SOLUTION INTRAVENOUS
Status: DISPENSED
Start: 2021-12-21

## (undated) RX ORDER — GLYCOPYRROLATE 0.2 MG/ML
INJECTION, SOLUTION INTRAMUSCULAR; INTRAVENOUS
Status: DISPENSED
Start: 2021-11-16

## (undated) RX ORDER — DEXAMETHASONE SODIUM PHOSPHATE 4 MG/ML
INJECTION, SOLUTION INTRA-ARTICULAR; INTRALESIONAL; INTRAMUSCULAR; INTRAVENOUS; SOFT TISSUE
Status: DISPENSED
Start: 2021-12-21

## (undated) RX ORDER — CIPROFLOXACIN 500 MG/1
TABLET, FILM COATED ORAL
Status: DISPENSED
Start: 2021-10-20

## (undated) RX ORDER — BUPIVACAINE HYDROCHLORIDE 5 MG/ML
INJECTION, SOLUTION PERINEURAL
Status: DISPENSED
Start: 2021-12-21